# Patient Record
Sex: FEMALE | Race: WHITE | NOT HISPANIC OR LATINO | URBAN - METROPOLITAN AREA
[De-identification: names, ages, dates, MRNs, and addresses within clinical notes are randomized per-mention and may not be internally consistent; named-entity substitution may affect disease eponyms.]

---

## 2017-12-30 ENCOUNTER — INPATIENT (INPATIENT)
Facility: HOSPITAL | Age: 77
LOS: 2 days | Discharge: HOME | End: 2018-01-02
Attending: HOSPITALIST

## 2017-12-30 DIAGNOSIS — I48.0 PAROXYSMAL ATRIAL FIBRILLATION: ICD-10-CM

## 2017-12-30 DIAGNOSIS — J10.1 INFLUENZA DUE TO OTHER IDENTIFIED INFLUENZA VIRUS WITH OTHER RESPIRATORY MANIFESTATIONS: ICD-10-CM

## 2018-01-05 DIAGNOSIS — Z86.73 PERSONAL HISTORY OF TRANSIENT ISCHEMIC ATTACK (TIA), AND CEREBRAL INFARCTION WITHOUT RESIDUAL DEFICITS: ICD-10-CM

## 2018-01-05 DIAGNOSIS — I25.10 ATHEROSCLEROTIC HEART DISEASE OF NATIVE CORONARY ARTERY WITHOUT ANGINA PECTORIS: ICD-10-CM

## 2018-01-05 DIAGNOSIS — R50.9 FEVER, UNSPECIFIED: ICD-10-CM

## 2018-01-05 DIAGNOSIS — E78.00 PURE HYPERCHOLESTEROLEMIA, UNSPECIFIED: ICD-10-CM

## 2018-01-05 DIAGNOSIS — R07.9 CHEST PAIN, UNSPECIFIED: ICD-10-CM

## 2018-01-05 DIAGNOSIS — I11.0 HYPERTENSIVE HEART DISEASE WITH HEART FAILURE: ICD-10-CM

## 2018-01-05 DIAGNOSIS — G93.41 METABOLIC ENCEPHALOPATHY: ICD-10-CM

## 2018-01-05 DIAGNOSIS — I48.91 UNSPECIFIED ATRIAL FIBRILLATION: ICD-10-CM

## 2018-01-05 DIAGNOSIS — I50.9 HEART FAILURE, UNSPECIFIED: ICD-10-CM

## 2018-01-05 DIAGNOSIS — Z95.5 PRESENCE OF CORONARY ANGIOPLASTY IMPLANT AND GRAFT: ICD-10-CM

## 2018-01-05 DIAGNOSIS — J44.9 CHRONIC OBSTRUCTIVE PULMONARY DISEASE, UNSPECIFIED: ICD-10-CM

## 2018-01-05 DIAGNOSIS — J44.1 CHRONIC OBSTRUCTIVE PULMONARY DISEASE WITH (ACUTE) EXACERBATION: ICD-10-CM

## 2018-01-05 DIAGNOSIS — M19.90 UNSPECIFIED OSTEOARTHRITIS, UNSPECIFIED SITE: ICD-10-CM

## 2018-11-02 ENCOUNTER — EMERGENCY (EMERGENCY)
Facility: HOSPITAL | Age: 78
LOS: 1 days | Discharge: HOME | End: 2018-11-02
Attending: EMERGENCY MEDICINE | Admitting: EMERGENCY MEDICINE

## 2018-11-02 VITALS
RESPIRATION RATE: 16 BRPM | OXYGEN SATURATION: 95 % | SYSTOLIC BLOOD PRESSURE: 126 MMHG | TEMPERATURE: 98 F | HEART RATE: 50 BPM | DIASTOLIC BLOOD PRESSURE: 81 MMHG

## 2018-11-02 VITALS
RESPIRATION RATE: 17 BRPM | HEIGHT: 60 IN | WEIGHT: 179.9 LBS | DIASTOLIC BLOOD PRESSURE: 65 MMHG | SYSTOLIC BLOOD PRESSURE: 132 MMHG | OXYGEN SATURATION: 92 % | HEART RATE: 52 BPM

## 2018-11-02 DIAGNOSIS — Z88.1 ALLERGY STATUS TO OTHER ANTIBIOTIC AGENTS STATUS: ICD-10-CM

## 2018-11-02 DIAGNOSIS — Z79.899 OTHER LONG TERM (CURRENT) DRUG THERAPY: ICD-10-CM

## 2018-11-02 DIAGNOSIS — I10 ESSENTIAL (PRIMARY) HYPERTENSION: ICD-10-CM

## 2018-11-02 DIAGNOSIS — R10.13 EPIGASTRIC PAIN: ICD-10-CM

## 2018-11-02 DIAGNOSIS — Z88.5 ALLERGY STATUS TO NARCOTIC AGENT: ICD-10-CM

## 2018-11-02 DIAGNOSIS — J45.909 UNSPECIFIED ASTHMA, UNCOMPLICATED: ICD-10-CM

## 2018-11-02 DIAGNOSIS — E11.9 TYPE 2 DIABETES MELLITUS WITHOUT COMPLICATIONS: ICD-10-CM

## 2018-11-02 DIAGNOSIS — R10.32 LEFT LOWER QUADRANT PAIN: ICD-10-CM

## 2018-11-02 DIAGNOSIS — R42 DIZZINESS AND GIDDINESS: ICD-10-CM

## 2018-11-02 LAB
ALBUMIN SERPL ELPH-MCNC: 4.6 G/DL — SIGNIFICANT CHANGE UP (ref 3.5–5.2)
ALP SERPL-CCNC: 52 U/L — SIGNIFICANT CHANGE UP (ref 30–115)
ALT FLD-CCNC: 12 U/L — SIGNIFICANT CHANGE UP (ref 0–41)
ANION GAP SERPL CALC-SCNC: 15 MMOL/L — HIGH (ref 7–14)
APPEARANCE UR: CLEAR — SIGNIFICANT CHANGE UP
AST SERPL-CCNC: 14 U/L — SIGNIFICANT CHANGE UP (ref 0–41)
BASE EXCESS BLDV CALC-SCNC: 3.2 MMOL/L — HIGH (ref -2–2)
BASOPHILS # BLD AUTO: 0.01 K/UL — SIGNIFICANT CHANGE UP (ref 0–0.2)
BASOPHILS NFR BLD AUTO: 0.1 % — SIGNIFICANT CHANGE UP (ref 0–1)
BILIRUB SERPL-MCNC: 0.7 MG/DL — SIGNIFICANT CHANGE UP (ref 0.2–1.2)
BILIRUB UR-MCNC: NEGATIVE — SIGNIFICANT CHANGE UP
BUN SERPL-MCNC: 26 MG/DL — HIGH (ref 10–20)
CA-I SERPL-SCNC: 1.2 MMOL/L — SIGNIFICANT CHANGE UP (ref 1.12–1.3)
CALCIUM SERPL-MCNC: 9.6 MG/DL — SIGNIFICANT CHANGE UP (ref 8.5–10.1)
CHLORIDE SERPL-SCNC: 96 MMOL/L — LOW (ref 98–110)
CO2 SERPL-SCNC: 27 MMOL/L — SIGNIFICANT CHANGE UP (ref 17–32)
COLOR SPEC: YELLOW — SIGNIFICANT CHANGE UP
CREAT SERPL-MCNC: 1.1 MG/DL — SIGNIFICANT CHANGE UP (ref 0.7–1.5)
DIFF PNL FLD: NEGATIVE — SIGNIFICANT CHANGE UP
EOSINOPHIL # BLD AUTO: 0.02 K/UL — SIGNIFICANT CHANGE UP (ref 0–0.7)
EOSINOPHIL NFR BLD AUTO: 0.3 % — SIGNIFICANT CHANGE UP (ref 0–8)
EPI CELLS # UR: ABNORMAL /HPF
GAS PNL BLDV: 139 MMOL/L — SIGNIFICANT CHANGE UP (ref 136–145)
GAS PNL BLDV: SIGNIFICANT CHANGE UP
GLUCOSE SERPL-MCNC: 199 MG/DL — HIGH (ref 70–99)
GLUCOSE UR QL: 250 MG/DL
HCO3 BLDV-SCNC: 30 MMOL/L — HIGH (ref 22–29)
HCT VFR BLD CALC: 39.4 % — SIGNIFICANT CHANGE UP (ref 37–47)
HCT VFR BLDA CALC: 46.4 % — HIGH (ref 34–44)
HGB BLD CALC-MCNC: 15.1 G/DL — SIGNIFICANT CHANGE UP (ref 14–18)
HGB BLD-MCNC: 13.8 G/DL — SIGNIFICANT CHANGE UP (ref 12–16)
IMM GRANULOCYTES NFR BLD AUTO: 0.6 % — HIGH (ref 0.1–0.3)
KETONES UR-MCNC: NEGATIVE — SIGNIFICANT CHANGE UP
LACTATE BLDV-MCNC: 3.7 MMOL/L — HIGH (ref 0.5–1.6)
LEUKOCYTE ESTERASE UR-ACNC: ABNORMAL
LIDOCAIN IGE QN: 50 U/L — SIGNIFICANT CHANGE UP (ref 7–60)
LYMPHOCYTES # BLD AUTO: 2 K/UL — SIGNIFICANT CHANGE UP (ref 1.2–3.4)
LYMPHOCYTES # BLD AUTO: 28.4 % — SIGNIFICANT CHANGE UP (ref 20.5–51.1)
MCHC RBC-ENTMCNC: 32.6 PG — HIGH (ref 27–31)
MCHC RBC-ENTMCNC: 35 G/DL — SIGNIFICANT CHANGE UP (ref 32–37)
MCV RBC AUTO: 93.1 FL — SIGNIFICANT CHANGE UP (ref 81–99)
MONOCYTES # BLD AUTO: 0.42 K/UL — SIGNIFICANT CHANGE UP (ref 0.1–0.6)
MONOCYTES NFR BLD AUTO: 6 % — SIGNIFICANT CHANGE UP (ref 1.7–9.3)
NEUTROPHILS # BLD AUTO: 4.55 K/UL — SIGNIFICANT CHANGE UP (ref 1.4–6.5)
NEUTROPHILS NFR BLD AUTO: 64.6 % — SIGNIFICANT CHANGE UP (ref 42.2–75.2)
NITRITE UR-MCNC: NEGATIVE — SIGNIFICANT CHANGE UP
NRBC # BLD: 0 /100 WBCS — SIGNIFICANT CHANGE UP (ref 0–0)
PCO2 BLDV: 53 MMHG — HIGH (ref 41–51)
PH BLDV: 7.36 — SIGNIFICANT CHANGE UP (ref 7.26–7.43)
PH UR: 7 — SIGNIFICANT CHANGE UP (ref 5–8)
PLATELET # BLD AUTO: 209 K/UL — SIGNIFICANT CHANGE UP (ref 130–400)
PO2 BLDV: 34 MMHG — SIGNIFICANT CHANGE UP (ref 20–40)
POTASSIUM BLDV-SCNC: 4.3 MMOL/L — SIGNIFICANT CHANGE UP (ref 3.3–5.6)
POTASSIUM SERPL-MCNC: 4.5 MMOL/L — SIGNIFICANT CHANGE UP (ref 3.5–5)
POTASSIUM SERPL-SCNC: 4.5 MMOL/L — SIGNIFICANT CHANGE UP (ref 3.5–5)
PROT SERPL-MCNC: 7.9 G/DL — SIGNIFICANT CHANGE UP (ref 6–8)
PROT UR-MCNC: ABNORMAL MG/DL
RBC # BLD: 4.23 M/UL — SIGNIFICANT CHANGE UP (ref 4.2–5.4)
RBC # FLD: 11.8 % — SIGNIFICANT CHANGE UP (ref 11.5–14.5)
SAO2 % BLDV: 58 % — SIGNIFICANT CHANGE UP
SODIUM SERPL-SCNC: 138 MMOL/L — SIGNIFICANT CHANGE UP (ref 135–146)
SP GR SPEC: 1.02 — SIGNIFICANT CHANGE UP (ref 1.01–1.03)
TROPONIN T SERPL-MCNC: <0.01 NG/ML — SIGNIFICANT CHANGE UP
UROBILINOGEN FLD QL: 0.2 MG/DL — SIGNIFICANT CHANGE UP (ref 0.2–0.2)
WBC # BLD: 7.04 K/UL — SIGNIFICANT CHANGE UP (ref 4.8–10.8)
WBC # FLD AUTO: 7.04 K/UL — SIGNIFICANT CHANGE UP (ref 4.8–10.8)
WBC UR QL: SIGNIFICANT CHANGE UP /HPF

## 2018-11-02 RX ORDER — FAMOTIDINE 10 MG/ML
20 INJECTION INTRAVENOUS ONCE
Qty: 0 | Refills: 0 | Status: COMPLETED | OUTPATIENT
Start: 2018-11-02 | End: 2018-11-02

## 2018-11-02 RX ORDER — SODIUM CHLORIDE 9 MG/ML
125 INJECTION INTRAMUSCULAR; INTRAVENOUS; SUBCUTANEOUS
Qty: 0 | Refills: 0 | Status: DISCONTINUED | OUTPATIENT
Start: 2018-11-02 | End: 2018-11-08

## 2018-11-02 RX ADMIN — FAMOTIDINE 20 MILLIGRAM(S): 10 INJECTION INTRAVENOUS at 06:35

## 2018-11-02 RX ADMIN — SODIUM CHLORIDE 1 MILLILITER(S): 9 INJECTION INTRAMUSCULAR; INTRAVENOUS; SUBCUTANEOUS at 06:35

## 2018-11-02 NOTE — ED PROVIDER NOTE - PHYSICAL EXAMINATION
Physical Exam    Vital Signs: I have reviewed the initial vital signs.  Constitutional: well-nourished, appears stated age, no acute distress, seated on stretcher  HEENT: Conjunctiva pink, Sclera clear, PERRLA, EOMI. Mucous membranes moist, no exudates or lesions noted.  Cardiovascular: S1 and S2 present, regular rate, regular rhythm, well-perfused extremities, radial pulses equal and 2+ No peripheral edema  Respiratory: unlabored respiratory effort, clear to auscultation bilaterally no wheezing, rales and rhonchi  Gastrointestinal: soft, TTP in epigastric region and slightly tender in LLQ. Slight distention. no pulsatile mass, active bowel sounds in all 4 quadrants. - CVA tenderness. No rebound tenderness or guarding  Musculoskeletal: supple nontender neck, no midline tenderness, no joint pain  Integumentary: warm, dry, no rash  Neurologic: awake, alert, cranial nerves II-XII grossly intact, extremities’ motor and sensory functions grossly intact  Psychiatric: appropriate mood, appropriate affect

## 2018-11-02 NOTE — ED PROVIDER NOTE - CARE PROVIDER_API CALL
Sandor Covarrubias), Neurology; Neuromuscular Medicine  [address] [place] [address] [id number]  Phone: [phone]  Fax: [phone]

## 2018-11-02 NOTE — ED PROVIDER NOTE - OBJECTIVE STATEMENT
78 year old female with hx of MI, CVA, A fib, DM, HTN, HLD presenting with abdominal pain x 3 days. Patient is presenting to the ED because she was awoken from sleep this morning with nausea, dizziness, and abdominal pain. The pain is located in the epigastric region and does not radiate. She denies vomiting, diarrhea, fever, chest pain, shortness of breath, dysuria. Last bowel movement was hard and yesterday morning. Her daughter out The pain comes and goes and is not associated with eating. Patient had MI and stroke 7 years ago with no residual deficits. No hx abdominal surgeries or cardiac surgeries. Cardiologist is Dr. Morrison and PCP is Dr. Londono.

## 2018-11-02 NOTE — ED PROVIDER NOTE - NS ED ROS FT
Constitutional: (-) fever (-) weakness  Head: (-) trauma  EENT: (-) sore throat  Cardiovascular: (-) chest pain, (-) syncope  Respiratory: (-) cough, (-) shortness of breath  Gastrointestinal: (-) vomiting, (-) diarrhea (+) nausea (+) abd pain  Genitourinary: (-) dysuria  Musculoskeletal: (-) neck pain, (-) back pain, (-) joint pain  Integumentary: (-) rash, (-) edema  Neurological: (-) headache, (+) dizziness  Allergic/Immunologic: (-) pruritus

## 2018-11-02 NOTE — ED PROVIDER NOTE - ATTENDING CONTRIBUTION TO CARE
78 year old female with hx of MI, CVA, A fib, DM, HTN, HLD presenting with abdominal pain x 3 days.  felt dizzy this am. no fever. pt in nad, CNs nml, aaox3, neck sup, no bruits or thrills, ctab, rrr, ab soft, nt, nd, no focal def. no rash. will get labs, ekg, imaging, reassess.

## 2018-11-02 NOTE — ED ADULT NURSE NOTE - PMH
Afib    CVA (cerebral vascular accident)    Diabetes    MI (myocardial infarction) Afib    Asthma    CVA (cerebral vascular accident)    Diabetes    MI (myocardial infarction)

## 2018-11-02 NOTE — ED PROVIDER NOTE - PROGRESS NOTE DETAILS
pt seen and examined with Pa Fellow Shaheed; pt with epigastric and mild LLQ abdominal pain x 3 days; no rebound guarding; labs, ct abdomen/pelvis, UA; s/o to me by dr curiel - abd pain, last night. hx of copd, chf, htn, dm, - abd soft. pending ct. Sign out received from DEONDRE Purcell. Patient now comfortable, improvement in pain. Abd soft. Will f/u ct. Scans negative. Patient continues to feel well. Wants to go home. Will DC with GI/neuro Follow up. Scans negative. Patient continues to feel well. Wants to go home. Patient will f/u GI in [place]. Will DC with neuro Follow up. Gave copy of results to patient.

## 2018-11-02 NOTE — ED PROVIDER NOTE - NSFOLLOWUPINSTRUCTIONS_ED_ALL_ED_FT
Abdominal Pain    Many things can cause abdominal pain. Many times, abdominal pain is not caused by a disease and will improve without treatment. Your health care provider will do a physical exam to determine if there is a dangerous cause of your pain; blood tests and imaging may help determine the cause of your pain. However, in many cases, no cause may be found and you may need further testing as an outpatient. Monitor your abdominal pain for any changes.     SEEK IMMEDIATE MEDICAL CARE IF YOU HAVE ANY OF THE FOLLOWING SYMPTOMS: worsening abdominal pain, uncontrollable vomiting, profuse diarrhea, inability to have bowel movements or pass gas, black or bloody stools, fever accompanying chest pain or back pain, or fainting. These symptoms may represent a serious problem that is an emergency. Do not wait to see if the symptoms will go away. Get medical help right away. Call 911 and do not drive yourself to the hospital.     Dizziness    Dizziness can manifest as a feeling of unsteadiness or light-headedness. You may feel like you are about to faint. This condition can be caused by a number of things, including medicines, dehydration, or illness. Drink enough fluid to keep your urine clear or pale yellow. Do not drink alcohol and limit your caffeine intake. Avoid quick or sudden movements.  Rise slowly from chairs and steady yourself until you feel okay. In the morning, first sit up on the side of the bed.    SEEK IMMEDIATE MEDICAL CARE IF YOU HAVE ANY OF THE FOLLOWING SYMPTOMS: vomiting, changes in your vision or speech, weakness in your arms or legs, trouble speaking or swallowing, chest pain, abdominal pain, shortness of breath, sweating, bleeding, headache, neck pain, or fever.

## 2018-11-02 NOTE — ED ADULT NURSE NOTE - NSIMPLEMENTINTERV_GEN_ALL_ED
Implemented All Universal Safety Interventions:  [place] to call system. Call bell, personal items and telephone within reach. Instruct patient to call for assistance. Room bathroom lighting operational. Non-slip footwear when patient is off stretcher. Physically safe environment: no spills, clutter or unnecessary equipment. Stretcher in lowest position, wheels locked, appropriate side rails in place.

## 2018-11-03 LAB
CULTURE RESULTS: SIGNIFICANT CHANGE UP
SPECIMEN SOURCE: SIGNIFICANT CHANGE UP

## 2019-07-05 ENCOUNTER — INPATIENT (INPATIENT)
Facility: HOSPITAL | Age: 79
LOS: 1 days | Discharge: HOME | End: 2019-07-07
Attending: HOSPITALIST | Admitting: HOSPITALIST
Payer: MEDICARE

## 2019-07-05 VITALS
TEMPERATURE: 98 F | DIASTOLIC BLOOD PRESSURE: 59 MMHG | RESPIRATION RATE: 20 BRPM | SYSTOLIC BLOOD PRESSURE: 104 MMHG | OXYGEN SATURATION: 95 % | HEART RATE: 53 BPM

## 2019-07-05 LAB
ALBUMIN SERPL ELPH-MCNC: 4 G/DL — SIGNIFICANT CHANGE UP (ref 3.5–5.2)
ALP SERPL-CCNC: 53 U/L — SIGNIFICANT CHANGE UP (ref 30–115)
ALT FLD-CCNC: 26 U/L — SIGNIFICANT CHANGE UP (ref 0–41)
ANION GAP SERPL CALC-SCNC: 17 MMOL/L — HIGH (ref 7–14)
APPEARANCE UR: CLEAR — SIGNIFICANT CHANGE UP
APTT BLD: 30.2 SEC — SIGNIFICANT CHANGE UP (ref 27–39.2)
AST SERPL-CCNC: 14 U/L — SIGNIFICANT CHANGE UP (ref 0–41)
BASE EXCESS BLDV CALC-SCNC: 2.7 MMOL/L — HIGH (ref -2–2)
BASOPHILS # BLD AUTO: 0.01 K/UL — SIGNIFICANT CHANGE UP (ref 0–0.2)
BASOPHILS NFR BLD AUTO: 0.1 % — SIGNIFICANT CHANGE UP (ref 0–1)
BILIRUB SERPL-MCNC: 1.5 MG/DL — HIGH (ref 0.2–1.2)
BILIRUB UR-MCNC: NEGATIVE — SIGNIFICANT CHANGE UP
BUN SERPL-MCNC: 25 MG/DL — HIGH (ref 10–20)
CA-I SERPL-SCNC: 1.11 MMOL/L — LOW (ref 1.12–1.3)
CALCIUM SERPL-MCNC: 8.8 MG/DL — SIGNIFICANT CHANGE UP (ref 8.5–10.1)
CHLORIDE SERPL-SCNC: 96 MMOL/L — LOW (ref 98–110)
CO2 SERPL-SCNC: 23 MMOL/L — SIGNIFICANT CHANGE UP (ref 17–32)
COLOR SPEC: SIGNIFICANT CHANGE UP
CREAT SERPL-MCNC: 1 MG/DL — SIGNIFICANT CHANGE UP (ref 0.7–1.5)
DIFF PNL FLD: NEGATIVE — SIGNIFICANT CHANGE UP
EOSINOPHIL # BLD AUTO: 0 K/UL — SIGNIFICANT CHANGE UP (ref 0–0.7)
EOSINOPHIL NFR BLD AUTO: 0 % — SIGNIFICANT CHANGE UP (ref 0–8)
GAS PNL BLDV: 139 MMOL/L — SIGNIFICANT CHANGE UP (ref 136–145)
GAS PNL BLDV: SIGNIFICANT CHANGE UP
GLUCOSE SERPL-MCNC: 405 MG/DL — HIGH (ref 70–99)
GLUCOSE UR QL: 100 MG/DL
HCO3 BLDV-SCNC: 27 MMOL/L — SIGNIFICANT CHANGE UP (ref 22–29)
HCT VFR BLD CALC: 33.6 % — LOW (ref 37–47)
HCT VFR BLDA CALC: 30.1 % — LOW (ref 34–44)
HGB BLD CALC-MCNC: 9.8 G/DL — LOW (ref 14–18)
HGB BLD-MCNC: 11.7 G/DL — LOW (ref 12–16)
IMM GRANULOCYTES NFR BLD AUTO: 0.6 % — HIGH (ref 0.1–0.3)
INR BLD: 1.42 RATIO — HIGH (ref 0.65–1.3)
KETONES UR-MCNC: NEGATIVE — SIGNIFICANT CHANGE UP
LACTATE BLDV-MCNC: 2.2 MMOL/L — HIGH (ref 0.5–1.6)
LEUKOCYTE ESTERASE UR-ACNC: NEGATIVE — SIGNIFICANT CHANGE UP
LYMPHOCYTES # BLD AUTO: 0.86 K/UL — LOW (ref 1.2–3.4)
LYMPHOCYTES # BLD AUTO: 10.4 % — LOW (ref 20.5–51.1)
MAGNESIUM SERPL-MCNC: 2 MG/DL — SIGNIFICANT CHANGE UP (ref 1.8–2.4)
MCHC RBC-ENTMCNC: 32.5 PG — HIGH (ref 27–31)
MCHC RBC-ENTMCNC: 34.8 G/DL — SIGNIFICANT CHANGE UP (ref 32–37)
MCV RBC AUTO: 93.3 FL — SIGNIFICANT CHANGE UP (ref 81–99)
MONOCYTES # BLD AUTO: 0.31 K/UL — SIGNIFICANT CHANGE UP (ref 0.1–0.6)
MONOCYTES NFR BLD AUTO: 3.8 % — SIGNIFICANT CHANGE UP (ref 1.7–9.3)
NEUTROPHILS # BLD AUTO: 7.03 K/UL — HIGH (ref 1.4–6.5)
NEUTROPHILS NFR BLD AUTO: 85.1 % — HIGH (ref 42.2–75.2)
NITRITE UR-MCNC: NEGATIVE — SIGNIFICANT CHANGE UP
NRBC # BLD: 0 /100 WBCS — SIGNIFICANT CHANGE UP (ref 0–0)
NT-PROBNP SERPL-SCNC: 2341 PG/ML — HIGH (ref 0–300)
PCO2 BLDV: 40 MMHG — LOW (ref 41–51)
PH BLDV: 7.44 — HIGH (ref 7.26–7.43)
PH UR: 6 — SIGNIFICANT CHANGE UP (ref 5–8)
PLATELET # BLD AUTO: 213 K/UL — SIGNIFICANT CHANGE UP (ref 130–400)
PO2 BLDV: 64 MMHG — HIGH (ref 20–40)
POTASSIUM BLDV-SCNC: 3.8 MMOL/L — SIGNIFICANT CHANGE UP (ref 3.3–5.6)
POTASSIUM SERPL-MCNC: 3.9 MMOL/L — SIGNIFICANT CHANGE UP (ref 3.5–5)
POTASSIUM SERPL-SCNC: 3.9 MMOL/L — SIGNIFICANT CHANGE UP (ref 3.5–5)
PROT SERPL-MCNC: 6.9 G/DL — SIGNIFICANT CHANGE UP (ref 6–8)
PROT UR-MCNC: NEGATIVE — SIGNIFICANT CHANGE UP
PROTHROM AB SERPL-ACNC: 16.3 SEC — HIGH (ref 9.95–12.87)
RBC # BLD: 3.6 M/UL — LOW (ref 4.2–5.4)
RBC # FLD: 11.8 % — SIGNIFICANT CHANGE UP (ref 11.5–14.5)
SAO2 % BLDV: 94 % — SIGNIFICANT CHANGE UP
SODIUM SERPL-SCNC: 136 MMOL/L — SIGNIFICANT CHANGE UP (ref 135–146)
SP GR SPEC: 1.01 — SIGNIFICANT CHANGE UP (ref 1.01–1.03)
TROPONIN T SERPL-MCNC: <0.01 NG/ML — SIGNIFICANT CHANGE UP
UROBILINOGEN FLD QL: 0.2 — SIGNIFICANT CHANGE UP (ref 0.2–0.2)
WBC # BLD: 8.26 K/UL — SIGNIFICANT CHANGE UP (ref 4.8–10.8)
WBC # FLD AUTO: 8.26 K/UL — SIGNIFICANT CHANGE UP (ref 4.8–10.8)

## 2019-07-05 PROCEDURE — 71045 X-RAY EXAM CHEST 1 VIEW: CPT | Mod: 26

## 2019-07-05 PROCEDURE — 93010 ELECTROCARDIOGRAM REPORT: CPT

## 2019-07-05 PROCEDURE — 99285 EMERGENCY DEPT VISIT HI MDM: CPT

## 2019-07-05 RX ORDER — IPRATROPIUM/ALBUTEROL SULFATE 18-103MCG
3 AEROSOL WITH ADAPTER (GRAM) INHALATION
Refills: 0 | Status: COMPLETED | OUTPATIENT
Start: 2019-07-05 | End: 2019-07-05

## 2019-07-05 RX ORDER — IPRATROPIUM/ALBUTEROL SULFATE 18-103MCG
3 AEROSOL WITH ADAPTER (GRAM) INHALATION ONCE
Refills: 0 | Status: COMPLETED | OUTPATIENT
Start: 2019-07-05 | End: 2019-07-05

## 2019-07-05 RX ADMIN — Medication 3 MILLILITER(S): at 17:37

## 2019-07-05 RX ADMIN — Medication 3 MILLILITER(S): at 18:19

## 2019-07-05 RX ADMIN — Medication 3 MILLILITER(S): at 18:41

## 2019-07-05 RX ADMIN — Medication 125 MILLIGRAM(S): at 19:38

## 2019-07-05 NOTE — H&P ADULT - NSHPPHYSICALEXAM_GEN_ALL_CORE
PHYSICAL EXAM:  GENERAL: NAD, lying in bed comfortably  HEAD:  Atraumatic, Normocephalic  EYES: EOMI, PERRLA, conjunctiva and sclera clear  ENT: Moist mucous membranes  NECK: Supple, No JVD  CHEST/LUNG: Clear to auscultation bilaterally; No rales, rhonchi, wheezing, or rubs. Unlabored respirations  HEART: Regular rate and rhythm; No murmurs, rubs, or gallops  ABDOMEN: Bowel sounds present; Soft, Nontender, Nondistended. No hepatomegally  EXTREMITIES:  2+ Peripheral Pulses, brisk capillary refill. No clubbing, cyanosis, or edema  NERVOUS SYSTEM:  Alert & Oriented X3, speech clear. No deficits   MSK: FROM all 4 extremities, full and equal strength  SKIN: No rashes or lesions GENERAL: NAD, lying in bed comfortably  HEAD: Atraumatic, Normocephalic  EYES: EOMI, PERRLA, conjunctiva pink and cornea white  ENT: Moist mucous membranes  NECK: Supple, No JVD  CHEST/LUNG: Fine crackles on left lower lobes, no wheezing  HEART: Regular rate and rhythm; No murmurs, rubs, or gallops  ABDOMEN: Bowel sounds present; Soft, Nontender, Nondistended. No hepatomegaly  EXTREMITIES:  2+ Peripheral Pulses, brisk capillary refill. 1+ petal edema  NERVOUS SYSTEM: Alert & Oriented X3, speech clear. No deficits   MSK: FROM all 4 extremities, full and equal strength  SKIN: No rashes or lesions

## 2019-07-05 NOTE — H&P ADULT - NSICDXPASTMEDICALHX_GEN_ALL_CORE_FT
PAST MEDICAL HISTORY:  Afib     Asthma     CAD (coronary atherosclerotic disease)     Congestive heart failure Unknown EF    Diabetes

## 2019-07-05 NOTE — H&P ADULT - ASSESSMENT
79 years old female with reported PMHx of COPD, HF with unknown EF, Afib on Eliquis, CVA, Diabetes, compression fracture of L1, CAD with recent cath showing non-obstructive CAD, presented to ED for shortness of breath for the past 2 weeks.    # Acute exacerbation of heart failure with 79 years old female with reported PMHx of COPD, HF with unknown EF, Afib on Eliquis, CVA, Diabetes, compression fracture of L1, CAD with recent cath showing non-obstructive CAD, presented to ED for shortness of breath for the past 2 weeks.    # Acute exacerbation of heart failure with unknown EF  - BNP 2341, CXR shows right sided pleural effusion, pending official read  - Increase Lasix to 40mg BID  - Continue Coreg 12.5mg BID, Losartan 100mg QD  - Repeat ECHO  - Strict I/O, daily weight, fluid restriction < 1L  - Wean off O2 as tolerated    # Chronic COPD  - Stable, no wheezing on exam  - Continue Symbicort and Duoneb    # Paroxysmal atrial fibrillation  - CHADsVASc 4 (Age, Sex, CHF)  - Currently at sinus bradycardia, rate control with Coreg 25mg BID, will decrease to Coreg 12.5 BID  - Continue anticoagulation with Eliquis 2.5mg BID    # T2DM without DKA  - Glucose 405, AG 17, no ketone in urine  - Start insulin basal/bolus  - Check A1C  - Monitor FS    # CAD  - Stable  - Continue Coreg, atorvastatin, and Eliquis    # Compression fracture of L1  - Call family to bring in MRI report  - Consider Neurosx consult if needed      DVT ppx: Eliquis  GI ppx: Not indicated  Diet: DASH, carb consistent, fluid restriction  Activity: ambulate as tolerated  Code status: Full code  Dispo: acute

## 2019-07-05 NOTE — ED PROVIDER NOTE - OBJECTIVE STATEMENT
79 y.o female w/ hx of COPD, DM, CHF, a-fib, COPD presents to the ED for evaluation of dyspnea x 2 weeks.  Admits to baseline dyspnea, but has become worse over past 2 weeks.  Dyspnea worse w/ ambulation, alleviated with rest, mild severity.  No associated chest pain, hemoptysis, edema of lower extremities, calf pain, recent travel, prolonged periods of being sedentary, hx of DVT/PE.  Had recent cardiac cath earlier this year which was WNL per pt. Was told that dyspnea 2/2 COPD and was told to follow w/ pulm but has not.  Per family pt was satting mid 80s on RA after ambulating which concerned them prompting visit to the ED.  Took extra dose of lasix yesterday with some improvement of dyspnea.  + orthopnea.

## 2019-07-05 NOTE — H&P ADULT - NSHPLABSRESULTS_GEN_ALL_CORE
11.7   8.26  )-----------( 213      ( 2019 17:41 )             33.6   07-05    136  |  96<L>  |  25<H>  ----------------------------<  405<H>  3.9   |  23  |  1.0    Ca    8.8      2019 17:41  Mg     2.0     07-05    TPro  6.9  /  Alb  4.0  /  TBili  1.5<H>  /  DBili  x   /  AST  14  /  ALT  26  /  AlkPhos  53  07-05    Serum Pro-Brain Natriuretic Peptide: 2341 pg/mL (19 @ 17:41)    CARDIAC MARKERS ( 2019 17:41 )  x     / <0.01 ng/mL / x     / x     / x        Urinalysis Basic - ( 2019 21:38 )    Color: Dark Yellow / Appearance: Clear / S.015 / pH: x  Gluc: x / Ketone: Negative  / Bili: Negative / Urobili: 0.2   Blood: x / Protein: Negative / Nitrite: Negative   Leuk Esterase: Negative / RBC: x / WBC x   Sq Epi: x / Non Sq Epi: x / Bacteria: x

## 2019-07-05 NOTE — H&P ADULT - NSHPREVIEWOFSYSTEMS_GEN_ALL_CORE
REVIEW OF SYSTEMS    CONSTITUTIONAL: No weakness, fevers or chills  RESPIRATORY: No cough, wheezing, hemoptysis; No shortness of breath  CARDIOVASCULAR: No chest pain or palpitations  GASTROINTESTINAL: No abdominal or epigastric pain. No nausea, vomiting, or hematemesis; No diarrhea or constipation. No melena or hematochezia.  GENITOURINARY: No dysuria, frequency or hematuria  NEUROLOGICAL: No numbness or weakness  SKIN: No itching, rashes CONSTITUTIONAL: No weakness, fevers or chills  RESPIRATORY: Reports shortness of breath on exertion, No cough, wheezing, hemoptysis;  CARDIOVASCULAR: No chest pain or palpitations  GASTROINTESTINAL: No abdominal or epigastric pain. No nausea, vomiting, or hematemesis; No diarrhea or constipation. No melena or hematochezia.  GENITOURINARY: No dysuria, frequency or hematuria  NEUROLOGICAL: No numbness or weakness  SKIN: No itching, rashes

## 2019-07-05 NOTE — ED PROVIDER NOTE - CLINICAL SUMMARY MEDICAL DECISION MAKING FREE TEXT BOX
Labs with hyperglycemia and AG but no acidosis--pt has been off her DM meds and taking steroids for the past few days, per daughter. Pt slightly improved with nebs/steroids but still with some mild tachypnea and scattered wheezing--saturation is 96% on 3L but drops to 89-90 on RA. As pt had recent cath in [facility] and was told SOB unlikely to be cardiac, will admit for pulm eval.

## 2019-07-05 NOTE — ED ADULT NURSE NOTE - OBJECTIVE STATEMENT
pt 78 y/o female presents ED BIBA c/o SOB for along time worsening today, pt should be on Oxygen but is staying with daughter and does not have O2.

## 2019-07-05 NOTE — ED PROVIDER NOTE - ATTENDING CONTRIBUTION TO CARE
80yo woman h/o HTN, DM, COPD, afib c/o persistent SHANKS x severl months, progressively worsening over the last few weeks. Reports that she has some SOB at baseline (and she is obese), but daughter notes that she has been staying with her over the past few days and she has had a significant decline in her exercise tolerance, is SOB with just walking around apt. Saw her cardiologist within the last couple of months and had a cardiac cath, was told that the dyspnea was unlikely to be cardiac and that she needed pulmonary function testing. She denies chest pain, nausea, vomiting, fever cough. On exam she is nontoxic, but hypoxic to about 90 with minimal exertion, 94-95 on 3L O2. Lungs with decreased BS at bases, CVS1S2 abd obese, soft, NT. Trace b/l edema of the legs. Unclear if sx are pulm vs cardiac in origin, will check labs, CXR, try neb/steroids, likely admit.

## 2019-07-05 NOTE — H&P ADULT - HISTORY OF PRESENT ILLNESS
79 years old female with reported PMHx of COPD, HF with unknown EF, Afib on AC, CVA, Diabetes, CAD with recent cath showing non-obstructive CAD, presented to ED for shortness of breath for the past week.      In ED: Solumedrol 125mg x 1, Duoneb x 3 79 years old female with reported PMHx of COPD, HF with unknown EF, Afib on Eliquis, CVA, Diabetes, compression fracture of L1, CAD with recent cath showing non-obstructive CAD, presented to ED for shortness of breath for the past 2 weeks. Patient has been having exertional dyspnea for the past 2 weeks, usually when she was changing, putting on shoes and she noticed herself requiring more rest on ambulation. Patient also reports increased lower extremity swelling for the past days. Patient reports compliance to diet and medication. Due to worsening shortness of breath and LE swelling, patient's daughter told her to take 2 doses of lasix (80mg total) today but the symptoms persist. Patient denies fever/chills, cough, orthopnea, chest pain, abdominal pain, nausea/vomiting, diarrhea/constipation, or urinary symptoms.       In ED: Solumedrol 125mg x 1, Duoneb x 3 79 years old female with reported PMHx of COPD, HF with unknown EF, Afib on Eliquis, Diabetes, compression fracture of L1, CAD with recent cath showing non-obstructive CAD, presented to ED for shortness of breath for the past 2 weeks. Patient has been having exertional dyspnea for the past 2 weeks, usually when she was changing, putting on shoes and she noticed herself requiring more rest on ambulation. Patient also reports increased lower extremity swelling for the past days. Patient reports compliance to diet and medication. Due to worsening shortness of breath and LE swelling, patient's daughter told her to take 2 doses of lasix (80mg total) today but the symptoms persist. Patient denies fever/chills, cough, orthopnea, chest pain, abdominal pain, nausea/vomiting, diarrhea/constipation, or urinary symptoms.       In ED: Solumedrol 125mg x 1, Duoneb x 3

## 2019-07-05 NOTE — ED PROVIDER NOTE - PHYSICAL EXAMINATION
CONST: Well appearing in NAD  EYES: Sclera and conjunctiva clear.  CARD: Normal S1 S2; Normal rate and rhythm  RESP: + wheezing bilaterally, no accessory muscle use, speaking in full sentences without difficulty, Equal BS B/L, No distress  GI: Soft, non-tender, non-distended.  MS: Normal ROM in all extremities. No edema of lower extremities, no calf pain, radial pulses 2+ bilaterally  SKIN: Warm, dry, no acute rashes. Good turgor  NEURO: A&Ox3, No focal deficits. Strength 5/5 with no sensory deficits. Steady gait

## 2019-07-05 NOTE — ED PROVIDER NOTE - NS ED ROS FT
Constitutional: See HPI.  Eyes: No visual changes, eye pain or discharge.   ENMT: No hearing changes, pain, discharge or infections. No neck pain or stiffness. No limited ROM  Cardiac: + edema. No chest pain with exertion.  Respiratory: + SHANKS. No cough or respiratory distress. No hemoptysis.   GI: No nausea, vomiting, diarrhea or abdominal pain.  : No dysuria, frequency or burning. No Discharge  MS: No myalgia, muscle weakness, joint pain or back pain.  Neuro: No headache or weakness.   Skin: No skin rash.  Except as documented in the HPI, all other systems are negative.

## 2019-07-05 NOTE — ED ADULT NURSE NOTE - NSIMPLEMENTINTERV_GEN_ALL_ED
Implemented All Fall with Harm Risk Interventions:  [place] to call system. Call bell, personal items and telephone within reach. Instruct patient to call for assistance. Room bathroom lighting operational. Non-slip footwear when patient is off stretcher. Physically safe environment: no spills, clutter or unnecessary equipment. Stretcher in lowest position, wheels locked, appropriate side rails in place. Provide visual cue, wrist band, yellow gown, etc. Monitor gait and stability. Monitor for mental status changes and reorient to person, place, and time. Review medications for side effects contributing to fall risk. Reinforce activity limits and safety measures with patient and family. Provide visual clues: red socks.

## 2019-07-06 DIAGNOSIS — Z90.49 ACQUIRED ABSENCE OF OTHER SPECIFIED PARTS OF DIGESTIVE TRACT: Chronic | ICD-10-CM

## 2019-07-06 PROBLEM — E11.9 TYPE 2 DIABETES MELLITUS WITHOUT COMPLICATIONS: Chronic | Status: ACTIVE | Noted: 2018-11-02

## 2019-07-06 PROBLEM — J45.909 UNSPECIFIED ASTHMA, UNCOMPLICATED: Chronic | Status: ACTIVE | Noted: 2018-11-02

## 2019-07-06 LAB
ALBUMIN SERPL ELPH-MCNC: 3.8 G/DL — SIGNIFICANT CHANGE UP (ref 3.5–5.2)
ALP SERPL-CCNC: 54 U/L — SIGNIFICANT CHANGE UP (ref 30–115)
ALT FLD-CCNC: 23 U/L — SIGNIFICANT CHANGE UP (ref 0–41)
ANION GAP SERPL CALC-SCNC: 17 MMOL/L — HIGH (ref 7–14)
AST SERPL-CCNC: 10 U/L — SIGNIFICANT CHANGE UP (ref 0–41)
BASOPHILS # BLD AUTO: 0 K/UL — SIGNIFICANT CHANGE UP (ref 0–0.2)
BASOPHILS NFR BLD AUTO: 0 % — SIGNIFICANT CHANGE UP (ref 0–1)
BILIRUB SERPL-MCNC: 1.4 MG/DL — HIGH (ref 0.2–1.2)
BUN SERPL-MCNC: 34 MG/DL — HIGH (ref 10–20)
CALCIUM SERPL-MCNC: 8.8 MG/DL — SIGNIFICANT CHANGE UP (ref 8.5–10.1)
CHLORIDE SERPL-SCNC: 97 MMOL/L — LOW (ref 98–110)
CO2 SERPL-SCNC: 23 MMOL/L — SIGNIFICANT CHANGE UP (ref 17–32)
CREAT SERPL-MCNC: 1.2 MG/DL — SIGNIFICANT CHANGE UP (ref 0.7–1.5)
EOSINOPHIL # BLD AUTO: 0 K/UL — SIGNIFICANT CHANGE UP (ref 0–0.7)
EOSINOPHIL NFR BLD AUTO: 0 % — SIGNIFICANT CHANGE UP (ref 0–8)
ESTIMATED AVERAGE GLUCOSE: 174 MG/DL — HIGH (ref 68–114)
GLUCOSE BLDC GLUCOMTR-MCNC: 228 MG/DL — HIGH (ref 70–99)
GLUCOSE BLDC GLUCOMTR-MCNC: 254 MG/DL — HIGH (ref 70–99)
GLUCOSE SERPL-MCNC: 414 MG/DL — HIGH (ref 70–99)
HBA1C BLD-MCNC: 7.7 % — HIGH (ref 4–5.6)
HCT VFR BLD CALC: 35.8 % — LOW (ref 37–47)
HGB BLD-MCNC: 12.3 G/DL — SIGNIFICANT CHANGE UP (ref 12–16)
IMM GRANULOCYTES NFR BLD AUTO: 0.6 % — HIGH (ref 0.1–0.3)
LYMPHOCYTES # BLD AUTO: 0.86 K/UL — LOW (ref 1.2–3.4)
LYMPHOCYTES # BLD AUTO: 12.9 % — LOW (ref 20.5–51.1)
MAGNESIUM SERPL-MCNC: 2.3 MG/DL — SIGNIFICANT CHANGE UP (ref 1.8–2.4)
MCHC RBC-ENTMCNC: 32.1 PG — HIGH (ref 27–31)
MCHC RBC-ENTMCNC: 34.4 G/DL — SIGNIFICANT CHANGE UP (ref 32–37)
MCV RBC AUTO: 93.5 FL — SIGNIFICANT CHANGE UP (ref 81–99)
MONOCYTES # BLD AUTO: 0.09 K/UL — LOW (ref 0.1–0.6)
MONOCYTES NFR BLD AUTO: 1.3 % — LOW (ref 1.7–9.3)
NEUTROPHILS # BLD AUTO: 5.68 K/UL — SIGNIFICANT CHANGE UP (ref 1.4–6.5)
NEUTROPHILS NFR BLD AUTO: 85.2 % — HIGH (ref 42.2–75.2)
NRBC # BLD: 0 /100 WBCS — SIGNIFICANT CHANGE UP (ref 0–0)
PLATELET # BLD AUTO: 211 K/UL — SIGNIFICANT CHANGE UP (ref 130–400)
POTASSIUM SERPL-MCNC: 3.8 MMOL/L — SIGNIFICANT CHANGE UP (ref 3.5–5)
POTASSIUM SERPL-SCNC: 3.8 MMOL/L — SIGNIFICANT CHANGE UP (ref 3.5–5)
PROT SERPL-MCNC: 7.3 G/DL — SIGNIFICANT CHANGE UP (ref 6–8)
RBC # BLD: 3.83 M/UL — LOW (ref 4.2–5.4)
RBC # FLD: 11.7 % — SIGNIFICANT CHANGE UP (ref 11.5–14.5)
SODIUM SERPL-SCNC: 137 MMOL/L — SIGNIFICANT CHANGE UP (ref 135–146)
WBC # BLD: 6.67 K/UL — SIGNIFICANT CHANGE UP (ref 4.8–10.8)
WBC # FLD AUTO: 6.67 K/UL — SIGNIFICANT CHANGE UP (ref 4.8–10.8)

## 2019-07-06 PROCEDURE — 99223 1ST HOSP IP/OBS HIGH 75: CPT | Mod: AI

## 2019-07-06 PROCEDURE — 93306 TTE W/DOPPLER COMPLETE: CPT | Mod: 26

## 2019-07-06 RX ORDER — FUROSEMIDE 40 MG
40 TABLET ORAL
Refills: 0 | Status: DISCONTINUED | OUTPATIENT
Start: 2019-07-06 | End: 2019-07-07

## 2019-07-06 RX ORDER — CARVEDILOL PHOSPHATE 80 MG/1
25 CAPSULE, EXTENDED RELEASE ORAL EVERY 12 HOURS
Refills: 0 | Status: DISCONTINUED | OUTPATIENT
Start: 2019-07-06 | End: 2019-07-06

## 2019-07-06 RX ORDER — CHLORHEXIDINE GLUCONATE 213 G/1000ML
1 SOLUTION TOPICAL
Refills: 0 | Status: DISCONTINUED | OUTPATIENT
Start: 2019-07-06 | End: 2019-07-07

## 2019-07-06 RX ORDER — ROSUVASTATIN CALCIUM 5 MG/1
0 TABLET ORAL
Qty: 0 | Refills: 0 | DISCHARGE

## 2019-07-06 RX ORDER — SODIUM CHLORIDE 9 MG/ML
1000 INJECTION, SOLUTION INTRAVENOUS
Refills: 0 | Status: DISCONTINUED | OUTPATIENT
Start: 2019-07-06 | End: 2019-07-07

## 2019-07-06 RX ORDER — CARVEDILOL PHOSPHATE 80 MG/1
12.5 CAPSULE, EXTENDED RELEASE ORAL EVERY 12 HOURS
Refills: 0 | Status: DISCONTINUED | OUTPATIENT
Start: 2019-07-06 | End: 2019-07-07

## 2019-07-06 RX ORDER — DEXTROSE 50 % IN WATER 50 %
12.5 SYRINGE (ML) INTRAVENOUS ONCE
Refills: 0 | Status: DISCONTINUED | OUTPATIENT
Start: 2019-07-06 | End: 2019-07-07

## 2019-07-06 RX ORDER — DEXTROSE 50 % IN WATER 50 %
15 SYRINGE (ML) INTRAVENOUS ONCE
Refills: 0 | Status: DISCONTINUED | OUTPATIENT
Start: 2019-07-06 | End: 2019-07-07

## 2019-07-06 RX ORDER — LOSARTAN POTASSIUM 100 MG/1
1 TABLET, FILM COATED ORAL
Qty: 0 | Refills: 0 | DISCHARGE

## 2019-07-06 RX ORDER — MAGNESIUM OXIDE 400 MG ORAL TABLET 241.3 MG
400 TABLET ORAL DAILY
Refills: 0 | Status: DISCONTINUED | OUTPATIENT
Start: 2019-07-06 | End: 2019-07-07

## 2019-07-06 RX ORDER — DEXTROSE 50 % IN WATER 50 %
25 SYRINGE (ML) INTRAVENOUS ONCE
Refills: 0 | Status: DISCONTINUED | OUTPATIENT
Start: 2019-07-06 | End: 2019-07-07

## 2019-07-06 RX ORDER — RANOLAZINE 500 MG/1
0 TABLET, FILM COATED, EXTENDED RELEASE ORAL
Qty: 0 | Refills: 0 | DISCHARGE

## 2019-07-06 RX ORDER — IPRATROPIUM/ALBUTEROL SULFATE 18-103MCG
3 AEROSOL WITH ADAPTER (GRAM) INHALATION EVERY 4 HOURS
Refills: 0 | Status: DISCONTINUED | OUTPATIENT
Start: 2019-07-06 | End: 2019-07-07

## 2019-07-06 RX ORDER — FUROSEMIDE 40 MG
1 TABLET ORAL
Qty: 0 | Refills: 0 | DISCHARGE

## 2019-07-06 RX ORDER — GLUCAGON INJECTION, SOLUTION 0.5 MG/.1ML
1 INJECTION, SOLUTION SUBCUTANEOUS ONCE
Refills: 0 | Status: DISCONTINUED | OUTPATIENT
Start: 2019-07-06 | End: 2019-07-07

## 2019-07-06 RX ORDER — ATORVASTATIN CALCIUM 80 MG/1
10 TABLET, FILM COATED ORAL AT BEDTIME
Refills: 0 | Status: DISCONTINUED | OUTPATIENT
Start: 2019-07-06 | End: 2019-07-07

## 2019-07-06 RX ORDER — BUDESONIDE AND FORMOTEROL FUMARATE DIHYDRATE 160; 4.5 UG/1; UG/1
2 AEROSOL RESPIRATORY (INHALATION)
Refills: 0 | Status: DISCONTINUED | OUTPATIENT
Start: 2019-07-06 | End: 2019-07-07

## 2019-07-06 RX ORDER — INSULIN LISPRO 100/ML
4 VIAL (ML) SUBCUTANEOUS
Refills: 0 | Status: DISCONTINUED | OUTPATIENT
Start: 2019-07-06 | End: 2019-07-07

## 2019-07-06 RX ORDER — APIXABAN 2.5 MG/1
2.5 TABLET, FILM COATED ORAL
Refills: 0 | Status: DISCONTINUED | OUTPATIENT
Start: 2019-07-06 | End: 2019-07-07

## 2019-07-06 RX ORDER — INSULIN LISPRO 100/ML
VIAL (ML) SUBCUTANEOUS
Refills: 0 | Status: DISCONTINUED | OUTPATIENT
Start: 2019-07-06 | End: 2019-07-07

## 2019-07-06 RX ORDER — LOSARTAN POTASSIUM 100 MG/1
100 TABLET, FILM COATED ORAL DAILY
Refills: 0 | Status: DISCONTINUED | OUTPATIENT
Start: 2019-07-06 | End: 2019-07-06

## 2019-07-06 RX ORDER — INSULIN HUMAN 100 [IU]/ML
10 INJECTION, SOLUTION SUBCUTANEOUS ONCE
Refills: 0 | Status: COMPLETED | OUTPATIENT
Start: 2019-07-06 | End: 2019-07-06

## 2019-07-06 RX ORDER — INSULIN GLARGINE 100 [IU]/ML
13 INJECTION, SOLUTION SUBCUTANEOUS AT BEDTIME
Refills: 0 | Status: DISCONTINUED | OUTPATIENT
Start: 2019-07-06 | End: 2019-07-07

## 2019-07-06 RX ORDER — FLUTICASONE PROPIONATE 220 MCG
0 AEROSOL WITH ADAPTER (GRAM) INHALATION
Qty: 0 | Refills: 0 | DISCHARGE

## 2019-07-06 RX ORDER — IPRATROPIUM/ALBUTEROL SULFATE 18-103MCG
3 AEROSOL WITH ADAPTER (GRAM) INHALATION EVERY 6 HOURS
Refills: 0 | Status: DISCONTINUED | OUTPATIENT
Start: 2019-07-06 | End: 2019-07-07

## 2019-07-06 RX ORDER — LINAGLIPTIN 5 MG/1
0 TABLET, FILM COATED ORAL
Qty: 0 | Refills: 0 | DISCHARGE

## 2019-07-06 RX ORDER — CARVEDILOL PHOSPHATE 80 MG/1
0 CAPSULE, EXTENDED RELEASE ORAL
Qty: 0 | Refills: 0 | DISCHARGE

## 2019-07-06 RX ORDER — INSULIN LISPRO 100/ML
5 VIAL (ML) SUBCUTANEOUS ONCE
Refills: 0 | Status: COMPLETED | OUTPATIENT
Start: 2019-07-06 | End: 2019-07-06

## 2019-07-06 RX ADMIN — ATORVASTATIN CALCIUM 10 MILLIGRAM(S): 80 TABLET, FILM COATED ORAL at 21:44

## 2019-07-06 RX ADMIN — Medication 5: at 08:19

## 2019-07-06 RX ADMIN — MAGNESIUM OXIDE 400 MG ORAL TABLET 400 MILLIGRAM(S): 241.3 TABLET ORAL at 14:04

## 2019-07-06 RX ADMIN — Medication 3 MILLILITER(S): at 18:54

## 2019-07-06 RX ADMIN — APIXABAN 2.5 MILLIGRAM(S): 2.5 TABLET, FILM COATED ORAL at 07:05

## 2019-07-06 RX ADMIN — Medication 40 MILLIGRAM(S): at 15:54

## 2019-07-06 RX ADMIN — Medication 6: at 11:28

## 2019-07-06 RX ADMIN — Medication 4 UNIT(S): at 11:28

## 2019-07-06 RX ADMIN — Medication 3: at 18:29

## 2019-07-06 RX ADMIN — INSULIN GLARGINE 13 UNIT(S): 100 INJECTION, SOLUTION SUBCUTANEOUS at 21:43

## 2019-07-06 RX ADMIN — Medication 40 MILLIGRAM(S): at 21:43

## 2019-07-06 RX ADMIN — Medication 5 UNIT(S): at 13:29

## 2019-07-06 RX ADMIN — Medication 40 MILLIGRAM(S): at 18:53

## 2019-07-06 RX ADMIN — Medication 4 UNIT(S): at 08:20

## 2019-07-06 RX ADMIN — CARVEDILOL PHOSPHATE 12.5 MILLIGRAM(S): 80 CAPSULE, EXTENDED RELEASE ORAL at 18:52

## 2019-07-06 RX ADMIN — Medication 4 UNIT(S): at 18:29

## 2019-07-06 RX ADMIN — APIXABAN 2.5 MILLIGRAM(S): 2.5 TABLET, FILM COATED ORAL at 18:53

## 2019-07-07 ENCOUNTER — TRANSCRIPTION ENCOUNTER (OUTPATIENT)
Age: 79
End: 2019-07-07

## 2019-07-07 VITALS
SYSTOLIC BLOOD PRESSURE: 102 MMHG | RESPIRATION RATE: 18 BRPM | HEART RATE: 64 BPM | TEMPERATURE: 98 F | DIASTOLIC BLOOD PRESSURE: 59 MMHG

## 2019-07-07 LAB
GLUCOSE BLDC GLUCOMTR-MCNC: 251 MG/DL — HIGH (ref 70–99)
GLUCOSE BLDC GLUCOMTR-MCNC: 370 MG/DL — HIGH (ref 70–99)

## 2019-07-07 PROCEDURE — 99222 1ST HOSP IP/OBS MODERATE 55: CPT

## 2019-07-07 PROCEDURE — 99239 HOSP IP/OBS DSCHRG MGMT >30: CPT

## 2019-07-07 RX ORDER — LOSARTAN POTASSIUM 100 MG/1
1 TABLET, FILM COATED ORAL
Qty: 0 | Refills: 0 | DISCHARGE

## 2019-07-07 RX ORDER — CARVEDILOL PHOSPHATE 80 MG/1
1 CAPSULE, EXTENDED RELEASE ORAL
Qty: 0 | Refills: 0 | DISCHARGE

## 2019-07-07 RX ORDER — FUROSEMIDE 40 MG
1 TABLET ORAL
Qty: 0 | Refills: 0 | DISCHARGE

## 2019-07-07 RX ORDER — SENNA PLUS 8.6 MG/1
2 TABLET ORAL
Qty: 60 | Refills: 0
Start: 2019-07-07 | End: 2019-08-05

## 2019-07-07 RX ORDER — CARVEDILOL PHOSPHATE 80 MG/1
1 CAPSULE, EXTENDED RELEASE ORAL
Qty: 60 | Refills: 2
Start: 2019-07-07 | End: 2019-10-04

## 2019-07-07 RX ORDER — FUROSEMIDE 40 MG
1 TABLET ORAL
Qty: 60 | Refills: 0
Start: 2019-07-07 | End: 2019-08-05

## 2019-07-07 RX ORDER — ASPIRIN/CALCIUM CARB/MAGNESIUM 324 MG
1 TABLET ORAL
Qty: 30 | Refills: 0
Start: 2019-07-07 | End: 2019-08-05

## 2019-07-07 RX ORDER — FLUTICASONE PROPIONATE AND SALMETEROL 50; 250 UG/1; UG/1
1 POWDER ORAL; RESPIRATORY (INHALATION)
Qty: 0 | Refills: 0 | DISCHARGE

## 2019-07-07 RX ORDER — FLUTICASONE PROPIONATE AND SALMETEROL 50; 250 UG/1; UG/1
1 POWDER ORAL; RESPIRATORY (INHALATION)
Qty: 1 | Refills: 0
Start: 2019-07-07 | End: 2019-08-05

## 2019-07-07 RX ORDER — ATORVASTATIN CALCIUM 80 MG/1
1 TABLET, FILM COATED ORAL
Qty: 0 | Refills: 0 | DISCHARGE

## 2019-07-07 RX ORDER — PANTOPRAZOLE SODIUM 20 MG/1
1 TABLET, DELAYED RELEASE ORAL
Qty: 5 | Refills: 0
Start: 2019-07-07 | End: 2019-07-11

## 2019-07-07 RX ORDER — ATORVASTATIN CALCIUM 80 MG/1
1 TABLET, FILM COATED ORAL
Qty: 0 | Refills: 0 | DISCHARGE
Start: 2019-07-07

## 2019-07-07 RX ORDER — DOCUSATE SODIUM 100 MG
1 CAPSULE ORAL
Qty: 60 | Refills: 0
Start: 2019-07-07 | End: 2019-08-05

## 2019-07-07 RX ORDER — POLYETHYLENE GLYCOL 3350 17 G/17G
17 POWDER, FOR SOLUTION ORAL
Qty: 500 | Refills: 0
Start: 2019-07-07 | End: 2019-07-13

## 2019-07-07 RX ADMIN — Medication 40 MILLIGRAM(S): at 05:14

## 2019-07-07 RX ADMIN — Medication 4 UNIT(S): at 12:10

## 2019-07-07 RX ADMIN — CARVEDILOL PHOSPHATE 12.5 MILLIGRAM(S): 80 CAPSULE, EXTENDED RELEASE ORAL at 05:14

## 2019-07-07 RX ADMIN — Medication 3 MILLILITER(S): at 02:10

## 2019-07-07 RX ADMIN — Medication 5: at 08:01

## 2019-07-07 RX ADMIN — BUDESONIDE AND FORMOTEROL FUMARATE DIHYDRATE 2 PUFF(S): 160; 4.5 AEROSOL RESPIRATORY (INHALATION) at 12:10

## 2019-07-07 RX ADMIN — MAGNESIUM OXIDE 400 MG ORAL TABLET 400 MILLIGRAM(S): 241.3 TABLET ORAL at 12:10

## 2019-07-07 RX ADMIN — Medication 3 MILLILITER(S): at 08:48

## 2019-07-07 RX ADMIN — Medication 40 MILLIGRAM(S): at 14:13

## 2019-07-07 RX ADMIN — Medication 3: at 12:10

## 2019-07-07 RX ADMIN — APIXABAN 2.5 MILLIGRAM(S): 2.5 TABLET, FILM COATED ORAL at 05:14

## 2019-07-07 RX ADMIN — Medication 3 MILLILITER(S): at 11:35

## 2019-07-07 RX ADMIN — Medication 4 UNIT(S): at 08:01

## 2019-07-07 NOTE — PROVIDER CONTACT NOTE (OTHER) - ACTION/TREATMENT ORDERED:
As per MD, recommends Neuro consult to be placed but will address the issue during rounds in the morning

## 2019-07-07 NOTE — PROVIDER CONTACT NOTE (OTHER) - SITUATION
As per pt daughter, pt has been getting difficult and being forgetful. Daughter wants pt to be consulted

## 2019-07-07 NOTE — DISCHARGE NOTE PROVIDER - HOSPITAL COURSE
79 years old female with PMHx of COPD, HFpEF, Afib on Eliquis, Diabetes, compression fracture of L1, non-obstructive CAD presented to ED for shortness of breath for the past 2 weeks. Patient was admitted for COPD exacerbation and acute on chronic HFpEF exacerbation. Patient was treated with steroids and diuresed with Lasix. Her symptoms improved and she is saturating well on room air during ambulation. She is stable for discharge with early pulmonary follow up as outpatient. Discharged on prednisone x 5days.

## 2019-07-07 NOTE — CONSULT NOTE ADULT - SUBJECTIVE AND OBJECTIVE BOX
HPI:  79 years old female with reported PMHx of COPD, HF with unknown EF, Afib on Eliquis, Diabetes, compression fracture of L1, CAD with recent cath showing non-obstructive CAD, presented to ED for shortness of breath for the past 2 weeks. Patient has been having exertional dyspnea for the past 2 weeks, usually when she was changing, putting on shoes and she noticed herself requiring more rest on ambulation. Patient also reports increased lower extremity swelling for the past days. Patient reports compliance to diet and medication. Due to worsening shortness of breath and LE swelling, patient's daughter told her to take 2 doses of lasix (80mg total) today but the symptoms persist. Patient denies fever/chills, cough, orthopnea, chest pain, abdominal pain, nausea/vomiting, diarrhea/constipation, or urinary symptoms.     In ED: Solumedrol 125mg x 1, Duoneb x 3 (05 Jul 2019 23:13)    Pt. currently denies any worsening SOB, being treated for CHF exacerbation    PAST MEDICAL & SURGICAL HISTORY  Congestive heart failure: Unknown EF  CAD (coronary atherosclerotic disease)  Asthma  Diabetes  Afib  S/P appendectomy      FAMILY HISTORY:  FAMILY HISTORY:  No pertinent family history in first degree relatives      SOCIAL HISTORY:  []smoker  []Alcohol  []Drug    ALLERGIES:  Augmentin (Rash)  oxycodone (Pruritus)      MEDICATIONS:  MEDICATIONS  (STANDING):  ALBUTerol/ipratropium for Nebulization 3 milliLiter(s) Nebulizer every 4 hours  apixaban 2.5 milliGRAM(s) Oral two times a day  atorvastatin 10 milliGRAM(s) Oral at bedtime  buDESOnide 160 MICROgram(s)/formoterol 4.5 MICROgram(s) Inhaler 2 Puff(s) Inhalation two times a day  carvedilol 12.5 milliGRAM(s) Oral every 12 hours  chlorhexidine 4% Liquid 1 Application(s) Topical <User Schedule>  dextrose 5%. 1000 milliLiter(s) (50 mL/Hr) IV Continuous <Continuous>  dextrose 50% Injectable 12.5 Gram(s) IV Push once  dextrose 50% Injectable 25 Gram(s) IV Push once  dextrose 50% Injectable 25 Gram(s) IV Push once  furosemide    Tablet 40 milliGRAM(s) Oral two times a day  insulin glargine Injectable (LANTUS) 13 Unit(s) SubCutaneous at bedtime  insulin lispro (HumaLOG) corrective regimen sliding scale   SubCutaneous three times a day before meals  insulin lispro Injectable (HumaLOG) 4 Unit(s) SubCutaneous three times a day before meals  magnesium oxide 400 milliGRAM(s) Oral daily  methylPREDNISolone sodium succinate Injectable 40 milliGRAM(s) IV Push every 8 hours    MEDICATIONS  (PRN):  ALBUTerol/ipratropium for Nebulization. 3 milliLiter(s) Nebulizer every 6 hours PRN Shortness of Breath and/or Wheezing  dextrose 40% Gel 15 Gram(s) Oral once PRN Blood Glucose LESS THAN 70 milliGRAM(s)/deciliter  glucagon  Injectable 1 milliGRAM(s) IntraMuscular once PRN Glucose LESS THAN 70 milligrams/deciliter      HOME MEDICATIONS:  Home Medications:  Advair Diskus 250 mcg-50 mcg inhalation powder: 1 puff(s) inhaled 2 times a day (06 Jul 2019 00:22)  atorvastatin 10 mg oral tablet: 1 tab(s) orally once a day (06 Jul 2019 00:22)  carvedilol 25 mg oral tablet: 1 tab(s) orally 2 times a day (06 Jul 2019 00:22)  Eliquis 2.5 mg oral tablet: 1 tab(s) orally 2 times a day (06 Jul 2019 00:22)  furosemide 40 mg oral tablet: 1 tab(s) orally once a day (06 Jul 2019 00:22)  glipiZIDE 10 mg oral tablet, extended release: 1 tab(s) orally 2 times a day (06 Jul 2019 00:22)  ipratropium-albuterol 0.5 mg-2.5 mg/3 mLinhalation solution: 3 milliliter(s) inhaled 2 times a day (06 Jul 2019 00:22)  losartan 100 mg oral tablet: 1 tab(s) orally once a day (06 Jul 2019 00:22)  Mag-Ox 400 oral tablet: 1 tab(s) orally once a day (06 Jul 2019 00:22)  Norco 5 mg-325 mg oral tablet: 1 tab(s) orally every 8 hours, As Needed (06 Jul 2019 00:22)  Tradjenta 5 mg oral tablet: 1 tab(s) orally once a day (06 Jul 2019 00:22)      VITALS:   T(F): 95.7 (07-07 @ 00:00), Max: 97.8 (07-06 @ 19:50)  HR: 66 (07-07 @ 00:00) (51 - 69)  BP: 138/70 (07-07 @ 00:00) (90/53 - 139/60)  BP(mean): --  RR: 18 (07-07 @ 00:00) (18 - 20)  SpO2: 99% (07-06 @ 19:50) (93% - 99%)    I&O's Summary      REVIEW OF SYSTEMS:  CONSTITUTIONAL: No weakness, fevers or chills  EYES/ENT: No visual changes;  No vertigo or throat pain   NECK: No pain or stiffness  RESPIRATORY: No cough, wheezing, hemoptysis, Shortness of breath as per HPI  CARDIOVASCULAR: No chest pain or palpitations  GASTROINTESTINAL: No abdominal or epigastric pain. No nausea, vomiting, or hematemesis; No diarrhea or constipation. No melena or hematochezia.  GENITOURINARY: No dysuria, frequency or hematuria  NEUROLOGICAL: No numbness or weakness  SKIN: No itching, no rashes    PHYSICAL EXAM:  NEURO: patient is awake , alert and oriented  GEN: Not in acute distress  NECK: no thyroid enlargement, no JVD  LUNGS: Decreased breath sounds Right lower lung field  CARDIOVASCULAR: S1/S2 present, RRR , no murmurs or rubs, no carotid bruits,  + PP bilaterally  ABD: Soft, non-tender, non-distended, +BS  EXT: No VINNY  SKIN: Intact    LABS:                        12.3   6.67  )-----------( 211      ( 06 Jul 2019 06:11 )             35.8     07-06    137  |  97<L>  |  34<H>  ----------------------------<  414<H>  3.8   |  23  |  1.2    Ca    8.8      06 Jul 2019 06:11  Mg     2.3     07-06    TPro  7.3  /  Alb  3.8  /  TBili  1.4<H>  /  DBili  x   /  AST  10  /  ALT  23  /  AlkPhos  54  07-06    PT/INR - ( 05 Jul 2019 17:41 )   PT: 16.30 sec;   INR: 1.42 ratio         PTT - ( 05 Jul 2019 17:41 )  PTT:30.2 sec    CARDIAC MARKERS ( 05 Jul 2019 17:41 )  x     / <0.01 ng/mL / x     / x     / x          Serum Pro-Brain Natriuretic Peptide: 2341 pg/mL (07-05-19 @ 17:41)      RADIOLOGY:  -CXR: 7/5  Right sided pleural effusion    -TTE: 7/6   1. Normal global left ventricular systolic function.   2. LV Ejection Fraction by Bustamante's Method with a biplane EF of 66 %.   3. Normal left ventricular internal cavity size.   4. Mild thickening of the anterior and posterior mitral valve leaflets.   5. Mild to moderate mitral annular calcification.   6. Mild to moderate mitral valve regurgitation.   7. Mild to moderate aortic regurgitation.   8. Sclerotic aortic valve with normal opening.   9. Estimated pulmonary artery systolic pressure is 47.7 mmHg assuming a   right atrial pressure of 5 mmHg, which is consistent with mild pulmonary   hypertension.  10. LA volume Index is 68.5 ml/m² ml/m2.    -CATHETERIZATION:   pt. reported non-obstructive CAD (cardiac cath last month at [facility] by Dr. Morrison)    ECG:  A.Fib @ 53 bpm, Non-specific ST-T changes, anteroseptal Q waves/poor RW progression HPI:  79 years old female with reported PMHx of COPD, HF with unknown EF, Afib on Eliquis, Diabetes, compression fracture of L1, CAD with recent cath showing non-obstructive CAD, presented to ED for shortness of breath for the past 2 weeks. Patient has been having exertional dyspnea for the past 2 weeks, usually when she was changing, putting on shoes and she noticed herself requiring more rest on ambulation. Patient also reports increased lower extremity swelling for the past days. Patient reports compliance to diet and medication. Due to worsening shortness of breath and LE swelling, patient's daughter told her to take 2 doses of lasix (80mg total) today but the symptoms persist. Patient denies fever/chills, cough, orthopnea, chest pain, abdominal pain, nausea/vomiting, diarrhea/constipation, or urinary symptoms.     In ED: Solumedrol 125mg x 1, Duoneb x 3 (05 Jul 2019 23:13)    Pt. currently denies any worsening SOB, being treated for CHF exacerbation      PAST MEDICAL & SURGICAL HISTORY  Congestive heart failure: Unknown EF  CAD (coronary atherosclerotic disease)  Asthma  Diabetes  Afib  S/P appendectomy      No pertinent family history of premature CAD in first degree relatives    SOCIAL HISTORY: Denies EtOH, smoking or drug use    ALLERGIES:  Augmentin (Rash)  oxycodone (Pruritus)      MEDICATIONS:  MEDICATIONS  (STANDING):  ALBUTerol/ipratropium for Nebulization 3 milliLiter(s) Nebulizer every 4 hours  apixaban 2.5 milliGRAM(s) Oral two times a day  atorvastatin 10 milliGRAM(s) Oral at bedtime  buDESOnide 160 MICROgram(s)/formoterol 4.5 MICROgram(s) Inhaler 2 Puff(s) Inhalation two times a day  carvedilol 12.5 milliGRAM(s) Oral every 12 hours  chlorhexidine 4% Liquid 1 Application(s) Topical <User Schedule>  dextrose 5%. 1000 milliLiter(s) (50 mL/Hr) IV Continuous <Continuous>  dextrose 50% Injectable 12.5 Gram(s) IV Push once  dextrose 50% Injectable 25 Gram(s) IV Push once  dextrose 50% Injectable 25 Gram(s) IV Push once  furosemide    Tablet 40 milliGRAM(s) Oral two times a day  insulin glargine Injectable (LANTUS) 13 Unit(s) SubCutaneous at bedtime  insulin lispro (HumaLOG) corrective regimen sliding scale   SubCutaneous three times a day before meals  insulin lispro Injectable (HumaLOG) 4 Unit(s) SubCutaneous three times a day before meals  magnesium oxide 400 milliGRAM(s) Oral daily  methylPREDNISolone sodium succinate Injectable 40 milliGRAM(s) IV Push every 8 hours    MEDICATIONS  (PRN):  ALBUTerol/ipratropium for Nebulization. 3 milliLiter(s) Nebulizer every 6 hours PRN Shortness of Breath and/or Wheezing  dextrose 40% Gel 15 Gram(s) Oral once PRN Blood Glucose LESS THAN 70 milliGRAM(s)/deciliter  glucagon  Injectable 1 milliGRAM(s) IntraMuscular once PRN Glucose LESS THAN 70 milligrams/deciliter      HOME MEDICATIONS:  Home Medications:  Advair Diskus 250 mcg-50 mcg inhalation powder: 1 puff(s) inhaled 2 times a day (06 Jul 2019 00:22)  atorvastatin 10 mg oral tablet: 1 tab(s) orally once a day (06 Jul 2019 00:22)  carvedilol 25 mg oral tablet: 1 tab(s) orally 2 times a day (06 Jul 2019 00:22)  Eliquis 2.5 mg oral tablet: 1 tab(s) orally 2 times a day (06 Jul 2019 00:22)  furosemide 40 mg oral tablet: 1 tab(s) orally once a day (06 Jul 2019 00:22)  glipiZIDE 10 mg oral tablet, extended release: 1 tab(s) orally 2 times a day (06 Jul 2019 00:22)  ipratropium-albuterol 0.5 mg-2.5 mg/3 mLinhalation solution: 3 milliliter(s) inhaled 2 times a day (06 Jul 2019 00:22)  losartan 100 mg oral tablet: 1 tab(s) orally once a day (06 Jul 2019 00:22)  Mag-Ox 400 oral tablet: 1 tab(s) orally once a day (06 Jul 2019 00:22)  Norco 5 mg-325 mg oral tablet: 1 tab(s) orally every 8 hours, As Needed (06 Jul 2019 00:22)  Tradjenta 5 mg oral tablet: 1 tab(s) orally once a day (06 Jul 2019 00:22)      REVIEW OF SYSTEMS:  CONSTITUTIONAL: No fever, weight loss, fatigue  NECK: No pain or stiffness  RESPIRATORY: See HPI  CARDIOVASCULAR: See HPI  GASTROINTESTINAL: No abdominal/epigastric pain, nausea, vomiting, hematemesis, diarrhea, constipation, melena or hematochezia  GENITOURINARY: No dysuria, frequency, hematuria, incontinence  NEUROLOGICAL: No headaches, memory loss, loss of strength, numbness, tremors  SKIN: No itching, burning, rashes, lesions   ENDOCRINE: No heat/cold intolerance or hair loss  MUSCULOSKELETAL: No joint pain or swelling  HEME/LYMPH: No easy bruising or bleeding gums    VITALS:   T(F): 95.7 (07-07 @ 00:00), Max: 97.8 (07-06 @ 19:50)  HR: 66 (07-07 @ 00:00) (51 - 69)  BP: 138/70 (07-07 @ 00:00) (90/53 - 139/60)  BP(mean): --  RR: 18 (07-07 @ 00:00) (18 - 20)  SpO2: 99% (07-06 @ 19:50) (93% - 99%)      PHYSICAL EXAM:  General: NAD, AAOx3  HEENT: NCAT, EOMI, PERRLA  Neck: supple, no JVD  CV: RRR, S1S2 nl, no murmurs, no edema  Respiratory: decreased bibasilar BS, no wheeze	  Abdomen: soft, NT/ND, +BS  Extremities: ROM nl, 2+ PP bilaterally  Neuro: Nonfocal                            12.3   6.67  )-----------( 211      ( 06 Jul 2019 06:11 )             35.8     07-06    137  |  97<L>  |  34<H>  ----------------------------<  414<H>  3.8   |  23  |  1.2    Ca    8.8      06 Jul 2019 06:11  Mg     2.3     07-06    TPro  7.3  /  Alb  3.8  /  TBili  1.4<H>  /  DBili  x   /  AST  10  /  ALT  23  /  AlkPhos  54  07-06    PT/INR - ( 05 Jul 2019 17:41 )   PT: 16.30 sec;   INR: 1.42 ratio        Troponin T, Serum: <0.01 ng/mL (07.05.19 @ 17:41)  Troponin T, Serum: <0.01 ng/mL (11.02.18 @ 06:01)      Serum Pro-Brain Natriuretic Peptide: 2341 pg/mL (07-05-19 @ 17:41)      RADIOLOGY:  -CXR: 7/5  Right sided pleural effusion    -TTE: 7/6   1. Normal global left ventricular systolic function.   2. LV Ejection Fraction by Bustamante's Method with a biplane EF of 66 %.   3. Normal left ventricular internal cavity size.   4. Mild thickening of the anterior and posterior mitral valve leaflets.   5. Mild to moderate mitral annular calcification.   6. Mild to moderate mitral valve regurgitation.   7. Mild to moderate aortic regurgitation.   8. Sclerotic aortic valve with normal opening.   9. Estimated pulmonary artery systolic pressure is 47.7 mmHg assuming a   right atrial pressure of 5 mmHg, which is consistent with mild pulmonary   hypertension.  10. LA volume Index is 68.5 ml/m² ml/m2.      -CATHETERIZATION:   pt. reported non-obstructive CAD (cardiac cath last month at [facility] by Dr. Morrison)    ECG:  A.Fib @ 53 bpm, Non-specific ST-T changes, anteroseptal Q waves/poor RW progression

## 2019-07-07 NOTE — DISCHARGE NOTE NURSING/CASE MANAGEMENT/SOCIAL WORK - NSDCPEPT PROEDHF_GEN_ALL_CORE
Monitor weight daily/Call primary care provider for follow up after discharge/Low salt diet/Activities as tolerated/Report signs and symptoms to primary care provider

## 2019-07-07 NOTE — CONSULT NOTE ADULT - ASSESSMENT
79 years old female with reported PMHx of COPD, HFpEF/valvular CHF, Afib on Eliquis, Diabetes, compression fracture of L1, CAD with recent cath at [facility] showing non-obstructive CAD, presented to ED for shortness of breath for the past 2 weeks being seen by Cardiology for decompensated heart failure.    A & P:    # Acute decompensated heart failure/A.Fib on Eliquis  - HFpEF, valvular disease (mild-mod MR, and mild-mod AI)  - EKG and 2d echo reviewed  - increase lasix to 40 mg PO Q12  - needs to be on guideline directed medical therapy (ASA, statin, betablockers, ARB's)  - optimization of treatment for COPD  - [id number] of 4, c/w Eliquis for anticoagulation for A.Fib  - will discuss the case with the Attending 79 years old female with reported PMHx of COPD, HFpEF/valvular CHF, Afib on Eliquis, Diabetes, compression fracture of L1, CAD with recent cath at [facility] showing non-obstructive CAD, presented to ED for shortness of breath for the past 2 weeks being seen by Cardiology for decompensated heart failure.      # Acute HFpEF / Valvular heart disease (mild-mod MR and mild-mod AI)    - Change Lasix to 40 mg IV Q12  - Continue guideline directed medical therapy (ASA, statin, beta blockers, ARB's)  - Continue Eliquis 79 years old female with reported PMHx of COPD, HFpEF/valvular CHF, Afib on Eliquis, Diabetes, compression fracture of L1, CAD with recent cath at [facility] showing non-obstructive CAD, presented to ED for shortness of breath for the past 2 weeks being seen by Cardiology for decompensated heart failure.      # Acute on Chronic HFpEF / Valvular heart disease (mild-mod MR and mild-mod AI)    - Change Lasix to 40 mg IV Q12  - Continue guideline directed medical therapy (ASA, statin, beta blockers, ARB's)  - Continue Eliquis

## 2019-07-07 NOTE — DISCHARGE NOTE PROVIDER - NSDCCPCAREPLAN_GEN_ALL_CORE_FT
PRINCIPAL DISCHARGE DIAGNOSIS  Diagnosis: COPD exacerbation  Assessment and Plan of Treatment: prednisone 40mg daily for 5 more days  continue taking advair daily and albuterol as needed   follow up with pulmonologist      SECONDARY DISCHARGE DIAGNOSES  Diagnosis: Diabetes mellitus  Assessment and Plan of Treatment: glucose control will be difficult due to prednisone   please check fingersticks three times a day and follow up with your PMD if your fingersticks are more than 400

## 2019-07-07 NOTE — DISCHARGE NOTE NURSING/CASE MANAGEMENT/SOCIAL WORK - NSDCDPATPORTLINK_GEN_ALL_CORE
You can access the [facility] Patient Portal, offered by [facility], by registering with the following website: [email or web address]

## 2019-07-12 DIAGNOSIS — Z91.14 PATIENT'S OTHER NONCOMPLIANCE WITH MEDICATION REGIMEN: ICD-10-CM

## 2019-07-12 DIAGNOSIS — I48.0 PAROXYSMAL ATRIAL FIBRILLATION: ICD-10-CM

## 2019-07-12 DIAGNOSIS — E11.65 TYPE 2 DIABETES MELLITUS WITH HYPERGLYCEMIA: ICD-10-CM

## 2019-07-12 DIAGNOSIS — I50.33 ACUTE ON CHRONIC DIASTOLIC (CONGESTIVE) HEART FAILURE: ICD-10-CM

## 2019-07-12 DIAGNOSIS — Z86.73 PERSONAL HISTORY OF TRANSIENT ISCHEMIC ATTACK (TIA), AND CEREBRAL INFARCTION WITHOUT RESIDUAL DEFICITS: ICD-10-CM

## 2019-07-12 DIAGNOSIS — R06.02 SHORTNESS OF BREATH: ICD-10-CM

## 2019-07-12 DIAGNOSIS — I25.2 OLD MYOCARDIAL INFARCTION: ICD-10-CM

## 2019-07-12 DIAGNOSIS — R09.02 HYPOXEMIA: ICD-10-CM

## 2019-07-12 DIAGNOSIS — I25.10 ATHEROSCLEROTIC HEART DISEASE OF NATIVE CORONARY ARTERY WITHOUT ANGINA PECTORIS: ICD-10-CM

## 2019-07-12 DIAGNOSIS — Z88.8 ALLERGY STATUS TO OTHER DRUGS, MEDICAMENTS AND BIOLOGICAL SUBSTANCES: ICD-10-CM

## 2019-07-12 DIAGNOSIS — J44.1 CHRONIC OBSTRUCTIVE PULMONARY DISEASE WITH (ACUTE) EXACERBATION: ICD-10-CM

## 2019-07-12 PROBLEM — Z00.00 ENCOUNTER FOR PREVENTIVE HEALTH EXAMINATION: Status: ACTIVE | Noted: 2019-07-12

## 2019-09-09 ENCOUNTER — INPATIENT (INPATIENT)
Facility: HOSPITAL | Age: 79
LOS: 3 days | Discharge: HOME | End: 2019-09-13
Attending: INTERNAL MEDICINE | Admitting: INTERNAL MEDICINE
Payer: MEDICARE

## 2019-09-09 VITALS
OXYGEN SATURATION: 99 % | RESPIRATION RATE: 16 BRPM | DIASTOLIC BLOOD PRESSURE: 89 MMHG | SYSTOLIC BLOOD PRESSURE: 124 MMHG | HEART RATE: 152 BPM

## 2019-09-09 DIAGNOSIS — Z90.49 ACQUIRED ABSENCE OF OTHER SPECIFIED PARTS OF DIGESTIVE TRACT: Chronic | ICD-10-CM

## 2019-09-09 LAB
HCT VFR BLD CALC: 40.6 % — SIGNIFICANT CHANGE UP (ref 37–47)
HGB BLD-MCNC: 13.6 G/DL — SIGNIFICANT CHANGE UP (ref 12–16)
MCHC RBC-ENTMCNC: 32.2 PG — HIGH (ref 27–31)
MCHC RBC-ENTMCNC: 33.5 G/DL — SIGNIFICANT CHANGE UP (ref 32–37)
MCV RBC AUTO: 96.2 FL — SIGNIFICANT CHANGE UP (ref 81–99)
NRBC # BLD: 0 /100 WBCS — SIGNIFICANT CHANGE UP (ref 0–0)
PLATELET # BLD AUTO: 239 K/UL — SIGNIFICANT CHANGE UP (ref 130–400)
RBC # BLD: 4.22 M/UL — SIGNIFICANT CHANGE UP (ref 4.2–5.4)
RBC # FLD: 12.7 % — SIGNIFICANT CHANGE UP (ref 11.5–14.5)
WBC # BLD: 6.29 K/UL — SIGNIFICANT CHANGE UP (ref 4.8–10.8)
WBC # FLD AUTO: 6.29 K/UL — SIGNIFICANT CHANGE UP (ref 4.8–10.8)

## 2019-09-09 PROCEDURE — 99285 EMERGENCY DEPT VISIT HI MDM: CPT

## 2019-09-09 PROCEDURE — 93010 ELECTROCARDIOGRAM REPORT: CPT | Mod: 76

## 2019-09-09 RX ORDER — SODIUM CHLORIDE 9 MG/ML
1000 INJECTION, SOLUTION INTRAVENOUS ONCE
Refills: 0 | Status: COMPLETED | OUTPATIENT
Start: 2019-09-09 | End: 2019-09-09

## 2019-09-09 RX ORDER — DILTIAZEM HCL 120 MG
10 CAPSULE, EXT RELEASE 24 HR ORAL ONCE
Refills: 0 | Status: COMPLETED | OUTPATIENT
Start: 2019-09-09 | End: 2019-09-09

## 2019-09-09 RX ADMIN — ADENOSINE 6 MILLIGRAM(S): 3 INJECTION INTRAVENOUS at 22:50

## 2019-09-09 RX ADMIN — SODIUM CHLORIDE 1000 MILLILITER(S): 9 INJECTION, SOLUTION INTRAVENOUS at 23:00

## 2019-09-09 RX ADMIN — Medication 10 MILLIGRAM(S): at 23:05

## 2019-09-09 NOTE — ED PROVIDER NOTE - PROGRESS NOTE DETAILS
Pt found to be in SVT, 6 adenosine given with response, pt now in a-fib. 10 mg cardizem given. Patient signd outto have follow of imaging and reassess

## 2019-09-09 NOTE — ED PROVIDER NOTE - OBJECTIVE STATEMENT
The pt is a 79y F with PMH afib on eliquis, DM, COPD, CHF, asthma is presenting to ED with palpations x 1 day. Pt endorses palpations x 1 day. no aggravating or relieving factors. Daughter called PMD ([facility]) and was instructed to give 50 metoprolol and an additional 50 if pt still tachycardic. pt did not respond, so EMS was called. Pt denies cp, sob, f/c/n/v/d, abd pain, urinary symptoms, lightheadedness/dizziness, lower extremity swelling, melena, bloody stools.

## 2019-09-09 NOTE — ED PROVIDER NOTE - CLINICAL SUMMARY MEDICAL DECISION MAKING FREE TEXT BOX
Case endorsed to me by Dr. Rosas -- patient with known afib, here for assessment of palpitations, found to be in SVT. SVT resolved with adenosine and patient converted to baseline afib but rapid, which further stabilized with cardizem.    On exam had abdominal tenderness, so CT scan was done as concern for acute intrabdominal pathology as cause of arrhythmia, none noted on CT.     Labs suggestive of UTI, also notable for mild transaminitis. Will treat UTI, place on tele for rapid afib, new SVT, for cards assessment, serial trop.

## 2019-09-09 NOTE — ED PROVIDER NOTE - ATTENDING CONTRIBUTION TO CARE
79y F with PMH afib on eliquis, DM, COPD, CHF, asthma is presenting to ED with palpations x 1 day. Pt endorses palpations x 1 day. Patient came in with svt, patient has underlying afib, patient took BB prior to arrival at home, patient denies chest pain, no n/v/d, no loc, no fever, + endorses mild abdominal discomfort. Patient svt broke with adenosine.     CONSTITUTIONAL: Well-developed; well-nourished; in no acute distress. Sitting up and providing appropriate history and physical examination  SKIN: skin exam is warm and dry, no acute rash.  HEAD: Normocephalic; atraumatic.  EYES: PERRL, 3 mm bilateral, no nystagmus, EOM intact; conjunctiva and sclera clear.  ENT: No nasal discharge; airway clear.  NECK: Supple; non tender. + full passive ROM in all directions. No JVD  CARD: S1, S2 normal; no murmurs, gallops, or rubs. Regular rate and rhythm. + Symmetric Strong Pulses  RESP: No wheezes, rales or rhonchi. Good air movement bilaterally  ABD: soft; non-distended; + mild epigastric tender. No Rebound, No Guarding, No signs of peritonitis, No CVA tenderness. No pulsatile abdominal mass. + Strong and Symmetric Pulses  EXT: Normal ROM. No clubbing, cyanosis or edema. Dp and Pt Pulses intact. Cap refill less than 3 seconds  NEURO: Alert, oriented, grossly unremarkable. No Focal deficits. GCS 15. NIH 0  PSYCH: Cooperative, appropriate.

## 2019-09-09 NOTE — ED PROVIDER NOTE - PMH
Afib    Asthma    CAD (coronary atherosclerotic disease)    Congestive heart failure  Unknown EF  Diabetes

## 2019-09-09 NOTE — ED PROVIDER NOTE - NS ED ROS FT
GEN: (-) fever, (-) chills, (-) malaise  HEENT: (-) vision changes, (-) HA  CV: (-) chest pain, (+) palpitations, (-) edema  PULM: (-) cough, (-) wheezing, (-) dyspnea  GI: (-) abdominal pain,(-) Nausea, (-) Vomiting, (-) Diarrhea, (-) Melena  NEURO: (-) weakness, (-) paresthesias, (-) syncope, (-) LOC  : (-) dysuria, (-) frequency, (-) urgency, (-) incontinence   MS: (-) back pain, (-) joint pain, (-)myalgias, (-) swelling  SKIN: (-) rashes, (-) new lesions  HEME: (-) bleeding, (-) ecchymosis

## 2019-09-09 NOTE — ED PROVIDER NOTE - PHYSICAL EXAMINATION
GEN: Alert & Oriented x 3, No acute distress. Calm, appropriate.  Head and Neck: Normocephalic, atraumatic.   ENT: Oral mucosa pink, moist without lesions.   Eyes: PERRL. No pale conjunctiva No conjunctival injection. No scleral icterus.  RESP: Lungs clear to auscult bilat. no wheezes, rhonchi or rales. No retractions. Equal air entry.  CARDIO: irregular rate and rhythm, no murmurs, rubs or gallops.  Radial pulses 2+ bilaterally. No lower extremity edema.  ABD: Soft, Nondistended. No rebound tenderness/guarding. No pulsatile mass. slight tenderness with palpation to RUQ. no tenderness with palpation RLQ/LLQ/LUQ.   MS: Full ROM of extremities.   SKIN: no rashes/lesions, no petechiae, no ecchymosis.  NEURO: CN II-XII grossly intact. Strength + sensation intact x 4 extremities. Speech and cognition normal.

## 2019-09-10 PROBLEM — I25.10 ATHEROSCLEROTIC HEART DISEASE OF NATIVE CORONARY ARTERY WITHOUT ANGINA PECTORIS: Chronic | Status: ACTIVE | Noted: 2019-07-06

## 2019-09-10 LAB
ALBUMIN SERPL ELPH-MCNC: 4.2 G/DL — SIGNIFICANT CHANGE UP (ref 3.5–5.2)
ALP SERPL-CCNC: 58 U/L — SIGNIFICANT CHANGE UP (ref 30–115)
ALT FLD-CCNC: 49 U/L — HIGH (ref 0–41)
ANION GAP SERPL CALC-SCNC: 14 MMOL/L — SIGNIFICANT CHANGE UP (ref 7–14)
APPEARANCE UR: ABNORMAL
AST SERPL-CCNC: 92 U/L — HIGH (ref 0–41)
BACTERIA # UR AUTO: ABNORMAL
BILIRUB SERPL-MCNC: 0.5 MG/DL — SIGNIFICANT CHANGE UP (ref 0.2–1.2)
BILIRUB UR-MCNC: NEGATIVE — SIGNIFICANT CHANGE UP
BUN SERPL-MCNC: 20 MG/DL — SIGNIFICANT CHANGE UP (ref 10–20)
CALCIUM SERPL-MCNC: 9 MG/DL — SIGNIFICANT CHANGE UP (ref 8.5–10.1)
CHLORIDE SERPL-SCNC: 101 MMOL/L — SIGNIFICANT CHANGE UP (ref 98–110)
CO2 SERPL-SCNC: 27 MMOL/L — SIGNIFICANT CHANGE UP (ref 17–32)
COLOR SPEC: YELLOW — SIGNIFICANT CHANGE UP
CREAT SERPL-MCNC: 0.9 MG/DL — SIGNIFICANT CHANGE UP (ref 0.7–1.5)
DIFF PNL FLD: NEGATIVE — SIGNIFICANT CHANGE UP
EPI CELLS # UR: 13 /HPF — HIGH (ref 0–5)
GLUCOSE BLDC GLUCOMTR-MCNC: 147 MG/DL — HIGH (ref 70–99)
GLUCOSE BLDC GLUCOMTR-MCNC: 181 MG/DL — HIGH (ref 70–99)
GLUCOSE BLDC GLUCOMTR-MCNC: 200 MG/DL — HIGH (ref 70–99)
GLUCOSE BLDC GLUCOMTR-MCNC: 209 MG/DL — HIGH (ref 70–99)
GLUCOSE SERPL-MCNC: 281 MG/DL — HIGH (ref 70–99)
GLUCOSE UR QL: ABNORMAL
HYALINE CASTS # UR AUTO: 2 /LPF — SIGNIFICANT CHANGE UP (ref 0–7)
KETONES UR-MCNC: NEGATIVE — SIGNIFICANT CHANGE UP
LEUKOCYTE ESTERASE UR-ACNC: ABNORMAL
LIDOCAIN IGE QN: 83 U/L — HIGH (ref 7–60)
MAGNESIUM SERPL-MCNC: 1.9 MG/DL — SIGNIFICANT CHANGE UP (ref 1.8–2.4)
NITRITE UR-MCNC: NEGATIVE — SIGNIFICANT CHANGE UP
PH UR: 6.5 — SIGNIFICANT CHANGE UP (ref 5–8)
POTASSIUM SERPL-MCNC: 4.4 MMOL/L — SIGNIFICANT CHANGE UP (ref 3.5–5)
POTASSIUM SERPL-SCNC: 4.4 MMOL/L — SIGNIFICANT CHANGE UP (ref 3.5–5)
PROT SERPL-MCNC: 6.6 G/DL — SIGNIFICANT CHANGE UP (ref 6–8)
PROT UR-MCNC: SIGNIFICANT CHANGE UP
RBC CASTS # UR COMP ASSIST: 1 /HPF — SIGNIFICANT CHANGE UP (ref 0–4)
SODIUM SERPL-SCNC: 142 MMOL/L — SIGNIFICANT CHANGE UP (ref 135–146)
SP GR SPEC: 1.02 — SIGNIFICANT CHANGE UP (ref 1.01–1.02)
TROPONIN T SERPL-MCNC: <0.01 NG/ML — SIGNIFICANT CHANGE UP
TROPONIN T SERPL-MCNC: <0.01 NG/ML — SIGNIFICANT CHANGE UP
UROBILINOGEN FLD QL: SIGNIFICANT CHANGE UP
WBC UR QL: 40 /HPF — HIGH (ref 0–5)

## 2019-09-10 PROCEDURE — 99223 1ST HOSP IP/OBS HIGH 75: CPT | Mod: AI

## 2019-09-10 PROCEDURE — 71275 CT ANGIOGRAPHY CHEST: CPT | Mod: 26

## 2019-09-10 PROCEDURE — 74177 CT ABD & PELVIS W/CONTRAST: CPT | Mod: 26

## 2019-09-10 PROCEDURE — 99223 1ST HOSP IP/OBS HIGH 75: CPT

## 2019-09-10 PROCEDURE — 93010 ELECTROCARDIOGRAM REPORT: CPT

## 2019-09-10 PROCEDURE — 71045 X-RAY EXAM CHEST 1 VIEW: CPT | Mod: 26

## 2019-09-10 PROCEDURE — 93306 TTE W/DOPPLER COMPLETE: CPT | Mod: 26

## 2019-09-10 RX ORDER — LISINOPRIL 2.5 MG/1
20 TABLET ORAL DAILY
Refills: 0 | Status: DISCONTINUED | OUTPATIENT
Start: 2019-09-10 | End: 2019-09-13

## 2019-09-10 RX ORDER — INSULIN LISPRO 100/ML
6 VIAL (ML) SUBCUTANEOUS
Refills: 0 | Status: DISCONTINUED | OUTPATIENT
Start: 2019-09-10 | End: 2019-09-12

## 2019-09-10 RX ORDER — DILTIAZEM HCL 120 MG
10 CAPSULE, EXT RELEASE 24 HR ORAL ONCE
Refills: 0 | Status: COMPLETED | OUTPATIENT
Start: 2019-09-10 | End: 2019-09-10

## 2019-09-10 RX ORDER — DEXTROSE 50 % IN WATER 50 %
12.5 SYRINGE (ML) INTRAVENOUS ONCE
Refills: 0 | Status: DISCONTINUED | OUTPATIENT
Start: 2019-09-10 | End: 2019-09-13

## 2019-09-10 RX ORDER — GLUCAGON INJECTION, SOLUTION 0.5 MG/.1ML
1 INJECTION, SOLUTION SUBCUTANEOUS ONCE
Refills: 0 | Status: DISCONTINUED | OUTPATIENT
Start: 2019-09-10 | End: 2019-09-13

## 2019-09-10 RX ORDER — DILTIAZEM HCL 120 MG
60 CAPSULE, EXT RELEASE 24 HR ORAL EVERY 6 HOURS
Refills: 0 | Status: DISCONTINUED | OUTPATIENT
Start: 2019-09-10 | End: 2019-09-10

## 2019-09-10 RX ORDER — DILTIAZEM HCL 120 MG
30 CAPSULE, EXT RELEASE 24 HR ORAL EVERY 6 HOURS
Refills: 0 | Status: DISCONTINUED | OUTPATIENT
Start: 2019-09-10 | End: 2019-09-10

## 2019-09-10 RX ORDER — INSULIN LISPRO 100/ML
VIAL (ML) SUBCUTANEOUS
Refills: 0 | Status: DISCONTINUED | OUTPATIENT
Start: 2019-09-10 | End: 2019-09-13

## 2019-09-10 RX ORDER — ASPIRIN/CALCIUM CARB/MAGNESIUM 324 MG
81 TABLET ORAL DAILY
Refills: 0 | Status: DISCONTINUED | OUTPATIENT
Start: 2019-09-10 | End: 2019-09-13

## 2019-09-10 RX ORDER — SODIUM CHLORIDE 9 MG/ML
1000 INJECTION, SOLUTION INTRAVENOUS
Refills: 0 | Status: DISCONTINUED | OUTPATIENT
Start: 2019-09-10 | End: 2019-09-13

## 2019-09-10 RX ORDER — DOCUSATE SODIUM 100 MG
100 CAPSULE ORAL DAILY
Refills: 0 | Status: DISCONTINUED | OUTPATIENT
Start: 2019-09-10 | End: 2019-09-11

## 2019-09-10 RX ORDER — SENNA PLUS 8.6 MG/1
2 TABLET ORAL AT BEDTIME
Refills: 0 | Status: DISCONTINUED | OUTPATIENT
Start: 2019-09-10 | End: 2019-09-11

## 2019-09-10 RX ORDER — CARVEDILOL PHOSPHATE 80 MG/1
12.5 CAPSULE, EXTENDED RELEASE ORAL EVERY 12 HOURS
Refills: 0 | Status: DISCONTINUED | OUTPATIENT
Start: 2019-09-10 | End: 2019-09-11

## 2019-09-10 RX ORDER — IPRATROPIUM/ALBUTEROL SULFATE 18-103MCG
3 AEROSOL WITH ADAPTER (GRAM) INHALATION EVERY 6 HOURS
Refills: 0 | Status: DISCONTINUED | OUTPATIENT
Start: 2019-09-10 | End: 2019-09-13

## 2019-09-10 RX ORDER — POLYETHYLENE GLYCOL 3350 17 G/17G
17 POWDER, FOR SOLUTION ORAL
Refills: 0 | Status: DISCONTINUED | OUTPATIENT
Start: 2019-09-10 | End: 2019-09-11

## 2019-09-10 RX ORDER — ADENOSINE 3 MG/ML
6 INJECTION INTRAVENOUS ONCE
Refills: 0 | Status: COMPLETED | OUTPATIENT
Start: 2019-09-10 | End: 2019-09-09

## 2019-09-10 RX ORDER — DEXTROSE 50 % IN WATER 50 %
25 SYRINGE (ML) INTRAVENOUS ONCE
Refills: 0 | Status: DISCONTINUED | OUTPATIENT
Start: 2019-09-10 | End: 2019-09-13

## 2019-09-10 RX ORDER — ALBUTEROL 90 UG/1
2 AEROSOL, METERED ORAL EVERY 6 HOURS
Refills: 0 | Status: DISCONTINUED | OUTPATIENT
Start: 2019-09-10 | End: 2019-09-13

## 2019-09-10 RX ORDER — CEFTRIAXONE 500 MG/1
1000 INJECTION, POWDER, FOR SOLUTION INTRAMUSCULAR; INTRAVENOUS EVERY 24 HOURS
Refills: 0 | Status: COMPLETED | OUTPATIENT
Start: 2019-09-10 | End: 2019-09-10

## 2019-09-10 RX ORDER — PANTOPRAZOLE SODIUM 20 MG/1
40 TABLET, DELAYED RELEASE ORAL
Refills: 0 | Status: DISCONTINUED | OUTPATIENT
Start: 2019-09-10 | End: 2019-09-13

## 2019-09-10 RX ORDER — DILTIAZEM HCL 120 MG
60 CAPSULE, EXT RELEASE 24 HR ORAL EVERY 8 HOURS
Refills: 0 | Status: DISCONTINUED | OUTPATIENT
Start: 2019-09-10 | End: 2019-09-11

## 2019-09-10 RX ORDER — CHLORHEXIDINE GLUCONATE 213 G/1000ML
1 SOLUTION TOPICAL
Refills: 0 | Status: DISCONTINUED | OUTPATIENT
Start: 2019-09-10 | End: 2019-09-13

## 2019-09-10 RX ORDER — INSULIN GLARGINE 100 [IU]/ML
15 INJECTION, SOLUTION SUBCUTANEOUS AT BEDTIME
Refills: 0 | Status: DISCONTINUED | OUTPATIENT
Start: 2019-09-10 | End: 2019-09-12

## 2019-09-10 RX ORDER — APIXABAN 2.5 MG/1
2.5 TABLET, FILM COATED ORAL
Refills: 0 | Status: DISCONTINUED | OUTPATIENT
Start: 2019-09-10 | End: 2019-09-10

## 2019-09-10 RX ORDER — ATORVASTATIN CALCIUM 80 MG/1
10 TABLET, FILM COATED ORAL AT BEDTIME
Refills: 0 | Status: DISCONTINUED | OUTPATIENT
Start: 2019-09-10 | End: 2019-09-13

## 2019-09-10 RX ORDER — TIOTROPIUM BROMIDE 18 UG/1
1 CAPSULE ORAL; RESPIRATORY (INHALATION) DAILY
Refills: 0 | Status: DISCONTINUED | OUTPATIENT
Start: 2019-09-10 | End: 2019-09-13

## 2019-09-10 RX ORDER — BUDESONIDE AND FORMOTEROL FUMARATE DIHYDRATE 160; 4.5 UG/1; UG/1
2 AEROSOL RESPIRATORY (INHALATION)
Refills: 0 | Status: DISCONTINUED | OUTPATIENT
Start: 2019-09-10 | End: 2019-09-13

## 2019-09-10 RX ORDER — APIXABAN 2.5 MG/1
5 TABLET, FILM COATED ORAL
Refills: 0 | Status: DISCONTINUED | OUTPATIENT
Start: 2019-09-10 | End: 2019-09-13

## 2019-09-10 RX ORDER — FUROSEMIDE 40 MG
40 TABLET ORAL DAILY
Refills: 0 | Status: DISCONTINUED | OUTPATIENT
Start: 2019-09-10 | End: 2019-09-13

## 2019-09-10 RX ORDER — CEFTRIAXONE 500 MG/1
1000 INJECTION, POWDER, FOR SOLUTION INTRAMUSCULAR; INTRAVENOUS EVERY 24 HOURS
Refills: 0 | Status: DISCONTINUED | OUTPATIENT
Start: 2019-09-10 | End: 2019-09-13

## 2019-09-10 RX ORDER — DEXTROSE 50 % IN WATER 50 %
15 SYRINGE (ML) INTRAVENOUS ONCE
Refills: 0 | Status: DISCONTINUED | OUTPATIENT
Start: 2019-09-10 | End: 2019-09-13

## 2019-09-10 RX ADMIN — Medication 30 MILLIGRAM(S): at 11:48

## 2019-09-10 RX ADMIN — Medication 10 MILLIGRAM(S): at 13:05

## 2019-09-10 RX ADMIN — Medication 10 MILLIGRAM(S): at 09:46

## 2019-09-10 RX ADMIN — CARVEDILOL PHOSPHATE 12.5 MILLIGRAM(S): 80 CAPSULE, EXTENDED RELEASE ORAL at 17:43

## 2019-09-10 RX ADMIN — POLYETHYLENE GLYCOL 3350 17 GRAM(S): 17 POWDER, FOR SOLUTION ORAL at 17:43

## 2019-09-10 RX ADMIN — Medication 100 MILLIGRAM(S): at 11:48

## 2019-09-10 RX ADMIN — TIOTROPIUM BROMIDE 1 CAPSULE(S): 18 CAPSULE ORAL; RESPIRATORY (INHALATION) at 08:58

## 2019-09-10 RX ADMIN — Medication 81 MILLIGRAM(S): at 11:48

## 2019-09-10 RX ADMIN — ALBUTEROL 2 PUFF(S): 90 AEROSOL, METERED ORAL at 15:51

## 2019-09-10 RX ADMIN — INSULIN GLARGINE 15 UNIT(S): 100 INJECTION, SOLUTION SUBCUTANEOUS at 22:45

## 2019-09-10 RX ADMIN — SENNA PLUS 2 TABLET(S): 8.6 TABLET ORAL at 21:58

## 2019-09-10 RX ADMIN — CEFTRIAXONE 100 MILLIGRAM(S): 500 INJECTION, POWDER, FOR SOLUTION INTRAMUSCULAR; INTRAVENOUS at 17:45

## 2019-09-10 RX ADMIN — Medication 4: at 17:42

## 2019-09-10 RX ADMIN — CEFTRIAXONE 100 MILLIGRAM(S): 500 INJECTION, POWDER, FOR SOLUTION INTRAMUSCULAR; INTRAVENOUS at 04:30

## 2019-09-10 RX ADMIN — Medication 60 MILLIGRAM(S): at 21:58

## 2019-09-10 RX ADMIN — ALBUTEROL 2 PUFF(S): 90 AEROSOL, METERED ORAL at 08:57

## 2019-09-10 RX ADMIN — APIXABAN 5 MILLIGRAM(S): 2.5 TABLET, FILM COATED ORAL at 17:42

## 2019-09-10 RX ADMIN — ATORVASTATIN CALCIUM 10 MILLIGRAM(S): 80 TABLET, FILM COATED ORAL at 21:58

## 2019-09-10 RX ADMIN — Medication 6 UNIT(S): at 08:47

## 2019-09-10 NOTE — H&P ADULT - NSHPREVIEWOFSYSTEMS_GEN_ALL_CORE
CONSTITUTIONAL: No weakness, fevers or chills   RESPIRATORY: Reports shortness of breath, No cough, wheezing,   hemoptysis;  CARDIOVASCULAR: palpitations  GASTROINTESTINAL: No abdominal or epigastric pain. No nausea, vomiting, or  hematemesis; No diarrhea or constipation. No melena or hematochezia.  GENITOURINARY: No dysuria, frequency or hematuria  NEUROLOGICAL: No numbness or weakness  SKIN: No itching, rashes  Other Review of Systems: All other review of systems negative, except as noted  in HPI

## 2019-09-10 NOTE — ED ADULT NURSE REASSESSMENT NOTE - NS ED NURSE REASSESS COMMENT FT1
Patient received alert,oriented . Cardiac moniter show normal sinus rhythm . Will continue to moniter

## 2019-09-10 NOTE — ED ADULT NURSE NOTE - NS TRANSFER PATIENT BELONGINGS
Wrist Watch/Electronic Device (specify)/Cell Phone/PDA (specify)/yellow metal ring, earrings, necklace, shoe Ipad,  and other belongings like food/Clothing/Jewelry/Money (specify)

## 2019-09-10 NOTE — ED ADULT NURSE REASSESSMENT NOTE - NS ED NURSE REASSESS COMMENT FT1
Pt. received, alert and oriented, able to make needs known, Uzbek speaking. Pt. denied any chest pain, dizziness, headache or palpitations. No complaints of pain or discomfort. Patient explained POC with Uzbek speaking nurse at bedside, aware and in agreement. Pt. connected to cardiac monitor. Will continue to monitor.

## 2019-09-10 NOTE — H&P ADULT - NSICDXPASTMEDICALHX_GEN_ALL_CORE_FT
PAST MEDICAL HISTORY:  Afib     Asthma     CAD (coronary atherosclerotic disease)     Diabetes     Diastolic heart failure

## 2019-09-10 NOTE — H&P ADULT - ATTENDING COMMENTS
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 Symptoms started in the morning she knew she had AFib, she called her MD and instructed to take Metoprolol 50mg, but her symptoms persist so she came to ED, she has multiple episodes of palpitations in the past, she denies chest pain, SOB, cough or fever, no abdominal pain or urinary symptoms. In the ED her HR: 152,. EKG showed SVT, she was given Adenosine IV and her rhythm slow down to Afib with RVR, then she received Cardizem IV.     PHYSICAL EXAM:  GENERAL: NAD, well-developed  HEAD:  Atraumatic, Normocephalic  EYES: EOMI, PERRLA, conjunctiva and sclera clear  NECK: Supple, No JVD  CHEST/LUNG: Clear to auscultation bilaterally; No wheeze  HEART: Irregular rate and rhythm; No murmurs, rubs, or gallops  ABDOMEN: Soft, Nontender, Nondistended; Bowel sounds present  EXTREMITIES:  2+ Peripheral Pulses, No clubbing, cyanosis, or edema  PSYCH: AAOx3  NEUROLOGY: non-focal  SKIN: No rashes or lesions    A/P:   Atrial Fibrillation with RVR ;  As per patient, she has multiple episodes in the past, her symptoms are not controlled  She follow up with DR. Morrison at [facility]   Continue Coreg 12.5mg BID, add Cardizem 30mg q 6 hrs  EP consult.   Echo. Telemetry monitor.     Chronic HFpEF: stable  Continue Lasix and Coreg.     COPD/Asthma:   Stable  Lungs are clear.   Continue Albuterol prn. Symbicort and Spiriva.     HTN:   Continue Coreg, Lisinopril and Lasix.     DM type 2:

## 2019-09-10 NOTE — CONSULT NOTE ADULT - ATTENDING COMMENTS
Cardiologist: None    80 yo Citizen of the [place] speaking F with history of AFib admitted with dyspnea found to have AFib with RVR.  HR are still very rapid (currently 140 bpm).    Recommend  - Discussed ARNOLD/DCCV with patient as an option to restore sinus rhythm but pt is not interested at this time. Will try to discuss with pt's daughter. Went over risks/benefits/alternatives.  - Recommend rate control. Consider Cardizem drip.   - Check TSH and free T4  - Monitor lytes and keep K>4.0 and Mg >2.0  - Please let us know if pt changes her mind for ARNOLD/DCCV; otherwise rate control and anticoagulate with ELIQUIS 5MG BID.
I saw the patient on 10th with the residents and above care was discussed.  I was not aware that consult was not signed completely, I just did today, but consult and visit was done on 10th

## 2019-09-10 NOTE — H&P ADULT - ASSESSMENT
79 years old female with reported PMHx of asthma, HFpEF, Afib on Eliquis, TIA, Diabetes, non obstructive CAD  presented to ED for shortness of breath 2/2 afib with RVR    # Acute exacerbation of diastolic heart failure 2/2 afib with RVR  - start diltiazem 30 mg po every 6 hrs + c/w coreg 12.5 mg po bid to keep HR<110  -  cw eliquis 2.5 mg po bid  - Accurate in/ out /  daily body weight, fluid restriction < 1 l  - decrease lasix dose and monitor  - off oxygen with no signs of fluid overload on PE    # Asthma:  -wean off steroids  - c/w inhaled steroids and duonebs    # T2DM  - Start insulin basal/bolus  - Check A1C  - Monitor FS    # CAD  - Stable,   - continue with aspirin and atorvastatin    DVT ppx: Eliquis  GI ppx: Pantoprazole  diet: DASH/TLC, fluid restriction  Activity: ambulate as tolerated  Code status: Full code  Dispo: acute 79 years old female with reported PMHx of asthma, HFpEF, Afib on Eliquis, TIA, Diabetes, non obstructive CAD  presented to ED for shortness of breath 2/2 afib with RVR    # Acute exacerbation of diastolic heart failure 2/2 afib with RVR  - start diltiazem 30 mg po every 6 hrs + c/w coreg 12.5 mg po bid to keep HR<110  -  cw eliquis 2.5 mg po bid  - Accurate in/ out /  daily body weight, fluid restriction < 1 l  - decrease lasix to 40 mg po od and monitor urine output  - off oxygen with no signs of fluid overload on PE    # Asthma:  -wean off steroids  - c/w inhaled steroids and duonebs    # T2DM  - Start insulin basal/bolus  - Check A1C  - Monitor FS    # CAD  - Stable,   - continue with aspirin and atorvastatin    DVT ppx: Eliquis  GI ppx: Pantoprazole  diet: DASH/TLC, fluid restriction  Activity: ambulate as tolerated  Code status: Full code  Dispo: acute 79 years old female with reported PMHx of asthma / copd on home O2, HFpEF, Afib on Eliquis, TIA, Diabetes, non obstructive CAD  presented to ED for shortness of breath 2/2 afib with RVR    # Acute exacerbation of diastolic heart failure 2/2 afib with RVR  - start diltiazem 30 mg po every 6 hrs + c/w coreg 12.5 mg po bid to keep HR<110  -  cw eliquis 2.5 mg po bid  - Accurate in/ out /  daily body weight, fluid restriction < 1 l  - decrease lasix to 40 mg po od and monitor urine output  - off oxygen with no signs of fluid overload on PE    # Asthma:  -wean off steroids  - c/w inhaled steroids and duonebs    # T2DM  - Start insulin basal/bolus  - Check A1C  - Monitor FS    # CAD  - Stable,   - continue with aspirin and atorvastatin    DVT ppx: Eliquis  GI ppx: Pantoprazole  diet: DASH/TLC, fluid restriction  Activity: ambulate as tolerated  Code status: Full code  Dispo: acute 79 years old female with reported PMHx of asthma / copd on home O2, HFpEF, Afib on Eliquis, TIA, Diabetes, non obstructive CAD  presented to ED for shortness of breath 2/2 afib with RVR    # Acute exacerbation of diastolic heart failure 2/2 afib with RVR  - start diltiazem 30 mg po every 6 hrs + c/w coreg 12.5 mg po bid to keep HR<110  -  cw eliquis 2.5 mg po bid  - Accurate in/ out /  daily body weight, fluid restriction < 1 l  - decrease lasix to 40 mg po od and monitor urine output  - off oxygen with no signs of fluid overload on PE    # Asthma / COPD  - c/w inhaled steroids and duonebs    # T2DM  - Start insulin basal/bolus  - Check A1C  - Monitor FS    # HTN:  c/w with lisinopril 20 mg po od    # CAD  - Stable,   - continue with aspirin and atorvastatin    DVT ppx: Eliquis  GI ppx: Pantoprazole  diet: DASH/TLC, fluid restriction  Activity: ambulate as tolerated  Code status: Full code  Dispo: acute

## 2019-09-10 NOTE — H&P ADULT - HISTORY OF PRESENT ILLNESS
79 years old female with reported PMHx of Asthma??, HFpEF, Afib on Eliquis, Diabetes, non obstructive CAD, Hypertension presented to ED for shortness of breath. Patient has been having exertional dyspnea for the past 2 weeks, requiring more rest. Patient reports compliance to diet and medication. she was c/o palpitations.  she  denies fever/chills, cough, orthopnea, chest pain, abdominal pain, nausea/vomiting, diarrhea/constipation, or urinary symptoms.  in the ED patient was found to be Afib with RVR. Adenosine was given but w/o any response.  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 Patient has been having exertional dyspnea for the past 2 weeks, requiring more rest. Patient reports compliance to diet and medication. she was c/o palpitations.  she  denies fever/chills, cough, orthopnea, chest pain, abdominal pain, nausea/vomiting, diarrhea/constipation, or urinary symptoms.  in the ED patient was found to be Afib with RVR. Adenosine was given but w/o any response.  cardizem 10 mg was given after that with adequate rate control  70-80  hemodynamically stable, EKG low rate Afib, no ST-T wave changes, 2 sets trop negative

## 2019-09-10 NOTE — ED ADULT NURSE REASSESSMENT NOTE - NS ED NURSE REASSESS COMMENT FT1
Patient report given to ora STAFFORD . Patient is alert,oriented . Daughter at bedside .Patient made aware of need to be on moniter with the help of daughter .Patient agrees to the plan of care

## 2019-09-10 NOTE — CONSULT NOTE ADULT - ASSESSMENT
78yo  with PAF, admitted with AF RVR, now rate controlled on Cardizem    con't tele  check TSH free T4  Echo  Maintain electrolytes K>4.0 Mg >2.0  kristian control with cardizem, uptitrate as tolerates  Conver to long acting  f/u as out pt for possible ablation  d/w attending

## 2019-09-10 NOTE — CONSULT NOTE ADULT - SUBJECTIVE AND OBJECTIVE BOX
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 Patient has been having exertional dyspnea for the past 2 weeks, requiring more rest. Patient reports compliance to diet and medication. she was c/o palpitations.  she  denies fever/chills, cough, orthopnea, chest pain, abdominal pain, nausea/vomiting, diarrhea/constipation, or urinary symptoms.  in the ED patient was found to be Afib with RVR. Adenosine was given but w/o any response.  cardizem 10 mg was given after that with adequate rate control  70-80  hemodynamically stable, EKG low rate Afib, no ST-T wave changes, 2 sets trop negative (10 Sep 2019 08:30)      Electrophysiology:  79y Female    REVIEW OF SYSTEMS    [ ] A ten-point review of systems was otherwise negative except as noted.  [ ] Due to altered mental status/intubation, subjective information were not able to be obtained from the patient. History was obtained, to the extent possible, from review of the chart and collateral sources of information.      PAST MEDICAL & SURGICAL HISTORY:  Diastolic heart failure  CAD (coronary atherosclerotic disease)  Asthma  Diabetes  Afib  S/P appendectomy      Home Medications:  atorvastatin 10 mg oral tablet: 1 tab(s) orally once a day (at bedtime) (10 Sep 2019 01:39)  Eliquis 2.5 mg oral tablet: 1 tab(s) orally 2 times a day (10 Sep 2019 01:39)  glipiZIDE 10 mg oral tablet, extended release: 1 tab(s) orally 2 times a day (10 Sep 2019 01:39)  ipratropium-albuterol 0.5 mg-2.5 mg/3 mLinhalation solution: 3 milliliter(s) inhaled 2 times a day (10 Sep 2019 01:39)  Mag-Ox 400 oral tablet: 1 tab(s) orally once a day (10 Sep 2019 01:39)  Norco 5 mg-325 mg oral tablet: 1 tab(s) orally every 8 hours, As Needed (10 Sep 2019 01:39)  Tradjenta 5 mg oral tablet: 1 tab(s) orally once a day (10 Sep 2019 01:39)      Allergies:    Augmentin (Rash)  oxycodone (Pruritus)      PREVIOUS DIAGNOSTIC TESTING:      ECHO  FINDINGS:    STRESS  FINDINGS:    CATHETERIZATION  FINDINGS:    ELECTROPHYSIOLOGY STUDY  FINDINGS:    CAROTID ULTRASOUND:  FINDINGS    VENOUS DUPLEX SCAN:  FINDINGS:    CHEST CT PULMONARY ANGIO with IV Contrast:  FINDINGS:    FAMILY HISTORY:  No pertinent family history in first degree relatives      SOCIAL HISTORY:    CIGARETTES:    ALCOHOL:      MEDICATIONS  (STANDING):  ALBUTerol    90 MICROgram(s) HFA Inhaler 2 Puff(s) Inhalation every 6 hours  apixaban 2.5 milliGRAM(s) Oral two times a day  aspirin enteric coated 81 milliGRAM(s) Oral daily  atorvastatin 10 milliGRAM(s) Oral at bedtime  buDESOnide 160 MICROgram(s)/formoterol 4.5 MICROgram(s) Inhaler - Peds 2 Puff(s) Inhalation two times a day  carvedilol 12.5 milliGRAM(s) Oral every 12 hours  cefTRIAXone   IVPB 1000 milliGRAM(s) IV Intermittent every 24 hours  chlorhexidine 4% Liquid 1 Application(s) Topical <User Schedule>  dextrose 5%. 1000 milliLiter(s) (50 mL/Hr) IV Continuous <Continuous>  dextrose 50% Injectable 12.5 Gram(s) IV Push once  dextrose 50% Injectable 25 Gram(s) IV Push once  dextrose 50% Injectable 25 Gram(s) IV Push once  diltiazem    Tablet 30 milliGRAM(s) Oral every 6 hours  docusate sodium Oral Tab/Cap - Peds 100 milliGRAM(s) Oral daily  furosemide    Tablet 40 milliGRAM(s) Oral daily  insulin glargine Injectable (LANTUS) 15 Unit(s) SubCutaneous at bedtime  insulin lispro (HumaLOG) corrective regimen sliding scale   SubCutaneous three times a day before meals  lisinopril 20 milliGRAM(s) Oral daily  pantoprazole    Tablet 40 milliGRAM(s) Oral before breakfast  polyethylene glycol 3350 17 Gram(s) Oral two times a day  senna 8.6 milliGRAM(s) Oral Tablet - Peds 2 Tablet(s) Oral at bedtime  tiotropium 18 MICROgram(s) Capsule 1 Capsule(s) Inhalation daily    MEDICATIONS  (PRN):  dextrose 40% Gel 15 Gram(s) Oral once PRN Blood Glucose LESS THAN 70 milliGRAM(s)/deciliter  glucagon  Injectable 1 milliGRAM(s) IntraMuscular once PRN Glucose LESS THAN 70 milligrams/deciliter  insulin lispro Injectable (HumaLOG) 6 Unit(s) SubCutaneous three times a day before meals PRN if finger stick > 200      Vital Signs Last 24 Hrs  T(C): 35.9 (10 Sep 2019 10:31), Max: 36.6 (10 Sep 2019 03:11)  T(F): 96.6 (10 Sep 2019 10:31), Max: 97.9 (10 Sep 2019 03:11)  HR: 80 (10 Sep 2019 10:31) (76 - 152)  BP: 130/68 (10 Sep 2019 10:) (116/77 - 151/90)  BP(mean): 88 (10 Sep 2019 09:44) (88 - 88)  RR: 20 (10 Sep 2019 10:) (16 - 20)  SpO2: 96% (10 Sep 2019 10:) (96% - 100%)    PHYSICAL EXAM:    GENERAL: In no apparent distress, well nourished, and hydrated.  HEAD:  Atraumatic, Normocephalic  EYES: EOMI, PERRLA, conjunctiva and sclera clear  NECK: Supple and normal thyroid.  No JVD or carotid bruit.  Carotid pulse is 2+ bilaterally.  HEART: Regular rate and rhythm; No murmurs, rubs, or gallops.  PULMONARY: Clear to auscultation and perfusion.  No rales, wheezing, or rhonchi bilaterally.  ABDOMEN: Soft, Nontender, Nondistended; Bowel sounds present  EXTREMITIES:  2+ Peripheral Pulses, No clubbing, cyanosis, or edema  NEUROLOGICAL: Grossly nonfocal      INTERPRETATION OF TELEMETRY:    ECG:    I&O's Detail      LABS:                        13.6   6.29  )-----------( 239      ( 09 Sep 2019 23:26 )             40.6     -    142  |  101  |  20  ----------------------------<  281<H>  4.4   |  27  |  0.9    Ca    9.0      09 Sep 2019 23:26  Mg     1.9         TPro  6.6  /  Alb  4.2  /  TBili  0.5  /  DBili  x   /  AST  92<H>  /  ALT  49<H>  /  AlkPhos  58      CARDIAC MARKERS ( 09 Sep 2019 23:26 )  x     / <0.01 ng/mL / x     / x     / x            Urinalysis Basic - ( 10 Sep 2019 00:35 )    Color: Yellow / Appearance: Slightly Turbid / S.016 / pH: x  Gluc: x / Ketone: Negative  / Bili: Negative / Urobili: <2 mg/dL   Blood: x / Protein: Trace / Nitrite: Negative   Leuk Esterase: Large / RBC: 1 /HPF / WBC 40 /HPF   Sq Epi: x / Non Sq Epi: 13 /HPF / Bacteria: Few      BNP      I&O's Detail    Daily     Daily     RADIOLOGY & ADDITIONAL STUDIES: Patient is a 79y old  Female who presents with a chief complaint of shortness of breath (10 Sep 2019 08:30)        HPI:  79 years old female with reported PMHx of 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 Patient has been having exertional dyspnea for the past 2 weeks, requiring more rest. Patient reports compliance to diet and medication. she was c/o palpitations.  she  denies fever/chills, cough, orthopnea, chest pain, abdominal pain, nausea/vomiting, diarrhea/constipation, or urinary symptoms.  in the ED patient was found to be Afib with RVR. Adenosine was given but w/o any response.  cardizem 10 mg was given after that with adequate rate control  70-80  hemodynamically stable, EKG low rate Afib, no ST-T wave changes, 2 sets trop negative (10 Sep 2019 08:30)      Electrophysiology (spoke with pt and dtr):  79y Female with h/o PAF, on Eliquis and Coreg, COPD on Albuterol and Atrovent, h/o CVA (no residuals) and MI 6yrs ago in [facility], DM, HTN, developed episode of SOB, palpitations and chest tightness, took extra Coreg as instructed by PMD (Dr Garcia) and some relieve, was doing well on  then on Monday had similar episode not relieved by extra Coreg, and presented to ED. In ED was found to be in ?SVT (no strip available) treated with Adenosine 6mg, slowed down to be recognized AF RVR, received Cardizem 10mg IVP with great rate response and started on PO.  Patient has long standing PAF, usually rate controlled Symptomatic with episodes.      Dtr [facility] [phone]      REVIEW OF SYSTEMS    [x] A ten-point review of systems was otherwise negative except as noted.      PAST MEDICAL & SURGICAL HISTORY:  Diastolic heart failure  CAD (coronary atherosclerotic disease)  [facility]  patent coronaries  Asthma  Diabetes  Afib  S/P appendectomy      Home Medications:  atorvastatin 10 mg oral tablet: 1 tab(s) orally once a day (at bedtime) (10 Sep 2019 01:39)  Eliquis 2.5 mg oral tablet: 1 tab(s) orally 2 times a day (10 Sep 2019 01:39)  glipiZIDE 10 mg oral tablet, extended release: 1 tab(s) orally 2 times a day (10 Sep 2019 01:39)  ipratropium-albuterol 0.5 mg-2.5 mg/3 mLinhalation solution: 3 milliliter(s) inhaled 2 times a day (10 Sep 2019 01:39)  Mag-Ox 400 oral tablet: 1 tab(s) orally once a day (10 Sep 2019 01:39)  Norco 5 mg-325 mg oral tablet: 1 tab(s) orally every 8 hours, As Needed (10 Sep 2019 01:39)  Tradjenta 5 mg oral tablet: 1 tab(s) orally once a day (10 Sep 2019 01:39)      Allergies:    Augmentin (Rash)  oxycodone (Pruritus)      PREVIOUS DIAGNOSTIC TESTING:      ECHO  FINDINGS:  < from: Transthoracic Echocardiogram (19 @ 09:13) >   1. Normal global left ventricular systolic function.   2. LV Ejection Fraction by Bustamante's Method with a biplane EF of 66 %.   3. Normal left ventricular internal cavity size.   4. Mild thickening of the anterior and posterior mitral valve leaflets.   5. Mild to moderate mitral annular calcification.   6. Mild to moderate mitral valve regurgitation.   7. Mild to moderate aortic regurgitation.   8. Sclerotic aortic valve with normal opening.   9. Estimated pulmonary artery systolic pressure is 47.7 mmHg assuming a   right atrial pressure of 5 mmHg, which is consistent with mild pulmonary   hypertension.  10. LA volume Index is 68.5 ml/m² ml/m2.    < end of copied text >    STRESS  FINDINGS:    CATHETERIZATION  FINDINGS:  [facility]  [email or web address] : patent coronaries    ELECTROPHYSIOLOGY STUDY  FINDINGS:    CAROTID ULTRASOUND:  FINDINGS    VENOUS DUPLEX SCAN:  FINDINGS:    CHEST CT PULMONARY ANGIO with IV Contrast:  FINDINGS:    FAMILY HISTORY:  No pertinent family history in first degree relatives      SOCIAL HISTORY: denies tobacco / ETOH / illicit drug use    CIGARETTES:    ALCOHOL:      MEDICATIONS  (STANDING):  ALBUTerol    90 MICROgram(s) HFA Inhaler 2 Puff(s) Inhalation every 6 hours  apixaban 2.5 milliGRAM(s) Oral two times a day  aspirin enteric coated 81 milliGRAM(s) Oral daily  atorvastatin 10 milliGRAM(s) Oral at bedtime  buDESOnide 160 MICROgram(s)/formoterol 4.5 MICROgram(s) Inhaler - Peds 2 Puff(s) Inhalation two times a day  carvedilol 12.5 milliGRAM(s) Oral every 12 hours  cefTRIAXone   IVPB 1000 milliGRAM(s) IV Intermittent every 24 hours  chlorhexidine 4% Liquid 1 Application(s) Topical <User Schedule>  dextrose 5%. 1000 milliLiter(s) (50 mL/Hr) IV Continuous <Continuous>  dextrose 50% Injectable 12.5 Gram(s) IV Push once  dextrose 50% Injectable 25 Gram(s) IV Push once  dextrose 50% Injectable 25 Gram(s) IV Push once  diltiazem    Tablet 30 milliGRAM(s) Oral every 6 hours  docusate sodium Oral Tab/Cap - Peds 100 milliGRAM(s) Oral daily  furosemide    Tablet 40 milliGRAM(s) Oral daily  insulin glargine Injectable (LANTUS) 15 Unit(s) SubCutaneous at bedtime  insulin lispro (HumaLOG) corrective regimen sliding scale   SubCutaneous three times a day before meals  lisinopril 20 milliGRAM(s) Oral daily  pantoprazole    Tablet 40 milliGRAM(s) Oral before breakfast  polyethylene glycol 3350 17 Gram(s) Oral two times a day  senna 8.6 milliGRAM(s) Oral Tablet - Peds 2 Tablet(s) Oral at bedtime  tiotropium 18 MICROgram(s) Capsule 1 Capsule(s) Inhalation daily    MEDICATIONS  (PRN):  dextrose 40% Gel 15 Gram(s) Oral once PRN Blood Glucose LESS THAN 70 milliGRAM(s)/deciliter  glucagon  Injectable 1 milliGRAM(s) IntraMuscular once PRN Glucose LESS THAN 70 milligrams/deciliter  insulin lispro Injectable (HumaLOG) 6 Unit(s) SubCutaneous three times a day before meals PRN if finger stick > 200      Vital Signs Last 24 Hrs  T(C): 35.9 (10 Sep 2019 10:31), Max: 36.6 (10 Sep 2019 03:11)  T(F): 96.6 (10 Sep 2019 10:31), Max: 97.9 (10 Sep 2019 03:11)  HR: 80 (10 Sep 2019 10:31) (76 - 152)  BP: 130/68 (10 Sep 2019 10:31) (116/77 - 151/90)  BP(mean): 88 (10 Sep 2019 09:44) (88 - 88)  RR: 20 (10 Sep 2019 10:31) (16 - 20)  SpO2: 96% (10 Sep 2019 10:31) (96% - 100%)    PHYSICAL EXAM:    GENERAL: In no apparent distress, well nourished, and hydrated.  HEAD:  Atraumatic, Normocephalic  EYES: EOMI, PERRLA, conjunctiva and sclera clear  NECK: Supple and normal thyroid.  No JVD or carotid bruit.  Carotid pulse is 2+ bilaterally.  HEART: IRRegular rate and rhythm; No murmurs, rubs, or gallops.  PULMONARY: Clear to auscultation and perfusion.  No rales, wheezing, or rhonchi bilaterally.  ABDOMEN: Soft, Nontender, Nondistended; Bowel sounds present  EXTREMITIES:  2+ Peripheral Pulses, No clubbing, cyanosis, or edema  NEUROLOGICAL: Grossly nonfocal      INTERPRETATION OF TELEMETRY:    ECG:  < from: 12 Lead ECG (19 @ 23:26) >  Ventricular Rate 146 BPM    Atrial Rate 147 BPM    QRS Duration 86 ms    Q-T Interval 320 ms    QTC Calculation(Bezet) 498 ms    R Axis -33 degrees    T Axis 198 degrees    Diagnosis Line Atrial fibrillation with rapid ventricular response  Left axis deviation  Septal infarct , age undetermined  ST & T wave abnormality, consider inferolateral ischemia  Abnormal ECG    < end of copied text >    < from: 12 Lead ECG (19 @ 23:09) >  Ventricular Rate 152 BPM    Atrial Rate 234 BPM    QRS Duration 90 ms    Q-T Interval 292 ms    QTC Calculation(Bezet) 464 ms    R Axis -42 degrees    T Axis 174 degrees    Diagnosis Line Supraventricular tachycardia  Left axis deviation  Left anterior fascicular block  Septal infarct , age undetermined  ST & T wave abnormality, consider inferolateral ischemia  Abnormal ECG    < end of copied text >      I&O's Detail      LABS:                        13.6   6.29  )-----------( 239      ( 09 Sep 2019 23:26 )             40.6     -    142  |  101  |  20  ----------------------------<  281<H>  4.4   |  27  |  0.9    Ca    9.0      09 Sep 2019 23:26  Mg     1.9         TPro  6.6  /  Alb  4.2  /  TBili  0.5  /  DBili  x   /  AST  92<H>  /  ALT  49<H>  /  AlkPhos  58      CARDIAC MARKERS ( 09 Sep 2019 23:26 )  x     / <0.01 ng/mL / x     / x     / x            Urinalysis Basic - ( 10 Sep 2019 00:35 )    Color: Yellow / Appearance: Slightly Turbid / S.016 / pH: x  Gluc: x / Ketone: Negative  / Bili: Negative / Urobili: <2 mg/dL   Blood: x / Protein: Trace / Nitrite: Negative   Leuk Esterase: Large / RBC: 1 /HPF / WBC 40 /HPF   Sq Epi: x / Non Sq Epi: 13 /HPF / Bacteria: Few      BNP      I&O's Detail    Daily     Daily     RADIOLOGY & ADDITIONAL STUDIES: Patient is a 79y old  Female who presents with a chief complaint of shortness of breath (10 Sep 2019 08:30)        HPI:  79 years old female with reported PMHx of 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 Patient has been having exertional dyspnea for the past 2 weeks, requiring more rest. Patient reports compliance to diet and medication. she was c/o palpitations.  she  denies fever/chills, cough, orthopnea, chest pain, abdominal pain, nausea/vomiting, diarrhea/constipation, or urinary symptoms.  in the ED patient was found to be Afib with RVR. Adenosine was given but w/o any response.  cardizem 10 mg was given after that with adequate rate control  70-80  hemodynamically stable, EKG low rate Afib, no ST-T wave changes, 2 sets trop negative (10 Sep 2019 08:30)      Electrophysiology (spoke with pt and dtr):  79y Female with h/o PAF, on Eliquis and Coreg, COPD on Albuterol and Atrovent, h/o CVA (no residuals) and MI 6yrs ago in [facility], DM, HTN, developed episode of SOB, palpitations and chest tightness, took extra Coreg as instructed by PMD (Dr Garcia) and some relieve, was doing well on  then on Monday had similar episode not relieved by extra Coreg, and presented to ED. In ED was found to be in ?SVT (no strip available) treated with Adenosine 6mg, slowed down to be recognized AF RVR, received Cardizem 10mg IVP with great rate response and started on PO.  Patient has long standing PAF, usually rate controlled Symptomatic with episodes.      Dtr [facility] [phone]      REVIEW OF SYSTEMS    [x] A ten-point review of systems was otherwise negative except as noted.      PAST MEDICAL & SURGICAL HISTORY:  Diastolic heart failure  CAD (coronary atherosclerotic disease)  [facility]  patent coronaries  Asthma  Diabetes  Afib  S/P appendectomy      Home Medications:  atorvastatin 10 mg oral tablet: 1 tab(s) orally once a day (at bedtime) (10 Sep 2019 01:39)  Eliquis 2.5 mg oral tablet: 1 tab(s) orally 2 times a day (10 Sep 2019 01:39)  glipiZIDE 10 mg oral tablet, extended release: 1 tab(s) orally 2 times a day (10 Sep 2019 01:39)  ipratropium-albuterol 0.5 mg-2.5 mg/3 mLinhalation solution: 3 milliliter(s) inhaled 2 times a day (10 Sep 2019 01:39)  Mag-Ox 400 oral tablet: 1 tab(s) orally once a day (10 Sep 2019 01:39)  Norco 5 mg-325 mg oral tablet: 1 tab(s) orally every 8 hours, As Needed (10 Sep 2019 01:39)  Tradjenta 5 mg oral tablet: 1 tab(s) orally once a day (10 Sep 2019 01:39)      Allergies:    Augmentin (Rash)  oxycodone (Pruritus)      PREVIOUS DIAGNOSTIC TESTING:      ECHO  FINDINGS:  < from: Transthoracic Echocardiogram (19 @ 09:13) >   1. Normal global left ventricular systolic function.   2. LV Ejection Fraction by Bustamante's Method with a biplane EF of 66 %.   3. Normal left ventricular internal cavity size.   4. Mild thickening of the anterior and posterior mitral valve leaflets.   5. Mild to moderate mitral annular calcification.   6. Mild to moderate mitral valve regurgitation.   7. Mild to moderate aortic regurgitation.   8. Sclerotic aortic valve with normal opening.   9. Estimated pulmonary artery systolic pressure is 47.7 mmHg assuming a   right atrial pressure of 5 mmHg, which is consistent with mild pulmonary   hypertension.  10. LA volume Index is 68.5 ml/m² ml/m2.    < end of copied text >    CATHETERIZATION  FINDINGS:  [facility]  [email or web address] : patent coronaries    FAMILY HISTORY:  No pertinent family history in first degree relatives      SOCIAL HISTORY: denies tobacco / ETOH / illicit drug use    CIGARETTES:    ALCOHOL:      MEDICATIONS  (STANDING):  ALBUTerol    90 MICROgram(s) HFA Inhaler 2 Puff(s) Inhalation every 6 hours  apixaban 2.5 milliGRAM(s) Oral two times a day  aspirin enteric coated 81 milliGRAM(s) Oral daily  atorvastatin 10 milliGRAM(s) Oral at bedtime  buDESOnide 160 MICROgram(s)/formoterol 4.5 MICROgram(s) Inhaler - Peds 2 Puff(s) Inhalation two times a day  carvedilol 12.5 milliGRAM(s) Oral every 12 hours  cefTRIAXone   IVPB 1000 milliGRAM(s) IV Intermittent every 24 hours  chlorhexidine 4% Liquid 1 Application(s) Topical <User Schedule>  dextrose 5%. 1000 milliLiter(s) (50 mL/Hr) IV Continuous <Continuous>  dextrose 50% Injectable 12.5 Gram(s) IV Push once  dextrose 50% Injectable 25 Gram(s) IV Push once  dextrose 50% Injectable 25 Gram(s) IV Push once  diltiazem    Tablet 30 milliGRAM(s) Oral every 6 hours  docusate sodium Oral Tab/Cap - Peds 100 milliGRAM(s) Oral daily  furosemide    Tablet 40 milliGRAM(s) Oral daily  insulin glargine Injectable (LANTUS) 15 Unit(s) SubCutaneous at bedtime  insulin lispro (HumaLOG) corrective regimen sliding scale   SubCutaneous three times a day before meals  lisinopril 20 milliGRAM(s) Oral daily  pantoprazole    Tablet 40 milliGRAM(s) Oral before breakfast  polyethylene glycol 3350 17 Gram(s) Oral two times a day  senna 8.6 milliGRAM(s) Oral Tablet - Peds 2 Tablet(s) Oral at bedtime  tiotropium 18 MICROgram(s) Capsule 1 Capsule(s) Inhalation daily    MEDICATIONS  (PRN):  dextrose 40% Gel 15 Gram(s) Oral once PRN Blood Glucose LESS THAN 70 milliGRAM(s)/deciliter  glucagon  Injectable 1 milliGRAM(s) IntraMuscular once PRN Glucose LESS THAN 70 milligrams/deciliter  insulin lispro Injectable (HumaLOG) 6 Unit(s) SubCutaneous three times a day before meals PRN if finger stick > 200      Vital Signs Last 24 Hrs  T(C): 35.9 (10 Sep 2019 10:31), Max: 36.6 (10 Sep 2019 03:11)  T(F): 96.6 (10 Sep 2019 10:31), Max: 97.9 (10 Sep 2019 03:11)  HR: 80 (10 Sep 2019 10:31) (76 - 152)  BP: 130/68 (10 Sep 2019 10:31) (116/77 - 151/90)  BP(mean): 88 (10 Sep 2019 09:44) (88 - 88)  RR: 20 (10 Sep 2019 10:31) (16 - 20)  SpO2: 96% (10 Sep 2019 10:31) (96% - 100%)    PHYSICAL EXAM:    GENERAL: In no apparent distress, well nourished, and hydrated.  HEAD:  Atraumatic, Normocephalic  EYES: EOMI, PERRLA, conjunctiva and sclera clear  NECK:  No JVD   HEART: IRRegular rate and rhythm; tachycardic  PULMONARY: Clear to auscultation and perfusion.  No rales, wheezing, or rhonchi bilaterally.  ABDOMEN: Soft, Nontender, Nondistended; Bowel sounds present  EXTREMITIES:  2+ Peripheral Pulses, No clubbing, cyanosis, or edema  NEUROLOGICAL: Grossly nonfocal      INTERPRETATION OF TELEMETRY:    ECG:  < from: 12 Lead ECG (19 @ 23:26) >  Ventricular Rate 146 BPM    Atrial Rate 147 BPM    QRS Duration 86 ms    Q-T Interval 320 ms    QTC Calculation(Bezet) 498 ms    R Axis -33 degrees    T Axis 198 degrees    Diagnosis Line Atrial fibrillation with rapid ventricular response  Left axis deviation  Septal infarct , age undetermined  ST & T wave abnormality, consider inferolateral ischemia  Abnormal ECG    < end of copied text >    < from: 12 Lead ECG (19 @ 23:09) >  Ventricular Rate 152 BPM    Atrial Rate 234 BPM    QRS Duration 90 ms    Q-T Interval 292 ms    QTC Calculation(Bezet) 464 ms    R Axis -42 degrees    T Axis 174 degrees    Diagnosis Line Supraventricular tachycardia  Left axis deviation  Left anterior fascicular block  Septal infarct , age undetermined  ST & T wave abnormality, consider inferolateral ischemia  Abnormal ECG    < end of copied text >      I&O's Detail      LABS:                        13.6   6.29  )-----------( 239      ( 09 Sep 2019 23:26 )             40.6         142  |  101  |  20  ----------------------------<  281<H>  4.4   |  27  |  0.9    Ca    9.0      09 Sep 2019 23:26  Mg     1.9         TPro  6.6  /  Alb  4.2  /  TBili  0.5  /  DBili  x   /  AST  92<H>  /  ALT  49<H>  /  AlkPhos  58      CARDIAC MARKERS ( 09 Sep 2019 23:26 )  x     / <0.01 ng/mL / x     / x     / x            Urinalysis Basic - ( 10 Sep 2019 00:35 )    Color: Yellow / Appearance: Slightly Turbid / S.016 / pH: x  Gluc: x / Ketone: Negative  / Bili: Negative / Urobili: <2 mg/dL   Blood: x / Protein: Trace / Nitrite: Negative   Leuk Esterase: Large / RBC: 1 /HPF / WBC 40 /HPF   Sq Epi: x / Non Sq Epi: 13 /HPF / Bacteria: Few      BNP      I&O's Detail    Daily     Daily     RADIOLOGY & ADDITIONAL STUDIES:

## 2019-09-10 NOTE — H&P ADULT - NSHPPHYSICALEXAM_GEN_ALL_CORE
VITAL SIGNS:  Vital Signs Last 24 Hrs  T(C): 36.6 (10 Sep 2019 03:11), Max: 36.6 (10 Sep 2019 03:11)  T(F): 97.9 (10 Sep 2019 03:11), Max: 97.9 (10 Sep 2019 03:11)  HR: 92 (10 Sep 2019 05:51) (76 - 152)  BP: 151/90 (10 Sep 2019 03:11) (123/93 - 151/90)  BP(mean): --  RR: 16 (10 Sep 2019 03:11) (16 - 16)  SpO2: 99% (10 Sep 2019 03:11) (99% - 100%)    conscious, oriented, Hungarian speaker lady  no JVD, no carotid bruits  heart: Irregular S1S2, no murmurs  lungs : clear breath sounds bilaterally  abdomen: soft, non tender, non distended, +BS  no LLE, + peripheral pulses

## 2019-09-10 NOTE — ED ADULT NURSE NOTE - CHPI ED NUR SYMPTOMS NEG
no chest pain/no congestion/no back pain/no syncope/no dizziness/no shortness of breath/no chills/no diaphoresis/no fever/no vomiting/no nausea

## 2019-09-10 NOTE — ED ADULT NURSE NOTE - OBJECTIVE STATEMENT
pt is an 78 yo F pmHx afib on eliquis, DM, COPD, CHF, asthma p/w palpitations x 1 day. pt daughter called pmd and was informed to give 50 mg of metoprolol and 50 more if no response. pt didn't respond and ems was called. pt denies cp, sob, dizziness, n/v/d, fever, chills.

## 2019-09-10 NOTE — CONSULT NOTE ADULT - SUBJECTIVE AND OBJECTIVE BOX
Date of Admission: 09-10-19    CHIEF COMPLAINT: RVR/SVT     HISTORY OF PRESENT ILLNESS:     79y Female with h/o PAF, on Eliquis and Coreg, COPD on Albuterol and Atrovent and home oxygen, h/o CVA (no residuals) and MI 6yrs ago in [facility], DM, HTN, developed episode of SOB, palpitations and chest tightness, took extra Coreg as instructed by PMD (Dr Garcia) and some relieve, was doing well on Sunday then on Monday had similar episode not relieved by extra Coreg, and presented to ED. In ED was found to be in ?SVT (no strip available) treated with Adenosine 6mg, slowed down to be recognized AF RVR, received Cardizem 10mg IVP with great rate response and started on PO.    Spoke with the patient with , she says she presented due to RUQ pain radiating to her back and shoulder and worsened by food. She denies new shortness, palpitations or chest pain. She says she felt these symptoms when her grandson passed away a few months ago and whenever she remembers that. She says that she is chronically short of breath on ambulation and has chronic orthopnea and sleeps almost at 90 degrees however this is all at baseline. She denies a history of stroke and is not aware of a history of CHF or atrial fibrillation and says that if it is in the chart it must be true.     PAST MEDICAL & SURGICAL HISTORY  Diastolic heart failure  CAD (coronary atherosclerotic disease)  Asthma  Diabetes  Afib  S/P appendectomy      FAMILY HISTORY:  FAMILY HISTORY:  No pertinent family history in first degree relatives      SOCIAL HISTORY:  [-] ex smoker  [-]Alcohol  [-]Drug    ROS:  Negative except as mentioned in HPI    ALLERGIES:  Augmentin (Rash)  oxycodone (Pruritus)      MEDICATIONS:  MEDICATIONS  (STANDING):  ALBUTerol    90 MICROgram(s) HFA Inhaler 2 Puff(s) Inhalation every 6 hours  apixaban 5 milliGRAM(s) Oral two times a day  aspirin enteric coated 81 milliGRAM(s) Oral daily  atorvastatin 10 milliGRAM(s) Oral at bedtime  buDESOnide 160 MICROgram(s)/formoterol 4.5 MICROgram(s) Inhaler - Peds 2 Puff(s) Inhalation two times a day  carvedilol 12.5 milliGRAM(s) Oral every 12 hours  cefTRIAXone   IVPB 1000 milliGRAM(s) IV Intermittent every 24 hours  chlorhexidine 4% Liquid 1 Application(s) Topical <User Schedule>  dextrose 5%. 1000 milliLiter(s) (50 mL/Hr) IV Continuous <Continuous>  dextrose 50% Injectable 12.5 Gram(s) IV Push once  dextrose 50% Injectable 25 Gram(s) IV Push once  dextrose 50% Injectable 25 Gram(s) IV Push once  diltiazem    Tablet 60 milliGRAM(s) Oral every 8 hours  docusate sodium Oral Tab/Cap - Peds 100 milliGRAM(s) Oral daily  furosemide    Tablet 40 milliGRAM(s) Oral daily  insulin glargine Injectable (LANTUS) 15 Unit(s) SubCutaneous at bedtime  insulin lispro (HumaLOG) corrective regimen sliding scale   SubCutaneous three times a day before meals  lisinopril 20 milliGRAM(s) Oral daily  pantoprazole    Tablet 40 milliGRAM(s) Oral before breakfast  polyethylene glycol 3350 17 Gram(s) Oral two times a day  predniSONE   Tablet 40 milliGRAM(s) Oral daily  senna 8.6 milliGRAM(s) Oral Tablet - Peds 2 Tablet(s) Oral at bedtime  tiotropium 18 MICROgram(s) Capsule 1 Capsule(s) Inhalation daily    MEDICATIONS  (PRN):  ALBUTerol/ipratropium for Nebulization. 3 milliLiter(s) Nebulizer every 6 hours PRN Shortness of Breath and/or Wheezing  dextrose 40% Gel 15 Gram(s) Oral once PRN Blood Glucose LESS THAN 70 milliGRAM(s)/deciliter  glucagon  Injectable 1 milliGRAM(s) IntraMuscular once PRN Glucose LESS THAN 70 milligrams/deciliter  insulin lispro Injectable (HumaLOG) 6 Unit(s) SubCutaneous three times a day before meals PRN if finger stick > 200      HOME MEDICATIONS:  Home Medications:  atorvastatin 10 mg oral tablet: 1 tab(s) orally once a day (at bedtime) (10 Sep 2019 01:39)  Eliquis 2.5 mg oral tablet: 1 tab(s) orally 2 times a day (10 Sep 2019 01:39)  glipiZIDE 10 mg oral tablet, extended release: 1 tab(s) orally 2 times a day (10 Sep 2019 01:39)  ipratropium-albuterol 0.5 mg-2.5 mg/3 mLinhalation solution: 3 milliliter(s) inhaled 2 times a day (10 Sep 2019 01:39)  Mag-Ox 400 oral tablet: 1 tab(s) orally once a day (10 Sep 2019 01:39)  Norco 5 mg-325 mg oral tablet: 1 tab(s) orally every 8 hours, As Needed (10 Sep 2019 01:39)  Tradjenta 5 mg oral tablet: 1 tab(s) orally once a day (10 Sep 2019 01:39)      VITALS:   T(F): 97.5 (09-10 @ 16:18), Max: 97.9 (09-10 @ 03:11)  HR: 87 (09-10 @ 16:18) (76 - 152)  BP: 164/86 (09-10 @ 16:18) (116/77 - 170/102)  BP(mean): 88 (09-10 @ 09:44) (88 - 88)  RR: 18 (09-10 @ 16:18) (16 - 20)  SpO2: 98% (09-10 @ 16:18) (96% - 100%)    I&O's Summary      PHYSICAL EXAM:  GEN: Not in acute distress, lying in bed   LUNGS: Bilateral exp and insp wheezing   CARDIOVASCULAR: Irregularly irregular rhythm, S1/S2 present, no murmurs, rubs or gallops, no JVD, + PP bilaterally  ABD: Soft, non-tender, non-distended, bowel sounds present   EXT: No VINNY  SKIN: Intact  NEURO: AAOx3, non focal     LABS:                        13.6   6.29  )-----------( 239      ( 09 Sep 2019 23:26 )             40.6     09-09    142  |  101  |  20  ----------------------------<  281<H>  4.4   |  27  |  0.9    Ca    9.0      09 Sep 2019 23:26  Mg     1.9     09-09    TPro  6.6  /  Alb  4.2  /  TBili  0.5  /  DBili  x   /  AST  92<H>  /  ALT  49<H>  /  AlkPhos  58  09-09      Troponin T, Serum: <0.01 ng/mL (09-10-19 @ 11:41)  Troponin T, Serum: <0.01 ng/mL (09-09-19 @ 23:26)    Troponin <0.01, CKMB --, CK --/ 09-10-19 @ 11:41  Troponin <0.01, CKMB --, CK --/ 09-09-19 @ 23:26    Hemoglobin A1C   Thyroid      RADIOLOGY:  -CXR:  < from: Xray Chest 1 View AP/PA (09.10.19 @ 01:15) >  Impression:      No radiographic evidence of acute cardiopulmonary disease.    < end of copied text >	    -TTE:  < from: Transthoracic Echocardiogram (09.10.19 @ 14:29) >  Summary:   1. Normal global left ventricular systolic function.   2. LV EjectionFraction by Bustamante's Method with a biplane EF of 65 %.   3. Elevated mean left atrial pressure.   4. Moderate concentric left ventricular hypertrophy.   5. Moderately increased LV wall thickness.   6. Normal left ventricular internal cavity size.   7. The left ventricular diastolic function could not be assessed in this   study.   8. Moderate mitral annular calcification.   9. Mild aortic regurgitation.  10. Estimated pulmonary artery systolic pressure is 62.8 mmHg assuming a   right atrial pressure of 3 mmHg, which is consistent with severe   pulmonary hypertension.  11. Pulmonary hypertension is present.  12. LA volume Index is 29.2 ml/m² ml/m2.    -CCTA:  -STRESS TEST:  -CATHETERIZATION:    ECG:  < from: 12 Lead ECG (09.09.19 @ 23:26) >  Diagnosis Line Atrial fibrillation with rapid ventricular response  Left axis deviation  Septal infarct , age undetermined  ST & T wave abnormality, consider inferolateral ischemia  Abnormal ECG    < end of copied text >    < from: 12 Lead ECG (09.09.19 @ 23:09) >  Diagnosis Line Supraventricular tachycardia  Left axis deviation  Left anterior fascicular block  Septal infarct , age undetermined  ST & T wave abnormality, consider inferolateral ischemia  Abnormal ECG    < end of copied text >    TELEMETRY EVENTS:  A fib with RVR Date of Admission: 09-10-19    CHIEF COMPLAINT: RVR/SVT     HISTORY OF PRESENT ILLNESS:     As per the patient's daughter: 79y Female with h/o PAF, on Eliquis and Coreg, COPD on Albuterol and Atrovent and home oxygen, h/o CVA (no residuals) and MI 6yrs ago in [place], DM, HTN, developed episode of SOB, palpitations and chest tightness, took extra Coreg as instructed by PMD (Dr Garcia) and some relieve, was doing well on Sunday then on Monday had similar episode not relieved by extra Coreg, and presented to ED. In ED was found to be in ?SVT (no strip available) treated with Adenosine 6mg, slowed down to be recognized AF RVR, received Cardizem 10mg IVP with great rate response and started on PO.    Spoke with the patient with , she says she presented due to RUQ pain radiating to her back and shoulder and worsened by food. She denies new shortness, palpitations or chest pain. She says she felt these symptoms when her grandson passed away a few months ago and whenever she remembers that. She says that she is chronically short of breath on ambulation and has chronic orthopnea and sleeps almost at 90 degrees however this is all at baseline. She denies a history of stroke and is not aware of a history of CHF or atrial fibrillation and says that if it is in the chart it must be true. She denies syncope or presyncope episodes.     PAST MEDICAL & SURGICAL HISTORY  Diastolic heart failure  CAD (coronary atherosclerotic disease)  Asthma  Diabetes  Afib  S/P appendectomy      FAMILY HISTORY:  FAMILY HISTORY:  No pertinent family history in first degree relatives      SOCIAL HISTORY:  [-] ex smoker  [-]Alcohol  [-]Drug    ROS:  Negative except as mentioned in HPI    ALLERGIES:  Augmentin (Rash)  oxycodone (Pruritus)      MEDICATIONS:  MEDICATIONS  (STANDING):  ALBUTerol    90 MICROgram(s) HFA Inhaler 2 Puff(s) Inhalation every 6 hours  apixaban 5 milliGRAM(s) Oral two times a day  aspirin enteric coated 81 milliGRAM(s) Oral daily  atorvastatin 10 milliGRAM(s) Oral at bedtime  buDESOnide 160 MICROgram(s)/formoterol 4.5 MICROgram(s) Inhaler - Peds 2 Puff(s) Inhalation two times a day  carvedilol 12.5 milliGRAM(s) Oral every 12 hours  cefTRIAXone   IVPB 1000 milliGRAM(s) IV Intermittent every 24 hours  chlorhexidine 4% Liquid 1 Application(s) Topical <User Schedule>  dextrose 5%. 1000 milliLiter(s) (50 mL/Hr) IV Continuous <Continuous>  dextrose 50% Injectable 12.5 Gram(s) IV Push once  dextrose 50% Injectable 25 Gram(s) IV Push once  dextrose 50% Injectable 25 Gram(s) IV Push once  diltiazem    Tablet 60 milliGRAM(s) Oral every 8 hours  docusate sodium Oral Tab/Cap - Peds 100 milliGRAM(s) Oral daily  furosemide    Tablet 40 milliGRAM(s) Oral daily  insulin glargine Injectable (LANTUS) 15 Unit(s) SubCutaneous at bedtime  insulin lispro (HumaLOG) corrective regimen sliding scale   SubCutaneous three times a day before meals  lisinopril 20 milliGRAM(s) Oral daily  pantoprazole    Tablet 40 milliGRAM(s) Oral before breakfast  polyethylene glycol 3350 17 Gram(s) Oral two times a day  predniSONE   Tablet 40 milliGRAM(s) Oral daily  senna 8.6 milliGRAM(s) Oral Tablet - Peds 2 Tablet(s) Oral at bedtime  tiotropium 18 MICROgram(s) Capsule 1 Capsule(s) Inhalation daily    MEDICATIONS  (PRN):  ALBUTerol/ipratropium for Nebulization. 3 milliLiter(s) Nebulizer every 6 hours PRN Shortness of Breath and/or Wheezing  dextrose 40% Gel 15 Gram(s) Oral once PRN Blood Glucose LESS THAN 70 milliGRAM(s)/deciliter  glucagon  Injectable 1 milliGRAM(s) IntraMuscular once PRN Glucose LESS THAN 70 milligrams/deciliter  insulin lispro Injectable (HumaLOG) 6 Unit(s) SubCutaneous three times a day before meals PRN if finger stick > 200      HOME MEDICATIONS:  Home Medications:  atorvastatin 10 mg oral tablet: 1 tab(s) orally once a day (at bedtime) (10 Sep 2019 01:39)  Eliquis 2.5 mg oral tablet: 1 tab(s) orally 2 times a day (10 Sep 2019 01:39)  glipiZIDE 10 mg oral tablet, extended release: 1 tab(s) orally 2 times a day (10 Sep 2019 01:39)  ipratropium-albuterol 0.5 mg-2.5 mg/3 mLinhalation solution: 3 milliliter(s) inhaled 2 times a day (10 Sep 2019 01:39)  Mag-Ox 400 oral tablet: 1 tab(s) orally once a day (10 Sep 2019 01:39)  Norco 5 mg-325 mg oral tablet: 1 tab(s) orally every 8 hours, As Needed (10 Sep 2019 01:39)  Tradjenta 5 mg oral tablet: 1 tab(s) orally once a day (10 Sep 2019 01:39)      VITALS:   T(F): 97.5 (09-10 @ 16:18), Max: 97.9 (09-10 @ 03:11)  HR: 87 (09-10 @ 16:18) (76 - 152)  BP: 164/86 (09-10 @ 16:18) (116/77 - 170/102)  BP(mean): 88 (09-10 @ 09:44) (88 - 88)  RR: 18 (09-10 @ 16:18) (16 - 20)  SpO2: 98% (09-10 @ 16:18) (96% - 100%)    I&O's Summary      PHYSICAL EXAM:  GEN: Not in acute distress, lying in bed   LUNGS: Bilateral exp and insp wheezing   CARDIOVASCULAR: Irregularly irregular rhythm, S1/S2 present, no murmurs, rubs or gallops, no JVD, + PP bilaterally  ABD: Soft, non-tender, non-distended, bowel sounds present   EXT: No VINNY  SKIN: Intact  NEURO: AAOx3, non focal     LABS:                        13.6   6.29  )-----------( 239      ( 09 Sep 2019 23:26 )             40.6     09-09    142  |  101  |  20  ----------------------------<  281<H>  4.4   |  27  |  0.9    Ca    9.0      09 Sep 2019 23:26  Mg     1.9     09-09    TPro  6.6  /  Alb  4.2  /  TBili  0.5  /  DBili  x   /  AST  92<H>  /  ALT  49<H>  /  AlkPhos  58  09-09      Troponin T, Serum: <0.01 ng/mL (09-10-19 @ 11:41)  Troponin T, Serum: <0.01 ng/mL (09-09-19 @ 23:26)    Troponin <0.01, CKMB --, CK --/ 09-10-19 @ 11:41  Troponin <0.01, CKMB --, CK --/ 09-09-19 @ 23:26    Hemoglobin A1C   Thyroid      RADIOLOGY:  -CXR:  < from: Xray Chest 1 View AP/PA (09.10.19 @ 01:15) >  Impression:      No radiographic evidence of acute cardiopulmonary disease.    < end of copied text >	    -TTE:  < from: Transthoracic Echocardiogram (09.10.19 @ 14:29) >  Summary:   1. Normal global left ventricular systolic function.   2. LV EjectionFraction by Bustamante's Method with a biplane EF of 65 %.   3. Elevated mean left atrial pressure.   4. Moderate concentric left ventricular hypertrophy.   5. Moderately increased LV wall thickness.   6. Normal left ventricular internal cavity size.   7. The left ventricular diastolic function could not be assessed in this   study.   8. Moderate mitral annular calcification.   9. Mild aortic regurgitation.  10. Estimated pulmonary artery systolic pressure is 62.8 mmHg assuming a   right atrial pressure of 3 mmHg, which is consistent with severe   pulmonary hypertension.  11. Pulmonary hypertension is present.  12. LA volume Index is 29.2 ml/m² ml/m2.    -CCTA:  -STRESS TEST:  -CATHETERIZATION:    ECG:  < from: 12 Lead ECG (09.09.19 @ 23:26) >  Diagnosis Line Atrial fibrillation with rapid ventricular response  Left axis deviation  Septal infarct , age undetermined  ST & T wave abnormality, consider inferolateral ischemia  Abnormal ECG    < end of copied text >    < from: 12 Lead ECG (09.09.19 @ 23:09) >  Diagnosis Line Supraventricular tachycardia  Left axis deviation  Left anterior fascicular block  Septal infarct , age undetermined  ST & T wave abnormality, consider inferolateral ischemia  Abnormal ECG    < end of copied text >    TELEMETRY EVENTS:  A fib with RVR Date of Admission: 09-10-19    CHIEF COMPLAINT: RVR/SVT     HISTORY OF PRESENT ILLNESS:     As per the patient's daughter: 79y Female with h/o PAF, on Eliquis and Coreg, COPD on Albuterol and Atrovent and home oxygen, h/o CVA (no residuals) and MI 6yrs ago in [facility], DM, HTN, developed episode of SOB, palpitations and chest tightness, took extra Coreg as instructed by PMD (Dr Garcia) and some relief, was doing well on Sunday then on Monday had similar episode not relieved by extra Coreg that time which lead her to present to the ED. In ED was found to be in AVNRT s/p Adenosine 6mg with good response then EKG showed a fib with RVR s/p cardizem 10 X1 with good response then conversion to PO     Spoke with the patient with , she says she presented due to RUQ pain radiating to her back and shoulder and worsened by food. She denies new shortness, palpitations or chest pain. She says she felt these symptoms when her grandson passed away a few months ago and whenever she remembers that. She says that she is chronically short of breath on ambulation and has chronic orthopnea and sleeps almost at 90 degrees however this is all at baseline. Is not aware of a history of CHF or atrial fibrillation and says that if it is in the chart it must be true. She denies syncope or presyncope episodes.     PAST MEDICAL & SURGICAL HISTORY  Diastolic heart failure  CAD (coronary atherosclerotic disease)  Asthma  Diabetes  Afib  S/P appendectomy      FAMILY HISTORY:  FAMILY HISTORY:  No pertinent family history in first degree relatives      SOCIAL HISTORY:  [-] ex smoker  [-]Alcohol  [-]Drug    ROS:  Negative except as mentioned in HPI    ALLERGIES:  Augmentin (Rash)  oxycodone (Pruritus)      MEDICATIONS:  MEDICATIONS  (STANDING):  ALBUTerol    90 MICROgram(s) HFA Inhaler 2 Puff(s) Inhalation every 6 hours  apixaban 5 milliGRAM(s) Oral two times a day  aspirin enteric coated 81 milliGRAM(s) Oral daily  atorvastatin 10 milliGRAM(s) Oral at bedtime  buDESOnide 160 MICROgram(s)/formoterol 4.5 MICROgram(s) Inhaler - Peds 2 Puff(s) Inhalation two times a day  carvedilol 12.5 milliGRAM(s) Oral every 12 hours  cefTRIAXone   IVPB 1000 milliGRAM(s) IV Intermittent every 24 hours  chlorhexidine 4% Liquid 1 Application(s) Topical <User Schedule>  dextrose 5%. 1000 milliLiter(s) (50 mL/Hr) IV Continuous <Continuous>  dextrose 50% Injectable 12.5 Gram(s) IV Push once  dextrose 50% Injectable 25 Gram(s) IV Push once  dextrose 50% Injectable 25 Gram(s) IV Push once  diltiazem    Tablet 60 milliGRAM(s) Oral every 8 hours  docusate sodium Oral Tab/Cap - Peds 100 milliGRAM(s) Oral daily  furosemide    Tablet 40 milliGRAM(s) Oral daily  insulin glargine Injectable (LANTUS) 15 Unit(s) SubCutaneous at bedtime  insulin lispro (HumaLOG) corrective regimen sliding scale   SubCutaneous three times a day before meals  lisinopril 20 milliGRAM(s) Oral daily  pantoprazole    Tablet 40 milliGRAM(s) Oral before breakfast  polyethylene glycol 3350 17 Gram(s) Oral two times a day  predniSONE   Tablet 40 milliGRAM(s) Oral daily  senna 8.6 milliGRAM(s) Oral Tablet - Peds 2 Tablet(s) Oral at bedtime  tiotropium 18 MICROgram(s) Capsule 1 Capsule(s) Inhalation daily    MEDICATIONS  (PRN):  ALBUTerol/ipratropium for Nebulization. 3 milliLiter(s) Nebulizer every 6 hours PRN Shortness of Breath and/or Wheezing  dextrose 40% Gel 15 Gram(s) Oral once PRN Blood Glucose LESS THAN 70 milliGRAM(s)/deciliter  glucagon  Injectable 1 milliGRAM(s) IntraMuscular once PRN Glucose LESS THAN 70 milligrams/deciliter  insulin lispro Injectable (HumaLOG) 6 Unit(s) SubCutaneous three times a day before meals PRN if finger stick > 200      HOME MEDICATIONS:  Home Medications:  atorvastatin 10 mg oral tablet: 1 tab(s) orally once a day (at bedtime) (10 Sep 2019 01:39)  Eliquis 2.5 mg oral tablet: 1 tab(s) orally 2 times a day (10 Sep 2019 01:39)  glipiZIDE 10 mg oral tablet, extended release: 1 tab(s) orally 2 times a day (10 Sep 2019 01:39)  ipratropium-albuterol 0.5 mg-2.5 mg/3 mLinhalation solution: 3 milliliter(s) inhaled 2 times a day (10 Sep 2019 01:39)  Mag-Ox 400 oral tablet: 1 tab(s) orally once a day (10 Sep 2019 01:39)  Norco 5 mg-325 mg oral tablet: 1 tab(s) orally every 8 hours, As Needed (10 Sep 2019 01:39)  Tradjenta 5 mg oral tablet: 1 tab(s) orally once a day (10 Sep 2019 01:39)      VITALS:   T(F): 97.5 (09-10 @ 16:18), Max: 97.9 (09-10 @ 03:11)  HR: 87 (09-10 @ 16:18) (76 - 152)  BP: 164/86 (09-10 @ 16:18) (116/77 - 170/102)  BP(mean): 88 (09-10 @ 09:44) (88 - 88)  RR: 18 (09-10 @ 16:18) (16 - 20)  SpO2: 98% (09-10 @ 16:18) (96% - 100%)    I&O's Summary      PHYSICAL EXAM:  GEN: Not in acute distress, lying in bed   LUNGS: Bilateral exp and insp wheezing   CARDIOVASCULAR: Irregularly irregular rhythm, S1/S2 present, no murmurs, rubs or gallops, no JVD, + PP bilaterally  ABD: Soft, non-tender, non-distended, bowel sounds present   EXT: No VINNY  SKIN: Intact  NEURO: AAOx3, non focal     LABS:                        13.6   6.29  )-----------( 239      ( 09 Sep 2019 23:26 )             40.6     09-09    142  |  101  |  20  ----------------------------<  281<H>  4.4   |  27  |  0.9    Ca    9.0      09 Sep 2019 23:26  Mg     1.9     09-09    TPro  6.6  /  Alb  4.2  /  TBili  0.5  /  DBili  x   /  AST  92<H>  /  ALT  49<H>  /  AlkPhos  58  09-09      Troponin T, Serum: <0.01 ng/mL (09-10-19 @ 11:41)  Troponin T, Serum: <0.01 ng/mL (09-09-19 @ 23:26)    Troponin <0.01, CKMB --, CK --/ 09-10-19 @ 11:41  Troponin <0.01, CKMB --, CK --/ 09-09-19 @ 23:26    Hemoglobin A1C   Thyroid      RADIOLOGY:  -CXR:  < from: Xray Chest 1 View AP/PA (09.10.19 @ 01:15) >  Impression:      No radiographic evidence of acute cardiopulmonary disease.    < end of copied text >	    -TTE:  < from: Transthoracic Echocardiogram (09.10.19 @ 14:29) >  Summary:   1. Normal global left ventricular systolic function.   2. LV EjectionFraction by Bustamante's Method with a biplane EF of 65 %.   3. Elevated mean left atrial pressure.   4. Moderate concentric left ventricular hypertrophy.   5. Moderately increased LV wall thickness.   6. Normal left ventricular internal cavity size.   7. The left ventricular diastolic function could not be assessed in this   study.   8. Moderate mitral annular calcification.   9. Mild aortic regurgitation.  10. Estimated pulmonary artery systolic pressure is 62.8 mmHg assuming a   right atrial pressure of 3 mmHg, which is consistent with severe   pulmonary hypertension.  11. Pulmonary hypertension is present.  12. LA volume Index is 29.2 ml/m² ml/m2.    -CCTA:  -STRESS TEST:  -CATHETERIZATION:    ECG:  < from: 12 Lead ECG (09.09.19 @ 23:26) >  Diagnosis Line Atrial fibrillation with rapid ventricular response  Left axis deviation  Septal infarct , age undetermined  ST & T wave abnormality, consider inferolateral ischemia  Abnormal ECG    < end of copied text >    < from: 12 Lead ECG (09.09.19 @ 23:09) >  Diagnosis Line Supraventricular tachycardia  Left axis deviation  Left anterior fascicular block  Septal infarct , age undetermined  ST & T wave abnormality, consider inferolateral ischemia  Abnormal ECG    < end of copied text >    TELEMETRY EVENTS:  A fib with RVR

## 2019-09-10 NOTE — H&P ADULT - NSHPSOCIALHISTORY_GEN_ALL_CORE
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

## 2019-09-10 NOTE — ED ADULT NURSE NOTE - NSIMPLEMENTINTERV_GEN_ALL_ED
Implemented All Universal Safety Interventions:  [place] to call system. Call bell, personal items and telephone within reach. Instruct patient to call for assistance. Room bathroom lighting operational. Non-slip footwear when patient is off stretcher. Physically safe environment: no spills, clutter or unnecessary equipment. Stretcher in lowest position, wheels locked, appropriate side rails in place.

## 2019-09-10 NOTE — H&P ADULT - NSHPLABSRESULTS_GEN_ALL_CORE
LABS:                        13.6   6.29  )-----------( 239      ( 09 Sep 2019 23:26 )             40.6         142  |  101  |  20  ----------------------------<  281<H>  4.4   |  27  |  0.9    Ca    9.0      09 Sep 2019 23:26  Mg     1.9         TPro  6.6  /  Alb  4.2  /  TBili  0.5  /  DBili  x   /  AST  92<H>  /  ALT  49<H>  /  AlkPhos  58        Urinalysis Basic - ( 10 Sep 2019 00:35 )    Color: Yellow / Appearance: Slightly Turbid / S.016 / pH: x  Gluc: x / Ketone: Negative  / Bili: Negative / Urobili: <2 mg/dL   Blood: x / Protein: Trace / Nitrite: Negative   Leuk Esterase: Large / RBC: 1 /HPF / WBC 40 /HPF   Sq Epi: x / Non Sq Epi: 13 /HPF / Bacteria: Few      CAPILLARY BLOOD GLUCOSE          RADIOLOGY & ADDITIONAL TESTS: Reviewed.    CT angio chest: No CT evidence for acute intrathoracic, abdominal or pelvic pathology,   specifically no pulmonary embolism was identified.    CT Abdomen: negative for any signs of cystitis or pyelonephritis

## 2019-09-10 NOTE — CONSULT NOTE ADULT - ASSESSMENT
ATTENDING ATTESTATION TO FOLLOW     IMPRESSION   AVNRT/A fib with RVR/A fib - Currently rate controlled at 80-90 BPM   CHADsVASC: 8-9 ; HASBLED 3  COPD exacerbation   ECHO as above with EF 62%, LV hypertrophy, increased LA pressure, severe pulm hypertension   ?senile depression / dementia     RECOMMENDATIONS  	  - Rate control for now: continue current cardizem dose for, uptitrate as needed and change to long acting when rate is controlled, and change Coreg to metoprolol 25 BID   - Anticoagulation with Eliquis 5 BID   - Check TSH and serial cardiac enzymes   - Might require ablation if recurrent symptoms - follow up with EP in the office   - Continue current lasix dose as patient euvolemic   - Might require ischemic workup once stabilized - Follow up with cardiology in the office   - Monitor lytes and keep K>4.0 and Mg >2.0  - Optimize COPD status, consider IV steroids, pulmonary evaluation and maintain O2 88-92%   - Continue telemetry monitoring for now ATTENDING ATTESTATION TO FOLLOW     IMPRESSION   AVNRT/A fib with RVR/A fib - Currently rate controlled at 80-90 BPM   CHADsVASC: 8-9 ; HASBLED 3  HFpEF - Patient euvolemic   Bilateral wheezing   ECHO as above with EF 62%, LV hypertrophy, increased LA pressure, severe pulm hypertension   ?senile depression / dementia     RECOMMENDATIONS  	  - Rate control for now: continue current cardizem dose, uptitrate as needed and change to long acting when rate is controlled, and change Coreg to metoprolol 25 BID   - Anticoagulation with Eliquis 5 BID   - Check TSH and serial cardiac enzymes   - Might require ablation if recurrent symptoms - follow up with EP in the office   - Continue current lasix dose as patient euvolemic   - Might require ischemic workup once stabilized - Follow up with cardiology in the office   - Monitor lytes and keep K>4.0 and Mg >2.0  - Optimize COPD status, consider IV steroids, pulmonary evaluation and maintain O2 88-92%   - Continue telemetry monitoring for now ATTENDING ATTESTATION TO FOLLOW     IMPRESSION   AVNRT/A fib with RVR/A fib - Currently rate controlled at 80-90 BPM   CHADsVASC II: 8-9 ; HASBLED 3  HFpEF - Patient euvolemic   Bilateral wheezing   ECHO as above with EF 62%, LV hypertrophy, increased LA pressure, severe pulm hypertension   ?senile depression / dementia     RECOMMENDATIONS  	  - Rate control for now: continue current Cardizem dose,  titrate up as needed and change to long acting when rate is controlled, and change Coreg to metoprolol 25 BID   - Anticoagulation with Eliquis 5 BID   - Check TSH and serial cardiac enzymes   - Might require ablation if recurrent symptoms - follow up with EP in the office   - Continue current Lasix dose as patient euvolemic   - Might require ischemic workup once stabilized - Follow up with cardiology in the office   - Monitor lytes and keep K>4.0 and Mg >2.0  - Optimize COPD status, consider IV steroids, pulmonary evaluation and maintain O2 88-92%   - Continue telemetry monitoring for now

## 2019-09-11 LAB
ANION GAP SERPL CALC-SCNC: 14 MMOL/L — SIGNIFICANT CHANGE UP (ref 7–14)
BASOPHILS # BLD AUTO: 0.02 K/UL — SIGNIFICANT CHANGE UP (ref 0–0.2)
BASOPHILS NFR BLD AUTO: 0.3 % — SIGNIFICANT CHANGE UP (ref 0–1)
BUN SERPL-MCNC: 11 MG/DL — SIGNIFICANT CHANGE UP (ref 10–20)
CALCIUM SERPL-MCNC: 9.5 MG/DL — SIGNIFICANT CHANGE UP (ref 8.5–10.1)
CHLORIDE SERPL-SCNC: 101 MMOL/L — SIGNIFICANT CHANGE UP (ref 98–110)
CO2 SERPL-SCNC: 26 MMOL/L — SIGNIFICANT CHANGE UP (ref 17–32)
CREAT SERPL-MCNC: 0.7 MG/DL — SIGNIFICANT CHANGE UP (ref 0.7–1.5)
EOSINOPHIL # BLD AUTO: 0.05 K/UL — SIGNIFICANT CHANGE UP (ref 0–0.7)
EOSINOPHIL NFR BLD AUTO: 0.9 % — SIGNIFICANT CHANGE UP (ref 0–8)
ESTIMATED AVERAGE GLUCOSE: 194 MG/DL — HIGH (ref 68–114)
GLUCOSE BLDC GLUCOMTR-MCNC: 189 MG/DL — HIGH (ref 70–99)
GLUCOSE BLDC GLUCOMTR-MCNC: 212 MG/DL — HIGH (ref 70–99)
GLUCOSE BLDC GLUCOMTR-MCNC: 287 MG/DL — HIGH (ref 70–99)
GLUCOSE BLDC GLUCOMTR-MCNC: 354 MG/DL — HIGH (ref 70–99)
GLUCOSE SERPL-MCNC: 257 MG/DL — HIGH (ref 70–99)
HBA1C BLD-MCNC: 8.4 % — HIGH (ref 4–5.6)
HCT VFR BLD CALC: 40.8 % — SIGNIFICANT CHANGE UP (ref 37–47)
HGB BLD-MCNC: 14.1 G/DL — SIGNIFICANT CHANGE UP (ref 12–16)
IMM GRANULOCYTES NFR BLD AUTO: 0.3 % — SIGNIFICANT CHANGE UP (ref 0.1–0.3)
LYMPHOCYTES # BLD AUTO: 1.53 K/UL — SIGNIFICANT CHANGE UP (ref 1.2–3.4)
LYMPHOCYTES # BLD AUTO: 26.3 % — SIGNIFICANT CHANGE UP (ref 20.5–51.1)
MAGNESIUM SERPL-MCNC: 1.6 MG/DL — LOW (ref 1.8–2.4)
MCHC RBC-ENTMCNC: 32.3 PG — HIGH (ref 27–31)
MCHC RBC-ENTMCNC: 34.6 G/DL — SIGNIFICANT CHANGE UP (ref 32–37)
MCV RBC AUTO: 93.6 FL — SIGNIFICANT CHANGE UP (ref 81–99)
MONOCYTES # BLD AUTO: 0.2 K/UL — SIGNIFICANT CHANGE UP (ref 0.1–0.6)
MONOCYTES NFR BLD AUTO: 3.4 % — SIGNIFICANT CHANGE UP (ref 1.7–9.3)
NEUTROPHILS # BLD AUTO: 4 K/UL — SIGNIFICANT CHANGE UP (ref 1.4–6.5)
NEUTROPHILS NFR BLD AUTO: 68.8 % — SIGNIFICANT CHANGE UP (ref 42.2–75.2)
NRBC # BLD: 0 /100 WBCS — SIGNIFICANT CHANGE UP (ref 0–0)
NT-PROBNP SERPL-SCNC: 3600 PG/ML — HIGH (ref 0–300)
PLATELET # BLD AUTO: 231 K/UL — SIGNIFICANT CHANGE UP (ref 130–400)
POTASSIUM SERPL-MCNC: 3.7 MMOL/L — SIGNIFICANT CHANGE UP (ref 3.5–5)
POTASSIUM SERPL-SCNC: 3.7 MMOL/L — SIGNIFICANT CHANGE UP (ref 3.5–5)
RBC # BLD: 4.36 M/UL — SIGNIFICANT CHANGE UP (ref 4.2–5.4)
RBC # FLD: 12.1 % — SIGNIFICANT CHANGE UP (ref 11.5–14.5)
SODIUM SERPL-SCNC: 141 MMOL/L — SIGNIFICANT CHANGE UP (ref 135–146)
T3FREE SERPL-MCNC: 3.74 PG/ML — SIGNIFICANT CHANGE UP (ref 1.8–4.6)
T4 FREE SERPL-MCNC: 1.5 NG/DL — SIGNIFICANT CHANGE UP (ref 0.9–1.8)
TSH SERPL-MCNC: 1.18 UIU/ML — SIGNIFICANT CHANGE UP (ref 0.27–4.2)
WBC # BLD: 5.82 K/UL — SIGNIFICANT CHANGE UP (ref 4.8–10.8)
WBC # FLD AUTO: 5.82 K/UL — SIGNIFICANT CHANGE UP (ref 4.8–10.8)

## 2019-09-11 PROCEDURE — 99233 SBSQ HOSP IP/OBS HIGH 50: CPT

## 2019-09-11 PROCEDURE — 99232 SBSQ HOSP IP/OBS MODERATE 35: CPT

## 2019-09-11 RX ORDER — METOPROLOL TARTRATE 50 MG
25 TABLET ORAL
Refills: 0 | Status: DISCONTINUED | OUTPATIENT
Start: 2019-09-11 | End: 2019-09-13

## 2019-09-11 RX ORDER — DILTIAZEM HCL 120 MG
180 CAPSULE, EXT RELEASE 24 HR ORAL DAILY
Refills: 0 | Status: DISCONTINUED | OUTPATIENT
Start: 2019-09-11 | End: 2019-09-13

## 2019-09-11 RX ADMIN — APIXABAN 5 MILLIGRAM(S): 2.5 TABLET, FILM COATED ORAL at 06:39

## 2019-09-11 RX ADMIN — ATORVASTATIN CALCIUM 10 MILLIGRAM(S): 80 TABLET, FILM COATED ORAL at 21:36

## 2019-09-11 RX ADMIN — INSULIN GLARGINE 15 UNIT(S): 100 INJECTION, SOLUTION SUBCUTANEOUS at 21:36

## 2019-09-11 RX ADMIN — CEFTRIAXONE 100 MILLIGRAM(S): 500 INJECTION, POWDER, FOR SOLUTION INTRAMUSCULAR; INTRAVENOUS at 17:09

## 2019-09-11 RX ADMIN — Medication 6 UNIT(S): at 07:41

## 2019-09-11 RX ADMIN — LISINOPRIL 20 MILLIGRAM(S): 2.5 TABLET ORAL at 06:39

## 2019-09-11 RX ADMIN — ALBUTEROL 2 PUFF(S): 90 AEROSOL, METERED ORAL at 13:19

## 2019-09-11 RX ADMIN — POLYETHYLENE GLYCOL 3350 17 GRAM(S): 17 POWDER, FOR SOLUTION ORAL at 06:46

## 2019-09-11 RX ADMIN — Medication 60 MILLIGRAM(S): at 06:39

## 2019-09-11 RX ADMIN — CARVEDILOL PHOSPHATE 12.5 MILLIGRAM(S): 80 CAPSULE, EXTENDED RELEASE ORAL at 06:39

## 2019-09-11 RX ADMIN — Medication 60 MILLIGRAM(S): at 13:27

## 2019-09-11 RX ADMIN — BUDESONIDE AND FORMOTEROL FUMARATE DIHYDRATE 2 PUFF(S): 160; 4.5 AEROSOL RESPIRATORY (INHALATION) at 21:36

## 2019-09-11 RX ADMIN — Medication 40 MILLIGRAM(S): at 06:39

## 2019-09-11 RX ADMIN — PANTOPRAZOLE SODIUM 40 MILLIGRAM(S): 20 TABLET, DELAYED RELEASE ORAL at 06:39

## 2019-09-11 RX ADMIN — Medication 10: at 16:27

## 2019-09-11 RX ADMIN — APIXABAN 5 MILLIGRAM(S): 2.5 TABLET, FILM COATED ORAL at 17:13

## 2019-09-11 RX ADMIN — Medication 2: at 07:41

## 2019-09-11 RX ADMIN — ALBUTEROL 2 PUFF(S): 90 AEROSOL, METERED ORAL at 20:45

## 2019-09-11 RX ADMIN — BUDESONIDE AND FORMOTEROL FUMARATE DIHYDRATE 2 PUFF(S): 160; 4.5 AEROSOL RESPIRATORY (INHALATION) at 07:40

## 2019-09-11 RX ADMIN — Medication 6: at 11:42

## 2019-09-11 NOTE — PROGRESS NOTE ADULT - ASSESSMENT
78yo  with PAF, admitted with AF RVR, now rate controlled on Cardizem    con't tele  follow up TSH free T4  Echo as above  Maintain electrolytes K>4.0 Mg >2.0  Rate control with cardizem, uptitrate as tolerates, convert to long acting  Asymptomatic at this time, no need for ARNOLD DCCV  f/u as out pt for possible ablation  Con't Dennis, ChadVasc 8  d/w attending
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 Today is her day 1 in hospital and has no acute complaints. Patient was having diarrhea since morning so bowel regimen was stopped.     Atrial Fibrillation with RVR ;  As per patient, she has multiple episodes in the past, her symptoms are not controlled  She follow up with DR. Morrison at [facility]   continue current cardizem dose, uptitrate as needed and change to long acting 180 mg, change Coreg to metoprolol 25 BID   Cardio rec to continue   - Anticoagulation with Eliquis 5 BID   - Check TSH pending and serial cardiac enzymes negative   - Might require ablation if recurrent symptoms - follow up with EP in the office   - Continue current lasix dose as patient euvolemic 40 mg IV   - Might require ischemic workup once stabilized   - Follow up with cardiology in the office   - Monitor lytes and keep K>4.0 and Mg >2.0  - Optimize COPD status, consider IV steroids, pulmonary evaluation and maintain O2 88-92%   - Continue telemetry monitoring for now       EP consult.   Echo 65% EF with age related changes   Telemetry monitor.   no need for ARNOLD DCCV  f/u as out pt for possible ablation    Chronic HFpEF: stable  Continue Lasix and metoprolol.     COPD/Asthma:   Stable  Lungs are clear.   Continue Albuterol prn. Symbicort and Spiriva.     HTN:   Continue METOPROLOL, Lisinopril and Lasix.       CODE: FULL  DIET; DASH  DVT PROPHYLAXIS  GI PROPHYLAXIS

## 2019-09-11 NOTE — PROGRESS NOTE ADULT - ATTENDING COMMENTS
#Progress Note Handoff  Pending (specify):  Consults_________, Tests________, Test Results_TSH_____, Other__Tele monitoring_______  Family discussion: [place] pt   Disposition: Home_x__/SNF___/Other________/Unknown at this time________

## 2019-09-11 NOTE — PROGRESS NOTE ADULT - SUBJECTIVE AND OBJECTIVE BOX
INTERVAL HPI/OVERNIGHT EVENTS:  resting comfortably  rate controlled 60-80s  asymptomatic    MEDICATIONS  (STANDING):  ALBUTerol    90 MICROgram(s) HFA Inhaler 2 Puff(s) Inhalation every 6 hours  apixaban 5 milliGRAM(s) Oral two times a day  aspirin enteric coated 81 milliGRAM(s) Oral daily  atorvastatin 10 milliGRAM(s) Oral at bedtime  buDESOnide 160 MICROgram(s)/formoterol 4.5 MICROgram(s) Inhaler - Peds 2 Puff(s) Inhalation two times a day  carvedilol 12.5 milliGRAM(s) Oral every 12 hours  cefTRIAXone   IVPB 1000 milliGRAM(s) IV Intermittent every 24 hours  chlorhexidine 4% Liquid 1 Application(s) Topical <User Schedule>  dextrose 5%. 1000 milliLiter(s) (50 mL/Hr) IV Continuous <Continuous>  dextrose 50% Injectable 12.5 Gram(s) IV Push once  dextrose 50% Injectable 25 Gram(s) IV Push once  dextrose 50% Injectable 25 Gram(s) IV Push once  diltiazem    Tablet 60 milliGRAM(s) Oral every 8 hours  docusate sodium Oral Tab/Cap - Peds 100 milliGRAM(s) Oral daily  furosemide    Tablet 40 milliGRAM(s) Oral daily  insulin glargine Injectable (LANTUS) 15 Unit(s) SubCutaneous at bedtime  insulin lispro (HumaLOG) corrective regimen sliding scale   SubCutaneous three times a day before meals  lisinopril 20 milliGRAM(s) Oral daily  pantoprazole    Tablet 40 milliGRAM(s) Oral before breakfast  polyethylene glycol 3350 17 Gram(s) Oral two times a day  predniSONE   Tablet 40 milliGRAM(s) Oral daily  senna 8.6 milliGRAM(s) Oral Tablet - Peds 2 Tablet(s) Oral at bedtime  tiotropium 18 MICROgram(s) Capsule 1 Capsule(s) Inhalation daily    MEDICATIONS  (PRN):  ALBUTerol/ipratropium for Nebulization. 3 milliLiter(s) Nebulizer every 6 hours PRN Shortness of Breath and/or Wheezing  dextrose 40% Gel 15 Gram(s) Oral once PRN Blood Glucose LESS THAN 70 milliGRAM(s)/deciliter  glucagon  Injectable 1 milliGRAM(s) IntraMuscular once PRN Glucose LESS THAN 70 milligrams/deciliter  insulin lispro Injectable (HumaLOG) 6 Unit(s) SubCutaneous three times a day before meals PRN if finger stick > 200      Allergies    Augmentin (Rash)  oxycodone (Pruritus)    Intolerances        REVIEW OF SYSTEMS    [x ] A ten-point review of systems was otherwise negative except as noted.        Vital Signs Last 24 Hrs  T(C): 35.8 (11 Sep 2019 13:39), Max: 36.7 (10 Sep 2019 19:30)  T(F): 96.5 (11 Sep 2019 13:39), Max: 98.1 (10 Sep 2019 19:30)  HR: 86 (11 Sep 2019 13:39) (80 - 87)  BP: 126/66 (11 Sep 2019 13:39) (126/66 - 164/86)  BP(mean): --  RR: 17 (11 Sep 2019 13:39) (17 - 18)  SpO2: 97% (11 Sep 2019 07:55) (96% - 99%)    19 @ 07:01  -  19 @ 14:11  --------------------------------------------------------  IN: 250 mL / OUT: 0 mL / NET: 250 mL        PHYSICAL EXAM:    GENERAL: In no apparent distress, well nourished, and hydrated.  HEART: IRRegular rate and rhythm; No murmur; NO rubs, or gallops.  PULMONARY: Clear to auscultation and percussion.  Normal expansion/effort. No rales, wheezing, or rhonchi bilaterally.  ABDOMEN: Soft, Nontender, Nondistended; Bowel sounds present  EXTREMITIES:  Extremities warm, pink, well-perfused, 2+ Peripheral Pulses, No clubbing, cyanosis, or edema  NEUROLOGICAL: alert & oriented x 3, no focal deficits, PERCLARA WAGGONER    LABS:                        14.1   5.82  )-----------( 231      ( 11 Sep 2019 08:18 )             40.8         141  |  101  |  11  ----------------------------<  257<H>  3.7   |  26  |  0.7    Ca    9.5      11 Sep 2019 08:18  Mg     1.6         TPro  6.6  /  Alb  4.2  /  TBili  0.5  /  DBili  x   /  AST  92<H>  /  ALT  49<H>  /  AlkPhos  58        Urinalysis Basic - ( 10 Sep 2019 00:35 )    Color: Yellow / Appearance: Slightly Turbid / S.016 / pH: x  Gluc: x / Ketone: Negative  / Bili: Negative / Urobili: <2 mg/dL   Blood: x / Protein: Trace / Nitrite: Negative   Leuk Esterase: Large / RBC: 1 /HPF / WBC 40 /HPF   Sq Epi: x / Non Sq Epi: 13 /HPF / Bacteria: Few        RADIOLOGY & ADDITIONAL TESTS:  < from: Transthoracic Echocardiogram (09.10.19 @ 14:29) >  Summary:   1. Normal global left ventricular systolic function.   2. LV EjectionFraction by Bustamante's Method with a biplane EF of 65 %.   3. Elevated mean left atrial pressure.   4. Moderate concentric left ventricular hypertrophy.   5. Moderately increased LV wall thickness.   6. Normal left ventricular internal cavity size.   7. The left ventricular diastolic function could not be assessed in this   study.   8. Moderate mitral annular calcification.   9. Mild aortic regurgitation.  10. Estimated pulmonary artery systolic pressure is 62.8 mmHg assuming a   right atrial pressure of 3 mmHg, which is consistent with severe   pulmonary hypertension.  11. Pulmonary hypertension is present.  12. LA volume Index is 29.2 ml/m² ml/m2.    < end of copied text >
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 Patient has been having exertional dyspnea for the past 2 weeks, requiring more rest. Patient reports compliance to diet and medication. she was c/o palpitations. She  denies fever/chills, cough, orthopnea, chest pain, abdominal pain, nausea/vomiting, diarrhea/constipation, or urinary symptoms. In the ED patient was found to be Afib with RVR. Adenosine was given but w/o any response. Cardizem 10 mg was given after that with adequate rate control  70-80. Hemodynamically stable, EKG low rate Afib, no ST-T wave changes, 2 sets trop negative.      Spoke with the patient with , she says she presented due to RUQ pain radiating to her back and shoulder and worsened by food. She denies new shortness, palpitations or chest pain. She says she felt these symptoms when her grandson passed away a few months ago and whenever she remembers that. She says that she is chronically short of breath on ambulation and has chronic orthopnea and sleeps almost at 90 degrees however this is all at baseline. Is not aware of a history of CHF or atrial fibrillation and says that if it is in the chart it must be true. She denies syncope or presyncope episodes.         PAST MEDICAL & SURGICAL HISTORY:  Diastolic heart failure  CAD (coronary atherosclerotic disease)  Asthma  Diabetes  Afib  S/P appendectomy    OVERNIGHT EVENTS: ns    SUBJECTIVE / INTERVAL HPI: Patient seen and examined at bedside.     VITAL SIGNS:  Vital Signs Last 24 Hrs  T(C): 35.8 (11 Sep 2019 13:39), Max: 36.7 (10 Sep 2019 19:30)  T(F): 96.5 (11 Sep 2019 13:39), Max: 98.1 (10 Sep 2019 19:30)  HR: 86 (11 Sep 2019 13:39) (80 - 87)  BP: 126/66 (11 Sep 2019 13:39) (126/66 - 161/93)  BP(mean): --  RR: 17 (11 Sep 2019 13:39) (17 - 18)  SpO2: 97% (11 Sep 2019 07:55) (96% - 99%)    PHYSICAL EXAM:    General: WDWN  HEENT: NC/AT; PERRL, anicteric sclera; MMM  Neck: supple  Cardiovascular: +S1/S2, RRR  Respiratory: CTA B/L; no W/R/R  Gastrointestinal: soft, NT/ND; +BSx4  Extremities: WWP; no edema, clubbing or cyanosis  Vascular: 2+ radial, DP/PT pulses B/L  Neurological: AAOx3; no focal deficits    MEDICATIONS:  MEDICATIONS  (STANDING):  ALBUTerol    90 MICROgram(s) HFA Inhaler 2 Puff(s) Inhalation every 6 hours  apixaban 5 milliGRAM(s) Oral two times a day  aspirin enteric coated 81 milliGRAM(s) Oral daily  atorvastatin 10 milliGRAM(s) Oral at bedtime  buDESOnide 160 MICROgram(s)/formoterol 4.5 MICROgram(s) Inhaler - Peds 2 Puff(s) Inhalation two times a day  carvedilol 12.5 milliGRAM(s) Oral every 12 hours  cefTRIAXone   IVPB 1000 milliGRAM(s) IV Intermittent every 24 hours  chlorhexidine 4% Liquid 1 Application(s) Topical <User Schedule>  dextrose 5%. 1000 milliLiter(s) (50 mL/Hr) IV Continuous <Continuous>  dextrose 50% Injectable 12.5 Gram(s) IV Push once  dextrose 50% Injectable 25 Gram(s) IV Push once  dextrose 50% Injectable 25 Gram(s) IV Push once  diltiazem    Tablet 60 milliGRAM(s) Oral every 8 hours  furosemide    Tablet 40 milliGRAM(s) Oral daily  insulin glargine Injectable (LANTUS) 15 Unit(s) SubCutaneous at bedtime  insulin lispro (HumaLOG) corrective regimen sliding scale   SubCutaneous three times a day before meals  lisinopril 20 milliGRAM(s) Oral daily  pantoprazole    Tablet 40 milliGRAM(s) Oral before breakfast  predniSONE   Tablet 40 milliGRAM(s) Oral daily  tiotropium 18 MICROgram(s) Capsule 1 Capsule(s) Inhalation daily    MEDICATIONS  (PRN):  ALBUTerol/ipratropium for Nebulization. 3 milliLiter(s) Nebulizer every 6 hours PRN Shortness of Breath and/or Wheezing  dextrose 40% Gel 15 Gram(s) Oral once PRN Blood Glucose LESS THAN 70 milliGRAM(s)/deciliter  glucagon  Injectable 1 milliGRAM(s) IntraMuscular once PRN Glucose LESS THAN 70 milligrams/deciliter  insulin lispro Injectable (HumaLOG) 6 Unit(s) SubCutaneous three times a day before meals PRN if finger stick > 200      ALLERGIES:  Allergies    Augmentin (Rash)  oxycodone (Pruritus)    Intolerances        LABS:                        14.1   5.82  )-----------( 231      ( 11 Sep 2019 08:18 )             40.8         141  |  101  |  11  ----------------------------<  257<H>  3.7   |  26  |  0.7    Ca    9.5      11 Sep 2019 08:18  Mg     1.6         TPro  6.6  /  Alb  4.2  /  TBili  0.5  /  DBili  x   /  AST  92<H>  /  ALT  49<H>  /  AlkPhos  58        Urinalysis Basic - ( 10 Sep 2019 00:35 )    Color: Yellow / Appearance: Slightly Turbid / S.016 / pH: x  Gluc: x / Ketone: Negative  / Bili: Negative / Urobili: <2 mg/dL   Blood: x / Protein: Trace / Nitrite: Negative   Leuk Esterase: Large / RBC: 1 /HPF / WBC 40 /HPF   Sq Epi: x / Non Sq Epi: 13 /HPF / Bacteria: Few      CAPILLARY BLOOD GLUCOSE      POCT Blood Glucose.: 354 mg/dL (11 Sep 2019 16:23)      RADIOLOGY & ADDITIONAL TESTS: Reviewed.

## 2019-09-12 ENCOUNTER — TRANSCRIPTION ENCOUNTER (OUTPATIENT)
Age: 79
End: 2019-09-12

## 2019-09-12 PROBLEM — I50.30 UNSPECIFIED DIASTOLIC (CONGESTIVE) HEART FAILURE: Chronic | Status: ACTIVE | Noted: 2019-09-10

## 2019-09-12 LAB
ANION GAP SERPL CALC-SCNC: 13 MMOL/L — SIGNIFICANT CHANGE UP (ref 7–14)
BASOPHILS # BLD AUTO: 0.01 K/UL — SIGNIFICANT CHANGE UP (ref 0–0.2)
BASOPHILS NFR BLD AUTO: 0.1 % — SIGNIFICANT CHANGE UP (ref 0–1)
BUN SERPL-MCNC: 23 MG/DL — HIGH (ref 10–20)
CALCIUM SERPL-MCNC: 9.7 MG/DL — SIGNIFICANT CHANGE UP (ref 8.5–10.1)
CHLORIDE SERPL-SCNC: 103 MMOL/L — SIGNIFICANT CHANGE UP (ref 98–110)
CO2 SERPL-SCNC: 25 MMOL/L — SIGNIFICANT CHANGE UP (ref 17–32)
CREAT SERPL-MCNC: 0.8 MG/DL — SIGNIFICANT CHANGE UP (ref 0.7–1.5)
EOSINOPHIL # BLD AUTO: 0.02 K/UL — SIGNIFICANT CHANGE UP (ref 0–0.7)
EOSINOPHIL NFR BLD AUTO: 0.3 % — SIGNIFICANT CHANGE UP (ref 0–8)
GLUCOSE BLDC GLUCOMTR-MCNC: 184 MG/DL — HIGH (ref 70–99)
GLUCOSE BLDC GLUCOMTR-MCNC: 344 MG/DL — HIGH (ref 70–99)
GLUCOSE BLDC GLUCOMTR-MCNC: 345 MG/DL — HIGH (ref 70–99)
GLUCOSE BLDC GLUCOMTR-MCNC: 393 MG/DL — HIGH (ref 70–99)
GLUCOSE BLDC GLUCOMTR-MCNC: 430 MG/DL — HIGH (ref 70–99)
GLUCOSE SERPL-MCNC: 225 MG/DL — HIGH (ref 70–99)
HCT VFR BLD CALC: 36.8 % — LOW (ref 37–47)
HGB BLD-MCNC: 12.5 G/DL — SIGNIFICANT CHANGE UP (ref 12–16)
IMM GRANULOCYTES NFR BLD AUTO: 0.5 % — HIGH (ref 0.1–0.3)
LYMPHOCYTES # BLD AUTO: 2.38 K/UL — SIGNIFICANT CHANGE UP (ref 1.2–3.4)
LYMPHOCYTES # BLD AUTO: 30.1 % — SIGNIFICANT CHANGE UP (ref 20.5–51.1)
MCHC RBC-ENTMCNC: 31.9 PG — HIGH (ref 27–31)
MCHC RBC-ENTMCNC: 34 G/DL — SIGNIFICANT CHANGE UP (ref 32–37)
MCV RBC AUTO: 93.9 FL — SIGNIFICANT CHANGE UP (ref 81–99)
MONOCYTES # BLD AUTO: 0.63 K/UL — HIGH (ref 0.1–0.6)
MONOCYTES NFR BLD AUTO: 8 % — SIGNIFICANT CHANGE UP (ref 1.7–9.3)
NEUTROPHILS # BLD AUTO: 4.82 K/UL — SIGNIFICANT CHANGE UP (ref 1.4–6.5)
NEUTROPHILS NFR BLD AUTO: 61 % — SIGNIFICANT CHANGE UP (ref 42.2–75.2)
NRBC # BLD: 0 /100 WBCS — SIGNIFICANT CHANGE UP (ref 0–0)
PLATELET # BLD AUTO: 231 K/UL — SIGNIFICANT CHANGE UP (ref 130–400)
POTASSIUM SERPL-MCNC: 4 MMOL/L — SIGNIFICANT CHANGE UP (ref 3.5–5)
POTASSIUM SERPL-SCNC: 4 MMOL/L — SIGNIFICANT CHANGE UP (ref 3.5–5)
RBC # BLD: 3.92 M/UL — LOW (ref 4.2–5.4)
RBC # FLD: 12.2 % — SIGNIFICANT CHANGE UP (ref 11.5–14.5)
SODIUM SERPL-SCNC: 141 MMOL/L — SIGNIFICANT CHANGE UP (ref 135–146)
WBC # BLD: 7.9 K/UL — SIGNIFICANT CHANGE UP (ref 4.8–10.8)
WBC # FLD AUTO: 7.9 K/UL — SIGNIFICANT CHANGE UP (ref 4.8–10.8)

## 2019-09-12 PROCEDURE — 99239 HOSP IP/OBS DSCHRG MGMT >30: CPT

## 2019-09-12 RX ORDER — FUROSEMIDE 40 MG
1 TABLET ORAL
Qty: 0 | Refills: 0 | DISCHARGE
Start: 2019-09-12

## 2019-09-12 RX ORDER — METOPROLOL TARTRATE 50 MG
1 TABLET ORAL
Qty: 60 | Refills: 2
Start: 2019-09-12 | End: 2019-12-10

## 2019-09-12 RX ORDER — INSULIN LISPRO 100/ML
8 VIAL (ML) SUBCUTANEOUS
Refills: 0 | Status: DISCONTINUED | OUTPATIENT
Start: 2019-09-12 | End: 2019-09-13

## 2019-09-12 RX ORDER — DILTIAZEM HCL 120 MG
15 CAPSULE, EXT RELEASE 24 HR ORAL ONCE
Refills: 0 | Status: COMPLETED | OUTPATIENT
Start: 2019-09-12 | End: 2019-09-12

## 2019-09-12 RX ORDER — LINAGLIPTIN 5 MG/1
1 TABLET, FILM COATED ORAL
Qty: 30 | Refills: 0
Start: 2019-09-12 | End: 2019-10-11

## 2019-09-12 RX ORDER — LISINOPRIL 2.5 MG/1
1 TABLET ORAL
Qty: 30 | Refills: 0
Start: 2019-09-12 | End: 2019-10-11

## 2019-09-12 RX ORDER — LINAGLIPTIN 5 MG/1
1 TABLET, FILM COATED ORAL
Qty: 0 | Refills: 0 | DISCHARGE

## 2019-09-12 RX ORDER — INSULIN GLARGINE 100 [IU]/ML
18 INJECTION, SOLUTION SUBCUTANEOUS AT BEDTIME
Refills: 0 | Status: DISCONTINUED | OUTPATIENT
Start: 2019-09-12 | End: 2019-09-13

## 2019-09-12 RX ORDER — DILTIAZEM HCL 120 MG
1 CAPSULE, EXT RELEASE 24 HR ORAL
Qty: 30 | Refills: 0
Start: 2019-09-12 | End: 2019-10-11

## 2019-09-12 RX ADMIN — Medication 40 MILLIGRAM(S): at 06:24

## 2019-09-12 RX ADMIN — Medication 12: at 11:28

## 2019-09-12 RX ADMIN — APIXABAN 5 MILLIGRAM(S): 2.5 TABLET, FILM COATED ORAL at 06:23

## 2019-09-12 RX ADMIN — ALBUTEROL 2 PUFF(S): 90 AEROSOL, METERED ORAL at 16:10

## 2019-09-12 RX ADMIN — CEFTRIAXONE 100 MILLIGRAM(S): 500 INJECTION, POWDER, FOR SOLUTION INTRAMUSCULAR; INTRAVENOUS at 16:46

## 2019-09-12 RX ADMIN — Medication 40 MILLIGRAM(S): at 06:23

## 2019-09-12 RX ADMIN — Medication 2: at 08:01

## 2019-09-12 RX ADMIN — BUDESONIDE AND FORMOTEROL FUMARATE DIHYDRATE 2 PUFF(S): 160; 4.5 AEROSOL RESPIRATORY (INHALATION) at 20:52

## 2019-09-12 RX ADMIN — INSULIN GLARGINE 15 UNIT(S): 100 INJECTION, SOLUTION SUBCUTANEOUS at 22:32

## 2019-09-12 RX ADMIN — APIXABAN 5 MILLIGRAM(S): 2.5 TABLET, FILM COATED ORAL at 17:33

## 2019-09-12 RX ADMIN — Medication 6 UNIT(S): at 16:46

## 2019-09-12 RX ADMIN — Medication 25 MILLIGRAM(S): at 06:23

## 2019-09-12 RX ADMIN — ATORVASTATIN CALCIUM 10 MILLIGRAM(S): 80 TABLET, FILM COATED ORAL at 22:32

## 2019-09-12 RX ADMIN — Medication 180 MILLIGRAM(S): at 06:23

## 2019-09-12 RX ADMIN — Medication 8: at 16:45

## 2019-09-12 RX ADMIN — PANTOPRAZOLE SODIUM 40 MILLIGRAM(S): 20 TABLET, DELAYED RELEASE ORAL at 06:23

## 2019-09-12 RX ADMIN — Medication 25 MILLIGRAM(S): at 17:33

## 2019-09-12 RX ADMIN — Medication 81 MILLIGRAM(S): at 11:28

## 2019-09-12 RX ADMIN — Medication 6 UNIT(S): at 11:28

## 2019-09-12 RX ADMIN — Medication 15 MILLIGRAM(S): at 18:44

## 2019-09-12 RX ADMIN — BUDESONIDE AND FORMOTEROL FUMARATE DIHYDRATE 2 PUFF(S): 160; 4.5 AEROSOL RESPIRATORY (INHALATION) at 08:01

## 2019-09-12 RX ADMIN — LISINOPRIL 20 MILLIGRAM(S): 2.5 TABLET ORAL at 06:23

## 2019-09-12 RX ADMIN — ALBUTEROL 2 PUFF(S): 90 AEROSOL, METERED ORAL at 08:05

## 2019-09-12 NOTE — DISCHARGE NOTE PROVIDER - NSDCFUSCHEDAPPT_GEN_ALL_CORE_FT
PRATEEK LAWRENCE ; 09/19/2019 ; [facility] [address] PRTAEEK LAWRENCE ; 09/19/2019 ; [facility] [address]

## 2019-09-12 NOTE — DISCHARGE NOTE PROVIDER - HOSPITAL COURSE
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 Patient had been having exertional dyspnea for the past 2 weeks, requiring more rest. Patient reported compliance to diet and medication. In the ED, patient was found to be Afib with RVR. Adenosine was given but w/o any response. Cardizem 10 mg was given after that with adequate rate control  70-80. She was hemodynamically stable, EKG was low rate Afib, no ST-T wave changes, trop sets negative. AC with Eliquis 5 mg bid, Cardiazem 180 mg, metoprolol 25 mg bid started in replacement of coreg. Patient became asymptomatic and heart rate was controlled.  EP was consulted who recommended that she might require ablation if recurrent symptoms - follow up with EP in the office.

## 2019-09-12 NOTE — DISCHARGE NOTE PROVIDER - NSDCFUADDAPPT_GEN_ALL_CORE_FT
Please call and make an appointment with your PMD, cardiologist and Electrophysiologist within a week after discharge and follow up with them for further management and routine health care assessments.     YOU DON'T HAVE A PRIMARY CARE SO YOU HAVE BEEN MADE AN APPOINTMENT AT [facility] MEDICINE ON SEP 19, THURSDAY 8-30 AM, [address] [place] [address]. [phone] Please call and make an appointment with your PMD, cardiologist and Electrophysiologist within a week after discharge and follow up with them for further management and routine health care assessments.   Follow up with the diabetes medicine ordered again from your pharmacy,   YOU DON'T HAVE A PRIMARY CARE SO YOU HAVE BEEN MADE AN APPOINTMENT AT [facility] MEDICINE ON SEP 19, THURSDAY 8-30 AM, [address] [place] [address]. [phone]

## 2019-09-12 NOTE — DISCHARGE NOTE PROVIDER - NSDCCPCAREPLAN_GEN_ALL_CORE_FT
PRINCIPAL DISCHARGE DIAGNOSIS  Diagnosis: Atrial fibrillation with RVR  Assessment and Plan of Treatment: You came to ED with shortness of breath for the past 2 weeks, requiring more rest despite compliance to diet and medication. In the ED, you were found to be Afib with RVR. You became hemodynamically stable, EKG was low rate Afib, no ST-T wave changes, trop sets negative. Anticoagulation with Eliquis 5 mg bid continued, Cardiazem was changed to 180 mg daily and metoprolol 25 mg bid started in replacement of coreg. ElectroPhysiology was consulted who recommended that youmight require ablation if recurrent symptoms - follow up with EP in the office and comply with medication regimen. . PRINCIPAL DISCHARGE DIAGNOSIS  Diagnosis: Atrial fibrillation with RVR  Assessment and Plan of Treatment: You came to ED with shortness of breath for the past 2 weeks, requiring more rest despite compliance to diet and medication. In the ED, you were found to be Afib with RVR. You became hemodynamically stable, EKG was low rate Afib, no ST-T wave changes, trop sets negative. Anticoagulation with Eliquis 5 mg bid continued, Cardiazem was changed to 240 mg daily and metoprolol 25 mg bid started in replacement of coreg. ElectroPhysiology was consulted who recommended that youmight require ablation if recurrent symptoms - follow up with EP in the office and comply with medication regimen. .

## 2019-09-12 NOTE — DISCHARGE NOTE PROVIDER - PROVIDER TOKENS
PROVIDER:[TOKEN:[[id number]],FOLLOWUP:[2 weeks]],PROVIDER:[TOKEN:[[id number]],FOLLOWUP:[2 weeks]]

## 2019-09-12 NOTE — DISCHARGE NOTE PROVIDER - CARE PROVIDER_API CALL
Joao Junior; TANJA)  Cardiac Electrophysiology  [address] [facility], [address] [address]  Phone: [phone]  Fax: [phone]  Follow Up Time: 2 weeks    Aretha Dukes)  Cardiology; Internal Medicine; Nuclear Cardiology  [address] [place] [address], [address]  [place] [address]  Phone: [phone]  Fax: [phone]  Follow Up Time: 2 weeks

## 2019-09-12 NOTE — DISCHARGE NOTE PROVIDER - NSDCFUADDINST_GEN_ALL_CORE_FT
Please call and make an appointment with your PMD, cardiologist and Electrophysiologist within a week after discharge and follow up with them for further management and routine health care assessments. Diet low in sodium and cholesterol and high in fibers.    YOU DON'T HAVE A PRIMARY CARE SO YOU HAVE BEEN MADE AN APPOINTMENT AT [facility] MEDICINE ON SEP 19, THURSDAY 8-30 AM, [address] [place] [address]. [phone]

## 2019-09-13 ENCOUNTER — TRANSCRIPTION ENCOUNTER (OUTPATIENT)
Age: 79
End: 2019-09-13

## 2019-09-13 VITALS
HEART RATE: 74 BPM | SYSTOLIC BLOOD PRESSURE: 136 MMHG | TEMPERATURE: 97 F | DIASTOLIC BLOOD PRESSURE: 78 MMHG | RESPIRATION RATE: 18 BRPM

## 2019-09-13 LAB
ANION GAP SERPL CALC-SCNC: 17 MMOL/L — HIGH (ref 7–14)
BASOPHILS # BLD AUTO: 0.02 K/UL — SIGNIFICANT CHANGE UP (ref 0–0.2)
BASOPHILS NFR BLD AUTO: 0.2 % — SIGNIFICANT CHANGE UP (ref 0–1)
BUN SERPL-MCNC: 29 MG/DL — HIGH (ref 10–20)
CALCIUM SERPL-MCNC: 10.7 MG/DL — HIGH (ref 8.5–10.1)
CHLORIDE SERPL-SCNC: 95 MMOL/L — LOW (ref 98–110)
CO2 SERPL-SCNC: 30 MMOL/L — SIGNIFICANT CHANGE UP (ref 17–32)
CREAT SERPL-MCNC: 0.8 MG/DL — SIGNIFICANT CHANGE UP (ref 0.7–1.5)
EOSINOPHIL # BLD AUTO: 0 K/UL — SIGNIFICANT CHANGE UP (ref 0–0.7)
EOSINOPHIL NFR BLD AUTO: 0 % — SIGNIFICANT CHANGE UP (ref 0–8)
GLUCOSE BLDC GLUCOMTR-MCNC: 236 MG/DL — HIGH (ref 70–99)
GLUCOSE BLDC GLUCOMTR-MCNC: 250 MG/DL — HIGH (ref 70–99)
GLUCOSE BLDC GLUCOMTR-MCNC: 253 MG/DL — HIGH (ref 70–99)
GLUCOSE SERPL-MCNC: 280 MG/DL — HIGH (ref 70–99)
HCT VFR BLD CALC: 42.6 % — SIGNIFICANT CHANGE UP (ref 37–47)
HGB BLD-MCNC: 14.8 G/DL — SIGNIFICANT CHANGE UP (ref 12–16)
IMM GRANULOCYTES NFR BLD AUTO: 0.9 % — HIGH (ref 0.1–0.3)
LYMPHOCYTES # BLD AUTO: 1.41 K/UL — SIGNIFICANT CHANGE UP (ref 1.2–3.4)
LYMPHOCYTES # BLD AUTO: 15.9 % — LOW (ref 20.5–51.1)
MCHC RBC-ENTMCNC: 32.7 PG — HIGH (ref 27–31)
MCHC RBC-ENTMCNC: 34.7 G/DL — SIGNIFICANT CHANGE UP (ref 32–37)
MCV RBC AUTO: 94 FL — SIGNIFICANT CHANGE UP (ref 81–99)
MONOCYTES # BLD AUTO: 0.23 K/UL — SIGNIFICANT CHANGE UP (ref 0.1–0.6)
MONOCYTES NFR BLD AUTO: 2.6 % — SIGNIFICANT CHANGE UP (ref 1.7–9.3)
NEUTROPHILS # BLD AUTO: 7.15 K/UL — HIGH (ref 1.4–6.5)
NEUTROPHILS NFR BLD AUTO: 80.4 % — HIGH (ref 42.2–75.2)
NRBC # BLD: 0 /100 WBCS — SIGNIFICANT CHANGE UP (ref 0–0)
PLATELET # BLD AUTO: 273 K/UL — SIGNIFICANT CHANGE UP (ref 130–400)
POTASSIUM SERPL-MCNC: 4.2 MMOL/L — SIGNIFICANT CHANGE UP (ref 3.5–5)
POTASSIUM SERPL-SCNC: 4.2 MMOL/L — SIGNIFICANT CHANGE UP (ref 3.5–5)
RBC # BLD: 4.53 M/UL — SIGNIFICANT CHANGE UP (ref 4.2–5.4)
RBC # FLD: 12.5 % — SIGNIFICANT CHANGE UP (ref 11.5–14.5)
SODIUM SERPL-SCNC: 142 MMOL/L — SIGNIFICANT CHANGE UP (ref 135–146)
WBC # BLD: 8.89 K/UL — SIGNIFICANT CHANGE UP (ref 4.8–10.8)
WBC # FLD AUTO: 8.89 K/UL — SIGNIFICANT CHANGE UP (ref 4.8–10.8)

## 2019-09-13 PROCEDURE — 99233 SBSQ HOSP IP/OBS HIGH 50: CPT

## 2019-09-13 RX ORDER — APIXABAN 2.5 MG/1
1 TABLET, FILM COATED ORAL
Qty: 60 | Refills: 0
Start: 2019-09-13 | End: 2019-10-12

## 2019-09-13 RX ORDER — HYDRALAZINE HCL 50 MG
25 TABLET ORAL ONCE
Refills: 0 | Status: COMPLETED | OUTPATIENT
Start: 2019-09-13 | End: 2019-09-13

## 2019-09-13 RX ORDER — METOPROLOL TARTRATE 50 MG
1 TABLET ORAL
Qty: 30 | Refills: 3
Start: 2019-09-13 | End: 2020-01-10

## 2019-09-13 RX ORDER — APIXABAN 2.5 MG/1
1 TABLET, FILM COATED ORAL
Qty: 0 | Refills: 0 | DISCHARGE

## 2019-09-13 RX ORDER — DILTIAZEM HCL 120 MG
30 CAPSULE, EXT RELEASE 24 HR ORAL ONCE
Refills: 0 | Status: COMPLETED | OUTPATIENT
Start: 2019-09-13 | End: 2019-09-13

## 2019-09-13 RX ORDER — DILTIAZEM HCL 120 MG
60 CAPSULE, EXT RELEASE 24 HR ORAL ONCE
Refills: 0 | Status: COMPLETED | OUTPATIENT
Start: 2019-09-13 | End: 2019-09-13

## 2019-09-13 RX ORDER — DILTIAZEM HCL 120 MG
1 CAPSULE, EXT RELEASE 24 HR ORAL
Qty: 30 | Refills: 0
Start: 2019-09-13 | End: 2019-10-12

## 2019-09-13 RX ORDER — HYDRALAZINE HCL 50 MG
10 TABLET ORAL ONCE
Refills: 0 | Status: DISCONTINUED | OUTPATIENT
Start: 2019-09-13 | End: 2019-09-13

## 2019-09-13 RX ADMIN — PANTOPRAZOLE SODIUM 40 MILLIGRAM(S): 20 TABLET, DELAYED RELEASE ORAL at 05:49

## 2019-09-13 RX ADMIN — Medication 180 MILLIGRAM(S): at 05:07

## 2019-09-13 RX ADMIN — Medication 4: at 11:41

## 2019-09-13 RX ADMIN — Medication 8 UNIT(S): at 08:09

## 2019-09-13 RX ADMIN — BUDESONIDE AND FORMOTEROL FUMARATE DIHYDRATE 2 PUFF(S): 160; 4.5 AEROSOL RESPIRATORY (INHALATION) at 08:33

## 2019-09-13 RX ADMIN — TIOTROPIUM BROMIDE 1 CAPSULE(S): 18 CAPSULE ORAL; RESPIRATORY (INHALATION) at 08:54

## 2019-09-13 RX ADMIN — Medication 60 MILLIGRAM(S): at 14:13

## 2019-09-13 RX ADMIN — Medication 8 UNIT(S): at 16:55

## 2019-09-13 RX ADMIN — Medication 30 MILLIGRAM(S): at 17:23

## 2019-09-13 RX ADMIN — APIXABAN 5 MILLIGRAM(S): 2.5 TABLET, FILM COATED ORAL at 05:07

## 2019-09-13 RX ADMIN — Medication 25 MILLIGRAM(S): at 08:32

## 2019-09-13 RX ADMIN — Medication 40 MILLIGRAM(S): at 05:07

## 2019-09-13 RX ADMIN — Medication 8 UNIT(S): at 11:41

## 2019-09-13 RX ADMIN — CHLORHEXIDINE GLUCONATE 1 APPLICATION(S): 213 SOLUTION TOPICAL at 05:07

## 2019-09-13 RX ADMIN — ALBUTEROL 2 PUFF(S): 90 AEROSOL, METERED ORAL at 08:33

## 2019-09-13 RX ADMIN — Medication 81 MILLIGRAM(S): at 11:42

## 2019-09-13 RX ADMIN — Medication 4: at 16:55

## 2019-09-13 RX ADMIN — Medication 25 MILLIGRAM(S): at 17:02

## 2019-09-13 RX ADMIN — Medication 25 MILLIGRAM(S): at 05:07

## 2019-09-13 RX ADMIN — Medication 6: at 08:09

## 2019-09-13 RX ADMIN — LISINOPRIL 20 MILLIGRAM(S): 2.5 TABLET ORAL at 05:07

## 2019-09-13 RX ADMIN — APIXABAN 5 MILLIGRAM(S): 2.5 TABLET, FILM COATED ORAL at 17:02

## 2019-09-13 NOTE — CHART NOTE - NSCHARTNOTEFT_GEN_A_CORE
<<<RESIDENT DISCHARGE NOTE>>>     PRATEEK LAWRENCE  MRN-[id number]    VITAL SIGNS:  T(F): 97.1 (09-13-19 @ 13:15), Max: 97.1 (09-13-19 @ 13:15)  HR: 125 (09-13-19 @ 13:15)  BP: 134/96 (09-13-19 @ 13:15)  SpO2: 98% (09-13-19 @ 07:03)      PHYSICAL EXAMINATION:  General: NAD  Head & Neck: NCAT  Pulmonary: CTA b/l  Cardiovascular: +s1s2 RRR  Gastrointestinal/Abdomen & Pelvis: soft, NT/ND (+) bs  Neurologic/Motor: FROM x 4 5/5    TEST RESULTS:                        14.8   8.89  )-----------( 273      ( 13 Sep 2019 11:35 )             42.6       09-13    142  |  95<L>  |  29<H>  ----------------------------<  280<H>  4.2   |  30  |  0.8    Ca    10.7<H>      13 Sep 2019 11:35        FINAL DISCHARGE INTERVIEW:  Resident(s) Present: (Name: Hoa), RN Present: (Name: Nely)  Change in heart rate control medicine and following up with PMD for monitoring of heart rate, rhythm and blood pressure explained to daughter.  DISCHARGE MEDICATION RECONCILIATION  reviewed with Attending (Name:Jewel)    DISPOSITION:   [ x ] Home,    [  ] Home with Visiting Nursing Services,   [    ]  SNF/ NH,    [   ] Acute Rehab (4A),   [   ] Other (Specify:)
<<<RESIDENT DISCHARGE NOTE>>>     PRATEEK LAWRENCE  MRN-[id number]    VITAL SIGNS:  T(F): 97.4 (09-12-19 @ 13:30), Max: 97.5 (09-11-19 @ 20:30)  HR: 99 (09-12-19 @ 13:30)  BP: 144/95 (09-12-19 @ 13:30)  SpO2: 98% (09-11-19 @ 19:45)      PHYSICAL EXAMINATION:  General: NAD  Head & Neck: NCAT  Pulmonary: CTA b/l  Cardiovascular: +s1s2 RRR  Gastrointestinal/Abdomen & Pelvis: soft, NT/ND (+) bs  Neurologic/Motor: FROM x 4 5/5    TEST RESULTS:                        12.5   7.90  )-----------( 231      ( 12 Sep 2019 05:34 )             36.8       09-12    141  |  103  |  23<H>  ----------------------------<  225<H>  4.0   |  25  |  0.8    Ca    9.7      12 Sep 2019 05:34  Mg     1.6     09-11        FINAL DISCHARGE INTERVIEW:  Resident(s) Present: (Name: Kanu, RN Present: (Name:Nely)    DISCHARGE MEDICATION RECONCILIATION  reviewed with Attending (Name:HAZEL)    DISPOSITION:   [  ] Home,    [  ] Home with Visiting Nursing Services,   [    ]  SNF/ NH,    [   ] Acute Rehab (4A),   [   ] Other (Specify:)
Called by RN because patients HR was in the 140's. On the monitor patient was in Afib w/ RVR. Blood pressure was 120's systolic. Patient is ready for discharge by 6PM today and no longer has IV access. After reviewing chart, 30mg cardizem was provided orally.     Will continue to monitor until discharge.
Patient was having afib w/ RVR to 145 to low 150s. Patient is asymptomatic, no chest pain. /100. Saturating well on RA, no difficulty breathing. Will administer IV 15mg cardizem push and monitor.

## 2019-09-13 NOTE — DISCHARGE NOTE NURSING/CASE MANAGEMENT/SOCIAL WORK - NSDCFUADDAPPT_GEN_ALL_CORE_FT
Please call and make an appointment with your PMD, cardiologist and Electrophysiologist within a week after discharge and follow up with them for further management and routine health care assessments.   Follow up with the diabetes medicine ordered again from your pharmacy,   YOU DON'T HAVE A PRIMARY CARE SO YOU HAVE BEEN MADE AN APPOINTMENT AT [facility] MEDICINE ON SEP 19, THURSDAY 8-30 AM, [address] [place] [address]. [phone]

## 2019-09-15 LAB
CULTURE RESULTS: SIGNIFICANT CHANGE UP
SPECIMEN SOURCE: SIGNIFICANT CHANGE UP

## 2019-09-18 DIAGNOSIS — E11.9 TYPE 2 DIABETES MELLITUS WITHOUT COMPLICATIONS: ICD-10-CM

## 2019-09-18 DIAGNOSIS — Z79.01 LONG TERM (CURRENT) USE OF ANTICOAGULANTS: ICD-10-CM

## 2019-09-18 DIAGNOSIS — I50.32 CHRONIC DIASTOLIC (CONGESTIVE) HEART FAILURE: ICD-10-CM

## 2019-09-18 DIAGNOSIS — I11.0 HYPERTENSIVE HEART DISEASE WITH HEART FAILURE: ICD-10-CM

## 2019-09-18 DIAGNOSIS — J44.9 CHRONIC OBSTRUCTIVE PULMONARY DISEASE, UNSPECIFIED: ICD-10-CM

## 2019-09-18 DIAGNOSIS — I48.0 PAROXYSMAL ATRIAL FIBRILLATION: ICD-10-CM

## 2019-09-18 DIAGNOSIS — Z99.81 DEPENDENCE ON SUPPLEMENTAL OXYGEN: ICD-10-CM

## 2019-09-18 DIAGNOSIS — I25.10 ATHEROSCLEROTIC HEART DISEASE OF NATIVE CORONARY ARTERY WITHOUT ANGINA PECTORIS: ICD-10-CM

## 2019-09-18 DIAGNOSIS — I47.1 SUPRAVENTRICULAR TACHYCARDIA: ICD-10-CM

## 2019-09-18 DIAGNOSIS — Z86.73 PERSONAL HISTORY OF TRANSIENT ISCHEMIC ATTACK (TIA), AND CEREBRAL INFARCTION WITHOUT RESIDUAL DEFICITS: ICD-10-CM

## 2019-09-19 ENCOUNTER — APPOINTMENT (OUTPATIENT)
Dept: INTERNAL MEDICINE | Facility: CLINIC | Age: 79
End: 2019-09-19

## 2020-07-26 NOTE — ED ADULT NURSE NOTE - NS ED PATIENT SAFETY CONCERN
RDW 13.4 13.5   * 110*   MPV 13.4* 13.1*       Radiology:   CT ABDOMEN PELVIS WO CONTRAST   Final Result      1. Diffuse small and large bowel enteritis with multiple fluid-filled   bowel loops showing minimal distention and no focal point of   obstruction. Consistent with infectious etiology. 2. Minimal free fluid in the pelvis. 3. Cholelithiasis with a contracted gallbladder, no obvious   cholecystitis. Assessment:    Active Problems:    Gastroenteritis    Enterocolitis  Resolved Problems:    * No resolved hospital problems. *      Plan:  1. Acute enterocolitis with nausea, vomiting and diarrhea  -C. Difficile negative. Rotavirus negative, still having significant diarrhea. Started on Imodium  -Started IV fluids. -GI consulted, patient started on IV steroids. Now ruling out inflammatory bowel disease.     2. Hypokalemia  -  K 3.3 on admission, replaced now 3.8     3. Cholelithiasis  -No acute cholecystitis noted. Recommend follow-up as outpatient.     4. Hypotension  -Improving slightly, most likely secondary to volume contraction, IV fluids continued. We will continue to monitor. 5.  Thrombocytopenia  -Platelets improved today to 121 from 111 yesterday,  -I will discuss with attending regarding work-up, continue to monitor for now.       Electronically signed by Mark Juan PA-C on 7/26/2020 at 9:11 AM No

## 2021-07-21 ENCOUNTER — EMERGENCY (EMERGENCY)
Facility: HOSPITAL | Age: 81
LOS: 0 days | Discharge: LEFT BEFORE DISCHARGE | End: 2021-07-22
Attending: EMERGENCY MEDICINE | Admitting: EMERGENCY MEDICINE
Payer: MEDICARE

## 2021-07-21 VITALS
OXYGEN SATURATION: 98 % | SYSTOLIC BLOOD PRESSURE: 113 MMHG | HEART RATE: 113 BPM | HEIGHT: 60 IN | RESPIRATION RATE: 17 BRPM | DIASTOLIC BLOOD PRESSURE: 76 MMHG | TEMPERATURE: 96 F

## 2021-07-21 DIAGNOSIS — Z88.8 ALLERGY STATUS TO OTHER DRUGS, MEDICAMENTS AND BIOLOGICAL SUBSTANCES: ICD-10-CM

## 2021-07-21 DIAGNOSIS — Z88.1 ALLERGY STATUS TO OTHER ANTIBIOTIC AGENTS STATUS: ICD-10-CM

## 2021-07-21 DIAGNOSIS — Z99.81 DEPENDENCE ON SUPPLEMENTAL OXYGEN: ICD-10-CM

## 2021-07-21 DIAGNOSIS — R60.0 LOCALIZED EDEMA: ICD-10-CM

## 2021-07-21 DIAGNOSIS — E11.9 TYPE 2 DIABETES MELLITUS WITHOUT COMPLICATIONS: ICD-10-CM

## 2021-07-21 DIAGNOSIS — R11.0 NAUSEA: ICD-10-CM

## 2021-07-21 DIAGNOSIS — R74.02 ELEVATION OF LEVELS OF LACTIC ACID DEHYDROGENASE [LDH]: ICD-10-CM
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
## 2021-07-21 DIAGNOSIS — I50.30 UNSPECIFIED DIASTOLIC (CONGESTIVE) HEART FAILURE: ICD-10-CM

## 2021-07-21 DIAGNOSIS — I48.91 UNSPECIFIED ATRIAL FIBRILLATION: ICD-10-CM

## 2021-07-21 DIAGNOSIS — Z79.84 LONG TERM (CURRENT) USE OF ORAL HYPOGLYCEMIC DRUGS: ICD-10-CM

## 2021-07-21 DIAGNOSIS — Z90.49 ACQUIRED ABSENCE OF OTHER SPECIFIED PARTS OF DIGESTIVE TRACT: Chronic | ICD-10-CM

## 2021-07-21 DIAGNOSIS — R11.2 NAUSEA WITH VOMITING, UNSPECIFIED: ICD-10-CM

## 2021-07-21 DIAGNOSIS — T82.897A OTHER SPECIFIED COMPLICATION OF CARDIAC PROSTHETIC DEVICES, IMPLANTS AND GRAFTS, INITIAL ENCOUNTER: ICD-10-CM
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
## 2021-07-21 DIAGNOSIS — I25.10 ATHEROSCLEROTIC HEART DISEASE OF NATIVE CORONARY ARTERY WITHOUT ANGINA PECTORIS: ICD-10-CM

## 2021-07-21 DIAGNOSIS — Z79.899 OTHER LONG TERM (CURRENT) DRUG THERAPY: ICD-10-CM

## 2021-07-21 DIAGNOSIS — Z79.51 LONG TERM (CURRENT) USE OF INHALED STEROIDS: ICD-10-CM

## 2021-07-21 DIAGNOSIS — Z79.01 LONG TERM (CURRENT) USE OF ANTICOAGULANTS: ICD-10-CM

## 2021-07-21 DIAGNOSIS — R00.0 TACHYCARDIA, UNSPECIFIED: ICD-10-CM

## 2021-07-21 DIAGNOSIS — I11.0 HYPERTENSIVE HEART DISEASE WITH HEART FAILURE: ICD-10-CM

## 2021-07-21 DIAGNOSIS — Z79.02 LONG TERM (CURRENT) USE OF ANTITHROMBOTICS/ANTIPLATELETS: ICD-10-CM

## 2021-07-21 DIAGNOSIS — E83.42 HYPOMAGNESEMIA: ICD-10-CM
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
## 2021-07-21 DIAGNOSIS — E78.5 HYPERLIPIDEMIA, UNSPECIFIED: ICD-10-CM

## 2021-07-21 DIAGNOSIS — R19.7 DIARRHEA, UNSPECIFIED: ICD-10-CM

## 2021-07-21 DIAGNOSIS — R53.1 WEAKNESS: ICD-10-CM

## 2021-07-21 LAB
B-OH-BUTYR SERPL-SCNC: <0.2 MMOL/L — SIGNIFICANT CHANGE UP
BASOPHILS # BLD AUTO: 0.02 K/UL — SIGNIFICANT CHANGE UP (ref 0–0.2)
BASOPHILS NFR BLD AUTO: 0.2 % — SIGNIFICANT CHANGE UP (ref 0–1)
EOSINOPHIL # BLD AUTO: 0.07 K/UL — SIGNIFICANT CHANGE UP (ref 0–0.7)
EOSINOPHIL NFR BLD AUTO: 0.8 % — SIGNIFICANT CHANGE UP (ref 0–8)
GAS PNL BLDV: SIGNIFICANT CHANGE UP
HCT VFR BLD CALC: 36.7 % — LOW (ref 37–47)
HGB BLD-MCNC: 12.6 G/DL — SIGNIFICANT CHANGE UP (ref 12–16)
IMM GRANULOCYTES NFR BLD AUTO: 0.2 % — SIGNIFICANT CHANGE UP (ref 0.1–0.3)
LACTATE SERPL-SCNC: 2.5 MMOL/L — HIGH (ref 0.7–2)
LYMPHOCYTES # BLD AUTO: 1.79 K/UL — SIGNIFICANT CHANGE UP (ref 1.2–3.4)
LYMPHOCYTES # BLD AUTO: 21.4 % — SIGNIFICANT CHANGE UP (ref 20.5–51.1)
MCHC RBC-ENTMCNC: 32.7 PG — HIGH (ref 27–31)
MCHC RBC-ENTMCNC: 34.3 G/DL — SIGNIFICANT CHANGE UP (ref 32–37)
MCV RBC AUTO: 95.3 FL — SIGNIFICANT CHANGE UP (ref 81–99)
MONOCYTES # BLD AUTO: 0.6 K/UL — SIGNIFICANT CHANGE UP (ref 0.1–0.6)
MONOCYTES NFR BLD AUTO: 7.2 % — SIGNIFICANT CHANGE UP (ref 1.7–9.3)
NEUTROPHILS # BLD AUTO: 5.87 K/UL — SIGNIFICANT CHANGE UP (ref 1.4–6.5)
NEUTROPHILS NFR BLD AUTO: 70.2 % — SIGNIFICANT CHANGE UP (ref 42.2–75.2)
NRBC # BLD: 0 /100 WBCS — SIGNIFICANT CHANGE UP (ref 0–0)
NT-PROBNP SERPL-SCNC: 3878 PG/ML — HIGH (ref 0–300)
PLATELET # BLD AUTO: 213 K/UL — SIGNIFICANT CHANGE UP (ref 130–400)
RBC # BLD: 3.85 M/UL — LOW (ref 4.2–5.4)
RBC # FLD: 12.6 % — SIGNIFICANT CHANGE UP (ref 11.5–14.5)
TROPONIN T SERPL-MCNC: <0.01 NG/ML — SIGNIFICANT CHANGE UP
WBC # BLD: 8.37 K/UL — SIGNIFICANT CHANGE UP (ref 4.8–10.8)
WBC # FLD AUTO: 8.37 K/UL — SIGNIFICANT CHANGE UP (ref 4.8–10.8)

## 2021-07-21 PROCEDURE — 93010 ELECTROCARDIOGRAM REPORT: CPT | Mod: 76

## 2021-07-21 PROCEDURE — 71045 X-RAY EXAM CHEST 1 VIEW: CPT | Mod: 26

## 2021-07-21 PROCEDURE — 99284 EMERGENCY DEPT VISIT MOD MDM: CPT

## 2021-07-21 PROCEDURE — 93010 ELECTROCARDIOGRAM REPORT: CPT

## 2021-07-21 RX ORDER — ONDANSETRON 8 MG/1
4 TABLET, FILM COATED ORAL ONCE
Refills: 0 | Status: COMPLETED | OUTPATIENT
Start: 2021-07-21 | End: 2021-07-21

## 2021-07-21 NOTE — ED ADULT NURSE NOTE - NSIMPLEMENTINTERV_GEN_ALL_ED
Implemented All Fall with Harm Risk Interventions:  [place] to call system. Call bell, personal items and telephone within reach. Instruct patient to call for assistance. Room bathroom lighting operational. Non-slip footwear when patient is off stretcher. Physically safe environment: no spills, clutter or unnecessary equipment. Stretcher in lowest position, wheels locked, appropriate side rails in place. Provide visual cue, wrist band, yellow gown, etc. Monitor gait and stability. Monitor for mental status changes and reorient to person, place, and time. Review medications for side effects contributing to fall risk. Reinforce activity limits and safety measures with patient and family. Provide visual clues: red socks.

## 2021-07-21 NOTE — ED ADULT NURSE NOTE - OBJECTIVE STATEMENT
Pt presented to ED c/o med eval. Pt BIBA c/o weakness x2 days. Pt baseline on 3L NC at home. Family at bedside. Pt h/o CHF/COPD. Denies n/v/d/fevers/chills. Airway intact.

## 2021-07-21 NOTE — ED ADULT TRIAGE NOTE - CHIEF COMPLAINT QUOTE
Pt BIBA with tachycardia since morning as per pulse ox. also complaining of weakness and NVD started today. FS as per . hx of COPD/ CHF. on home o2 3 L

## 2021-07-22 VITALS — HEART RATE: 114 BPM

## 2021-07-22 LAB
ALBUMIN SERPL ELPH-MCNC: 3.8 G/DL — SIGNIFICANT CHANGE UP (ref 3.5–5.2)
ALP SERPL-CCNC: 125 U/L — HIGH (ref 30–115)
ALT FLD-CCNC: 88 U/L — HIGH (ref 0–41)
ANION GAP SERPL CALC-SCNC: 16 MMOL/L — HIGH (ref 7–14)
APPEARANCE UR: ABNORMAL
AST SERPL-CCNC: 119 U/L — HIGH (ref 0–41)
BACTERIA # UR AUTO: NEGATIVE — SIGNIFICANT CHANGE UP
BASE EXCESS BLDV CALC-SCNC: 6.2 MMOL/L — HIGH (ref -2–2)
BILIRUB SERPL-MCNC: 1 MG/DL — SIGNIFICANT CHANGE UP (ref 0.2–1.2)
BILIRUB UR-MCNC: NEGATIVE — SIGNIFICANT CHANGE UP
BUN SERPL-MCNC: 17 MG/DL — SIGNIFICANT CHANGE UP (ref 10–20)
CA-I SERPL-SCNC: 1.08 MMOL/L — LOW (ref 1.12–1.3)
CALCIUM SERPL-MCNC: 8.6 MG/DL — SIGNIFICANT CHANGE UP (ref 8.5–10.1)
CHLORIDE SERPL-SCNC: 95 MMOL/L — LOW (ref 98–110)
CO2 SERPL-SCNC: 24 MMOL/L — SIGNIFICANT CHANGE UP (ref 17–32)
COLOR SPEC: YELLOW — SIGNIFICANT CHANGE UP
CREAT SERPL-MCNC: 1 MG/DL — SIGNIFICANT CHANGE UP (ref 0.7–1.5)
DIFF PNL FLD: NEGATIVE — SIGNIFICANT CHANGE UP
EPI CELLS # UR: 7 /HPF — HIGH (ref 0–5)
GAS PNL BLDV: 137 MMOL/L — SIGNIFICANT CHANGE UP (ref 136–145)
GLUCOSE SERPL-MCNC: 230 MG/DL — HIGH (ref 70–99)
GLUCOSE UR QL: ABNORMAL
HCO3 BLDV-SCNC: 31 MMOL/L — HIGH (ref 22–29)
HCT VFR BLDA CALC: 43.3 % — SIGNIFICANT CHANGE UP (ref 34–44)
HGB BLD CALC-MCNC: 14.1 G/DL — SIGNIFICANT CHANGE UP (ref 14–18)
HYALINE CASTS # UR AUTO: 4 /LPF — SIGNIFICANT CHANGE UP (ref 0–7)
KETONES UR-MCNC: NEGATIVE — SIGNIFICANT CHANGE UP
LACTATE BLDV-MCNC: 1.9 MMOL/L — HIGH (ref 0.5–1.6)
LEUKOCYTE ESTERASE UR-ACNC: ABNORMAL
LIDOCAIN IGE QN: 79 U/L — HIGH (ref 7–60)
MAGNESIUM SERPL-MCNC: 1.7 MG/DL — LOW (ref 1.8–2.4)
NITRITE UR-MCNC: NEGATIVE — SIGNIFICANT CHANGE UP
PCO2 BLDV: 45 MMHG — SIGNIFICANT CHANGE UP (ref 41–51)
PH BLDV: 7.45 — HIGH (ref 7.26–7.43)
PH UR: 6.5 — SIGNIFICANT CHANGE UP (ref 5–8)
PO2 BLDV: 65 MMHG — HIGH (ref 20–40)
POTASSIUM BLDV-SCNC: 3.2 MMOL/L — LOW (ref 3.3–5.6)
POTASSIUM SERPL-MCNC: 3.5 MMOL/L — SIGNIFICANT CHANGE UP (ref 3.5–5)
POTASSIUM SERPL-SCNC: 3.5 MMOL/L — SIGNIFICANT CHANGE UP (ref 3.5–5)
PROT SERPL-MCNC: 6.9 G/DL — SIGNIFICANT CHANGE UP (ref 6–8)
PROT UR-MCNC: ABNORMAL
RBC CASTS # UR COMP ASSIST: 3 /HPF — SIGNIFICANT CHANGE UP (ref 0–4)
SAO2 % BLDV: 92 % — SIGNIFICANT CHANGE UP
SODIUM SERPL-SCNC: 135 MMOL/L — SIGNIFICANT CHANGE UP (ref 135–146)
SP GR SPEC: 1.02 — SIGNIFICANT CHANGE UP (ref 1.01–1.03)
UROBILINOGEN FLD QL: ABNORMAL
WBC UR QL: 10 /HPF — HIGH (ref 0–5)

## 2021-07-22 RX ORDER — MAGNESIUM SULFATE 500 MG/ML
2 VIAL (ML) INJECTION ONCE
Refills: 0 | Status: COMPLETED | OUTPATIENT
Start: 2021-07-22 | End: 2021-07-22

## 2021-07-22 RX ADMIN — Medication 50 GRAM(S): at 01:04

## 2021-07-22 RX ADMIN — ONDANSETRON 4 MILLIGRAM(S): 8 TABLET, FILM COATED ORAL at 01:04

## 2021-07-22 NOTE — ED PROVIDER NOTE - PATIENT PORTAL LINK FT
You can access the FollowMyHealth Patient Portal offered by [facility] by registering at the following website: [email or web address]. By joining [employer]’s FollowMyHealth portal, you will also be able to view your health information using other applications (apps) compatible with our system.

## 2021-07-22 NOTE — ED PROVIDER NOTE - NS ED ROS FT
Constitutional: no fever, chills, no recent weight loss  Eyes: no redness/discharge/pain/vision changes  ENT: no rhinorrhea/ear pain/sore throat  Cardiac: No chest pain, SOB or edema.  Respiratory: No cough or respiratory distress  GI: No diarrhea or abdominal pain.  : No dysuria, frequency, urgency or hematuria  MS: no pain to back or extremities, no loss of ROM  Neuro: No headache. No LOC.  Skin: No skin rash.  Except as documented in the HPI, all other systems are negative.

## 2021-07-22 NOTE — ED PROVIDER NOTE - OBJECTIVE STATEMENT
pt with pmhx Afib, Asthma, CAD/CHF, DM, COPD on home O2 presents to ED c/o generalized weakness, n/v. Denies fever/chill/HA/dizziness/chest pain/palpitation/sob/abd pain/d/black stool/bloody stool/urinary sxs

## 2021-07-22 NOTE — ED PROVIDER NOTE - CARE PLAN
Principal Discharge DX:	Pacemaker complications  Secondary Diagnosis:	Tachycardia  Secondary Diagnosis:	Hypomagnesemia  Secondary Diagnosis:	Transaminitis

## 2021-07-22 NOTE — ED PROVIDER NOTE - ATTENDING CONTRIBUTION TO CARE
81F pmh copd on home O2, cad, chf/svt s/p ppm on lasix 40 mg BID, AF on elliquis, dm, htn, hl p/w tachycardia. Per daughter @ bedside who is RN, she chkd pts VS today and found her tachycardic to 110s - rpts she is usu never above 70 w/her PPM. Later this afternoon pt rptd gen weakness & nvd. FS in triage 270s. No other complaints. No falls or head trauma. No ha, f/c, uri sx, cp/sob/suggs, cough, hemoptysis, abd pain, flank pain, urinary sx, rash, edema. PCP/Cardio in [place].    PE:  elderly f nad  skin warm, dry  ncat  neck supple  tachy 110s reg rhythm nl s1s2 no mrg  ctab no wrr  abd soft ntnd no palpable masses no rgr  back non-tender no cvat  ext mild pitting edema distal LEs bl, L=R, no erythema, dpi  neuro aaox3 grossly nf exam

## 2021-07-22 NOTE — ED PROVIDER NOTE - CLINICAL SUMMARY MEDICAL DECISION MAKING FREE TEXT BOX
tachycardia, nvd - ekg & bedside cardiac monitor showing steady ventric pacing @ 113/114, cxr clear, hypomag repleted - pt & daughter advised of rec for admission for ppm interrog / EP consultation, requesting to leave AMA - Risks of same explained to pt incl. possible disability/death. Pt verbalized understanding of same - rec urgent op Cardio f/u, encouraged to return to ED @ any time for further mgmt/admission

## 2021-07-22 NOTE — ED PROVIDER NOTE - PHYSICAL EXAMINATION
CONSTITUTIONAL: Well-appearing; well-nourished; in no apparent distress.   CARDIOVASCULAR: Normal S1, S2; no murmurs, rubs, or gallops.   RESPIRATORY: Normal chest excursion with respiration; breath sounds clear and equal bilaterally; no wheezes, rhonchi, or rales.  GI/: non-distended; non-tender; no palpable organomegaly.   MS: minimal b/l LE edema, Normal ROM in all four extremities; non-tender to palpation; distal pulses are normal.   SKIN: Normal for age and race; warm; dry; good turgor; no apparent lesions or exudate.   NEURO/PSYCH: A & O x 4; grossly unremarkable. mood and manner are appropriate.

## 2021-07-22 NOTE — ED PROVIDER NOTE - NSFOLLOWUPINSTRUCTIONS_ED_ALL_ED_FT

## 2021-07-22 NOTE — ED PROVIDER NOTE - NSFOLLOWUPCLINICS_GEN_ALL_ED_FT
A Cardiologist  Cardiology  .  [place]   Phone:   Fax:     A Family Medicine Doctor  Family Medicine  .  [place]   Phone:   Fax:     A Gastroenterologist  Gastroenterology  .  [place]   Phone:   Fax:

## 2021-07-23 LAB
CULTURE RESULTS: SIGNIFICANT CHANGE UP
SPECIMEN SOURCE: SIGNIFICANT CHANGE UP

## 2021-08-05 NOTE — ED ADULT NURSE NOTE - NSFALLRSKINDICATORS_ED_ALL_ED
yes
Consult received and appreciated for unintentional wt loss PTA.  Source: Medical record and pt (Sammarinese speaking,  ID#[id number], [place])

## 2021-08-16 ENCOUNTER — EMERGENCY (EMERGENCY)
Facility: HOSPITAL | Age: 81
LOS: 0 days | Discharge: HOME | End: 2021-08-16
Attending: EMERGENCY MEDICINE | Admitting: EMERGENCY MEDICINE
Payer: MEDICARE

## 2021-08-16 VITALS
OXYGEN SATURATION: 94 % | DIASTOLIC BLOOD PRESSURE: 78 MMHG | WEIGHT: 188.94 LBS | HEART RATE: 60 BPM | RESPIRATION RATE: 20 BRPM | TEMPERATURE: 97 F | HEIGHT: 60 IN | SYSTOLIC BLOOD PRESSURE: 151 MMHG

## 2021-08-16 DIAGNOSIS — M25.532 PAIN IN LEFT WRIST: ICD-10-CM

## 2021-08-16 DIAGNOSIS — Z79.84 LONG TERM (CURRENT) USE OF ORAL HYPOGLYCEMIC DRUGS: ICD-10-CM
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
## 2021-08-16 DIAGNOSIS — Z99.81 DEPENDENCE ON SUPPLEMENTAL OXYGEN: ICD-10-CM

## 2021-08-16 DIAGNOSIS — Z79.01 LONG TERM (CURRENT) USE OF ANTICOAGULANTS: ICD-10-CM

## 2021-08-16 DIAGNOSIS — M54.5 LOW BACK PAIN: ICD-10-CM

## 2021-08-16 DIAGNOSIS — Z79.82 LONG TERM (CURRENT) USE OF ASPIRIN: ICD-10-CM

## 2021-08-16 DIAGNOSIS — Z90.49 ACQUIRED ABSENCE OF OTHER SPECIFIED PARTS OF DIGESTIVE TRACT: Chronic | ICD-10-CM
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
## 2021-08-16 DIAGNOSIS — I25.10 ATHEROSCLEROTIC HEART DISEASE OF NATIVE CORONARY ARTERY WITHOUT ANGINA PECTORIS: ICD-10-CM

## 2021-08-16 DIAGNOSIS — I48.91 UNSPECIFIED ATRIAL FIBRILLATION: ICD-10-CM

## 2021-08-16 DIAGNOSIS — Z79.899 OTHER LONG TERM (CURRENT) DRUG THERAPY: ICD-10-CM

## 2021-08-16 DIAGNOSIS — S52.612A DISPLACED FRACTURE OF LEFT ULNA STYLOID PROCESS, INITIAL ENCOUNTER FOR CLOSED FRACTURE: ICD-10-CM

## 2021-08-16 DIAGNOSIS — J44.9 CHRONIC OBSTRUCTIVE PULMONARY DISEASE, UNSPECIFIED: ICD-10-CM

## 2021-08-16 DIAGNOSIS — I50.30 UNSPECIFIED DIASTOLIC (CONGESTIVE) HEART FAILURE: ICD-10-CM

## 2021-08-16 PROCEDURE — 73090 X-RAY EXAM OF FOREARM: CPT | Mod: 26,LT

## 2021-08-16 PROCEDURE — 99284 EMERGENCY DEPT VISIT MOD MDM: CPT | Mod: 25,GC

## 2021-08-16 PROCEDURE — 29125 APPL SHORT ARM SPLINT STATIC: CPT | Mod: LT,GC

## 2021-08-16 PROCEDURE — 73130 X-RAY EXAM OF HAND: CPT | Mod: 26,LT

## 2021-08-16 PROCEDURE — 73110 X-RAY EXAM OF WRIST: CPT | Mod: 26,LT

## 2021-08-16 RX ORDER — ACETAMINOPHEN 500 MG
650 TABLET ORAL ONCE
Refills: 0 | Status: COMPLETED | OUTPATIENT
Start: 2021-08-16 | End: 2021-08-16

## 2021-08-16 RX ADMIN — Medication 650 MILLIGRAM(S): at 20:53

## 2021-08-16 NOTE — ED ADULT NURSE NOTE - CAS EDN DISCHARGE ASSESSMENT
----- Message from Angel Ellington MD sent at 7/15/2018  6:17 PM CDT -----  Please inform patient that their testing results were normal other than mild chronic gastritis without H. pylori infection.. Thanks.   Alert and oriented to person, place and time

## 2021-08-16 NOTE — ED ADULT NURSE NOTE - NSIMPLEMENTINTERV_GEN_ALL_ED
Implemented All Fall with Harm Risk Interventions:  [place] to call system. Call bell, personal items and telephone within reach. Instruct patient to call for assistance. Room bathroom lighting operational. Non-slip footwear when patient is off stretcher. Physically safe environment: no spills, clutter or unnecessary equipment. Stretcher in lowest position, wheels locked, appropriate side rails in place. Provide visual cue, wrist band, yellow gown, etc. Monitor gait and stability. Monitor for mental status changes and reorient to person, place, and time. Review medications for side effects contributing to fall risk. Reinforce activity limits and safety measures with patient and family. Provide visual clues: red socks.

## 2021-08-16 NOTE — ED ADULT TRIAGE NOTE - CHIEF COMPLAINT QUOTE
She tripped and fell forward and her left wrist/hand and lower back hurts as per daughter. Did not hit head, on Eliquis

## 2021-08-16 NOTE — ED PROVIDER NOTE - PHYSICAL EXAMINATION
CONSTITUTIONAL: well-appearing, in NAD  SKIN: Warm dry, normal skin turgor  HEAD: NCAT  EYES: EOMI, PERRLA, no scleral icterus, conjunctiva pink  ENT: normal pharynx with no erythema or exudates  NECK: Supple; non tender. Full ROM.  CARD: RRR, no murmurs.  RESP: clear to ausculation b/l. No crackles or wheezing.  ABD: soft, non-tender, non-distended, no rebound or guarding.  EXT: tenderness to L ulnar aspect of wrist, cap refill <2 seconds, radial pulses 2+ b/l, full ROM of wrist, fingers, Full ROM, no pedal edema, no calf tenderness  NEURO: AAOX3 normal motor. normal sensory. Normal gait.  PSYCH: Cooperative, appropriate.

## 2021-08-16 NOTE — ED PROVIDER NOTE - PATIENT PORTAL LINK FT
You can access the FollowMyHealth Patient Portal offered by [facility] by registering at the following website: [email or web address]. By joining [employer]’s FollowMyHealth portal, you will also be able to view your health information using other applications (apps) compatible with our system.

## 2021-08-16 NOTE — ED PROVIDER NOTE - NS ED ROS FT
Constitutional:  (-) fever, (-) chills, (-) lethargy  Eyes:  (-) eye pain (-) visual changes  ENMT: (-) nasal discharge, (-) sore throat. (-) neck pain or stiffness  Cardiac: (-) chest pain (-) palpitations  Respiratory:  (-) cough (-) respiratory distress.   GI:  (-) nausea (-) vomiting (-) diarrhea (-) abdominal pain.  :  (-) dysuria (-) frequency (-) burning.  MS:  (-) back pain (+) joint pain, L wrist  Neuro:  (-) headache (-) numbness (-) tingling (-) focal weakness  Skin:  (-) rash  Except as documented in the HPI,  all other systems are negative

## 2021-08-16 NOTE — ED ADULT NURSE NOTE - OBJECTIVE STATEMENT
Pt s/p trip and fall forward two days ago and her left wrist/hand and lower back hurts as per daughter. Pt did not hit head. Pt on eloquis

## 2021-08-16 NOTE — ED PROVIDER NOTE - NSFOLLOWUPINSTRUCTIONS_ED_ALL_ED_FT
Ulnar Fracture      An ulnar fracture is a break in the ulna bone. The ulna is a bone in the forearm, on the same side as the little finger. The forearm is the part of the arm that is between the elbow and the wrist. It is made up of two bones: the radius and the ulna. You can feel the ulna on the outside of the wrist and at the point of the elbow.    An ulnar fracture can happen near the wrist, near the elbow, or in the middle of the forearm. In many cases of ulnar fracture, the radius is also fractured.    What are the causes?  This condition is usually caused by a direct hit or stress to the forearm. This may result from:  •An accident, such as a car or bike accident.  •Falling with the arm outstretched.    What increases the risk?  You may be more likely to fracture your ulna if you:  •Play contact sports.  •Have a condition that causes your bones to become thin and brittle (osteoporosis).    What are the signs or symptoms?  Signs and symptoms may include:  •Pain immediately after the injury.  •An abnormal bend or bump in the arm (deformity).  •Swelling.  •Bruising.  •Numbness or weakness in your hand.  •Inability to turn your hand from side to side.    How is this diagnosed?  This condition may be diagnosed based on:  •Your symptoms and medical history.  •A physical exam.  •An xray.    How is this treated?  Treatment depends on how severe your fracture is, where it is, and how the pieces of the broken bone line up with each other (alignment).  •The first step in treatment may be for you to wear a temporary splint for a few days. After the swelling goes down, you may get a cast, get a different type of splint, or have surgery.  •If your broken bone is in good alignment, you will need to wear a splint or cast for several weeks.  •If your broken bone is not aligned (is displaced), your health care provider will need to align the bone pieces. After alignment, you will need to wear a splint or cast for up to 6 weeks. To align your broken bone, your health care provider may:  •Move the bones back into position without surgery (closed reduction).  •Perform surgery to align the fracture and fix the bone pieces into place with metal screws, plates, or wires (open reduction and internal fixation, ORIF).  •Perform surgery to align the fracture and fix the bone pieces into place with pins that are attached to a stabilizing bar outside your skin (external fixation).    Treatment may also include:  •Having your cast changed after 2–3 weeks.  •Physical therapy.  •Follow-up visits and X-rays to make sure you are healing.    Follow these instructions at home:    If you have a splint:     •Wear it as told by your health care provider. Remove it only as told by your health care provider.  •Loosen the splint if your fingers tingle, become numb, or turn cold and blue.  •Keep the splint clean and dry.    If you have a cast:     • Do not stick anything inside the cast to scratch your skin. Doing that increases your risk for infection.  •Check the skin around the cast every day. Tell your health care provider about any concerns.  •You may put lotion on dry skin around the edges of the cast. Do not put lotion on the skin underneath the cast.  •Keep the cast clean and dry.    Bathing     • Do not take baths, swim, or use a hot tub until your health care provider approves. Ask your health care provider if you may take showers. You may only be allowed to take sponge baths.    •If your splint or cast is not waterproof:  •Do not let it get wet.  •Cover it with a watertight covering when you take a bath or a shower.    Activity     • Do not lift anything with your injured arm.  • Do not use the injured arm to support your body weight until your health care provider says that you can.  •Ask your health care provider what activities are safe for you during recovery, and ask what activities you need to avoid.    Managing pain, stiffness, and swelling   •If directed, put ice on painful areas:  •If you have a removable splint, remove it as told by your health care provider.  •Put ice in a plastic bag.  •Place a towel between your skin and the bag, or between your cast and the bag.  •Leave the ice on for 20 minutes, 2–3 times a day.  •Move your fingers often to avoid stiffness and to lessen swelling.  •Raise (elevate) the injured area above the level of your heart while you are sitting or lying down.    General instructions     • Do not put pressure on any part of the cast or splint until it is fully hardened. This may take several hours.    •Take over-the-counter and prescription medicines only as told by your health care provider.    • Do not drive until your health care provider approves. You should not drive or use heavy machinery while taking prescription pain medicine.    • Do not use any products that contain nicotine or tobacco, such as cigarettes and e-cigarettes. These can delay bone healing. If you need help quitting, ask your health care provider.     •Keep all follow-up visits as told by your health care provider. This is important.    Contact a health care provider if you have:    •Pain that does not get better with medicine.  •Swelling that gets worse.  •A bad smell coming from your cast.    Get help right away if:    •You cannot move your fingers.  •You have severe pain.  •Your fingers or your hand:  •Become numb, cold, or pale.  •Turn a bluish color.    Summary    •An ulnar fracture is a break in the ulnar bone, which is the bone in your forearm that is on the same side as your little finger.    •You may need to wear a splint or a cast for at least several weeks. Sometimes surgery is needed.    •Keep all follow-up visits as told by your health care provider.

## 2021-08-16 NOTE — ED PROVIDER NOTE - CARE PROVIDER_API CALL
Eugene Causey)  Orthopaedic Surgery  [address] [place] [address]  Phone: [phone]  Fax: [phone]  Follow Up Time: 1-3 Days

## 2021-08-16 NOTE — ED PROVIDER NOTE - OBJECTIVE STATEMENT
80 yo F with PMH of Afib not on AC, Asthma, CAD, CHF, DM, COPD on 3 L home O2 presenting for L wrist pain and swelling. Patient had mechanical fall 2 days prior, witnessed by daughter, in which she landed on her L hand. Started having pain at ulnar aspect of wrist today with some swelling. Denies numbness, weakness, tingling, head trauma, LOC, vision changes, dizziness, headache, CP, SOB, NVD.

## 2021-08-28 ENCOUNTER — EMERGENCY (EMERGENCY)
Facility: HOSPITAL | Age: 81
LOS: 0 days | Discharge: HOME | End: 2021-08-28
Attending: EMERGENCY MEDICINE | Admitting: EMERGENCY MEDICINE
Payer: MEDICARE

## 2021-08-28 VITALS
TEMPERATURE: 98 F | RESPIRATION RATE: 19 BRPM | SYSTOLIC BLOOD PRESSURE: 154 MMHG | OXYGEN SATURATION: 94 % | DIASTOLIC BLOOD PRESSURE: 73 MMHG | HEART RATE: 60 BPM | HEIGHT: 60 IN | WEIGHT: 160.06 LBS

## 2021-08-28 DIAGNOSIS — Z79.84 LONG TERM (CURRENT) USE OF ORAL HYPOGLYCEMIC DRUGS: ICD-10-CM

## 2021-08-28 DIAGNOSIS — J44.9 CHRONIC OBSTRUCTIVE PULMONARY DISEASE, UNSPECIFIED: ICD-10-CM

## 2021-08-28 DIAGNOSIS — Z79.899 OTHER LONG TERM (CURRENT) DRUG THERAPY: ICD-10-CM

## 2021-08-28 DIAGNOSIS — Z90.49 ACQUIRED ABSENCE OF OTHER SPECIFIED PARTS OF DIGESTIVE TRACT: Chronic | ICD-10-CM

## 2021-08-28 DIAGNOSIS — I50.9 HEART FAILURE, UNSPECIFIED: ICD-10-CM

## 2021-08-28 DIAGNOSIS — Z20.822 CONTACT WITH AND (SUSPECTED) EXPOSURE TO COVID-19: ICD-10-CM

## 2021-08-28 DIAGNOSIS — E11.9 TYPE 2 DIABETES MELLITUS WITHOUT COMPLICATIONS: ICD-10-CM

## 2021-08-28 DIAGNOSIS — Z79.82 LONG TERM (CURRENT) USE OF ASPIRIN: ICD-10-CM

## 2021-08-28 DIAGNOSIS — Z88.0 ALLERGY STATUS TO PENICILLIN: ICD-10-CM
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
## 2021-08-28 DIAGNOSIS — Z88.5 ALLERGY STATUS TO NARCOTIC AGENT: ICD-10-CM

## 2021-08-28 DIAGNOSIS — I48.91 UNSPECIFIED ATRIAL FIBRILLATION: ICD-10-CM
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
## 2021-08-28 DIAGNOSIS — F03.90 UNSPECIFIED DEMENTIA WITHOUT BEHAVIORAL DISTURBANCE: ICD-10-CM

## 2021-08-28 DIAGNOSIS — M54.5 LOW BACK PAIN: ICD-10-CM

## 2021-08-28 DIAGNOSIS — I25.10 ATHEROSCLEROTIC HEART DISEASE OF NATIVE CORONARY ARTERY WITHOUT ANGINA PECTORIS: ICD-10-CM

## 2021-08-28 DIAGNOSIS — Z99.81 DEPENDENCE ON SUPPLEMENTAL OXYGEN: ICD-10-CM

## 2021-08-28 DIAGNOSIS — S00.03XA CONTUSION OF SCALP, INITIAL ENCOUNTER: ICD-10-CM

## 2021-08-28 DIAGNOSIS — R51.9 HEADACHE, UNSPECIFIED: ICD-10-CM

## 2021-08-28 DIAGNOSIS — S00.83XA CONTUSION OF OTHER PART OF HEAD, INITIAL ENCOUNTER: ICD-10-CM

## 2021-08-28 DIAGNOSIS — Z79.01 LONG TERM (CURRENT) USE OF ANTICOAGULANTS: ICD-10-CM

## 2021-08-28 LAB
ALBUMIN SERPL ELPH-MCNC: 3.9 G/DL — SIGNIFICANT CHANGE UP (ref 3.5–5.2)
ALP SERPL-CCNC: 92 U/L — SIGNIFICANT CHANGE UP (ref 30–115)
ALT FLD-CCNC: 59 U/L — HIGH (ref 0–41)
ANION GAP SERPL CALC-SCNC: 13 MMOL/L — SIGNIFICANT CHANGE UP (ref 7–14)
APPEARANCE UR: CLEAR — SIGNIFICANT CHANGE UP
APTT BLD: 34.6 SEC — SIGNIFICANT CHANGE UP (ref 27–39.2)
AST SERPL-CCNC: 133 U/L — HIGH (ref 0–41)
BASOPHILS # BLD AUTO: 0.03 K/UL — SIGNIFICANT CHANGE UP (ref 0–0.2)
BASOPHILS NFR BLD AUTO: 0.4 % — SIGNIFICANT CHANGE UP (ref 0–1)
BILIRUB SERPL-MCNC: 0.9 MG/DL — SIGNIFICANT CHANGE UP (ref 0.2–1.2)
BILIRUB UR-MCNC: NEGATIVE — SIGNIFICANT CHANGE UP
BUN SERPL-MCNC: 17 MG/DL — SIGNIFICANT CHANGE UP (ref 10–20)
CALCIUM SERPL-MCNC: 9.1 MG/DL — SIGNIFICANT CHANGE UP (ref 8.5–10.1)
CHLORIDE SERPL-SCNC: 99 MMOL/L — SIGNIFICANT CHANGE UP (ref 98–110)
CO2 SERPL-SCNC: 24 MMOL/L — SIGNIFICANT CHANGE UP (ref 17–32)
COLOR SPEC: YELLOW — SIGNIFICANT CHANGE UP
CREAT SERPL-MCNC: 1 MG/DL — SIGNIFICANT CHANGE UP (ref 0.7–1.5)
DIFF PNL FLD: SIGNIFICANT CHANGE UP
EOSINOPHIL # BLD AUTO: 0.07 K/UL — SIGNIFICANT CHANGE UP (ref 0–0.7)
EOSINOPHIL NFR BLD AUTO: 1 % — SIGNIFICANT CHANGE UP (ref 0–8)
ETHANOL SERPL-MCNC: <10 MG/DL — SIGNIFICANT CHANGE UP
GLUCOSE SERPL-MCNC: 247 MG/DL — HIGH (ref 70–99)
GLUCOSE UR QL: ABNORMAL
HCT VFR BLD CALC: 37.8 % — SIGNIFICANT CHANGE UP (ref 37–47)
HGB BLD-MCNC: 12.7 G/DL — SIGNIFICANT CHANGE UP (ref 12–16)
IMM GRANULOCYTES NFR BLD AUTO: 0.7 % — HIGH (ref 0.1–0.3)
INR BLD: 1.21 RATIO — SIGNIFICANT CHANGE UP (ref 0.65–1.3)
KETONES UR-MCNC: NEGATIVE — SIGNIFICANT CHANGE UP
LACTATE SERPL-SCNC: 1.9 MMOL/L — SIGNIFICANT CHANGE UP (ref 0.7–2)
LEUKOCYTE ESTERASE UR-ACNC: NEGATIVE — SIGNIFICANT CHANGE UP
LIDOCAIN IGE QN: 171 U/L — HIGH (ref 7–60)
LYMPHOCYTES # BLD AUTO: 1.28 K/UL — SIGNIFICANT CHANGE UP (ref 1.2–3.4)
LYMPHOCYTES # BLD AUTO: 18.4 % — LOW (ref 20.5–51.1)
MCHC RBC-ENTMCNC: 32.8 PG — HIGH (ref 27–31)
MCHC RBC-ENTMCNC: 33.6 G/DL — SIGNIFICANT CHANGE UP (ref 32–37)
MCV RBC AUTO: 97.7 FL — SIGNIFICANT CHANGE UP (ref 81–99)
MONOCYTES # BLD AUTO: 0.53 K/UL — SIGNIFICANT CHANGE UP (ref 0.1–0.6)
MONOCYTES NFR BLD AUTO: 7.6 % — SIGNIFICANT CHANGE UP (ref 1.7–9.3)
NEUTROPHILS # BLD AUTO: 5.01 K/UL — SIGNIFICANT CHANGE UP (ref 1.4–6.5)
NEUTROPHILS NFR BLD AUTO: 71.9 % — SIGNIFICANT CHANGE UP (ref 42.2–75.2)
NITRITE UR-MCNC: NEGATIVE — SIGNIFICANT CHANGE UP
NRBC # BLD: 0 /100 WBCS — SIGNIFICANT CHANGE UP (ref 0–0)
PH UR: 6.5 — SIGNIFICANT CHANGE UP (ref 5–8)
PLATELET # BLD AUTO: 222 K/UL — SIGNIFICANT CHANGE UP (ref 130–400)
POTASSIUM SERPL-MCNC: 4.4 MMOL/L — SIGNIFICANT CHANGE UP (ref 3.5–5)
POTASSIUM SERPL-SCNC: 4.4 MMOL/L — SIGNIFICANT CHANGE UP (ref 3.5–5)
PROT SERPL-MCNC: 7.5 G/DL — SIGNIFICANT CHANGE UP (ref 6–8)
PROT UR-MCNC: SIGNIFICANT CHANGE UP
PROTHROM AB SERPL-ACNC: 13.9 SEC — HIGH (ref 9.95–12.87)
RBC # BLD: 3.87 M/UL — LOW (ref 4.2–5.4)
RBC # FLD: 12.5 % — SIGNIFICANT CHANGE UP (ref 11.5–14.5)
SARS-COV-2 RNA SPEC QL NAA+PROBE: SIGNIFICANT CHANGE UP
SODIUM SERPL-SCNC: 136 MMOL/L — SIGNIFICANT CHANGE UP (ref 135–146)
SP GR SPEC: >1.05 (ref 1.01–1.03)
UROBILINOGEN FLD QL: SIGNIFICANT CHANGE UP
WBC # BLD: 6.97 K/UL — SIGNIFICANT CHANGE UP (ref 4.8–10.8)
WBC # FLD AUTO: 6.97 K/UL — SIGNIFICANT CHANGE UP (ref 4.8–10.8)

## 2021-08-28 PROCEDURE — 72170 X-RAY EXAM OF PELVIS: CPT | Mod: 26

## 2021-08-28 PROCEDURE — 99283 EMERGENCY DEPT VISIT LOW MDM: CPT

## 2021-08-28 PROCEDURE — 74177 CT ABD & PELVIS W/CONTRAST: CPT | Mod: 26,MA

## 2021-08-28 PROCEDURE — 70450 CT HEAD/BRAIN W/O DYE: CPT | Mod: 26,MA

## 2021-08-28 PROCEDURE — 73630 X-RAY EXAM OF FOOT: CPT | Mod: 26,RT

## 2021-08-28 PROCEDURE — 71045 X-RAY EXAM CHEST 1 VIEW: CPT | Mod: 26

## 2021-08-28 PROCEDURE — 72125 CT NECK SPINE W/O DYE: CPT | Mod: 26,MA

## 2021-08-28 PROCEDURE — 99291 CRITICAL CARE FIRST HOUR: CPT

## 2021-08-28 PROCEDURE — 71260 CT THORAX DX C+: CPT | Mod: 26,MA

## 2021-08-28 RX ORDER — SODIUM CHLORIDE 9 MG/ML
1000 INJECTION INTRAMUSCULAR; INTRAVENOUS; SUBCUTANEOUS ONCE
Refills: 0 | Status: COMPLETED | OUTPATIENT
Start: 2021-08-28 | End: 2021-08-28

## 2021-08-28 RX ADMIN — SODIUM CHLORIDE 1000 MILLILITER(S): 9 INJECTION INTRAMUSCULAR; INTRAVENOUS; SUBCUTANEOUS at 03:46

## 2021-08-28 NOTE — ED PROVIDER NOTE - PATIENT PORTAL LINK FT
You can access the FollowMyHealth Patient Portal offered by [facility] by registering at the following website: [email or web address]. By joining [employer]’s FollowMyHealth portal, you will also be able to view your health information using other applications (apps) compatible with our system.

## 2021-08-28 NOTE — ED PROVIDER NOTE - NS ED ROS FT
Review of Systems:  CONSTITUTIONAL: No fever   SKIN: No rash  HEMATOLOGIC: No abnormal bleeding   EYES: No eye pain, No blurred vision  ENT:  No neck pain, No rhinorrhea   RESPIRATORY: No shortness of breath, No cough  CARDIAC: No chest pain, No palpitations  GI: No abdominal pain, No nausea, No vomiting   : No dysuria, frequency, hematuria.   MUSCULOSKELETAL: No joint paint, No swelling, +back pain  NEUROLOGIC: No numbness, No focal weakness, +headache   All other systems negative, unless specified in HPI

## 2021-08-28 NOTE — CONSULT NOTE ADULT - ASSESSMENT
81F w/PMHx of Afib on Eliquis, DM, asthma, CAD, heart failure, dementia with multiple mechanical falls in the past (most recent presentation two weeks ago) presents s/p mechanical fall down 6-7 steps, +HT, ?LOC, +AC (Eliquis).     Plan:   -CT pan scan   -CXR, pelvis XR, right foot XR   -Trauma labs   -C-collar in place   -Patient and plan discussed with Dr. Patricio 81F w/PMHx of Afib on Eliquis, DM, asthma, CAD, heart failure, dementia with multiple mechanical falls in the past (most recent presentation two weeks ago) presents s/p mechanical fall down 6-7 steps, +HT, ?LOC, +AC (Eliquis).     Plan:   -No traumatic injuries identified on imaging   -Patient cleared from a Trauma Surgery standpoint    -Patient and plan discussed with Dr. Patricio

## 2021-08-28 NOTE — ED ADULT TRIAGE NOTE - CHIEF COMPLAINT QUOTE
Pt came s/p fall down stairs, unwitnessed, unknown amount of stairs, daughter heard her falling, came right away, pt was conscious, has dementia, confused at the baseline, as per pt, she did lost consciousness, hit her head, has left forehead hematoma, on Eliquis. Pt c/o back pain, headache, right foot pain and headache.

## 2021-08-28 NOTE — CONSULT NOTE ADULT - ATTENDING COMMENTS
Trauma Attending H&P Attestation    Patient seen and evaluated with the trauma team in the trauma bay upon arrival. All pertinent labs and radiographic imaging reviewed, pending final reports. Outpatient medications reviewed, including the presence of anticoagulants, if applicable. I agree with the resident's note above, including the physical exam findings, assessment and plan as documented with the following adjustments.     Trauma Level: [ ] Code  [x ] Alert  [ ] Consult [ ] Transfer in  Activation by:  [x ] ED physician [ x] EMS  Intubated in Field? [ ] Yes [ x] No  Intubated in ED? [ ] Yes [ x] No  Intubated in Trauma [place]? [ ] Yes [x ] No    PRATEEK LAWRENCE Patient is a 81y old  Female who presents with a chief complaint of mechanical fall on AC     Patient presented with GCS [15 ]  upon arrival to the trauma bay.    Allergies  Augmentin (Rash)  oxycodone (Pruritus)  Intolerances    PAST MEDICAL & SURGICAL HISTORY:  Afib  Diabetes  Asthma  CAD (coronary atherosclerotic disease)  Diastolic heart failure  S/P appendectomy    On AC/Antiplatelets [x ] Yes [ ] No              [x ] NOVACs, [ ] Coumadin, [ ] ASA, [ ] Antiplatelets     Vital Signs Last 24 Hrs  T(C): 36.8 (28 Aug 2021 02:39), Max: 36.8 (28 Aug 2021 02:39)  T(F): 98.2 (28 Aug 2021 02:39), Max: 98.2 (28 Aug 2021 02:39)  HR: 60 (28 Aug 2021 02:39) (60 - 60)  BP: 154/73 (28 Aug 2021 02:39) (154/73 - 154/73)  BP(mean): --  RR: 19 (28 Aug 2021 02:39) (19 - 19)  SpO2: 94% (28 Aug 2021 02:39) (94% - 94%)    PE: scalp hematomas x 2    Assessment:  Fall on AC    PLAN  - supportive care  - GI/DVT prophylaxis  - pain management  - follow up consults  - repeat studies as needed  - complete trauma work up included but not limites to                          [x ] CXR [x ] PXR [ ] Extremities X-RAYs                          [x ] NCHCT [x ] C-Spine CT [x ] CT Chest [ x] CT Abdomen/Pelvis                          [x ] FAST [ ] Other                          [x ] Trauma Labs                          [ ] Toxicology   - Consults  [ ] Neurosurgery [ ] Orthopaedics [ ] Plastics [ ] Fascial/OMFS [x ] Medicine [ ] Cardiology      Tiffanie Patricio MD, FACS  Trauma/ACS/Surgical Critical Care Attending

## 2021-08-28 NOTE — ED PROVIDER NOTE - PROGRESS NOTE DETAILS
Authored by Johanna Gardner DO: Patient came in as a trauma I intend to get an E-FAST Authored by Johanna Gardner, DO: Spoke with patients daughter who is a physician reports that patient's labs are baseline (LFT and lipase) known to be elevated due to her medications. Daughter and granddaughter agree with plan for patient to go home. Pt cleared by trauma tram.

## 2021-08-28 NOTE — ED PROVIDER NOTE - CLINICAL SUMMARY MEDICAL DECISION MAKING FREE TEXT BOX
pt evaluated s/p fall with frontal hematoma, trauma called, pan s/p panscan without acute traumatic injury. pt reported feeling well to go home with family. advised close follow up with PMD; strict return precautions advised and pt verbalized understanding.

## 2021-08-28 NOTE — ED PROVIDER NOTE - ATTENDING CONTRIBUTION TO CARE
80 yo female with PMH Afib on Eliquis, CAD, CHF, CAD, asthma, DM, COPD on 3L O2, dementia BIBEMS to ED s/p fall. Pt fell down about 6 stairs at home prior to arrival, with + head trauma without reported LOC. + left sided headache. Denied any vomiting, abd pain, CP, SOB.      VITAL SIGNS: noted  CONSTITUTIONAL: Well-developed; well-nourished; in no acute distress  HEAD: Normocephalic; + left parietal contusion/small left frontal hematoma  EYES: PERRL, EOM intact; no hemotympanum, neg Battles, conjunctiva and sclera clear  ENT: No nasal discharge; airway clear. MMM  NECK: Supple; non tender. No anterior cervical lymphadenopathy noted  CARD: S1, S2 normal; no murmurs, gallops, or rubs. Regular rate and rhythm  RESP: CTAB/L, no wheezes, rales or rhonchi  ABD: Normal bowel sounds; soft; non-distended; non-tender; no hepatosplenomegaly. No CVA tenderness  EXT: Normal ROM. No calf tenderness or edema. Distal pulses intact  NEURO: Alert, oriented. Grossly unremarkable. No focal deficits  SKIN: Skin exam is warm and dry, no acute rash

## 2021-08-28 NOTE — ED ADULT NURSE NOTE - OBJECTIVE STATEMENT
patient presents s/p fall down unknown number of steps at home, on eliquis with hx of multiple recent falls. patient denies LOC, + HT sustained hematoma to left parietal & frontal areas. endorses headache, lumbar spine pain and right foot pain. has dementia and is confused at baseline. denies cp/sob

## 2021-08-28 NOTE — ED ADULT NURSE NOTE - NSIMPLEMENTINTERV_GEN_ALL_ED
Implemented All Fall Risk Interventions:  [place] to call system. Call bell, personal items and telephone within reach. Instruct patient to call for assistance. Room bathroom lighting operational. Non-slip footwear when patient is off stretcher. Physically safe environment: no spills, clutter or unnecessary equipment. Stretcher in lowest position, wheels locked, appropriate side rails in place. Provide visual cue, wrist band, yellow gown, etc. Monitor gait and stability. Monitor for mental status changes and reorient to person, place, and time. Review medications for side effects contributing to fall risk. Reinforce activity limits and safety measures with patient and family.

## 2021-08-28 NOTE — CONSULT NOTE ADULT - SUBJECTIVE AND OBJECTIVE BOX
TRAUMA ACTIVATION LEVEL:  Trauma Alert     MECHANISM OF INJURY: S/p mechanical fall down 6-7 steps, +HT, ?LOC, +AC (Eliquis)    GCS: 15 	E: 4	V: 5	M: 6    HPI:  81F w/PMHx of Afib on Eliquis, DM, asthma, CAD, heart failure, dementia with multiple mechanical falls in the past (most recent presentation two weeks ago) presents s/p mechanical fall down 6-7 steps, +HT, ?LOC, +AC (Eliquis). Per daughter the fall was unwitnessed, exact number of steps unknown. She was found at the bottom of the stairs, conscious. In the ED she is GCS 15, demented at baseline. Primary survey negative. On exposure she has a ~3cm left frontal scalp hematoma and ~5cm left parietal scalp hematoma. No other acute external signs of injury. Patient complains of right subcostal pain, lumbar spine pain, and right great toe pain. ROM of all extremities in tact. Cervical collar in place.     PAST MEDICAL & SURGICAL HISTORY:  Afib  Diabetes  Asthma  CAD (coronary atherosclerotic disease)  Diastolic heart failure  S/P appendectomy    Allergies  Augmentin (Rash)  oxycodone (Pruritus)    Home Medications:  atorvastatin 10 mg oral tablet: 1 tab(s) orally once a day (at bedtime) (10 Sep 2019 01:39)  glipiZIDE 10 mg oral tablet, extended release: 1 tab(s) orally 2 times a day (12 Sep 2019 15:28)  ipratropium-albuterol 0.5 mg-2.5 mg/3 mLinhalation solution: 3 milliliter(s) inhaled 2 times a day (10 Sep 2019 01:39)  Lasix 40 mg oral tablet: 1 tab(s) orally once a day (12 Sep 2019 15:14)  Mag-Ox 400 oral tablet: 1 tab(s) orally once a day (10 Sep 2019 01:39)  Norco 5 mg-325 mg oral tablet: 1 tab(s) orally every 8 hours, As Needed (10 Sep 2019 01:39)  Tradjenta 5 mg oral tablet: 1 tab(s) orally once a day (12 Sep 2019 15:28)    ROS: 10-system review is otherwise negative except HPI above.      Primary Survey:    A - airway intact  B - bilateral breath sounds and good chest rise  C - palpable pulses in all extremities  D - GCS 15 on arrival, PHAM  Exposure obtained    Vital Signs Last 24 Hrs  T(C): 36.8 (28 Aug 2021 02:39), Max: 36.8 (28 Aug 2021 02:39)  T(F): 98.2 (28 Aug 2021 02:39), Max: 98.2 (28 Aug 2021 02:39)  HR: 60 (28 Aug 2021 02:39) (60 - 60)  BP: 154/73 (28 Aug 2021 02:39) (154/73 - 154/73)  RR: 19 (28 Aug 2021 02:39) (19 - 19)  SpO2: 94% (28 Aug 2021 02:39) (94% - 94%)    Secondary Survey:   General: NAD, confused (baseline dementia)   HEENT: Normocephalic, EOMI, PEERLA. no scalp lacerations, ~3cm left frontal scalp hematoma, ~5cm left parietal scalp hematoma   Neck: Soft, midline trachea. no cspine tenderness  Chest: No subq  emphysema, tender at the right chest   Cardiac: S1, S2, RRR  Respiratory: Bilateral breath sounds, clear and equal bilaterally  Abdomen: Soft, non-distended, no rebound, tender under the right costal margin  Groin: Normal appearing, pelvis stable   Ext: palp radial b/l UE, b/l DP palp in Lower Extrem. +Right great toe pain  Back: no palpable runoff/stepoff/deformity, +TTP at the lumbar spine    LABS:  *Pending trauma labs     RADIOLOGY & ADDITIONAL STUDIES:  *Pending CT pan scan, CXR, pelvis XR  TRAUMA ACTIVATION LEVEL:  Trauma Alert     MECHANISM OF INJURY: S/p mechanical fall down 6-7 steps, +HT, ?LOC, +AC (Eliquis)    GCS: 15 	E: 4	V: 5	M: 6    HPI:  81F w/PMHx of Afib on Eliquis, DM, asthma, CAD, COPD w/3L home O2, heart failure, dementia with multiple mechanical falls in the past (most recent presentation two weeks ago) presents s/p mechanical fall down 6-7 steps, +HT, ?LOC, +AC (Eliquis). Per daughter the fall was unwitnessed, exact number of steps unknown. She was found at the bottom of the stairs, conscious. In the ED she is GCS 15, demented at baseline. Primary survey negative. On exposure she has a ~3cm left frontal scalp hematoma and ~5cm left parietal scalp hematoma. No other acute external signs of injury. Patient complains of right subcostal pain, lumbar spine pain, and right great toe pain. ROM of all extremities in tact. Cervical collar in place.     PAST MEDICAL & SURGICAL HISTORY:  Afib  Diabetes  Asthma  CAD (coronary atherosclerotic disease)  Diastolic heart failure  S/P appendectomy    Allergies  Augmentin (Rash)  oxycodone (Pruritus)    Home Medications:  atorvastatin 10 mg oral tablet: 1 tab(s) orally once a day (at bedtime) (10 Sep 2019 01:39)  glipiZIDE 10 mg oral tablet, extended release: 1 tab(s) orally 2 times a day (12 Sep 2019 15:28)  ipratropium-albuterol 0.5 mg-2.5 mg/3 mLinhalation solution: 3 milliliter(s) inhaled 2 times a day (10 Sep 2019 01:39)  Lasix 40 mg oral tablet: 1 tab(s) orally once a day (12 Sep 2019 15:14)  Mag-Ox 400 oral tablet: 1 tab(s) orally once a day (10 Sep 2019 01:39)  Norco 5 mg-325 mg oral tablet: 1 tab(s) orally every 8 hours, As Needed (10 Sep 2019 01:39)  Tradjenta 5 mg oral tablet: 1 tab(s) orally once a day (12 Sep 2019 15:28)    ROS: 10-system review is otherwise negative except HPI above.      Primary Survey:    A - airway intact  B - bilateral breath sounds and good chest rise  C - palpable pulses in all extremities  D - GCS 15 on arrival, PHAM  Exposure obtained    Vital Signs Last 24 Hrs  T(C): 36.8 (28 Aug 2021 02:39), Max: 36.8 (28 Aug 2021 02:39)  T(F): 98.2 (28 Aug 2021 02:39), Max: 98.2 (28 Aug 2021 02:39)  HR: 60 (28 Aug 2021 02:39) (60 - 60)  BP: 154/73 (28 Aug 2021 02:39) (154/73 - 154/73)  RR: 19 (28 Aug 2021 02:39) (19 - 19)  SpO2: 94% (28 Aug 2021 02:39) (94% - 94%)    Secondary Survey:   General: NAD, confused (baseline dementia)   HEENT: Normocephalic, EOMI, PEERLA. no scalp lacerations, ~3cm left frontal scalp hematoma, ~5cm left parietal scalp hematoma   Neck: Soft, midline trachea. no cspine tenderness  Chest: No subq  emphysema, tender at the right chest   Cardiac: S1, S2, RRR  Respiratory: Bilateral breath sounds, clear and equal bilaterally  Abdomen: Soft, non-distended, no rebound, tender under the right costal margin  Groin: Normal appearing, pelvis stable   Ext: palp radial b/l UE, b/l DP palp in Lower Extrem. +Right great toe pain  Back: no palpable runoff/stepoff/deformity, +TTP at the lumbar spine    LABS:  *Pending trauma labs     RADIOLOGY & ADDITIONAL STUDIES:  *Pending CT pan scan, CXR, pelvis XR  TRAUMA ACTIVATION LEVEL:  Trauma Alert     MECHANISM OF INJURY: S/p mechanical fall down 6-7 steps, +HT, ?LOC, +AC (Eliquis)    GCS: 15 	E: 4	V: 5	M: 6    HPI:  81F w/PMHx of Afib on Eliquis, DM, asthma, CAD, COPD w/3L home O2, heart failure, dementia with multiple mechanical falls in the past (most recent presentation two weeks ago) presents s/p mechanical fall down 6-7 steps, +HT, ?LOC, +AC (Eliquis). Per daughter the fall was unwitnessed, exact number of steps unknown. She was found at the bottom of the stairs, conscious. In the ED she is GCS 15, demented at baseline. Primary survey negative. On exposure she has a ~3cm left frontal scalp hematoma and ~5cm left parietal scalp hematoma. No other acute external signs of injury. Patient complains of right subcostal pain, lumbar spine pain, and right great toe pain. ROM of all extremities in tact. Cervical collar in place.     PAST MEDICAL & SURGICAL HISTORY:  Afib  Diabetes  Asthma  CAD (coronary atherosclerotic disease)  Diastolic heart failure  S/P appendectomy    Allergies  Augmentin (Rash)  oxycodone (Pruritus)    Home Medications:  atorvastatin 10 mg oral tablet: 1 tab(s) orally once a day (at bedtime) (10 Sep 2019 01:39)  glipiZIDE 10 mg oral tablet, extended release: 1 tab(s) orally 2 times a day (12 Sep 2019 15:28)  ipratropium-albuterol 0.5 mg-2.5 mg/3 mLinhalation solution: 3 milliliter(s) inhaled 2 times a day (10 Sep 2019 01:39)  Lasix 40 mg oral tablet: 1 tab(s) orally once a day (12 Sep 2019 15:14)  Mag-Ox 400 oral tablet: 1 tab(s) orally once a day (10 Sep 2019 01:39)  Norco 5 mg-325 mg oral tablet: 1 tab(s) orally every 8 hours, As Needed (10 Sep 2019 01:39)  Tradjenta 5 mg oral tablet: 1 tab(s) orally once a day (12 Sep 2019 15:28)    ROS: 10-system review is otherwise negative except HPI above.      Primary Survey:    A - airway intact  B - bilateral breath sounds and good chest rise  C - palpable pulses in all extremities  D - GCS 15 on arrival, PHAM  Exposure obtained    Vital Signs Last 24 Hrs  T(C): 36.8 (28 Aug 2021 02:39), Max: 36.8 (28 Aug 2021 02:39)  T(F): 98.2 (28 Aug 2021 02:39), Max: 98.2 (28 Aug 2021 02:39)  HR: 60 (28 Aug 2021 02:39) (60 - 60)  BP: 154/73 (28 Aug 2021 02:39) (154/73 - 154/73)  RR: 19 (28 Aug 2021 02:39) (19 - 19)  SpO2: 94% (28 Aug 2021 02:39) (94% - 94%)    Secondary Survey:   General: NAD, confused (baseline dementia)   HEENT: Normocephalic, EOMI, PEERLA. no scalp lacerations, ~3cm left frontal scalp hematoma, ~5cm left parietal scalp hematoma   Neck: Soft, midline trachea. no cspine tenderness  Chest: No subq  emphysema, tender at the right chest   Cardiac: S1, S2, RRR  Respiratory: Bilateral breath sounds, clear and equal bilaterally  Abdomen: Soft, non-distended, no rebound, tender under the right costal margin  Groin: Normal appearing, pelvis stable   Ext: palp radial b/l UE, b/l DP palp in Lower Extrem. +Right great toe pain  Back: no palpable runoff/stepoff/deformity, +TTP at the lumbar spine    LABS:    POCT Blood Glucose.: 232 mg/dL (28 Aug 2021 03:00)                        12.7   6.97  )-----------( 222      ( 28 Aug 2021 02:55 )             37.8       Auto Neutrophil %: 71.9 % (08-28-21 @ 02:55)  Auto Immature Granulocyte %: 0.7 % (08-28-21 @ 02:55)    08-28    136  |  99  |  17  ----------------------------<  247<H>  4.4   |  24  |  1.0    Calcium, Total Serum: 9.1 mg/dL (08-28-21 @ 02:55)    LFTs:             7.5  | 0.9  | 133      ------------------[92      ( 28 Aug 2021 02:55 )  3.9  | x    | 59          Lipase:171      Lactate, Blood: 1.9 mmol/L (08-28-21 @ 02:55)    Coags:     13.90  ----< 1.21    ( 28 Aug 2021 02:55 )     34.6     Alcohol, Blood: <10 mg/dL (08-28-21 @ 02:55)    Urinalysis Basic - ( 28 Aug 2021 02:56 )    Color: Yellow / Appearance: Clear / SG: >1.050 / pH: x  Gluc: x / Ketone: Negative  / Bili: Negative / Urobili: <2 mg/dL   Blood: x / Protein: Trace / Nitrite: Negative   Leuk Esterase: Negative / RBC: x / WBC x   Sq Epi: x / Non Sq Epi: x / Bacteria: x    RADIOLOGY & ADDITIONAL STUDIES:  < from: CT Head No Cont (08.28.21 @ 03:35) >  Impression:  CT head:  No evidence of acute intracranial abnormality.  Left scalp, extracalvarial soft tissue hematoma measuring approximately 4 x 0.8 cm.  CT cervical spine:  No evidence of acute fracture of the cervical spine.    < from: Xray Pelvis AP only (08.28.21 @ 03:38) >  Impression:  No evidence of acute fracture.    < from: CT Chest w/ IV Cont (08.28.21 @ 03:47) >  IMPRESSION:  No evidence of acute traumatic intrathoracic or intra-abdominal pathology.

## 2021-08-28 NOTE — ED PROVIDER NOTE - CARE PROVIDER_API CALL
GIULIANO PALOMINO  [id number] [facility] [address]  [place] [address]  Phone: [phone]  Fax: [phone]  Follow Up Time: Routine

## 2021-08-28 NOTE — ED PROVIDER NOTE - PHYSICAL EXAMINATION
CONSTITUTIONAL: well developed, well nourished, no acute distress  TRAUMA: ABC intact, GCS 15  HEAD: normocephalic, +large left parietal hematoma and +small left frontal hematoma   EYES: PERRL at 3mm, EOMI, no raccoon eyes  ENT: no nasal discharge, no call sign, moist mucous membranes moist  NECK: cervical collar in place, no midline tenderness, no stepoffs, no deformity  CV: regular rate and rhythm, equal distal pulses  RESP: lungs clear to auscultation bilaterally, normal work of breathing, symmetric rise and fall of chest   CHEST: no chest wall tenderness, no crepitus, no clavicular deformity/tenting  ABD: soft, nondistended, nontender, no rebound, no guarding, no rigidity, no pelvic instability  BACK: +lumbar midline TTP, no stepoffs, no deformity, no saddle paresthesia  EXT: no obvious deformity, no tenderness of extremities, full ROM, cap refill < 2 seconds  SKIN: no rashes, no lacerations   NEURO: Awake and alert, motor strength 5/5 in all extremities, sensation grossly intact throughout, no focal neurological deficits   PSYCH: normal mood, appropriate affect

## 2021-08-29 LAB
CULTURE RESULTS: SIGNIFICANT CHANGE UP
SPECIMEN SOURCE: SIGNIFICANT CHANGE UP

## 2022-05-10 ENCOUNTER — INPATIENT (INPATIENT)
Facility: HOSPITAL | Age: 82
LOS: 3 days | Discharge: HOME HEALTH | End: 2022-05-14
Attending: HOSPITALIST | Admitting: HOSPITALIST
Payer: MEDICARE

## 2022-05-10 VITALS
RESPIRATION RATE: 18 BRPM | SYSTOLIC BLOOD PRESSURE: 176 MMHG | HEIGHT: 60 IN | DIASTOLIC BLOOD PRESSURE: 81 MMHG | TEMPERATURE: 97 F | OXYGEN SATURATION: 96 % | WEIGHT: 149.91 LBS | HEART RATE: 57 BPM

## 2022-05-10 DIAGNOSIS — Z90.49 ACQUIRED ABSENCE OF OTHER SPECIFIED PARTS OF DIGESTIVE TRACT: Chronic | ICD-10-CM

## 2022-05-10 LAB
ALBUMIN SERPL ELPH-MCNC: 3.8 G/DL — SIGNIFICANT CHANGE UP (ref 3.5–5.2)
ALP SERPL-CCNC: 71 U/L — SIGNIFICANT CHANGE UP (ref 30–115)
ALT FLD-CCNC: 39 U/L — SIGNIFICANT CHANGE UP (ref 0–41)
ANION GAP SERPL CALC-SCNC: 13 MMOL/L — SIGNIFICANT CHANGE UP (ref 7–14)
AST SERPL-CCNC: 74 U/L — HIGH (ref 0–41)
BASE EXCESS BLDV CALC-SCNC: 10.6 MMOL/L — HIGH (ref -2–3)
BASOPHILS # BLD AUTO: 0.02 K/UL — SIGNIFICANT CHANGE UP (ref 0–0.2)
BASOPHILS NFR BLD AUTO: 0.2 % — SIGNIFICANT CHANGE UP (ref 0–1)
BILIRUB SERPL-MCNC: 1.2 MG/DL — SIGNIFICANT CHANGE UP (ref 0.2–1.2)
BUN SERPL-MCNC: 57 MG/DL — HIGH (ref 10–20)
CA-I SERPL-SCNC: 1.06 MMOL/L — LOW (ref 1.15–1.33)
CALCIUM SERPL-MCNC: 8.7 MG/DL — SIGNIFICANT CHANGE UP (ref 8.5–10.1)
CHLORIDE SERPL-SCNC: 96 MMOL/L — LOW (ref 98–110)
CK MB CFR SERPL CALC: 2.2 NG/ML — SIGNIFICANT CHANGE UP (ref 0.6–6.3)
CK SERPL-CCNC: 42 U/L — SIGNIFICANT CHANGE UP (ref 0–225)
CO2 SERPL-SCNC: 30 MMOL/L — SIGNIFICANT CHANGE UP (ref 17–32)
CREAT SERPL-MCNC: 1.6 MG/DL — HIGH (ref 0.7–1.5)
D DIMER BLD IA.RAPID-MCNC: 964 NG/ML DDU — HIGH (ref 0–230)
EGFR: 32 ML/MIN/1.73M2 — LOW
EOSINOPHIL # BLD AUTO: 0.14 K/UL — SIGNIFICANT CHANGE UP (ref 0–0.7)
EOSINOPHIL NFR BLD AUTO: 1.4 % — SIGNIFICANT CHANGE UP (ref 0–8)
GAS PNL BLDV: 137 MMOL/L — SIGNIFICANT CHANGE UP (ref 136–145)
GAS PNL BLDV: SIGNIFICANT CHANGE UP
GLUCOSE BLDC GLUCOMTR-MCNC: 168 MG/DL — HIGH (ref 70–99)
GLUCOSE BLDC GLUCOMTR-MCNC: 239 MG/DL — HIGH (ref 70–99)
GLUCOSE SERPL-MCNC: 110 MG/DL — HIGH (ref 70–99)
HCO3 BLDV-SCNC: 38 MMOL/L — HIGH (ref 22–29)
HCOV PNL SPEC NAA+PROBE: DETECTED
HCT VFR BLD CALC: 32.5 % — LOW (ref 37–47)
HCT VFR BLDA CALC: 34 % — LOW (ref 39–51)
HGB BLD CALC-MCNC: 11.3 G/DL — LOW (ref 12.6–17.4)
HGB BLD-MCNC: 10.9 G/DL — LOW (ref 12–16)
IMM GRANULOCYTES NFR BLD AUTO: 0.3 % — SIGNIFICANT CHANGE UP (ref 0.1–0.3)
LACTATE BLDV-MCNC: 2.3 MMOL/L — HIGH (ref 0.5–2)
LACTATE SERPL-SCNC: 1.8 MMOL/L — SIGNIFICANT CHANGE UP (ref 0.7–2)
LACTATE SERPL-SCNC: 2.2 MMOL/L — HIGH (ref 0.7–2)
LYMPHOCYTES # BLD AUTO: 1.53 K/UL — SIGNIFICANT CHANGE UP (ref 1.2–3.4)
LYMPHOCYTES # BLD AUTO: 15.5 % — LOW (ref 20.5–51.1)
MCHC RBC-ENTMCNC: 33.5 G/DL — SIGNIFICANT CHANGE UP (ref 32–37)
MCHC RBC-ENTMCNC: 34.3 PG — HIGH (ref 27–31)
MCV RBC AUTO: 102.2 FL — HIGH (ref 81–99)
MONOCYTES # BLD AUTO: 0.67 K/UL — HIGH (ref 0.1–0.6)
MONOCYTES NFR BLD AUTO: 6.8 % — SIGNIFICANT CHANGE UP (ref 1.7–9.3)
NEUTROPHILS # BLD AUTO: 7.46 K/UL — HIGH (ref 1.4–6.5)
NEUTROPHILS NFR BLD AUTO: 75.8 % — HIGH (ref 42.2–75.2)
NRBC # BLD: 0 /100 WBCS — SIGNIFICANT CHANGE UP (ref 0–0)
NT-PROBNP SERPL-SCNC: 4262 PG/ML — HIGH (ref 0–300)
PCO2 BLDV: 64 MMHG — HIGH (ref 39–42)
PH BLDV: 7.38 — SIGNIFICANT CHANGE UP (ref 7.32–7.43)
PLATELET # BLD AUTO: 226 K/UL — SIGNIFICANT CHANGE UP (ref 130–400)
PO2 BLDV: 19 MMHG — SIGNIFICANT CHANGE UP
POTASSIUM BLDV-SCNC: 3.3 MMOL/L — LOW (ref 3.5–5.1)
POTASSIUM SERPL-MCNC: 3.5 MMOL/L — SIGNIFICANT CHANGE UP (ref 3.5–5)
POTASSIUM SERPL-SCNC: 3.5 MMOL/L — SIGNIFICANT CHANGE UP (ref 3.5–5)
PROT SERPL-MCNC: 8.6 G/DL — HIGH (ref 6–8)
RAPID RVP RESULT: DETECTED
RBC # BLD: 3.18 M/UL — LOW (ref 4.2–5.4)
RBC # FLD: 14 % — SIGNIFICANT CHANGE UP (ref 11.5–14.5)
SAO2 % BLDV: 26.1 % — SIGNIFICANT CHANGE UP
SARS-COV-2 RNA SPEC QL NAA+PROBE: SIGNIFICANT CHANGE UP
SODIUM SERPL-SCNC: 139 MMOL/L — SIGNIFICANT CHANGE UP (ref 135–146)
TROPONIN T SERPL-MCNC: 0.02 NG/ML — HIGH
TROPONIN T SERPL-MCNC: 0.03 NG/ML — CRITICAL HIGH
WBC # BLD: 9.85 K/UL — SIGNIFICANT CHANGE UP (ref 4.8–10.8)
WBC # FLD AUTO: 9.85 K/UL — SIGNIFICANT CHANGE UP (ref 4.8–10.8)

## 2022-05-10 PROCEDURE — 71045 X-RAY EXAM CHEST 1 VIEW: CPT | Mod: 26

## 2022-05-10 PROCEDURE — 99285 EMERGENCY DEPT VISIT HI MDM: CPT | Mod: FS,CS

## 2022-05-10 PROCEDURE — 93010 ELECTROCARDIOGRAM REPORT: CPT

## 2022-05-10 RX ORDER — SENNA PLUS 8.6 MG/1
2 TABLET ORAL AT BEDTIME
Refills: 0 | Status: DISCONTINUED | OUTPATIENT
Start: 2022-05-10 | End: 2022-05-14

## 2022-05-10 RX ORDER — IPRATROPIUM/ALBUTEROL SULFATE 18-103MCG
3 AEROSOL WITH ADAPTER (GRAM) INHALATION
Refills: 0 | Status: COMPLETED | OUTPATIENT
Start: 2022-05-10 | End: 2022-05-10

## 2022-05-10 RX ORDER — FUROSEMIDE 40 MG
40 TABLET ORAL DAILY
Refills: 0 | Status: DISCONTINUED | OUTPATIENT
Start: 2022-05-10 | End: 2022-05-10

## 2022-05-10 RX ORDER — IPRATROPIUM/ALBUTEROL SULFATE 18-103MCG
3 AEROSOL WITH ADAPTER (GRAM) INHALATION EVERY 6 HOURS
Refills: 0 | Status: DISCONTINUED | OUTPATIENT
Start: 2022-05-10 | End: 2022-05-14

## 2022-05-10 RX ORDER — QUETIAPINE FUMARATE 200 MG/1
25 TABLET, FILM COATED ORAL DAILY
Refills: 0 | Status: ACTIVE | OUTPATIENT
Start: 2022-05-10 | End: 2023-04-08

## 2022-05-10 RX ORDER — DEXTROSE 50 % IN WATER 50 %
15 SYRINGE (ML) INTRAVENOUS ONCE
Refills: 0 | Status: DISCONTINUED | OUTPATIENT
Start: 2022-05-10 | End: 2022-05-14

## 2022-05-10 RX ORDER — SODIUM CHLORIDE 9 MG/ML
1000 INJECTION, SOLUTION INTRAVENOUS
Refills: 0 | Status: DISCONTINUED | OUTPATIENT
Start: 2022-05-10 | End: 2022-05-14

## 2022-05-10 RX ORDER — PANTOPRAZOLE SODIUM 20 MG/1
40 TABLET, DELAYED RELEASE ORAL
Refills: 0 | Status: DISCONTINUED | OUTPATIENT
Start: 2022-05-10 | End: 2022-05-14

## 2022-05-10 RX ORDER — APIXABAN 2.5 MG/1
5 TABLET, FILM COATED ORAL
Refills: 0 | Status: DISCONTINUED | OUTPATIENT
Start: 2022-05-10 | End: 2022-05-14

## 2022-05-10 RX ORDER — METOPROLOL TARTRATE 50 MG
100 TABLET ORAL
Refills: 0 | Status: DISCONTINUED | OUTPATIENT
Start: 2022-05-10 | End: 2022-05-14

## 2022-05-10 RX ORDER — SACUBITRIL AND VALSARTAN 24; 26 MG/1; MG/1
1 TABLET, FILM COATED ORAL
Refills: 0 | Status: DISCONTINUED | OUTPATIENT
Start: 2022-05-10 | End: 2022-05-10

## 2022-05-10 RX ORDER — MAGNESIUM OXIDE 400 MG ORAL TABLET 241.3 MG
1 TABLET ORAL
Qty: 0 | Refills: 0 | DISCHARGE

## 2022-05-10 RX ORDER — LINAGLIPTIN 5 MG/1
1 TABLET, FILM COATED ORAL
Qty: 0 | Refills: 0 | DISCHARGE

## 2022-05-10 RX ORDER — IPRATROPIUM/ALBUTEROL SULFATE 18-103MCG
3 AEROSOL WITH ADAPTER (GRAM) INHALATION ONCE
Refills: 0 | Status: COMPLETED | OUTPATIENT
Start: 2022-05-10 | End: 2022-05-10

## 2022-05-10 RX ORDER — DEXTROSE 50 % IN WATER 50 %
25 SYRINGE (ML) INTRAVENOUS ONCE
Refills: 0 | Status: DISCONTINUED | OUTPATIENT
Start: 2022-05-10 | End: 2022-05-14

## 2022-05-10 RX ORDER — ONDANSETRON 8 MG/1
4 TABLET, FILM COATED ORAL ONCE
Refills: 0 | Status: COMPLETED | OUTPATIENT
Start: 2022-05-10 | End: 2022-05-10

## 2022-05-10 RX ORDER — INSULIN GLARGINE 100 [IU]/ML
40 INJECTION, SOLUTION SUBCUTANEOUS EVERY MORNING
Refills: 0 | Status: DISCONTINUED | OUTPATIENT
Start: 2022-05-10 | End: 2022-05-11

## 2022-05-10 RX ORDER — DEXTROSE 50 % IN WATER 50 %
12.5 SYRINGE (ML) INTRAVENOUS ONCE
Refills: 0 | Status: DISCONTINUED | OUTPATIENT
Start: 2022-05-10 | End: 2022-05-14

## 2022-05-10 RX ORDER — POLYETHYLENE GLYCOL 3350 17 G/17G
17 POWDER, FOR SOLUTION ORAL DAILY
Refills: 0 | Status: DISCONTINUED | OUTPATIENT
Start: 2022-05-10 | End: 2022-05-14

## 2022-05-10 RX ORDER — INSULIN LISPRO 100/ML
VIAL (ML) SUBCUTANEOUS
Refills: 0 | Status: DISCONTINUED | OUTPATIENT
Start: 2022-05-10 | End: 2022-05-14

## 2022-05-10 RX ORDER — GLUCAGON INJECTION, SOLUTION 0.5 MG/.1ML
1 INJECTION, SOLUTION SUBCUTANEOUS ONCE
Refills: 0 | Status: DISCONTINUED | OUTPATIENT
Start: 2022-05-10 | End: 2022-05-14

## 2022-05-10 RX ORDER — APIXABAN 2.5 MG/1
2.5 TABLET, FILM COATED ORAL EVERY 12 HOURS
Refills: 0 | Status: DISCONTINUED | OUTPATIENT
Start: 2022-05-10 | End: 2022-05-10

## 2022-05-10 RX ADMIN — Medication 40 MILLIGRAM(S): at 17:34

## 2022-05-10 RX ADMIN — Medication 3 MILLILITER(S): at 06:03

## 2022-05-10 RX ADMIN — Medication 3 MILLILITER(S): at 20:44

## 2022-05-10 RX ADMIN — Medication 3 MILLILITER(S): at 07:02

## 2022-05-10 RX ADMIN — Medication 100 MILLIGRAM(S): at 17:36

## 2022-05-10 RX ADMIN — ONDANSETRON 4 MILLIGRAM(S): 8 TABLET, FILM COATED ORAL at 06:16

## 2022-05-10 RX ADMIN — Medication 60 MILLIGRAM(S): at 17:32

## 2022-05-10 RX ADMIN — SENNA PLUS 2 TABLET(S): 8.6 TABLET ORAL at 22:12

## 2022-05-10 RX ADMIN — APIXABAN 5 MILLIGRAM(S): 2.5 TABLET, FILM COATED ORAL at 17:38

## 2022-05-10 RX ADMIN — Medication 3 MILLILITER(S): at 05:44

## 2022-05-10 RX ADMIN — QUETIAPINE FUMARATE 25 MILLIGRAM(S): 200 TABLET, FILM COATED ORAL at 11:57

## 2022-05-10 RX ADMIN — Medication 3 MILLILITER(S): at 14:12

## 2022-05-10 RX ADMIN — Medication 125 MILLIGRAM(S): at 05:44

## 2022-05-10 RX ADMIN — Medication 2: at 17:37

## 2022-05-10 NOTE — ED PROVIDER NOTE - PHYSICAL EXAMINATION
CONSTITUTIONAL: Well-developed; well-nourished; in no acute distress.   SKIN: warm, dry  HEAD: Normocephalic; atraumatic.  EYES: PERRL, EOMI, normal sclera and conjunctiva   ENT: No nasal discharge; airway clear.  NECK: Supple; non tender.  CARD:  Regular rate   RESP: mild tachypnea, able to speak to her daughters in short sentences, b/l expiratory wheezing.   ABD: diffuse TTP

## 2022-05-10 NOTE — PHYSICAL THERAPY INITIAL EVALUATION ADULT - GENERAL OBSERVATIONS, REHAB EVAL
[id number] Patient encountered semi greenfield in bed + IV lock, NAD , + tele, + NCO2@3L . grand daughter at bedside . social work provided translation

## 2022-05-10 NOTE — ED ADULT NURSE NOTE - OBJECTIVE STATEMENT
Pt BIBA as per daughter Jennifer (with whom she lives) because she was "dry heaving, chills, sugar was 50, pressure 189/100, SOB and chest pain. having trouble breathing." Pt has inspiratory and expiratory wheezing. Daughter translates from Finnish. Denies chest pain at this time. Pt is on eliquis for CVA 2017.

## 2022-05-10 NOTE — H&P ADULT - NSHPLABSRESULTS_GEN_ALL_CORE
05-10    139  |  96<L>  |  57<H>  ----------------------------<  110<H>  3.5   |  30  |  1.6<H>    Ca    8.7      10 May 2022 05:45    TPro  8.6<H>  /  Alb  3.8  /  TBili  1.2  /  DBili  x   /  AST  74<H>  /  ALT  39  /  AlkPhos  71  05-10                        10.9   9.85  )-----------( 226      ( 10 May 2022 05:45 )             32.5

## 2022-05-10 NOTE — PHYSICAL THERAPY INITIAL EVALUATION ADULT - PERTINENT HX OF CURRENT PROBLEM, REHAB EVAL
Pt is an 81 y/o F with PMHx of Afib on Eliquis, asthma, CAD, HFpEF (last EF 2019 65%), DM, COPD on 3L O2, dementia presenting w/ SOB

## 2022-05-10 NOTE — ED PROVIDER NOTE - CARE PLAN
Principal Discharge DX:	SOB (shortness of breath)   1 Principal Discharge DX:	SOB (shortness of breath)  Secondary Diagnosis:	TJ (acute kidney injury)  Secondary Diagnosis:	Elevated troponin  Secondary Diagnosis:	Viral illness

## 2022-05-10 NOTE — H&P ADULT - ASSESSMENT
Pt is an 83 y/o F with PMHx of Afib on Eliquis, asthma, CAD, HFpEF (last EF 2019 65%), DM, COPD on 3L O2, dementia presenting w/ SOB    SOB secondary to likely HFpEF exacerbation (last EF 2019 65%)  Afib (on eliquis)  CAD  TJ on CKD3, Cr 1.6 (baseline 0.9-1.0)  -as per pts daughter pt was receiving decadron 6mg IVP and lasix 40 IVP for past 3 days by PCP  -expiratory wheezing on exam, BNP 4262, b/l 2+ LE edema on examination  -solumedrol 125mg IVP in ED  -last echo in 2019 showed LVEF 65; will reorder echo  -will start lasix 40mg IVP; monitor renal function  -continue eliquis  -monitor I's, O's  -daily weights        COPD (on 3L O2)  COVID-19 infection  -COVID positive  -solumedrol 125 mg IVP in ED; will start decadron 6mg IVP for 10 days  -will order procal, ferritin, D-dimer  -will hold remdesevir as pt in TJ; ID consult  -duonebs q6 PRN  -cont 3L NC    T2DM  -on lantus 40 at home  -FS q6 hrs  -will start lantus 40 and SS for now; consider bolus if glucose worsens    Dementia  -continue home seroquel        DVT ppx: eliquis  GI ppx: protonix  Diet: DASH/TLC  Dispo: acute         Pt is an 83 y/o F with PMHx of Afib on Eliquis, asthma, CAD, HFpEF (last EF 2019 65%), DM, COPD on 3L O2, dementia presenting w/ SOB    SOB secondary to likely HFpEF exacerbation (last EF 2019 65%)  Afib (on eliquis)  CAD  TJ on CKD3, Cr 1.6 (baseline 0.9-1.0)  -as per pts daughter pt was receiving decadron 6mg IVP and lasix 40 IVP for past 3 days by PCP  -expiratory wheezing on exam, BNP 4262, b/l 2+ LE edema on examination  -solumedrol 125mg IVP in ED  -last echo in 2019 showed LVEF 65; will reorder echo  -will start lasix 40mg IVP; monitor renal function  -continue eliquis  -monitor I's, O's  -daily weights        COPD (on 3L O2)  COVID-19 infection  -COVID positive  -solumedrol 125 mg IVP in ED; will start decadron 6mg IVP for 10 days  -will order procal, ferritin, D-dimer  -will hold remdesevir as pt in TJ; ID consult  -duonebs q6 PRN  -cont 3L NC    T2DM  -on lantus 40 at home  -FS q6 hrs  -will start lantus 40 and SS for now; consider bolus if glucose worsens    HLD  -on repatha and vascepa at home; not on statins due to hx of liver injury      Dementia  -continue home seroquel        DVT ppx: eliquis  GI ppx: protonix  Diet: DASH/TLC  Dispo: acute         Pt is an 81 y/o F with PMHx of Afib on Eliquis, asthma, CAD, HFpEF (last EF 2019 65%), DM, COPD on 3L O2, dementia presenting w/ SOB    SOB secondary to likely HFpEF exacerbation (last EF 2019 65%)  Afib (on eliquis)  CAD  TJ on CKD3, Cr 1.6 (baseline 0.9-1.0)  -as per pts daughter pt was receiving decadron 6mg IVP and lasix 40 IVP for past 3 days by PCP  -expiratory wheezing on exam, BNP 4262, b/l 2+ LE edema on examination  -solumedrol 125mg IVP in ED  -last echo in 2019 showed LVEF 65; will reorder echo  -will start lasix 40mg IVP; monitor renal function  -continue eliquis  -monitor I's, O's  -daily weights        COPD (on 3L O2)  COVID-19 infection  -COVID positive  -solumedrol 125 mg IVP in ED; will start decadron 6mg IVP for 10 days  -lactate 2.2 on admission, will trend  -will order procal, ferritin, D-dimer  -will hold remdesevir as pt in TJ; ID consult  -duonebs q6 PRN  -cont 3L NC    T2DM  -on lantus 40 at home  -FS q6 hrs  -will start lantus 40 and SS for now; consider bolus if glucose worsens    HLD  -on repatha and vascepa at home; not on statins due to hx of liver injury      Dementia  -continue home seroquel        DVT ppx: eliquis  GI ppx: protonix  Diet: DASH/TLC  Dispo: acute

## 2022-05-10 NOTE — H&P ADULT - ATTENDING COMMENTS
Patient seen and examined independently of resident     Agree with plan above and additionally as below    Pt is an 83 y/o F with PMHx of Afib on Eliquis, asthma, CAD, HFpEF (last EF 2019 65%), DM, COPD on 3L O2, dementia presenting w/ SOB secondary to HFpEF exacerbation     GENERAL: NAD, resting in bed comfortably   PULMONARY: b/l expiratory wheezing   CARDIOVASCULAR: Regular rate and rhythm, S1-S2, no murmurs   GASTROINTESTINAL: Soft, non-tender, non-distended, no guarding.   SKIN/EXTREMITIES: 2+ LE edema b/l    # SOB secondary to likely HFpEF exacerbation (last EF 2019 65%)  # Possibly reduced EF in the past (is on entresto)   # CAD  - Continue IV lasix   - echo  - monitor I's, O's  - daily weights  - trend troponins   - Continue home medications   - Hold entresto in the setting of tj continue beta blocker     # Afib (on eliquis)  - EKG Ap,    - Continue Eliquis   - Patient with heart failure seems to be constantly RV paced. Consult EP for device interrogation    # TJ on CKD3, Cr 1.6 (baseline 0.9-1.0)  Likely hypervolemic   - Continue diuresis   - Hold entresto   - Monitor BMP     # COPD (on 3L O2)  # RVP + but not for sars cov 2  - Hold decadron for now   - Continue solumderol for COPD   - Continue inhatlers   - Trend lactate  - will order procal, ferritin, D-dimer  - ID consult  - duonebs q6 PRN  - cont 3L NC    # T2DM  -on lantus 40 at home  -FS q6 hrs  -will start lantus 40 and SS for now  - May need increase with steroids     # HLD  -on repatha and vascepa at home; not on statins due to hx of liver injury    # Dementia  -continue home seroquel    DVT ppx: eliquis  GI ppx: protonix  Diet: DASH/TLC  Dispo: acute Patient seen and examined independently of resident     Agree with plan above and additionally as below    Pt is an 83 y/o F with PMHx of Afib on Eliquis, asthma, CAD, HFpEF (last EF 2019 65%), DM, COPD on 3L O2, dementia presenting w/ SOB secondary to HFpEF exacerbation     GENERAL: NAD, resting in bed comfortably   PULMONARY: b/l expiratory wheezing. No crackles   CARDIOVASCULAR: Regular rate and rhythm, S1-S2, no murmurs   GASTROINTESTINAL: Soft, non-tender, non-distended, no guarding.   SKIN/EXTREMITIES: 1+ LE edema b/l    # SOB secondary to likely COPD exacerbation and acute bronchitis secondary to coronaviurs infection  # RVP + but not for sars cov 2  # COPD on 3L at home   # HFpeF but doubt exacerbation (last EF 2019 65%)  # Possibly reduced EF in the past (is on entresto)   # CAD  - Hold decadron for now . Continue solumderol for COPD   - Continue inhatlers   - Trend lactate  - will order procal, ferritin, D-dimer  - ID consult  - duonebs q6 PRN  - cont 3L NC  - Patient seems dry on exam. Will hold diuretics for a couple of doses and monitor BMP.   - echo  - monitor I's, O's  - daily weights  - trend troponins   - Continue home medications   - Hold entresto in the setting of tj continue beta blocker     # Afib (on eliquis)  - EKG Ap,    - Continue Eliquis   - Patient with heart failure seems to be constantly RV paced. Consult EP for device interrogation    # TJ on CKD3, Cr 1.6 (baseline 0.9-1.0)  Likely hypovolemic   - Holdiuresis   - Hold entresto   - Monitor BMP     # T2DM  -on lantus 40 at home  -FS q6 hrs  -will start lantus 40 and SS for now  - May need increase with steroids     # HLD  -on repatha and vascepa at home; not on statins due to hx of liver injury    # Dementia  -continue home seroquel    DVT ppx: eliquis  GI ppx: protonix  Diet: DASH/TLC  Dispo: acute Patient seen and examined independently of resident     Agree with plan above and additionally as below    Pt is an 83 y/o F with PMHx of Afib on Eliquis, asthma, CAD, HFpEF (last EF 2019 65%), DM, COPD on 3L O2, dementia presenting w/ SOB secondary to HFpEF exacerbation     GENERAL: NAD, resting in bed comfortably lying flat on room air   PULMONARY: b/l expiratory wheezing. No crackles   CARDIOVASCULAR: Regular rate and rhythm, S1-S2, no murmurs   GASTROINTESTINAL: Soft, non-tender, non-distended, no guarding.   SKIN/EXTREMITIES: 1+ LE edema b/l    # SOB secondary to likely COPD exacerbation and acute bronchitis secondary to coronaviurs infection  # RVP + but not for sars cov 2  # COPD on 3L at home   # HFpeF but doubt exacerbation (last EF 2019 65%)  # Possibly reduced EF in the past (is on entresto)   # CAD  - Hold decadron for now . Continue solumderol for COPD   - Continue inhatlers   - Trend lactate  - will order procal, ferritin, D-dimer  - ID consult  - duonebs q6 PRN  - cont 3L NC  - Patient seems dry on exam. Will hold diuretics for a couple of doses and monitor BMP.   - echo  - monitor I's, O's  - daily weights  - trend troponins   - Continue home medications   - Hold entresto in the setting of tj continue beta blocker     # Afib (on eliquis)  - EKG Ap,    - Continue Eliquis   - Patient with heart failure seems to be constantly RV paced. Consult EP for device interrogation    # TJ on CKD3, Cr 1.6 (baseline 0.9-1.0)  Likely hypovolemic   - Holdiuresis   - Hold entresto   - Monitor BMP     # T2DM  -on lantus 40 at home  -FS q6 hrs  -will start lantus 40 and SS for now  - May need increase with steroids     # HLD  -on repatha and vascepa at home; not on statins due to hx of liver injury    # Dementia  -continue home seroquel    DVT ppx: eliquis  GI ppx: protonix  Diet: DASH/TLC  Dispo: acute

## 2022-05-10 NOTE — PATIENT PROFILE ADULT - FALL HARM RISK - HARM RISK INTERVENTIONS

## 2022-05-10 NOTE — ED ADULT NURSE NOTE - NSIMPLEMENTINTERV_GEN_ALL_ED
Implemented All Fall with Harm Risk Interventions:  [place] to call system. Call bell, personal items and telephone within reach. Instruct patient to call for assistance. Room bathroom lighting operational. Non-slip footwear when patient is off stretcher. Physically safe environment: no spills, clutter or unnecessary equipment. Stretcher in lowest position, wheels locked, appropriate side rails in place. Provide visual cue, wrist band, yellow gown, etc. Monitor gait and stability. Monitor for mental status changes and reorient to person, place, and time. Review medications for side effects contributing to fall risk. Reinforce activity limits and safety measures with patient and family. Provide visual clues: red socks.

## 2022-05-10 NOTE — H&P ADULT - NSHPPHYSICALEXAM_GEN_ALL_CORE
GENERAL: NAD, resting in bed comfortably   PULMONARY: b/l expiratory wheezing   CARDIOVASCULAR: Regular rate and rhythm, S1-S2, no murmurs   GASTROINTESTINAL: Soft, non-tender, non-distended, no guarding.   SKIN/EXTREMITIES: 2+ LE edema b/l

## 2022-05-10 NOTE — ED PROVIDER NOTE - NS ED ATTENDING STATEMENT MOD
This was a shared visit with the BERNARDINO. I reviewed and verified the documentation and independently performed the documented: Attending with

## 2022-05-10 NOTE — ED PROVIDER NOTE - PROGRESS NOTE DETAILS
FF: discussed lab results with pt and pt family at bedside. pending xray. FF: discussed lab results with pt and pt family at bedside. pending xray. pt family agrees with plan to admit.

## 2022-05-10 NOTE — ED ADULT NURSE NOTE - NSICDXPASTMEDICALHX_GEN_ALL_CORE_FT
PAST MEDICAL HISTORY:  Afib     Asthma     CAD (coronary atherosclerotic disease)     COPD exacerbation     CVA (cerebrovascular accident)     Diabetes     Diastolic heart failure     Hyperlipidemia     Type 2 diabetes mellitus

## 2022-05-10 NOTE — ED PROVIDER NOTE - ATTENDING APP SHARED VISIT CONTRIBUTION OF CARE
79-year-old female with A. fib on Eliquis CAD CHF diabetes coming complaining of shortness of breath.  Also some nausea and dry heaving.  Denies abdominal pain.    Patient no acute distress comfortable bilateral expiratory wheezing S1-S2 abdomen soft nontender nonfocal mild lower extremity edema    Labs EKG imaging supportive care reassess

## 2022-05-10 NOTE — ED PROCEDURE NOTE - CONTRACTILITY
Preceptor Attestation:    I discussed the patient with the resident. I have verified the content of the note, which accurately reflects my assessment of the patient and the plan of care.   Supervising Physician:  Leigha Telles MD.          Decreased

## 2022-05-10 NOTE — ED PROVIDER NOTE - OBJECTIVE STATEMENT
Pt is an 83 y/o F with PMHx of Afib on Eliquis, asthma, CAD, CHF, DM, COPD on 3L O2, dementia presenting w/ SOB . Per family, pt also has SOB at baseline but her symptoms have gotten worse in the last few days. Pt has also been complaining of nausea and has been dry heaving all day with no vomiting.  Pt has a cardiologist. Dr. Morrison but she has not seen him in 2 years. No recent cardiac workup. Daughter at bedside reports mom has had similar symptoms chronically and she has been prescribed steroids and lasix in the past which only temporarily relieve her symptoms.

## 2022-05-10 NOTE — ED PROVIDER NOTE - CLINICAL SUMMARY MEDICAL DECISION MAKING FREE TEXT BOX
I personally evaluated the patient. I reviewed the Resident´s or Physician Assistant´s note (as assigned above), and agree with the findings and plan except as documented in my note.  Patient signed out to Dr. Bosch.  Patient evaluated for shortness of breath.  Labs, EKG, chest x-ray performed in the ED.  Patient noted to have slightly elevated troponin, per daughter troponin is not elevated in the past.  Patient given medication for COPD exacerbation with improvement in symptoms.  Chest x-ray noted to have pneumonia on right side, given antibiotics.  Patient admitted to medicine for further evaluation and treatment.

## 2022-05-10 NOTE — H&P ADULT - HISTORY OF PRESENT ILLNESS
Pt is an 81 y/o F with PMHx of Afib on Eliquis, asthma, CAD, HFpEF (last EF 2019 65%), DM, COPD on 3L O2, dementia presenting w/ SOB . Pt only speaks Burkinan and translation provided by pts daughter who is a nurse. Pt has been complaining of nonproductive cough and nonexertional dyspnea for the past 3 days. As per pts daughter, pt saw PCP about 3-4 days prior and has been receiving decadron 6mg IV pushes as well as lasix 40 IVP the past 3 days for presumed URI, all of which have been administered by pts daughter. Pts symptoms have not been relieved with these measures prompting her arrival to the ED. Pt has also been complaining of nausea and has been dry heaving with no vomiting. Pts cardiologist is Dr. Morrison, but as per pts daughter, pt has not seen cardiologist in 3-4 years, says "we did not see him because of COVID." Pt denies chest pain, lightheadedness, dizziness, abdominal pain, diarrhea, constipation, sick contacts. Pt uptodate with COVID/flu vaccines.    In the ED, BP elevated at 176/81. Labs notable Hb 10.9, .2, Cr 1.6 (baseline 0.9-1.0), lactate 2.2, trops 0.03, BNP 4262, COVID positive. Given solumedrol 125 IVP in ED, duonebs, and levaquin 750.  Pt is an 81 y/o F with PMHx of Afib on Eliquis, HLD,  CAD, HFpEF (last EF 2019 65%), DM, COPD on 3L O2, dementia presenting w/ SOB . Pt only speaks Trinidadian and translation provided by pts daughter who is a nurse. Pt has been complaining of nonproductive cough and nonexertional dyspnea for the past 3 days. As per pts daughter, pt saw PCP about 3-4 days prior and has been receiving decadron 6mg IV pushes as well as lasix 40 IVP the past 3 days for presumed URI, all of which have been administered by pts daughter. Pts symptoms have not been relieved with these measures prompting her arrival to the ED. Pt has also been complaining of nausea and has been dry heaving with no vomiting. Pts cardiologist is Dr. Morrison, but as per pts daughter, pt has not seen cardiologist in 3-4 years, says "we did not see him because of COVID." Pt denies chest pain, lightheadedness, dizziness, abdominal pain, diarrhea, constipation, sick contacts. Pt uptodate with COVID/flu vaccines.    In the ED, BP elevated at 176/81. Labs notable Hb 10.9, .2, Cr 1.6 (baseline 0.9-1.0), lactate 2.2, trops 0.03, BNP 4262, COVID positive. Given solumedrol 125 IVP in ED, duonebs, and levaquin 750.

## 2022-05-11 LAB
A1C WITH ESTIMATED AVERAGE GLUCOSE RESULT: 7.5 % — HIGH (ref 4–5.6)
ALBUMIN SERPL ELPH-MCNC: 3.2 G/DL — LOW (ref 3.5–5.2)
ALP SERPL-CCNC: 49 U/L — SIGNIFICANT CHANGE UP (ref 30–115)
ALT FLD-CCNC: 55 U/L — HIGH (ref 0–41)
ANION GAP SERPL CALC-SCNC: 13 MMOL/L — SIGNIFICANT CHANGE UP (ref 7–14)
AST SERPL-CCNC: 102 U/L — HIGH (ref 0–41)
BILIRUB SERPL-MCNC: 1.5 MG/DL — HIGH (ref 0.2–1.2)
BUN SERPL-MCNC: 69 MG/DL — CRITICAL HIGH (ref 10–20)
CALCIUM SERPL-MCNC: 7.8 MG/DL — LOW (ref 8.5–10.1)
CHLORIDE SERPL-SCNC: 97 MMOL/L — LOW (ref 98–110)
CO2 SERPL-SCNC: 27 MMOL/L — SIGNIFICANT CHANGE UP (ref 17–32)
CREAT SERPL-MCNC: 2 MG/DL — HIGH (ref 0.7–1.5)
EGFR: 24 ML/MIN/1.73M2 — LOW
ESTIMATED AVERAGE GLUCOSE: 169 MG/DL — HIGH (ref 68–114)
FERRITIN SERPL-MCNC: 87 NG/ML — SIGNIFICANT CHANGE UP (ref 15–150)
FOLATE SERPL-MCNC: 4.5 NG/ML — LOW
GLUCOSE BLDC GLUCOMTR-MCNC: 328 MG/DL — HIGH (ref 70–99)
GLUCOSE BLDC GLUCOMTR-MCNC: 347 MG/DL — HIGH (ref 70–99)
GLUCOSE BLDC GLUCOMTR-MCNC: 352 MG/DL — HIGH (ref 70–99)
GLUCOSE BLDC GLUCOMTR-MCNC: 354 MG/DL — HIGH (ref 70–99)
GLUCOSE BLDC GLUCOMTR-MCNC: 356 MG/DL — HIGH (ref 70–99)
GLUCOSE SERPL-MCNC: 321 MG/DL — HIGH (ref 70–99)
HCT VFR BLD CALC: 28.2 % — LOW (ref 37–47)
HGB BLD-MCNC: 9.5 G/DL — LOW (ref 12–16)
MAGNESIUM SERPL-MCNC: 2.3 MG/DL — SIGNIFICANT CHANGE UP (ref 1.8–2.4)
MCHC RBC-ENTMCNC: 33.7 G/DL — SIGNIFICANT CHANGE UP (ref 32–37)
MCHC RBC-ENTMCNC: 34.1 PG — HIGH (ref 27–31)
MCV RBC AUTO: 101.1 FL — HIGH (ref 81–99)
NRBC # BLD: 0 /100 WBCS — SIGNIFICANT CHANGE UP (ref 0–0)
PLATELET # BLD AUTO: 199 K/UL — SIGNIFICANT CHANGE UP (ref 130–400)
POTASSIUM SERPL-MCNC: 4.3 MMOL/L — SIGNIFICANT CHANGE UP (ref 3.5–5)
POTASSIUM SERPL-SCNC: 4.3 MMOL/L — SIGNIFICANT CHANGE UP (ref 3.5–5)
PROCALCITONIN SERPL-MCNC: 0.46 NG/ML — HIGH (ref 0.02–0.1)
PROT SERPL-MCNC: 7.6 G/DL — SIGNIFICANT CHANGE UP (ref 6–8)
RBC # BLD: 2.79 M/UL — LOW (ref 4.2–5.4)
RBC # FLD: 13.9 % — SIGNIFICANT CHANGE UP (ref 11.5–14.5)
SODIUM SERPL-SCNC: 137 MMOL/L — SIGNIFICANT CHANGE UP (ref 135–146)
VIT B12 SERPL-MCNC: 1247 PG/ML — HIGH (ref 232–1245)
WBC # BLD: 7.15 K/UL — SIGNIFICANT CHANGE UP (ref 4.8–10.8)
WBC # FLD AUTO: 7.15 K/UL — SIGNIFICANT CHANGE UP (ref 4.8–10.8)

## 2022-05-11 PROCEDURE — 76770 US EXAM ABDO BACK WALL COMP: CPT | Mod: 26

## 2022-05-11 PROCEDURE — 76705 ECHO EXAM OF ABDOMEN: CPT | Mod: 26

## 2022-05-11 PROCEDURE — 99233 SBSQ HOSP IP/OBS HIGH 50: CPT

## 2022-05-11 PROCEDURE — 93970 EXTREMITY STUDY: CPT | Mod: 26

## 2022-05-11 PROCEDURE — 93280 PM DEVICE PROGR EVAL DUAL: CPT | Mod: 26

## 2022-05-11 PROCEDURE — 93306 TTE W/DOPPLER COMPLETE: CPT | Mod: 26

## 2022-05-11 RX ORDER — INSULIN LISPRO 100/ML
7 VIAL (ML) SUBCUTANEOUS
Refills: 0 | Status: DISCONTINUED | OUTPATIENT
Start: 2022-05-11 | End: 2022-05-12

## 2022-05-11 RX ORDER — INSULIN GLARGINE 100 [IU]/ML
21 INJECTION, SOLUTION SUBCUTANEOUS EVERY MORNING
Refills: 0 | Status: DISCONTINUED | OUTPATIENT
Start: 2022-05-11 | End: 2022-05-12

## 2022-05-11 RX ADMIN — Medication 100 MILLIGRAM(S): at 18:07

## 2022-05-11 RX ADMIN — APIXABAN 5 MILLIGRAM(S): 2.5 TABLET, FILM COATED ORAL at 18:08

## 2022-05-11 RX ADMIN — Medication 3 MILLILITER(S): at 14:03

## 2022-05-11 RX ADMIN — Medication 4: at 17:51

## 2022-05-11 RX ADMIN — APIXABAN 5 MILLIGRAM(S): 2.5 TABLET, FILM COATED ORAL at 05:27

## 2022-05-11 RX ADMIN — QUETIAPINE FUMARATE 25 MILLIGRAM(S): 200 TABLET, FILM COATED ORAL at 12:39

## 2022-05-11 RX ADMIN — Medication 60 MILLIGRAM(S): at 18:05

## 2022-05-11 RX ADMIN — SENNA PLUS 2 TABLET(S): 8.6 TABLET ORAL at 22:29

## 2022-05-11 RX ADMIN — POLYETHYLENE GLYCOL 3350 17 GRAM(S): 17 POWDER, FOR SOLUTION ORAL at 12:39

## 2022-05-11 RX ADMIN — Medication 5: at 12:40

## 2022-05-11 RX ADMIN — Medication 60 MILLIGRAM(S): at 05:27

## 2022-05-11 RX ADMIN — Medication 100 MILLIGRAM(S): at 05:27

## 2022-05-11 RX ADMIN — Medication 3 MILLILITER(S): at 21:04

## 2022-05-11 RX ADMIN — INSULIN GLARGINE 21 UNIT(S): 100 INJECTION, SOLUTION SUBCUTANEOUS at 22:46

## 2022-05-11 RX ADMIN — INSULIN GLARGINE 40 UNIT(S): 100 INJECTION, SOLUTION SUBCUTANEOUS at 12:38

## 2022-05-11 RX ADMIN — Medication 7 UNIT(S): at 17:50

## 2022-05-11 RX ADMIN — PANTOPRAZOLE SODIUM 40 MILLIGRAM(S): 20 TABLET, DELAYED RELEASE ORAL at 05:27

## 2022-05-11 RX ADMIN — Medication 4: at 09:43

## 2022-05-11 NOTE — PROGRESS NOTE ADULT - SUBJECTIVE AND OBJECTIVE BOX
CHIEF COMPLAINT:  Patient is a 82y old  Female who presents with a chief complaint of COVID-19, SOB (11 May 2022 07:27)      INTERVAL HISTORY/OVERNIGHT EVENTS:  pt Maldivian speaking  daughter/granddaughter at bedside  still with significant wheeze bilateral lungs  on ra  no significant LE pitting edema    going for echo today  pending further gdmt    ======================  MEDICATIONS:  albuterol/ipratropium for Nebulization 3 milliLiter(s) Nebulizer every 6 hours  apixaban 5 milliGRAM(s) Oral two times a day  dextrose 50% Injectable 25 Gram(s) IV Push once  dextrose 50% Injectable 12.5 Gram(s) IV Push once  dextrose 50% Injectable 25 Gram(s) IV Push once  glucagon  Injectable 1 milliGRAM(s) IntraMuscular once  insulin glargine Injectable (LANTUS) 40 Unit(s) SubCutaneous every morning  insulin lispro (ADMELOG) corrective regimen sliding scale   SubCutaneous three times a day before meals  methylPREDNISolone sodium succinate Injectable 60 milliGRAM(s) IV Push every 12 hours  metoprolol tartrate 100 milliGRAM(s) Oral two times a day  pantoprazole    Tablet 40 milliGRAM(s) Oral before breakfast  polyethylene glycol 3350 17 Gram(s) Oral daily  QUEtiapine 25 milliGRAM(s) Oral daily  senna 8.6 milliGRAM(s) Oral Tablet - Peds 2 Tablet(s) Oral at bedtime    DRIPS:  dextrose 5%. 1000 milliLiter(s) (50 mL/Hr) IV Continuous <Continuous>  dextrose 5%. 1000 milliLiter(s) (100 mL/Hr) IV Continuous <Continuous>    PRN:       ======================  PHYSICAL EXAMINATION:  GEN:  nad.   HEENT:  eomi. ncat  PULM: wheeze /rhonchi b/l  CARD: regular. s1, s2.  no murmurs.   ABD: +bs. ntnd  EXT:  no new rashes.  no pitting edema b/l .   NEURO:  alert. follows commands  ======================  OBJECTIVE:        VS:  T(F): 97.2 (05-11 @ 04:32), Max: 98.3 (05-10 @ 17:30)  HR: 60 (05-11 @ 04:32) (57 - 65)  BP: 107/58 (05-11 @ 04:32) (91/52 - 119/64)  RR: 18 (05-11 @ 04:32) (18 - 20)  SpO2: 98% (05-10 @ 19:35) (98% - 100%)  CVP(mm Hg): --  CO: --  CI: --  PA: --  PCWP: --    I/O:      05-10 @ 07:01  -  05-11 @ 07:00  --------------------------------------------------------  IN: 240 mL / OUT: 0 mL / NET: 240 mL        Weight trend:  Weight (kg): 68 (05-10)    ======================    LABS:                          9.5    7.15  )-----------( 199      ( 11 May 2022 06:06 )             28.2     05-11    137  |  97<L>  |  69<HH>  ----------------------------<  321<H>  4.3   |  27  |  2.0<H>    Ca    7.8<L>      11 May 2022 06:06  Mg     2.3     05-11    TPro  7.6  /  Alb  3.2<L>  /  TBili  1.5<H>  /  DBili  x   /  AST  102<H>  /  ALT  55<H>  /  AlkPhos  49  05-11    LIVER FUNCTIONS - ( 11 May 2022 06:06 )  Alb: 3.2 g/dL / Pro: 7.6 g/dL / ALK PHOS: 49 U/L / ALT: 55 U/L / AST: 102 U/L / GGT: x             Creatine Kinase, Serum: 42 U/L (05-10 @ 19:45)  CKMB Units: 2.2 ng/mL (05-10 @ 19:45)  Troponin T, Serum: 0.02 ng/mL (05-10 @ 19:45)    CARDIAC MARKERS ( 10 May 2022 19:45 )  x     / 0.02 ng/mL / 42 U/L / x     / 2.2 ng/mL  CARDIAC MARKERS ( 10 May 2022 05:45 )  x     / 0.03 ng/mL / x     / x     / x          Serum Pro-Brain Natriuretic Peptide: 4262 pg/mL (05-10)        Cultures:         CHIEF COMPLAINT:  Patient is a 82y old  Female who presents with a chief complaint of COVID-19, SOB (11 May 2022 07:27)      INTERVAL HISTORY/OVERNIGHT EVENTS:  pt Barbadian speaking  daughter/granddaughter at bedside  still with significant wheeze bilateral lungs  on ra  no significant LE pitting edema    going for echo today  pending further medical management for copd exacerbation  found with lv wall abnormalities on echo; f/u cardio Dr. Dukes    ======================  MEDICATIONS:  albuterol/ipratropium for Nebulization 3 milliLiter(s) Nebulizer every 6 hours  apixaban 5 milliGRAM(s) Oral two times a day  dextrose 50% Injectable 25 Gram(s) IV Push once  dextrose 50% Injectable 12.5 Gram(s) IV Push once  dextrose 50% Injectable 25 Gram(s) IV Push once  glucagon  Injectable 1 milliGRAM(s) IntraMuscular once  insulin glargine Injectable (LANTUS) 40 Unit(s) SubCutaneous every morning  insulin lispro (ADMELOG) corrective regimen sliding scale   SubCutaneous three times a day before meals  methylPREDNISolone sodium succinate Injectable 60 milliGRAM(s) IV Push every 12 hours  metoprolol tartrate 100 milliGRAM(s) Oral two times a day  pantoprazole    Tablet 40 milliGRAM(s) Oral before breakfast  polyethylene glycol 3350 17 Gram(s) Oral daily  QUEtiapine 25 milliGRAM(s) Oral daily  senna 8.6 milliGRAM(s) Oral Tablet - Peds 2 Tablet(s) Oral at bedtime    DRIPS:  dextrose 5%. 1000 milliLiter(s) (50 mL/Hr) IV Continuous <Continuous>  dextrose 5%. 1000 milliLiter(s) (100 mL/Hr) IV Continuous <Continuous>    PRN:       ======================  PHYSICAL EXAMINATION:  GEN:  nad.   HEENT:  eomi. ncat  PULM: wheeze /rhonchi b/l  CARD: regular. s1, s2.  no murmurs.   ABD: +bs. ntnd  EXT:  no new rashes.  no pitting edema b/l .   NEURO:  alert. follows commands  ======================  OBJECTIVE:        VS:  T(F): 97.2 (05-11 @ 04:32), Max: 98.3 (05-10 @ 17:30)  HR: 60 (05-11 @ 04:32) (57 - 65)  BP: 107/58 (05-11 @ 04:32) (91/52 - 119/64)  RR: 18 (05-11 @ 04:32) (18 - 20)  SpO2: 98% (05-10 @ 19:35) (98% - 100%)  CVP(mm Hg): --  CO: --  CI: --  PA: --  PCWP: --    I/O:      05-10 @ 07:01  -  05-11 @ 07:00  --------------------------------------------------------  IN: 240 mL / OUT: 0 mL / NET: 240 mL        Weight trend:  Weight (kg): 68 (05-10)    ======================    LABS:                          9.5    7.15  )-----------( 199      ( 11 May 2022 06:06 )             28.2     05-11    137  |  97<L>  |  69<HH>  ----------------------------<  321<H>  4.3   |  27  |  2.0<H>    Ca    7.8<L>      11 May 2022 06:06  Mg     2.3     05-11    TPro  7.6  /  Alb  3.2<L>  /  TBili  1.5<H>  /  DBili  x   /  AST  102<H>  /  ALT  55<H>  /  AlkPhos  49  05-11    LIVER FUNCTIONS - ( 11 May 2022 06:06 )  Alb: 3.2 g/dL / Pro: 7.6 g/dL / ALK PHOS: 49 U/L / ALT: 55 U/L / AST: 102 U/L / GGT: x             Creatine Kinase, Serum: 42 U/L (05-10 @ 19:45)  CKMB Units: 2.2 ng/mL (05-10 @ 19:45)  Troponin T, Serum: 0.02 ng/mL (05-10 @ 19:45)    CARDIAC MARKERS ( 10 May 2022 19:45 )  x     / 0.02 ng/mL / 42 U/L / x     / 2.2 ng/mL  CARDIAC MARKERS ( 10 May 2022 05:45 )  x     / 0.03 ng/mL / x     / x     / x          Serum Pro-Brain Natriuretic Peptide: 4262 pg/mL (05-10)        Cultures:

## 2022-05-11 NOTE — PROGRESS NOTE ADULT - ASSESSMENT
Pt is an 81 y/o F with PMHx of Afib on Eliquis, HLD,  CAD, HFpEF (last EF 2019 65%), DM, COPD on 3L O2, dementia presenting w/ SOB. Pt has been complaining of nonproductive cough and nonexertional dyspnea for the past 3 days. Pt is an 83 y/o F with PMHx of Afib on Eliquis, HLD,  CAD, HFpEF (last EF 2019 65%), DM, COPD on 3L O2, dementia presenting w/ SOB. Pt has been complaining of nonproductive cough and nonexertional dyspnea for the past 3 days.    Viral ( non COVID-19 coronavirus ) bronchitis. No Covid-19  TJ on CKD-3  Chronic HFpEF  Chronic A-fib on Eliquis  DM-2 / DL  COPD on 3L NC at home  Dementia           PLAN:    ·	Can d/c tele  ·	Cont Alb/Atrovent neb treatment  ·	D/C Solumedrol. Switch her to Prednisone 40 mg po daily  ·	No diuretics.   ·	Renal/bladder US. Check PVR  ·	Check urine lytes  ·	Renal eval  ·	Cont Eliquis  ·	Monitor FS  ·	D/C sliding scale. Start her on Lantus 21 units sq in AM and 7 units sq before meals  ·	Cont her other home meds    Progress Note Handoff    Pending (specify):  Consults__Renal_______, Tests__Renal/Bladder US______, Test Results_______, Other_________  Family discussion:  Disposition: Home___/SNF___/Other________/Unknown at this time________    Laz Montoya MD  Spectra: [id number]

## 2022-05-11 NOTE — PROGRESS NOTE ADULT - ASSESSMENT
Pt is an 83 y/o F with PMHx of Afib on Eliquis, asthma, CAD, HFpEF (last EF 2019 65%), DM, COPD on 3L O2, dementia presenting w/ SOB    COPD exacerbation secondary to coronavirus  HFpEF exacerbation?  -as per pts daughter pt was receiving decadron 6mg IVP and lasix 40 IVP for past 3 days by PCP  -expiratory wheezing on exam, BNP 4262, b/l 2+ LE edema on examination  -solumedrol 125mg IVP in ED  -last echo in 2019 showed LVEF 65; will reorder echo  -will start lasix 40mg IVP; monitor renal function  -continue eliquis  -monitor I's, O's  -daily weights    Afib (on eliquis)  CAD  TJ on CKD3, Cr 1.6 (baseline 0.9-1.0)      COPD (on 3L O2)  COVID-19 infection  -corona virus +  -solumedrol 125 mg IVP in ED; will start decadron 6mg IVP for 10 days  -lactate 2.2 on admission, will trend  -will order procal, ferritin, D-dimer  -will hold remdesevir as pt in TJ; ID consult  -duonebs q6 PRN  -cont 3L NC    T2DM  -on lantus 40 at home  -FS q6 hrs  -c/w lantus 40 add SS  -monitor and adjust PRN    HLD  -on repatha and vascepa at home; not on statins due to hx of liver injury  -daughter wants pt on crestor 20; will hold off for now    Dementia  -c/w Seroquel 25mg qhs        DVT ppx: eliquis  GI ppx: protonix  Diet: DASH/TLC  Dispo: acute Pt is an 83 y/o F with PMHx of Afib on Eliquis, asthma, CAD, HFpEF (last EF 2019 65%), DM, COPD on 3L O2, dementia presenting w/ SOB    COPD exacerbation secondary to coronavirus  HFpEF exacerbation?  -as per pts daughter pt was receiving decadron 6mg IVP and lasix 40 IVP for past 3 days by PCP  -expiratory wheezing on exam, BNP 4262, b/l 2+ LE edema on examination  -solumedrol 125mg IVP in ED  -last echo in 2019 showed LVEF 65; will reorder echo  -continue eliquis  -monitor I's, O's  -daily weights    TJ on CKD3, Cr 1.6 (baseline 0.9-1.0)  -Cr worsening 2.0 today  -kidney bladder US ordered  -urine sodium, urine urea, urine   -f/u nephro eval    Afib (on eliquis)  CAD  -c/w eliquis  -on repatha; hold statin d/t mild transaminitis    COPD (on 3L O2)  COVID-19 infection  -corona virus +  -solumedrol 125 mg IVP in ED; will start decadron 6mg IVP for 10 days  -lactate 2.2 on admission, will trend  -will order procal, ferritin, D-dimer  -will hold remdesevir as pt in TJ; ID consult  -duonebs q6 PRN  -cont 3L NC    T2DM  -on lantus 40 at home  -FS q6 hrs  -per attending; d/c sliding scale  >>start lantus 21 AM, and 7 lispro qachs  -monitor and adjust PRN    HLD  -on repatha and vascepa at home; not on statins due to hx of liver injury  -daughter wants pt on crestor 20; will hold off for now d/t mild transaminitis    Dementia  -c/w Seroquel 25mg qhs        DVT ppx: eliquis  GI ppx: protonix  Diet: DASH/TLC  Dispo: acute Pt is an 81 y/o F with PMHx of Afib on Eliquis, asthma, CAD, HFpEF (last EF 2019 65%), DM, COPD on 3L O2, dementia presenting w/ SOB    COPD exacerbation secondary to coronavirus  HFpEF exacerbation? not fluid overloaded on exam (xray with some congestion)  -as per pts daughter pt was receiving decadron 6mg IVP and lasix 40 IVP for past 3 days by PCP  -expiratory wheezing on exam, BNP 4262, b/l 2+ LE edema on examination  -last echo in 2019 showed LVEF 65  -repeat echo: EF 51%, multiple wall motion abnormalities; pHTN, grade 2 diastolic dysfxn  >>Cardiology Dr. Dukes c/s for possible ischemic w/u  -switch to prednisone 40 daily (sp solumedrol bid)  -    TJ on CKD3, Cr 1.6 on admission (baseline 0.9-1.0)  -Cr worsening 2.0 today  -pt voiding, not in fluid overload, serum sodium nl  -kidney bladder US ordered  -urine sodium, urine urea, urine creatine ordered  -f/u nephro eval    Afib (on eliquis)  CAD  -c/w eliquis  -on repatha; hold statin d/t mild transaminitis    COPD (on 3L O2)  COVID-19 infection  -corona virus +  -solumedrol 125 mg IVP in ED; will start decadron 6mg IVP for 10 days  -lactate 2.2 on admission, will trend  -will order procal, ferritin, D-dimer  -will hold remdesevir as pt in TJ; ID consult  -duonebs q6 PRN  -cont 3L NC    T2DM  -on lantus 40 at home  -FS q6 hrs  -per attending; d/c sliding scale  >>start lantus 21 AM, and 7 lispro qachs  -monitor and adjust PRN    HLD  -on repatha and vascepa at home; not on statins due to hx of liver injury  -daughter wants pt on crestor 20; will hold off for now d/t mild transaminitis    Dementia  -c/w Seroquel 25mg qhs        DVT ppx: eliquis  GI ppx: protonix  Diet: DASH/TLC  Dispo: acute Pt is an 81 y/o F with PMHx of Afib on Eliquis, asthma, CAD, HFpEF (last EF 2019 65%), DM, COPD on 3L O2, dementia presenting w/ SOB    COPD exacerbation secondary to coronavirus  HFpEF exacerbation? not fluid overloaded on exam (xray with some congestion)  -as per pts daughter pt was receiving decadron 6mg IVP and lasix 40 IVP for past 3 days by PCP  -expiratory wheezing on exam, BNP 4262, b/l 2+ LE edema on examination  -last echo in 2019 showed LVEF 65  -repeat echo: EF 51%, multiple wall motion abnormalities; pHTN, grade 2 diastolic dysfxn  >>Cardiology Dr. Dukes c/s for possible ischemic w/u  -switch to prednisone 40 daily (sp solumedrol bid)  -c/w duonebs q6 standing  -will dc tele today    TJ on CKD3, Cr 1.6 on admission (baseline 0.9-1.0)  -Cr worsening 2.0 today  -pt voiding, not in fluid overload, serum sodium nl  -kidney bladder US ordered  -urine sodium, urine urea, urine creatine ordered  -f/u nephro eval    Afib (on eliquis)  CAD  -c/w eliquis  -on repatha; hold statin d/t mild transaminitis    COPD (on 3L O2) acute exacerbation (see above)  coronavirus infection  -corona virus +  -lactate 2.2 on admission, improving  -f/u ID c/s; monitor off abx for now  -cont 3L NC    T2DM  -on lantus 40 at home  -FS q6 hrs  -per attending; d/c sliding scale  >>start lantus 21 AM, and 7 lispro qachs  -monitor and adjust PRN    HLD  -on repatha and vascepa at home; not on statins due to hx of liver injury  -daughter wants pt on crestor 20; will hold off for now d/t mild transaminitis    Dementia  -c/w Seroquel 25mg qhs        DVT ppx: eliquis  GI ppx: protonix  Diet: DASH/TLC  Dispo: acute Pt is an 83 y/o F with PMHx of Afib on Eliquis, asthma, CAD, HFpEF (last EF 2019 65%), DM, COPD on 3L O2, dementia presenting w/ SOB    COPD exacerbation secondary to coronavirus  HFpEF exacerbation? not fluid overloaded on exam (xray with some congestion)  -as per pts daughter pt was receiving decadron 6mg IVP and lasix 40 IVP for past 3 days by PCP  -expiratory wheezing on exam, BNP 4262, b/l 2+ LE edema on examination  -last echo in 2019 showed LVEF 65  -repeat echo: EF 51%, multiple wall motion abnormalities; pHTN, grade 2 diastolic dysfxn  >>Cardiology Dr. Dukes c/s for possible ischemic w/u  -no diuretics for now  -switch to prednisone 40 daily (sp solumedrol bid)  -c/w duonebs q6 standing  -will dc tele today    TJ on CKD3, Cr 1.6 on admission (baseline 0.9-1.0)  -Cr worsening 2.0 today  -pt voiding, not in fluid overload, serum sodium nl  -kidney bladder US ordered  -urine sodium, urine urea, urine creatine ordered  -f/u nephro eval    Afib (on eliquis)  CAD  -c/w eliquis  -on repatha; hold statin d/t mild transaminitis    COPD (on 3L O2) acute exacerbation (see above)  coronavirus infection  -corona virus +  -lactate 2.2 on admission, improving  -f/u ID c/s; monitor off abx for now  -cont 3L NC    T2DM  -on lantus 40 at home  -FS q6 hrs  -per attending; d/c sliding scale  >>start lantus 21 AM, and 7 lispro qachs  -monitor and adjust PRN    HLD  -on repatha and vascepa at home; not on statins due to hx of liver injury  -daughter wants pt on crestor 20; will hold off for now d/t mild transaminitis    Dementia  -c/w Seroquel 25mg qhs    DVT ppx: eliquis  GI ppx: protonix  Diet: DASH/TLC  Dispo: acute Pt is an 81 y/o F with PMHx of Afib on Eliquis, asthma, CAD, HFpEF (last EF 2019 65%), DM, COPD on 3L O2, dementia presenting w/ SOB    COPD exacerbation secondary to coronavirus  HFpEF exacerbation? not fluid overloaded on exam (xray with some congestion)  -as per pts daughter pt was receiving decadron 6mg IVP and lasix 40 IVP for past 3 days by PCP  -expiratory wheezing on exam, BNP 4262, b/l 2+ LE edema on examination  -last echo in 2019 showed LVEF 65  -repeat echo: EF 51%, multiple wall motion abnormalities; pHTN, grade 2 diastolic dysfxn  >>Cardiology Dr. Dukes c/s for possible ischemic w/u  -no diuretics for now  -switch to prednisone 40 daily (sp solumedrol bid)  -c/w duonebs q6 standing  -will dc tele today    TJ on CKD3, Cr 1.6 on admission (baseline 0.9-1.0)  -Cr worsening 2.0 today  -pt voiding, not in fluid overload, serum sodium nl  -kidney bladder US ordered  -urine sodium, urine urea, urine creatine ordered  -f/u nephro eval    Afib (on eliquis)  CAD  -EP interogated device; no events; f/u o/p  -c/w eliquis  -on repatha; hold statin d/t mild transaminitis    COPD (on 3L O2) acute exacerbation (see above)  coronavirus infection  -corona virus +  -lactate 2.2 on admission, improving  -f/u ID c/s; monitor off abx for now  -cont 3L NC    T2DM  -on lantus 40 at home  -FS q6 hrs  -per attending; d/c sliding scale  >>start lantus 21 AM, and 7 lispro qachs  -monitor and adjust PRN    HLD  -on repatha and vascepa at home; not on statins due to hx of liver injury  -daughter wants pt on crestor 20; will hold off for now d/t mild transaminitis    Dementia  -c/w Seroquel 25mg qhs    DVT ppx: eliquis  GI ppx: protonix  Diet: DASH/TLC  Dispo: acute Pt is an 83 y/o F with PMHx of Afib on Eliquis, asthma, CAD, HFpEF (last EF 2019 65%), DM, COPD on 3L O2, dementia presenting w/ SOB    COPD exacerbation secondary to coronavirus  HFpEF exacerbation? not fluid overloaded on exam (xray with some congestion)  -expiratory wheezing on exam, BNP 4262 increased but similar to baseline ~3600  -last echo in 2019 showed LVEF 65  -repeat echo: EF 51%, multiple wall motion abnormalities; pHTN, grade 2 diastolic dysfxn  >>Cardiology Dr. Dukes c/s for possible ischemic w/u  -no diuretics for now  -switch to prednisone 40 daily (sp solumedrol bid)  -c/w duonebs q6 standing  -will dc tele today    TJ on CKD3, Cr 1.6 on admission (baseline 0.9-1.0)  -Cr worsening 2.0 today  -BUN/cr ratio ~over 2; prerenal?  -pt voiding, not in fluid overload, serum sodium nl  -kidney bladder US nl  -urine sodium, urine urea, urine creatine ordered  -f/u nephro eval    Afib (on eliquis)  CAD  -EP interogated device; no events; f/u o/p  -c/w eliquis  -on repatha; hold statin d/t mild transaminitis    COPD (on 3L O2) acute exacerbation (see above)  coronavirus infection  -corona virus +  -lactate 2.2 on admission, improving  -f/u ID c/s; monitor off abx for now  -cont 3L NC    T2DM  -on lantus 40 at home  -FS q6 hrs  -per attending; d/c sliding scale  -FS are elevated ~300s  >>start lantus 21 AM, and 7 lispro qachs today  -monitor and adjust PRN    HLD  -on repatha and vascepa at home; not on statins due to hx of liver injury  -daughter wants pt on crestor 20; will hold off for now d/t mild transaminitis    Dementia  -c/w Seroquel 25mg qhs    DVT ppx: eliquis  GI ppx: protonix  Diet: DASH/TLC  Dispo: acute; pending cardio c/s; improvement in wheeze; pt eval  Pt is an 81 y/o F with PMHx of Afib on Eliquis, asthma, CAD, HFpEF (last EF 2019 65%), DM, COPD on 3L O2, dementia presenting w/ SOB    COPD exacerbation secondary to coronavirus  HFpEF exacerbation? not fluid overloaded on exam (xray with some congestion)  -expiratory wheezing on exam, BNP 4262 increased but similar to baseline ~3600  -last echo in 2019 showed LVEF 65  -repeat echo: EF 51%, multiple wall motion abnormalities; pHTN, grade 2 diastolic dysfxn  >>Cardiology Dr. Dukes c/s for possible ischemic w/u  -no diuretics for now  -switch to prednisone 40 daily (sp solumedrol bid)  -c/w duonebs q6 standing  -elevated ddimer>>f/u LE duplex  -no events on tele, will dc tele today    TJ on CKD3, Cr 1.6 on admission (baseline 0.9-1.0)  -Cr worsening 2.0 today  -BUN/cr ratio ~over 2; prerenal?  -pt voiding, not in fluid overload, serum sodium nl  -kidney bladder US nl  -urine sodium, urine urea, urine creatine ordered  -f/u nephro eval    Afib (on eliquis)  CAD  -EP interogated device; no events; f/u o/p  -c/w eliquis  -on repatha; hold statin d/t mild transaminitis    COPD (on 3L O2) acute exacerbation (see above)  coronavirus infection  -corona virus +  -lactate 2.2 on admission, improving  -f/u ID c/s; monitor off abx for now  -cont 3L NC    T2DM  -on lantus 40 at home  -FS q6 hrs  -per attending; d/c sliding scale  -FS are elevated ~300s  >>start lantus 21 AM, and 7 lispro qachs today  -monitor and adjust PRN    HLD  -on repatha and vascepa at home; not on statins due to hx of liver injury  -daughter wants pt on crestor 20; will hold off for now d/t mild transaminitis    Dementia  -c/w Seroquel 25mg qhs    DVT ppx: eliquis  GI ppx: protonix  Diet: DASH/TLC  Dispo: acute; pending cardio c/s; improvement in wheeze; pt eval

## 2022-05-11 NOTE — CONSULT NOTE ADULT - TIME BILLING
Counseled patient's daughter at the bedside about diagnostic testing and treatment plan. All questions answered.

## 2022-05-11 NOTE — CONSULT NOTE ADULT - SUBJECTIVE AND OBJECTIVE BOX
PRATEEK LAWRENCE  82y, Female  Allergy: Augmentin (Rash)  oxycodone (Pruritus)      All historical available data reviewed.    HPI:  Pt is an 83 y/o F with PMHx of Afib on Eliquis, HLD,  CAD, HFpEF (last EF 2019 65%), DM, COPD on 3L O2, dementia presenting w/ SOB . Pt only speaks Bulgarian and translation provided by pts daughter who is a nurse. Pt has been complaining of nonproductive cough and nonexertional dyspnea for the past 3 days. As per pts daughter, pt saw PCP about 3-4 days prior and has been receiving decadron 6mg IV pushes as well as lasix 40 IVP the past 3 days for presumed URI, all of which have been administered by pts daughter. Pts symptoms have not been relieved with these measures prompting her arrival to the ED. Pt has also been complaining of nausea and has been dry heaving with no vomiting. Pts cardiologist is Dr. Morrison, but as per pts daughter, pt has not seen cardiologist in 3-4 years, says "we did not see him because of COVID." Pt denies chest pain, lightheadedness, dizziness, abdominal pain, diarrhea, constipation, sick contacts. Pt uptodate with COVID/flu vaccines.    In the ED, BP elevated at 176/81. Labs notable Hb 10.9, .2, Cr 1.6 (baseline 0.9-1.0), lactate 2.2, trops 0.03, BNP 4262, COVID positive. Given solumedrol 125 IVP in ED, duonebs, and levaquin 750.  (10 May 2022 11:03)    FAMILY HISTORY:    PAST MEDICAL & SURGICAL HISTORY:  Afib  on eliquis      Diabetes      Asthma      CAD (coronary atherosclerotic disease)      Diastolic heart failure      COPD exacerbation      Hyperlipidemia      CVA (cerebrovascular accident)      Type 2 diabetes mellitus      S/P appendectomy            VITALS:  T(F): 97.2, Max: 98.3 (05-10-22 @ 17:30)  HR: 60  BP: 107/58  RR: 18Vital Signs Last 24 Hrs  T(C): 36.2 (11 May 2022 04:32), Max: 36.8 (10 May 2022 17:30)  T(F): 97.2 (11 May 2022 04:32), Max: 98.3 (10 May 2022 17:30)  HR: 60 (11 May 2022 04:32) (56 - 65)  BP: 107/58 (11 May 2022 04:32) (91/52 - 156/74)  BP(mean): --  RR: 18 (11 May 2022 04:32) (18 - 20)  SpO2: 98% (10 May 2022 19:35) (98% - 100%)    TESTS & MEASUREMENTS:                        10.9   9.85  )-----------( 226      ( 10 May 2022 05:45 )             32.5     05-10    139  |  96<L>  |  57<H>  ----------------------------<  110<H>  3.5   |  30  |  1.6<H>    Ca    8.7      10 May 2022 05:45    TPro  8.6<H>  /  Alb  3.8  /  TBili  1.2  /  DBili  x   /  AST  74<H>  /  ALT  39  /  AlkPhos  71  05-10    LIVER FUNCTIONS - ( 10 May 2022 05:45 )  Alb: 3.8 g/dL / Pro: 8.6 g/dL / ALK PHOS: 71 U/L / ALT: 39 U/L / AST: 74 U/L / GGT: x                   RADIOLOGY & ADDITIONAL TESTS:  Personal review of radiological diagnostics performed  Echo and EKG results noted when applicable.     MEDICATIONS:  albuterol/ipratropium for Nebulization 3 milliLiter(s) Nebulizer every 6 hours  apixaban 5 milliGRAM(s) Oral two times a day  dextrose 5%. 1000 milliLiter(s) IV Continuous <Continuous>  dextrose 5%. 1000 milliLiter(s) IV Continuous <Continuous>  dextrose 50% Injectable 25 Gram(s) IV Push once  dextrose 50% Injectable 12.5 Gram(s) IV Push once  dextrose 50% Injectable 25 Gram(s) IV Push once  dextrose Oral Gel 15 Gram(s) Oral once PRN  glucagon  Injectable 1 milliGRAM(s) IntraMuscular once  insulin glargine Injectable (LANTUS) 40 Unit(s) SubCutaneous every morning  insulin lispro (ADMELOG) corrective regimen sliding scale   SubCutaneous three times a day before meals  methylPREDNISolone sodium succinate Injectable 60 milliGRAM(s) IV Push every 12 hours  metoprolol tartrate 100 milliGRAM(s) Oral two times a day  pantoprazole    Tablet 40 milliGRAM(s) Oral before breakfast  polyethylene glycol 3350 17 Gram(s) Oral daily  QUEtiapine 25 milliGRAM(s) Oral daily  senna 8.6 milliGRAM(s) Oral Tablet - Peds 2 Tablet(s) Oral at bedtime      ANTIBIOTICS:

## 2022-05-11 NOTE — PROGRESS NOTE ADULT - SUBJECTIVE AND OBJECTIVE BOX
PRATEEK LAWRENCE  82y Female    CHIEF COMPLAINT:    Patient is a 82y old  Female who presents with a chief complaint of COVID-19, SOB (11 May 2022 05:56)      INTERVAL HPI/OVERNIGHT EVENTS:    Patient seen and examined.    ROS: All other systems are negative.    Vital Signs:    T(F): 97.2 (22 @ 04:32), Max: 98.3 (05-10-22 @ 17:30)  HR: 60 (22 @ 04:32) (57 - 65)  BP: 107/58 (22 @ 04:32) (91/52 - 119/64)  RR: 18 (22 @ 04:32) (18 - 20)  SpO2: 98% (05-10-22 @ 19:35) (98% - 100%)  I&O's Summary    10 May 2022 07:01  -  11 May 2022 07:00  --------------------------------------------------------  IN: 240 mL / OUT: 0 mL / NET: 240 mL      Daily     Daily Weight in k (11 May 2022 04:32)  CAPILLARY BLOOD GLUCOSE      POCT Blood Glucose.: 239 mg/dL (10 May 2022 17:07)  POCT Blood Glucose.: 168 mg/dL (10 May 2022 12:00)      PHYSICAL EXAM:    GENERAL:  NAD  SKIN: No rashes or lesions  HENT: Atraumatic. Normocephalic. PERRL. Moist membranes.  NECK: Supple, No JVD. No lymphadenopathy.  PULMONARY: CTA B/L. No wheezing. No rales  CVS: Normal S1, S2. Rate and Rhythm are regular. No murmurs.  ABDOMEN/GI: Soft, Nontender, Nondistended; BS present  EXTREMITIES: Peripheral pulses intact. No edema B/L LE.  NEUROLOGIC:  No motor or sensory deficit.  PSYCH: Alert & oriented x 3    Consultant(s) Notes Reviewed:  [x ] YES  [ ] NO  Care Discussed with Consultants/Other Providers [ x] YES  [ ] NO    EKG reviewed  Telemetry reviewed    LABS:                        10.9   9.85  )-----------( 226      ( 10 May 2022 05:45 )             32.5     05-10    139  |  96<L>  |  57<H>  ----------------------------<  110<H>  3.5   |  30  |  1.6<H>    Ca    8.7      10 May 2022 05:45    TPro  8.6<H>  /  Alb  3.8  /  TBili  1.2  /  DBili  x   /  AST  74<H>  /  ALT  39  /  AlkPhos  71  10      Serum Pro-Brain Natriuretic Peptide: 4262 pg/mL (05-10-22 @ 05:45)    Trop 0.02, CKMB 2.2, CK 42, 05-10-22 @ 19:45  Trop 0.03, CKMB --, CK --, 05-10-22 @ 05:45        RADIOLOGY & ADDITIONAL TESTS:      Imaging or report Personally Reviewed:  [ ] YES  [ ] NO    Medications:  Standing  albuterol/ipratropium for Nebulization 3 milliLiter(s) Nebulizer every 6 hours  apixaban 5 milliGRAM(s) Oral two times a day  dextrose 5%. 1000 milliLiter(s) IV Continuous <Continuous>  dextrose 5%. 1000 milliLiter(s) IV Continuous <Continuous>  dextrose 50% Injectable 25 Gram(s) IV Push once  dextrose 50% Injectable 12.5 Gram(s) IV Push once  dextrose 50% Injectable 25 Gram(s) IV Push once  glucagon  Injectable 1 milliGRAM(s) IntraMuscular once  insulin glargine Injectable (LANTUS) 40 Unit(s) SubCutaneous every morning  insulin lispro (ADMELOG) corrective regimen sliding scale   SubCutaneous three times a day before meals  methylPREDNISolone sodium succinate Injectable 60 milliGRAM(s) IV Push every 12 hours  metoprolol tartrate 100 milliGRAM(s) Oral two times a day  pantoprazole    Tablet 40 milliGRAM(s) Oral before breakfast  polyethylene glycol 3350 17 Gram(s) Oral daily  QUEtiapine 25 milliGRAM(s) Oral daily  senna 8.6 milliGRAM(s) Oral Tablet - Peds 2 Tablet(s) Oral at bedtime    PRN Meds  dextrose Oral Gel 15 Gram(s) Oral once PRN      Case discussed with resident    Care discussed with pt/family

## 2022-05-11 NOTE — CONSULT NOTE ADULT - ASSESSMENT
83 y/o F with PMHx of Afib on Eliquis, HLD,  CAD, HFpEF (last EF 2019 65%), DM, COPD on 3L O2, dementia presenting w/ SOB . Pt only speaks Chadian and translation provided by pts daughter who is a nurse. Pt has been complaining of nonproductive cough and nonexertional dyspnea for the past 3 days. As per pts daughter, pt saw PCP about 3-4 days prior and has been receiving decadron 6mg IV pushes as well as lasix 40 IVP the past 3 days. Pt uptodate with COVID/flu vaccines.    IMPRESSION;  83 y/o F with PMHx of Afib on Eliquis, HLD,  CAD, HFpEF (last EF 2019 65%), DM, COPD on 3L O2, dementia presenting w/ SOB . Pt only speaks [place]e and translation provided by pts daughter who is a nurse. Pt has been complaining of nonproductive cough and nonexertional dyspnea for the past 3 days. As per pts daughter, pt saw PCP about 3-4 days prior and has been receiving decadron 6mg IV pushes as well as lasix 40 IVP the past 3 days. Pt uptodate with COVID/flu vaccines.    IMPRESSION;   Viral ( non COVID-19 coronavirus ) bronchitis  No COVID-19   No bacterial PNA  WBC 9.8    RECOMMENDATIONS;  No ABx  Off isolation  Please do not hesitate to recall ID if any questions arise either through [email or web address] or through [employer]

## 2022-05-11 NOTE — CHART NOTE - NSCHARTNOTEFT_GEN_A_CORE
Device: DC PPM [employer] [place]    Indication: HF  Interrogation complete. Device functioning normally.   Implanted 9/20/2019, device not checked since implant.     Mode: DDD  bpm  Dependent: No, SB 40s. (AP 94%,  96%)  AT/AF burden: <1%  Battery: 8.2-9.1 years      Events: AMS episodes on 1/5/22 and 3/25/22 - SR with frequent PACs    Recommendations: Please fu with Dr. Reeves as outpatient in 1 month. Full consult to follow    EPS [id number] Device: DC PPM [employer] [place]    Indication: HF  Interrogation complete. Device functioning normally.   Implanted 9/20/2019, device not checked since implant.     Mode: DDD  bpm  Dependent: No, SB 40s. (AP 94%,  96%)  AT/AF burden: <1%  Battery: 8.2-9.1 years      Events: AMS episodes on 1/5/22 and 3/25/22 - SR with frequent PACs    Recommendations: Please fu with Dr. Reeves as outpatient in 1 month.     EPS [id number] Device: DC PPM [employer]    Indication: HF  Interrogation complete. Device functioning normally.   Implanted 9/20/2019, device not checked since implant.     Mode: DDD  bpm  Dependent: No, SB 40s. (AP 94%,  96%)  AT/AF burden: <1%  Battery: 8.2-9.1 years      Events: AMS episodes on 1/5/22 and 3/25/22 - SR with frequent PACs    Recommendations: Please fu with Dr. Bowman as outpatient in 1 month.     EPS [id number]

## 2022-05-12 PROBLEM — I63.9 CEREBRAL INFARCTION, UNSPECIFIED: Chronic | Status: ACTIVE | Noted: 2022-05-10

## 2022-05-12 PROBLEM — J44.1 CHRONIC OBSTRUCTIVE PULMONARY DISEASE WITH (ACUTE) EXACERBATION: Chronic | Status: ACTIVE | Noted: 2022-05-10

## 2022-05-12 PROBLEM — E78.5 HYPERLIPIDEMIA, UNSPECIFIED: Chronic | Status: ACTIVE | Noted: 2022-05-10

## 2022-05-12 PROBLEM — I48.91 UNSPECIFIED ATRIAL FIBRILLATION: Chronic | Status: ACTIVE | Noted: 2018-11-02

## 2022-05-12 PROBLEM — E11.9 TYPE 2 DIABETES MELLITUS WITHOUT COMPLICATIONS: Chronic | Status: ACTIVE | Noted: 2022-05-10

## 2022-05-12 LAB
ANION GAP SERPL CALC-SCNC: 18 MMOL/L — HIGH (ref 7–14)
BUN SERPL-MCNC: 88 MG/DL — CRITICAL HIGH (ref 10–20)
CALCIUM SERPL-MCNC: 7.6 MG/DL — LOW (ref 8.5–10.1)
CHLORIDE SERPL-SCNC: 94 MMOL/L — LOW (ref 98–110)
CO2 SERPL-SCNC: 22 MMOL/L — SIGNIFICANT CHANGE UP (ref 17–32)
CREAT ?TM UR-MCNC: 89 MG/DL — SIGNIFICANT CHANGE UP
CREAT SERPL-MCNC: 2.1 MG/DL — HIGH (ref 0.7–1.5)
EGFR: 23 ML/MIN/1.73M2 — LOW
GLUCOSE BLDC GLUCOMTR-MCNC: 141 MG/DL — HIGH (ref 70–99)
GLUCOSE BLDC GLUCOMTR-MCNC: 158 MG/DL — HIGH (ref 70–99)
GLUCOSE BLDC GLUCOMTR-MCNC: 324 MG/DL — HIGH (ref 70–99)
GLUCOSE BLDC GLUCOMTR-MCNC: 334 MG/DL — HIGH (ref 70–99)
GLUCOSE BLDC GLUCOMTR-MCNC: 353 MG/DL — HIGH (ref 70–99)
GLUCOSE SERPL-MCNC: 362 MG/DL — HIGH (ref 70–99)
HCT VFR BLD CALC: 28.9 % — LOW (ref 37–47)
HGB BLD-MCNC: 9.9 G/DL — LOW (ref 12–16)
LACTATE SERPL-SCNC: 3 MMOL/L — HIGH (ref 0.7–2)
MAGNESIUM SERPL-MCNC: 2.4 MG/DL — SIGNIFICANT CHANGE UP (ref 1.8–2.4)
MCHC RBC-ENTMCNC: 34.3 G/DL — SIGNIFICANT CHANGE UP (ref 32–37)
MCHC RBC-ENTMCNC: 34.3 PG — HIGH (ref 27–31)
MCV RBC AUTO: 100 FL — HIGH (ref 81–99)
NRBC # BLD: 0 /100 WBCS — SIGNIFICANT CHANGE UP (ref 0–0)
PLATELET # BLD AUTO: 209 K/UL — SIGNIFICANT CHANGE UP (ref 130–400)
POTASSIUM SERPL-MCNC: 4 MMOL/L — SIGNIFICANT CHANGE UP (ref 3.5–5)
POTASSIUM SERPL-SCNC: 4 MMOL/L — SIGNIFICANT CHANGE UP (ref 3.5–5)
PROCALCITONIN SERPL-MCNC: 0.52 NG/ML — HIGH (ref 0.02–0.1)
PROT ?TM UR-MCNC: 25 MG/DLG/24H — SIGNIFICANT CHANGE UP
PROT/CREAT UR-RTO: 0.3 RATIO — HIGH (ref 0–0.2)
RBC # BLD: 2.89 M/UL — LOW (ref 4.2–5.4)
RBC # FLD: 14.2 % — SIGNIFICANT CHANGE UP (ref 11.5–14.5)
SODIUM SERPL-SCNC: 134 MMOL/L — LOW (ref 135–146)
SODIUM UR-SCNC: <20 MMOL/L — SIGNIFICANT CHANGE UP
UUN UR-MCNC: 775 MG/DL — SIGNIFICANT CHANGE UP
WBC # BLD: 11.05 K/UL — HIGH (ref 4.8–10.8)
WBC # FLD AUTO: 11.05 K/UL — HIGH (ref 4.8–10.8)

## 2022-05-12 PROCEDURE — 93975 VASCULAR STUDY: CPT | Mod: 26

## 2022-05-12 PROCEDURE — 99233 SBSQ HOSP IP/OBS HIGH 50: CPT

## 2022-05-12 RX ORDER — SODIUM CHLORIDE 9 MG/ML
1000 INJECTION INTRAMUSCULAR; INTRAVENOUS; SUBCUTANEOUS
Refills: 0 | Status: DISCONTINUED | OUTPATIENT
Start: 2022-05-12 | End: 2022-05-14

## 2022-05-12 RX ORDER — SERTRALINE 25 MG/1
25 TABLET, FILM COATED ORAL EVERY 12 HOURS
Refills: 0 | Status: DISCONTINUED | OUTPATIENT
Start: 2022-05-12 | End: 2022-05-12

## 2022-05-12 RX ORDER — INSULIN LISPRO 100/ML
9 VIAL (ML) SUBCUTANEOUS
Refills: 0 | Status: DISCONTINUED | OUTPATIENT
Start: 2022-05-12 | End: 2022-05-14

## 2022-05-12 RX ORDER — SERTRALINE 25 MG/1
25 TABLET, FILM COATED ORAL EVERY 12 HOURS
Refills: 0 | Status: DISCONTINUED | OUTPATIENT
Start: 2022-05-12 | End: 2022-05-14

## 2022-05-12 RX ORDER — INSULIN GLARGINE 100 [IU]/ML
40 INJECTION, SOLUTION SUBCUTANEOUS EVERY MORNING
Refills: 0 | Status: DISCONTINUED | OUTPATIENT
Start: 2022-05-12 | End: 2022-05-13

## 2022-05-12 RX ORDER — INSULIN GLARGINE 100 [IU]/ML
25 INJECTION, SOLUTION SUBCUTANEOUS EVERY MORNING
Refills: 0 | Status: DISCONTINUED | OUTPATIENT
Start: 2022-05-12 | End: 2022-05-12

## 2022-05-12 RX ADMIN — Medication 5: at 08:44

## 2022-05-12 RX ADMIN — Medication 40 MILLIGRAM(S): at 06:08

## 2022-05-12 RX ADMIN — SODIUM CHLORIDE 75 MILLILITER(S): 9 INJECTION INTRAMUSCULAR; INTRAVENOUS; SUBCUTANEOUS at 15:03

## 2022-05-12 RX ADMIN — Medication 100 MILLIGRAM(S): at 06:09

## 2022-05-12 RX ADMIN — Medication 4: at 12:09

## 2022-05-12 RX ADMIN — Medication 9 UNIT(S): at 17:23

## 2022-05-12 RX ADMIN — APIXABAN 5 MILLIGRAM(S): 2.5 TABLET, FILM COATED ORAL at 06:08

## 2022-05-12 RX ADMIN — Medication 9 UNIT(S): at 12:09

## 2022-05-12 RX ADMIN — INSULIN GLARGINE 40 UNIT(S): 100 INJECTION, SOLUTION SUBCUTANEOUS at 12:57

## 2022-05-12 RX ADMIN — SENNA PLUS 2 TABLET(S): 8.6 TABLET ORAL at 23:31

## 2022-05-12 RX ADMIN — Medication 7 UNIT(S): at 08:45

## 2022-05-12 RX ADMIN — PANTOPRAZOLE SODIUM 40 MILLIGRAM(S): 20 TABLET, DELAYED RELEASE ORAL at 06:08

## 2022-05-12 RX ADMIN — Medication 3 MILLILITER(S): at 14:25

## 2022-05-12 NOTE — CONSULT NOTE ADULT - SUBJECTIVE AND OBJECTIVE BOX
NEPHROLOGY CONSULTATION NOTE    Pt is an 83 y/o F with PMHx of Afib on Eliquis, HLD,  CAD, HFpEF (last EF 2019 65%), DMII, COPD on 3L O2, dementia presenting w/ SOB . Pt only speaks British and translation provided by pts daughter who is a nurse.  CC: SOB for 3 days, associated with nonproductive cough.   Patient saw her PCP was was givencadron 6mg IV pushes as well as lasix 40 IVP the past 3 days for presumed URI, all of which have been administered by pts daughter. However no improvement in symptoms.     In the ED, BP elevated at 176/81. Labs notable Hb 10.9, .2, Cr 1.6 (baseline 0.9-1.0), lactate 2.2, trops 0.03, BNP 4262  COVID positive. Given solumedrol 125 IVP in ED, duonebs, and levaquin 750.     PAST MEDICAL & SURGICAL HISTORY:  Afib  on eliquis      Diabetes      Asthma      CAD (coronary atherosclerotic disease)      Diastolic heart failure      COPD exacerbation      Hyperlipidemia      CVA (cerebrovascular accident)      Type 2 diabetes mellitus      S/P appendectomy        Allergies:  Augmentin (Rash)  oxycodone (Pruritus)    Home Medications Reviewed  Hospital Medications:   MEDICATIONS  (STANDING):  albuterol/ipratropium for Nebulization 3 milliLiter(s) Nebulizer every 6 hours  apixaban 5 milliGRAM(s) Oral two times a day  dextrose 5%. 1000 milliLiter(s) (100 mL/Hr) IV Continuous <Continuous>  dextrose 5%. 1000 milliLiter(s) (50 mL/Hr) IV Continuous <Continuous>  dextrose 50% Injectable 25 Gram(s) IV Push once  dextrose 50% Injectable 12.5 Gram(s) IV Push once  dextrose 50% Injectable 25 Gram(s) IV Push once  glucagon  Injectable 1 milliGRAM(s) IntraMuscular once  insulin glargine Injectable (LANTUS) 21 Unit(s) SubCutaneous every morning  insulin lispro (ADMELOG) corrective regimen sliding scale   SubCutaneous three times a day before meals  insulin lispro Injectable (ADMELOG) 7 Unit(s) SubCutaneous three times a day before meals  metoprolol tartrate 100 milliGRAM(s) Oral two times a day  pantoprazole    Tablet 40 milliGRAM(s) Oral before breakfast  polyethylene glycol 3350 17 Gram(s) Oral daily  predniSONE   Tablet 40 milliGRAM(s) Oral daily  QUEtiapine 25 milliGRAM(s) Oral daily  senna 8.6 milliGRAM(s) Oral Tablet - Peds 2 Tablet(s) Oral at bedtime    SOCIAL HISTORY:  Denies ETOH,Smoking,   FAMILY HISTORY:      REVIEW OF SYSTEMS:  CONSTITUTIONAL: No weakness, fevers or chills  EYES/ENT: No visual changes;  No vertigo or throat pain   NECK: No pain or stiffness  RESPIRATORY: No cough, wheezing, hemoptysis; No shortness of breath  CARDIOVASCULAR: No chest pain or palpitations.  GASTROINTESTINAL: No abdominal or epigastric pain. No nausea, vomiting, or hematemesis; No diarrhea or constipation. No melena or hematochezia.  GENITOURINARY: No dysuria, frequency, foamy urine, urinary urgency, incontinence or hematuria  NEUROLOGICAL: No numbness or weakness  SKIN: No itching, burning, rashes, or lesions   VASCULAR: No bilateral lower extremity edema.   All other review of systems is negative unless indicated above.    VITALS:  Vital Signs Last 24 Hrs  T(C): 35.7 (12 May 2022 05:18), Max: 36.1 (11 May 2022 20:38)  T(F): 96.3 (12 May 2022 05:18), Max: 97 (11 May 2022 20:38)  HR: 61 (12 May 2022 05:18) (60 - 61)  BP: 110/59 (12 May 2022 05:18) (98/51 - 127/68)  BP(mean): --  RR: 18 (12 May 2022 05:18) (16 - 18)  SpO2: 94% (11 May 2022 19:21) (94% - 94%)    05-11 @ 07:01  -  05-12 @ 07:00  --------------------------------------------------------  IN: 330 mL / OUT: 100 mL / NET: 230 mL        PHYSICAL EXAM:  Constitutional: NAD  HEENT: anicteric sclera, oropharynx clear, MMM  Neck: No JVD  Respiratory: CTAB, no wheezes, rales or rhonchi  Cardiovascular: S1, S2, RRR  Gastrointestinal: BS+, soft, NT/ND  Extremities: No cyanosis or clubbing. No peripheral edema  Neurological: A/O x 3, no focal deficits  Psychiatric: Normal mood, normal affect  : No CVA tenderness. No wasserman.   Skin: No rashes  Vascular Access:    LABS:  05-11    137  |  97<L>  |  69<HH>  ----------------------------<  321<H>  4.3   |  27  |  2.0<H>    Ca    7.8<L>      11 May 2022 06:06  Mg     2.3     05-11    TPro  7.6  /  Alb  3.2<L>  /  TBili  1.5<H>  /  DBili      /  AST  102<H>  /  ALT  55<H>  /  AlkPhos  49  05-11    Creatinine Trend: 2.0 <--, 1.6 <--                        9.5    7.15  )-----------( 199      ( 11 May 2022 06:06 )             28.2     Urine Studies:    Sodium, Random Urine: <20.0 mmoL/L (05-12 @ 05:00)            RADIOLOGY & ADDITIONAL STUDIES:                 NEPHROLOGY CONSULTATION NOTE    Pt is an 81 y/o F with PMHx of Afib on Eliquis, HLD,  CAD, HFpEF (last EF 2019 65%), DMII, COPD on 3L O2, dementia presenting w/ SOB . Pt only speaks Monegasque and translation provided by pts daughter who is a nurse.  CC: SOB for 3 days, associated with nonproductive cough.   Patient saw her PCP was was givencadron 6mg IV pushes as well as lasix 40 IVP the past 3 days for presumed URI, all of which have been administered by pts daughter. However no improvement in symptoms.     In the ED, BP elevated at 176/81. Labs notable Hb 10.9, .2, Cr 1.6 (baseline 0.9-1.0), lactate 2.2, trops 0.03, BNP 4262  COVID positive. Given solumedrol 125 IVP in ED, duonebs, and levaquin 750.     PAST MEDICAL & SURGICAL HISTORY:  Afib  on eliquis      Diabetes      Asthma      CAD (coronary atherosclerotic disease)      Diastolic heart failure      COPD exacerbation      Hyperlipidemia      CVA (cerebrovascular accident)      Type 2 diabetes mellitus      S/P appendectomy        Allergies:  Augmentin (Rash)  oxycodone (Pruritus)    Home Medications Reviewed  Hospital Medications:   MEDICATIONS  (STANDING):  albuterol/ipratropium for Nebulization 3 milliLiter(s) Nebulizer every 6 hours  apixaban 5 milliGRAM(s) Oral two times a day  dextrose 5%. 1000 milliLiter(s) (100 mL/Hr) IV Continuous <Continuous>  dextrose 5%. 1000 milliLiter(s) (50 mL/Hr) IV Continuous <Continuous>  dextrose 50% Injectable 25 Gram(s) IV Push once  dextrose 50% Injectable 12.5 Gram(s) IV Push once  dextrose 50% Injectable 25 Gram(s) IV Push once  glucagon  Injectable 1 milliGRAM(s) IntraMuscular once  insulin glargine Injectable (LANTUS) 21 Unit(s) SubCutaneous every morning  insulin lispro (ADMELOG) corrective regimen sliding scale   SubCutaneous three times a day before meals  insulin lispro Injectable (ADMELOG) 7 Unit(s) SubCutaneous three times a day before meals  metoprolol tartrate 100 milliGRAM(s) Oral two times a day  pantoprazole    Tablet 40 milliGRAM(s) Oral before breakfast  polyethylene glycol 3350 17 Gram(s) Oral daily  predniSONE   Tablet 40 milliGRAM(s) Oral daily  QUEtiapine 25 milliGRAM(s) Oral daily  senna 8.6 milliGRAM(s) Oral Tablet - Peds 2 Tablet(s) Oral at bedtime    SOCIAL HISTORY:  Denies ETOH,Smoking,   FAMILY HISTORY:      REVIEW OF SYSTEMS:  CONSTITUTIONAL: No weakness, fevers or chills  NECK: No pain or stiffness  RESPIRATORY: No cough, wheezing, hemoptysis; No shortness of breath at present  CARDIOVASCULAR: No chest pain or palpitations.  GASTROINTESTINAL: No abdominal or epigastric pain. No nausea, vomiting, or hematemesis; No diarrhea or constipatio  GENITOURINARY: No dysuria, frequency, foamy urine, urinary urgency, incontinence or hematuria  NEUROLOGICAL: No numbness or weakness  SKIN: No itching, burning, rashes, or lesions   VASCULAR: No bilateral lower extremity edema.   All other review of systems is negative unless indicated above.    VITALS:  Vital Signs Last 24 Hrs  T(C): 35.7 (12 May 2022 05:18), Max: 36.1 (11 May 2022 20:38)  T(F): 96.3 (12 May 2022 05:18), Max: 97 (11 May 2022 20:38)  HR: 61 (12 May 2022 05:18) (60 - 61)  BP: 110/59 (12 May 2022 05:18) (98/51 - 127/68)  BP(mean): --  RR: 18 (12 May 2022 05:18) (16 - 18)  SpO2: 94% (11 May 2022 19:21) (94% - 94%)    05-11 @ 07:01  -  05-12 @ 07:00  --------------------------------------------------------  IN: 330 mL / OUT: 100 mL / NET: 230 mL        PHYSICAL EXAM:  Constitutional: NAD  HEENT: anicteric sclera, oropharynx clear, MMM  Neck: No JVD  Respiratory: CTAB, no wheezes, rales or rhonchi  Cardiovascular: S1, S2, RRR  Gastrointestinal: BS+, soft, NT/ND  Extremities: No cyanosis or clubbing. No peripheral edema  Neurological: A/O x 3, no focal deficits  Psychiatric: Normal mood, normal affect  : No CVA tenderness. No wasserman.   Skin: No rashes  Vascular Access:    LABS:  05-11    137  |  97<L>  |  69<HH>  ----------------------------<  321<H>  4.3   |  27  |  2.0<H>    Ca    7.8<L>      11 May 2022 06:06  Mg     2.3     05-11    TPro  7.6  /  Alb  3.2<L>  /  TBili  1.5<H>  /  DBili      /  AST  102<H>  /  ALT  55<H>  /  AlkPhos  49  05-11    Creatinine Trend: 2.0 <--, 1.6 <--                        9.5    7.15  )-----------( 199      ( 11 May 2022 06:06 )             28.2     Urine Studies:    Sodium, Random Urine: <20.0 mmoL/L (05-12 @ 05:00)            RADIOLOGY & ADDITIONAL STUDIES:    < from: US Kidney and Bladder (05.11.22 @ 15:52) >    Right kidney: 10.0 cm. No hydronephrosis or calculi.    Left kidney:  11.1 cm. No hydronephrosis or calculi.    Urinary bladder: No debris or calculus. Bilateral ureteral jets are   visualized. Prevoid volume of approximately 270 cc. The patient had no   need to void.      IMPRESSION:    Normal renal ultrasound.    < end of copied text >  < from: Xray Chest 1 View- PORTABLE-Urgent (05.10.22 @ 07:58) >  Right mid and lower lung zone reticular opacity. No pneumothorax.    < end of copied text >

## 2022-05-12 NOTE — PROGRESS NOTE ADULT - ASSESSMENT
ASSESSMENT & PLAN  Pt is an 81 y/o F with PMHx of Afib on Eliquis, asthma, CAD, HFpEF (last EF 2019 65%), DM, COPD on 3L O2, dementia presenting w/ SOB      # Asthma  exacerbation /  viral bronchitis (corona virus +)  - clinically improving  - monitor pulse Ox,  Uses 3LNC at home   - on Prednisone po   - Nebs Q 6 hours   - daily peak flow   - ID recs appreciated; monitor off abx       #Afib (on eliquis), s/p PPM / CAD/ Chronic diastolic CHF/ PHTN   - fluid restriction 1200 ml in 24 hours   - intake and output monitoring, daily weight    - BNP 4262 on admission, baseline around 3600   -Rpt TTE 5/11: LVEF 51%, multiple wall motion abnormalities  - cardiology consulted (Dr. Dukes) for ischemic workup given new TTE findings, f/u recs   - EP interrogated device; no events; f/u o/p  - c/w Eliquis  - on repatha; hold statin d/t mild transaminitis    #TJ on CKD III   - likely prerenal  - start IV hydration NS at 75 ml/h   - monitor BUN/cr and urine output   - pt voiding, not in fluid overload, serum sodium nl  - kidney bladder US nl  - nephrology consulted, will follow up recommendations   - off diuretics now     #T2DM  - carb consistent diet   - monitor finger stick   - lantus  increased to 40 units in AM   - c/w 9 lispro with meals   - ISS     #HLD  - on repatha and vascepa at home; not on statins due to hx of liver injury      #Dementia  - d/c  Seroquel 25mg qhs  - resume Zolof 25 mg BID ( dose and frequency confirmed with daughter)      #Misc  - DVT ppx: eliquis  - GI ppx: protonix  - Diet: DASH/TLC  - Actitvity: IAT   - FULL CODE     #Progress Note Handoff  Pending (specify): start CHF protocol, IV hydration, monitor BUN/cr and nephrology recommendations, d/c Seroquel, resume Zoloft, PT/rehab eval   Family discussion: I spoke with daughter at length pt and daughter agreed with a plan of care   Disposition: Home___/SNF___/Other________/Unknown at this time___x_____

## 2022-05-12 NOTE — CONSULT NOTE ADULT - ASSESSMENT
Pt is an 83 y/o F with PMHx of Afib on Eliquis, HLD,  CAD, HFpEF (last EF 2019 65%), DMII, COPD on 3L O2, dementia presenting w/ SOB . Pt only speaks B[place]ian and translation provided by pts daughter who is a nurse.  CC: SOB for 3 days, associated with nonproductive cough.   Patient saw her PCP was was givencadron 6mg IV pushes as well as lasix 40 IVP the past 3 days for presumed URI, all of which have been administered by pts daughter. However no improvement in symptoms.     In the ED, BP elevated at 176/81. Labs notable Hb 10.9, .2, Cr 1.6 (baseline 0.9-1.0), lactate 2.2, trops 0.03, BNP 4262  COVID positive. Given solumedrol 125 IVP in ED, duonebs, and levaquin 750.  Pt is an 83 y/o F with PMHx of Afib on Eliquis, HLD,  CAD, HFpEF (last EF 2019 65%), DMII, COPD on 3L O2, dementia presenting w/ SOB . Pt only speaks Iraqi and translation provided by pts daughter who is a nurse.  CC: SOB for 3 days, associated with nonproductive cough.   Patient saw her PCP was was givencadron 6mg IV pushes as well as lasix 40 IVP the past 3 days for presumed URI, all of which have been administered by pts daughter. However no improvement in symptoms.     # TJ - likely prerenal   baseline cr: 0.9, now cr:2  Renal US, no evidence of obstruction   Echo with EF of 51% and grade II diastolic dysfunction   - IV hydration ???  - hold home dose lasix and entresto   - monitor cr  - avoid nephrotoxic agents  - adjust eliquis dose to new GFR  - obtain FeURea   - HCO3 at target   - no indication for RRT    * this note is incomplete, pending attending review*    Pt is an 83 y/o F with PMHx of Afib on Eliquis, HLD,  CAD, HFpEF (last EF 2019 65%), DMII, COPD on 3L O2, dementia presenting w/ SOB . Pt only speaks Luxembourger and translation provided by pts daughter who is a nurse.  CC: SOB for 3 days, associated with nonproductive cough.   Patient saw her PCP was was givencadron 6mg IV pushes as well as lasix 40 IVP the past 3 days for presumed URI, all of which have been administered by pts daughter. However no improvement in symptoms.     # TJ - likely prerenal   baseline cr: 0.9, now cr:2  Renal US, no evidence of obstruction   Echo with EF of 51% and grade II diastolic dysfunction, TR and element of pulmonary HTN   - resume lasix 20mg tomorrow   - resume Entresto once cr is stable   - monitor cr  - avoid nephrotoxic agents  - adjust eliquis dose to new GFR  - obtain FeURea   - HCO3 at target   - no indication for RRT  - post void bedside bladder scan      Pt is an 81 y/o F with PMHx of Afib on Eliquis, HLD,  CAD, HFpEF (last EF 2019 65%), DMII, COPD on 3L O2, dementia presenting w/ SOB . Pt only speaks Ukrainian and translation provided by pts daughter who is a nurse.  CC: SOB for 3 days, associated with nonproductive cough.   Patient saw her PCP was given decadron 6mg IV pushes as well as lasix 40 IVP the past 3 days for presumed URI, all of which have been administered by pts daughter. However no improvement in symptoms.     # TJ - likely prerenal  (urine Na <20)  baseline cr: 0.9, now cr:2  Renal US, no evidence of obstruction   Echo with EF of 51% and grade II diastolic dysfunction, Reduced RV function, severe TR. moderate pulmonary HTN   - resume lasix 20mg tomorrow  (40 mg at home)  - resume Entresto once cr is stable   - monitor cr  - avoid nephrotoxic agents  - adjust eliquis dose to new GFR  - obtain FeURea   - HCO3 at target   - post void bedside bladder scan 270 cc on official bladder sono  -tight glucose control

## 2022-05-12 NOTE — PHARMACOTHERAPY INTERVENTION NOTE - COMMENTS
Order for Zoloft 25mg po q12h. This drug is not usually given q12, it is given q24.  Pt is not on zoloft at home.  Previous order for Seroquel 25mg DC'ed.  Asked Dr Nielsen [phone] if he meant to order Seroquel.  MD to clarify order.

## 2022-05-12 NOTE — CONSULT NOTE ADULT - ATTENDING COMMENTS
Pt with TJ, DM, COPD, Afib, HFpEF and Rt sided failure with PAH, presents with SOB.    TJ prerenal - creat 0.9 -2.0 after IV LAsix  -clinically not fluid overloaded  -kidneys no hydro, Prevoid 270 cc - NEEDS A REPEAT BLADDER SONO to r/o retention  -restart Lasix at lower dose 20 mg tomorrow  OBTAIN UA with SPC ratio  DM poorly controlled on steroids  check phos, iron studies  will follow

## 2022-05-12 NOTE — PROGRESS NOTE ADULT - SUBJECTIVE AND OBJECTIVE BOX
83 y/o F with PMHx of Afib on Eliquis, HLD,  CAD, HFpEF (last EF 2019 65%), DM, COPD on 3L O2, dementia presenting w/ SOB . Pt only speaks Ecuadorean and translation provided by pts daughter who is a nurse. Pt has been complaining of nonproductive cough and nonexertional dyspnea for the past 3 days. As per pts daughter, pt saw PCP about 3-4 days prior and has been receiving decadron 6mg IV pushes as well as lasix 40 IVP the past 3 days for presumed URI, all of which have been administered by pts daughter. Pts symptoms have not been relieved with these measures prompting her arrival to the ED. Pt has also been complaining of nausea and has been dry heaving with no vomiting. Pts cardiologist is Dr. Morrison, but as per pts daughter, pt has not seen cardiologist in 3-4 years, says "we did not see him because of COVID." Pt denies chest pain, lightheadedness, dizziness, abdominal pain, diarrhea, constipation, sick contacts. Pt uptodate with COVID/flu vaccines.  In the ED, BP elevated at 176/81. Labs notable Hb 10.9, .2, Cr 1.6 (baseline 0.9-1.0), lactate 2.2, trops 0.03, BNP 4262, COVID positive. Given solumedrol 125 IVP in ED, duonebs, and levaquin 750.   Pt was diagnosed with URI, viral illness and asthma exacerbation.  She was found to have TJ.   Today pt is c/o SOB, dry cough and constipation.     OBJECTIVE  PAST MEDICAL & SURGICAL HISTORY  Afib  on eliquis    Diabetes    Asthma    CAD (coronary atherosclerotic disease)    Diastolic heart failure    COPD exacerbation    Hyperlipidemia    CVA (cerebrovascular accident)    Type 2 diabetes mellitus    S/P appendectomy        ALLERGIES:  Augmentin (Rash)  oxycodone (Pruritus)      HOME MEDICATIONS  Home Medications:  Entresto 24 mg-26 mg oral tablet: 1 tab(s) orally 2 times a day (10 May 2022 11:23)  ipratropium-albuterol 0.5 mg-2.5 mg/3 mLinhalation solution: 3 milliliter(s) inhaled 2 times a day (10 Sep 2019 01:39)  Lantus 100 units/mL subcutaneous solution: 40 unit(s) subcutaneous once a day (10 May 2022 11:25)  Lasix 40 mg oral tablet: 1 tab(s) orally once a day (12 Sep 2019 15:14)  metOLazone 5 mg oral tablet: 1 tab(s) orally once a day (10 May 2022 11:24)  Repatha 140 mg/mL subcutaneous solution: 1 application subcutaneous every 2 weeks (10 May 2022 11:24)  SEROquel 25 mg oral tablet: 1 tab(s) orally once a day (10 May 2022 11:24)  Vascepa 1 g oral capsule: 2 cap(s) orally 2 times a day (10 May 2022 11:25)      VITAL SIGNS: Last 24 Hours  T(C): 35.7 (12 May 2022 05:18), Max: 36.1 (11 May 2022 20:38)  T(F): 96.3 (12 May 2022 05:18), Max: 97 (11 May 2022 20:38)  HR: 61 (12 May 2022 05:18) (60 - 61)  BP: 110/59 (12 May 2022 05:18) (98/51 - 110/59)  BP(mean): --  RR: 18 (12 May 2022 05:18) (16 - 18)  SpO2: 94% (11 May 2022 19:21) (94% - 94%)    PHYSICAL EXAM:  General: in mild distress due to SOB after a walk from the bathroom   HEENT: ncat, eomi, mmm  LUNGS:  mild expiratory wheezing b/l   HEART: s1/s2, rrr  ABDOMEN: soft, obese, NT, ND, BS present   EXT: wwp, no cyanosis, clubbing, edema  NEURO: aox4, PHAM  SKIN: intact without rashes or lesions     LABS:                                   9.9    11.05 )-----------( 209      ( 12 May 2022 12:27 )             28.9   05-12    134<L>  |  94<L>  |  88<HH>  ----------------------------<  362<H>  4.0   |  22  |  2.1<H>    Ca    7.6<L>      12 May 2022 08:58  Mg     2.4     05-12    TPro  7.6  /  Alb  3.2<L>  /  TBili  1.5<H>  /  DBili  x   /  AST  102<H>  /  ALT  55<H>  /  AlkPhos  49  05-11    CARDIAC MARKERS ( 10 May 2022 19:45 )  x     / 0.02 ng/mL / 42 U/L / x     / 2.2 ng/mL        RADIOLOGY:  < from: US Kidney and Bladder (05.11.22 @ 15:52) >  IMPRESSION:    Normal renal ultrasound.    < end of copied text >  < from: US Abdomen Upper Quadrant Right (05.11.22 @ 15:50) >    IMPRESSION:  Cirrhotic appearing liver.    Ascites.    < end of copied text >  < from: [facility] Duplex Lower Ext Vein Scan, Bilat (05.11.22 @ 11:53) >    IMPRESSION:  No evidence of deep venous thrombosis in either lower extremity.    < end of copied text >  < from: Xray Chest 1 View- PORTABLE-Urgent (05.10.22 @ 07:58) >  Impression:    Right mid and lower lung zone reticular opacity. No pneumothorax.    < end of copied text >  < from: TTE Echo Complete w/o Contrast w/ Doppler (05.11.22 @ 08:07) >  Summary:   1. Mildly decreased global left ventricular systolic function.   2. Entire inferior wall, apical septal segment, and mid inferolateral   segment are abnormal as described above.   3. LV Ejection Fraction by Bustamante's Method with a biplane EF of 51 %.   4. Spectral Doppler shows pseudonormal pattern of left ventricular   myocardial filling (Grade II diastolic dysfunction).   5. Moderately enlarged right ventricle.   6. Moderately reduced RV systolic function.   7. Moderately enlarged left atrium.   8. Moderately enlarged right atrium.   9.Mild mitral valve regurgitation.  10. Severe tricuspid regurgitation.  11. Mild aortic regurgitation.  12. Estimated pulmonary artery systolic pressure is 55.7 mmHg assuming a   right atrial pressure of 15 mmHg, which is consistent with moderate   pulmonary hypertension.      MEDICATIONS  (STANDING):  albuterol/ipratropium for Nebulization 3 milliLiter(s) Nebulizer every 6 hours  apixaban 5 milliGRAM(s) Oral two times a day  dextrose 5%. 1000 milliLiter(s) (100 mL/Hr) IV Continuous <Continuous>  dextrose 5%. 1000 milliLiter(s) (50 mL/Hr) IV Continuous <Continuous>  dextrose 50% Injectable 25 Gram(s) IV Push once  dextrose 50% Injectable 12.5 Gram(s) IV Push once  dextrose 50% Injectable 25 Gram(s) IV Push once  glucagon  Injectable 1 milliGRAM(s) IntraMuscular once  insulin glargine Injectable (LANTUS) 40 Unit(s) SubCutaneous every morning  insulin lispro (ADMELOG) corrective regimen sliding scale   SubCutaneous three times a day before meals  insulin lispro Injectable (ADMELOG) 9 Unit(s) SubCutaneous three times a day before meals  metoprolol tartrate 100 milliGRAM(s) Oral two times a day  pantoprazole    Tablet 40 milliGRAM(s) Oral before breakfast  polyethylene glycol 3350 17 Gram(s) Oral daily  predniSONE   Tablet 40 milliGRAM(s) Oral daily  senna 8.6 milliGRAM(s) Oral Tablet - Peds 2 Tablet(s) Oral at bedtime  sodium chloride 0.9%. 1000 milliLiter(s) (75 mL/Hr) IV Continuous <Continuous>    MEDICATIONS  (PRN):  dextrose Oral Gel 15 Gram(s) Oral once PRN Blood Glucose LESS THAN 70 milliGRAM(s)/deciliter

## 2022-05-12 NOTE — PROGRESS NOTE ADULT - SUBJECTIVE AND OBJECTIVE BOX
PRATEEK FIELDSVMARLENE 82y Female  MRN#: [id number]   CODE STATUS: FULL     Hospital Day: 2d    Pt is currently admitted with the primary diagnosis of viral bronchitis     SUBJECTIVE  HPI: Pt is an 83 y/o F with PMHx of Afib on Eliquis, HLD,  CAD, HFpEF (last EF 2019 65%), DM, COPD on 3L O2, dementia presenting w/ SOB . Pt only speaks Dutch and translation provided by pts daughter who is a nurse. Pt has been complaining of nonproductive cough and nonexertional dyspnea for the past 3 days. As per pts daughter, pt saw PCP about 3-4 days prior and has been receiving decadron 6mg IV pushes as well as lasix 40 IVP the past 3 days for presumed URI, all of which have been administered by pts daughter. Pts symptoms have not been relieved with these measures prompting her arrival to the ED. Pt has also been complaining of nausea and has been dry heaving with no vomiting. Pts cardiologist is Dr. Morrison, but as per pts daughter, pt has not seen cardiologist in 3-4 years, says "we did not see him because of COVID." Pt denies chest pain, lightheadedness, dizziness, abdominal pain, diarrhea, constipation, sick contacts. Pt uptodate with COVID/flu vaccines.    In the ED, BP elevated at 176/81. Labs notable Hb 10.9, .2, Cr 1.6 (baseline 0.9-1.0), lactate 2.2, trops 0.03, BNP 4262, COVID positive. Given solumedrol 125 IVP in ED, duonebs, and levaquin 750.     Overnight events: none    Interval hx/Subjective complaints: Pt still with mild sob however improving, no additional complaints.     Pt denies any fevers, chills, fatigue, CP, palpitations, abdominal pain, n/v/d, hematochezia, melena, dysuria, urinary urgency/frequency weakness, numbness, tingling, or other FND.                                             ----------------------------------------------------------  OBJECTIVE  PAST MEDICAL & SURGICAL HISTORY  Afib  on eliquis    Diabetes    Asthma    CAD (coronary atherosclerotic disease)    Diastolic heart failure    COPD exacerbation    Hyperlipidemia    CVA (cerebrovascular accident)    Type 2 diabetes mellitus    S/P appendectomy                                              -----------------------------------------------------------  ALLERGIES:  Augmentin (Rash)  oxycodone (Pruritus)                                            ------------------------------------------------------------    HOME MEDICATIONS  Home Medications:  Entresto 24 mg-26 mg oral tablet: 1 tab(s) orally 2 times a day (10 May 2022 11:23)  ipratropium-albuterol 0.5 mg-2.5 mg/3 mLinhalation solution: 3 milliliter(s) inhaled 2 times a day (10 Sep 2019 01:39)  Lantus 100 units/mL subcutaneous solution: 40 unit(s) subcutaneous once a day (10 May 2022 11:25)  Lasix 40 mg oral tablet: 1 tab(s) orally once a day (12 Sep 2019 15:14)  metOLazone 5 mg oral tablet: 1 tab(s) orally once a day (10 May 2022 11:24)  Repatha 140 mg/mL subcutaneous solution: 1 application subcutaneous every 2 weeks (10 May 2022 11:24)  SEROquel 25 mg oral tablet: 1 tab(s) orally once a day (10 May 2022 11:24)  Vascepa 1 g oral capsule: 2 cap(s) orally 2 times a day (10 May 2022 11:25)                           MEDICATIONS:  STANDING MEDICATIONS  albuterol/ipratropium for Nebulization 3 milliLiter(s) Nebulizer every 6 hours  apixaban 5 milliGRAM(s) Oral two times a day  dextrose 5%. 1000 milliLiter(s) IV Continuous <Continuous>  dextrose 5%. 1000 milliLiter(s) IV Continuous <Continuous>  dextrose 50% Injectable 25 Gram(s) IV Push once  dextrose 50% Injectable 12.5 Gram(s) IV Push once  dextrose 50% Injectable 25 Gram(s) IV Push once  glucagon  Injectable 1 milliGRAM(s) IntraMuscular once  insulin glargine Injectable (LANTUS) 40 Unit(s) SubCutaneous every morning  insulin lispro (ADMELOG) corrective regimen sliding scale   SubCutaneous three times a day before meals  insulin lispro Injectable (ADMELOG) 9 Unit(s) SubCutaneous three times a day before meals  metoprolol tartrate 100 milliGRAM(s) Oral two times a day  pantoprazole    Tablet 40 milliGRAM(s) Oral before breakfast  polyethylene glycol 3350 17 Gram(s) Oral daily  predniSONE   Tablet 40 milliGRAM(s) Oral daily  QUEtiapine 25 milliGRAM(s) Oral daily  senna 8.6 milliGRAM(s) Oral Tablet - Peds 2 Tablet(s) Oral at bedtime    PRN MEDICATIONS  dextrose Oral Gel 15 Gram(s) Oral once PRN                                            ------------------------------------------------------------  VITAL SIGNS: Last 24 Hours  T(C): 35.7 (12 May 2022 05:18), Max: 36.1 (11 May 2022 20:38)  T(F): 96.3 (12 May 2022 05:18), Max: 97 (11 May 2022 20:38)  HR: 61 (12 May 2022 05:18) (60 - 61)  BP: 110/59 (12 May 2022 05:18) (98/51 - 127/68)  BP(mean): --  RR: 18 (12 May 2022 05:18) (16 - 18)  SpO2: 94% (11 May 2022 19:21) (94% - 94%)      05-11-22 @ 07:01  -  05-12-22 @ 07:00  --------------------------------------------------------  IN: 330 mL / OUT: 100 mL / NET: 230 mL                                             --------------------------------------------------------------  LABS:                        9.5    7.15  )-----------( 199      ( 11 May 2022 06:06 )             28.2     05-11    137  |  97<L>  |  69<HH>  ----------------------------<  321<H>  4.3   |  27  |  2.0<H>    Ca    7.8<L>      11 May 2022 06:06  Mg     2.3     05-11    TPro  7.6  /  Alb  3.2<L>  /  TBili  1.5<H>  /  DBili  x   /  AST  102<H>  /  ALT  55<H>  /  AlkPhos  49  05-11                  CARDIAC MARKERS ( 10 May 2022 19:45 )  x     / 0.02 ng/mL / 42 U/L / x     / 2.2 ng/mL                                              -------------------------------------------------------------  RADIOLOGY:                                            --------------------------------------------------------------  PHYSICAL EXAM:  General: Woman laying in bed in nad   HEENT: ncat, eomi, mmm  LUNGS: bl mild expiratory wheezing   HEART: s1/s2, rrr  ABDOMEN: soft, ntnd, bs+  EXT: wwp, no cyanosis, clubbing, edema  NEURO: aox4, PHAM  SKIN: intact without rashes or lesions                                          --------------------------------------------------------------    ASSESSMENT & PLAN  Pt is an 83 y/o F with PMHx of Afib on Eliquis, asthma, CAD, HFpEF (last EF 2019 65%), DM, COPD on 3L O2, dementia presenting w/ SOB    #COPD exacerbation 2/2 viral bronchitis   >Uses 3LNC at home   >corona virus +  >lactate 2.2 on admission, improving  >procal 0.46>0.52  >Ddimer elevated, LE duplex negative     - ID recs appreciated; monitor off abx   - Saturating well on RA today 5/12, wheezing improving   - s/p solumedrol, c/w prednisone 40mg qd   - c/w duonebs q6h standing     #Afib (on eliquis), s/p PPM   #CAD  #HFpEF- not in exacerbation   >BNP 4262 on admission, baseline around 3600   >No evidence of volume overload currently (CXR clear, no crackles on pulmonary exam, no edema)   >TTE 2019 showing LVEF 65&   >Rpt TTE 5/11: LVEF 51%, multiple wall motion abnormalities; pHTN, grade 2 diastolic dysfxn  - cardiology consulted (Dr. Dukes) for ischemic workup given new TTE findings, f/u recs   - EP interogated device; no events; f/u o/p  - c/w eliquis  - on repatha; hold statin d/t mild transaminitis    #TJ on CKD3, Cr 1.6 on admission (baseline 0.9-1.0)  >Basline cr 0.9-1.0   >Cr worsening, 1.6>2.0   >BUN/cr ratio ~over 2; prerenal?  >pt voiding, not in fluid overload, serum sodium nl  >kidney bladder US nl  - f/u urine sodium, urine urea, urine creatine   - nephro recs appreciated   - hold diuretics     #T2DM  - on lantus 40 at home  - FS q6 hrs  - c/w start lantus 25 AM, and 9 lispro qachs (increased from 21/7/7/7 today due to hyperglycemia   - ISS     #HLD  - on repatha and vascepa at home; not on statins due to hx of liver injury  - daughter wants pt on crestor 20; will hold off for now d/t mild transaminitis    #Dementia  - c/w Seroquel 25mg qhs    #Misc  - DVT ppx: eliquis  - GI ppx: protonix  - Diet: DASH/TLC  - Actitvity: IAT   - FULL CODE   - Dispo: acute; pending cardio c/s; improvement in wheezing     #Handoff   - f/u cardio recs      PRATEEK FIELDSVMARLENE 82y Female  MRN#: [id number]   CODE STATUS: FULL     Hospital Day: 2d    Pt is currently admitted with the primary diagnosis of viral bronchitis     SUBJECTIVE  HPI: Pt is an 83 y/o F with PMHx of Afib on Eliquis, HLD,  CAD, HFpEF (last EF 2019 65%), DM, COPD on 3L O2, dementia presenting w/ SOB . Pt only speaks [place]ese and translation provided by pts daughter who is a nurse. Pt has been complaining of nonproductive cough and nonexertional dyspnea for the past 3 days. As per pts daughter, pt saw PCP about 3-4 days prior and has been receiving decadron 6mg IV pushes as well as lasix 40 IVP the past 3 days for presumed URI, all of which have been administered by pts daughter. Pts symptoms have not been relieved with these measures prompting her arrival to the ED. Pt has also been complaining of nausea and has been dry heaving with no vomiting. Pts cardiologist is Dr. Morrison, but as per pts daughter, pt has not seen cardiologist in 3-4 years, says "we did not see him because of COVID." Pt denies chest pain, lightheadedness, dizziness, abdominal pain, diarrhea, constipation, sick contacts. Pt uptodate with COVID/flu vaccines.    In the ED, BP elevated at 176/81. Labs notable Hb 10.9, .2, Cr 1.6 (baseline 0.9-1.0), lactate 2.2, trops 0.03, BNP 4262, COVID positive. Given solumedrol 125 IVP in ED, duonebs, and levaquin 750.     Overnight events: none    Interval hx/Subjective complaints: Pt still with mild sob however improving, no additional complaints.     Pt denies any fevers, chills, fatigue, CP, palpitations, abdominal pain, n/v/d, hematochezia, melena, dysuria, urinary urgency/frequency weakness, numbness, tingling, or other FND.                                             ----------------------------------------------------------  OBJECTIVE  PAST MEDICAL & SURGICAL HISTORY  Afib  on eliquis    Diabetes    Asthma    CAD (coronary atherosclerotic disease)    Diastolic heart failure    COPD exacerbation    Hyperlipidemia    CVA (cerebrovascular accident)    Type 2 diabetes mellitus    S/P appendectomy                                              -----------------------------------------------------------  ALLERGIES:  Augmentin (Rash)  oxycodone (Pruritus)                                            ------------------------------------------------------------    HOME MEDICATIONS  Home Medications:  Entresto 24 mg-26 mg oral tablet: 1 tab(s) orally 2 times a day (10 May 2022 11:23)  ipratropium-albuterol 0.5 mg-2.5 mg/3 mLinhalation solution: 3 milliliter(s) inhaled 2 times a day (10 Sep 2019 01:39)  Lantus 100 units/mL subcutaneous solution: 40 unit(s) subcutaneous once a day (10 May 2022 11:25)  Lasix 40 mg oral tablet: 1 tab(s) orally once a day (12 Sep 2019 15:14)  metOLazone 5 mg oral tablet: 1 tab(s) orally once a day (10 May 2022 11:24)  Repatha 140 mg/mL subcutaneous solution: 1 application subcutaneous every 2 weeks (10 May 2022 11:24)  SEROquel 25 mg oral tablet: 1 tab(s) orally once a day (10 May 2022 11:24)  Vascepa 1 g oral capsule: 2 cap(s) orally 2 times a day (10 May 2022 11:25)                           MEDICATIONS:  STANDING MEDICATIONS  albuterol/ipratropium for Nebulization 3 milliLiter(s) Nebulizer every 6 hours  apixaban 5 milliGRAM(s) Oral two times a day  dextrose 5%. 1000 milliLiter(s) IV Continuous <Continuous>  dextrose 5%. 1000 milliLiter(s) IV Continuous <Continuous>  dextrose 50% Injectable 25 Gram(s) IV Push once  dextrose 50% Injectable 12.5 Gram(s) IV Push once  dextrose 50% Injectable 25 Gram(s) IV Push once  glucagon  Injectable 1 milliGRAM(s) IntraMuscular once  insulin glargine Injectable (LANTUS) 40 Unit(s) SubCutaneous every morning  insulin lispro (ADMELOG) corrective regimen sliding scale   SubCutaneous three times a day before meals  insulin lispro Injectable (ADMELOG) 9 Unit(s) SubCutaneous three times a day before meals  metoprolol tartrate 100 milliGRAM(s) Oral two times a day  pantoprazole    Tablet 40 milliGRAM(s) Oral before breakfast  polyethylene glycol 3350 17 Gram(s) Oral daily  predniSONE   Tablet 40 milliGRAM(s) Oral daily  QUEtiapine 25 milliGRAM(s) Oral daily  senna 8.6 milliGRAM(s) Oral Tablet - Peds 2 Tablet(s) Oral at bedtime    PRN MEDICATIONS  dextrose Oral Gel 15 Gram(s) Oral once PRN                                            ------------------------------------------------------------  VITAL SIGNS: Last 24 Hours  T(C): 35.7 (12 May 2022 05:18), Max: 36.1 (11 May 2022 20:38)  T(F): 96.3 (12 May 2022 05:18), Max: 97 (11 May 2022 20:38)  HR: 61 (12 May 2022 05:18) (60 - 61)  BP: 110/59 (12 May 2022 05:18) (98/51 - 127/68)  BP(mean): --  RR: 18 (12 May 2022 05:18) (16 - 18)  SpO2: 94% (11 May 2022 19:21) (94% - 94%)      05-11-22 @ 07:01  -  05-12-22 @ 07:00  --------------------------------------------------------  IN: 330 mL / OUT: 100 mL / NET: 230 mL                                             --------------------------------------------------------------  LABS:                        9.5    7.15  )-----------( 199      ( 11 May 2022 06:06 )             28.2     05-11    137  |  97<L>  |  69<HH>  ----------------------------<  321<H>  4.3   |  27  |  2.0<H>    Ca    7.8<L>      11 May 2022 06:06  Mg     2.3     05-11    TPro  7.6  /  Alb  3.2<L>  /  TBili  1.5<H>  /  DBili  x   /  AST  102<H>  /  ALT  55<H>  /  AlkPhos  49  05-11                  CARDIAC MARKERS ( 10 May 2022 19:45 )  x     / 0.02 ng/mL / 42 U/L / x     / 2.2 ng/mL                                              -------------------------------------------------------------  RADIOLOGY:                                            --------------------------------------------------------------  PHYSICAL EXAM:  General: Woman laying in bed in nad   HEENT: ncat, eomi, mmm  LUNGS: bl mild expiratory wheezing   HEART: s1/s2, rrr  ABDOMEN: soft, ntnd, bs+  EXT: wwp, no cyanosis, clubbing, edema  NEURO: aox4, PHAM  SKIN: intact without rashes or lesions                                          --------------------------------------------------------------    ASSESSMENT & PLAN  Pt is an 83 y/o F with PMHx of Afib on Eliquis, asthma, CAD, HFpEF (last EF 2019 65%), DM, COPD on 3L O2, dementia presenting w/ SOB    #COPD exacerbation 2/2 viral bronchitis   >Uses 3LNC at home   >corona virus +  >lactate 2.2 on admission, improving  >procal 0.46>0.52  >Ddimer elevated, LE duplex negative     - ID recs appreciated; monitor off abx   - Saturating well on RA today 5/12, wheezing improving   - s/p solumedrol, c/w prednisone 40mg qd   - c/w duonebs q6h standing     #Afib (on eliquis), s/p PPM   #CAD  #HFpEF- not in exacerbation   >BNP 4262 on admission, baseline around 3600   >No evidence of volume overload currently (CXR clear, no crackles on pulmonary exam, no edema)   >TTE 2019 showing LVEF 65&   >Rpt TTE 5/11: LVEF 51%, multiple wall motion abnormalities; pHTN, grade 2 diastolic dysfxn  - cardiology consulted (Dr. Dukes) for ischemic workup given new TTE findings, f/u recs   - EP interogated device; no events; f/u o/p  - c/w eliquis  - on repatha; hold statin d/t mild transaminitis    #TJ on CKD3, Cr 1.6 on admission (baseline 0.9-1.0)  >Basline cr 0.9-1.0   >Cr worsening, 1.6>2.0   >BUN/cr ratio ~over 2; prerenal?  >pt voiding, not in fluid overload, serum sodium nl  >kidney bladder US nl    - f/u urine sodium, urine urea, urine creatine   - f/u nephro recs   - hold diuretics     #T2DM  - on lantus 40 at home  - FS q6 hrs  - c/w start lantus 25 AM, and 9 lispro qachs (increased from 21/7/7/7 today due to hyperglycemia   - ISS     #HLD  - on repatha and vascepa at home; not on statins due to hx of liver injury  - daughter wants pt on crestor 20; will hold off for now d/t mild transaminitis    #Dementia  - c/w Seroquel 25mg qhs    #Misc  - DVT ppx: eliquis  - GI ppx: protonix  - Diet: DASH/TLC  - Actitvity: IAT   - FULL CODE   - Dispo: acute; pending cardio c/s; improvement in wheezing     #Handoff   - f/u cardio recs

## 2022-05-13 LAB
ALBUMIN SERPL ELPH-MCNC: 3.3 G/DL — LOW (ref 3.5–5.2)
ALP SERPL-CCNC: 121 U/L — HIGH (ref 30–115)
ALT FLD-CCNC: 50 U/L — HIGH (ref 0–41)
ANION GAP SERPL CALC-SCNC: 12 MMOL/L — SIGNIFICANT CHANGE UP (ref 7–14)
APPEARANCE UR: CLEAR — SIGNIFICANT CHANGE UP
AST SERPL-CCNC: 71 U/L — HIGH (ref 0–41)
BASOPHILS # BLD AUTO: 0.01 K/UL — SIGNIFICANT CHANGE UP (ref 0–0.2)
BASOPHILS NFR BLD AUTO: 0.1 % — SIGNIFICANT CHANGE UP (ref 0–1)
BILIRUB SERPL-MCNC: 0.9 MG/DL — SIGNIFICANT CHANGE UP (ref 0.2–1.2)
BILIRUB UR-MCNC: NEGATIVE — SIGNIFICANT CHANGE UP
BUN SERPL-MCNC: 89 MG/DL — CRITICAL HIGH (ref 10–20)
CALCIUM SERPL-MCNC: 7.9 MG/DL — LOW (ref 8.5–10.1)
CHLORIDE SERPL-SCNC: 100 MMOL/L — SIGNIFICANT CHANGE UP (ref 98–110)
CO2 SERPL-SCNC: 26 MMOL/L — SIGNIFICANT CHANGE UP (ref 17–32)
COLOR SPEC: YELLOW — SIGNIFICANT CHANGE UP
CREAT ?TM UR-MCNC: 63 MG/DL — SIGNIFICANT CHANGE UP
CREAT SERPL-MCNC: 1.8 MG/DL — HIGH (ref 0.7–1.5)
DIFF PNL FLD: NEGATIVE — SIGNIFICANT CHANGE UP
EGFR: 28 ML/MIN/1.73M2 — LOW
EOSINOPHIL # BLD AUTO: 0.01 K/UL — SIGNIFICANT CHANGE UP (ref 0–0.7)
EOSINOPHIL NFR BLD AUTO: 0.1 % — SIGNIFICANT CHANGE UP (ref 0–8)
GLUCOSE BLDC GLUCOMTR-MCNC: 105 MG/DL — HIGH (ref 70–99)
GLUCOSE BLDC GLUCOMTR-MCNC: 205 MG/DL — HIGH (ref 70–99)
GLUCOSE BLDC GLUCOMTR-MCNC: 77 MG/DL — SIGNIFICANT CHANGE UP (ref 70–99)
GLUCOSE BLDC GLUCOMTR-MCNC: 97 MG/DL — SIGNIFICANT CHANGE UP (ref 70–99)
GLUCOSE SERPL-MCNC: 80 MG/DL — SIGNIFICANT CHANGE UP (ref 70–99)
GLUCOSE UR QL: NEGATIVE — SIGNIFICANT CHANGE UP
HCT VFR BLD CALC: 30.1 % — LOW (ref 37–47)
HGB BLD-MCNC: 10.1 G/DL — LOW (ref 12–16)
IMM GRANULOCYTES NFR BLD AUTO: 0.4 % — HIGH (ref 0.1–0.3)
KETONES UR-MCNC: NEGATIVE — SIGNIFICANT CHANGE UP
LEUKOCYTE ESTERASE UR-ACNC: NEGATIVE — SIGNIFICANT CHANGE UP
LYMPHOCYTES # BLD AUTO: 1.68 K/UL — SIGNIFICANT CHANGE UP (ref 1.2–3.4)
LYMPHOCYTES # BLD AUTO: 16.3 % — LOW (ref 20.5–51.1)
MAGNESIUM SERPL-MCNC: 2.5 MG/DL — HIGH (ref 1.8–2.4)
MCHC RBC-ENTMCNC: 33.6 G/DL — SIGNIFICANT CHANGE UP (ref 32–37)
MCHC RBC-ENTMCNC: 33.8 PG — HIGH (ref 27–31)
MCV RBC AUTO: 100.7 FL — HIGH (ref 81–99)
MONOCYTES # BLD AUTO: 0.8 K/UL — HIGH (ref 0.1–0.6)
MONOCYTES NFR BLD AUTO: 7.8 % — SIGNIFICANT CHANGE UP (ref 1.7–9.3)
NEUTROPHILS # BLD AUTO: 7.75 K/UL — HIGH (ref 1.4–6.5)
NEUTROPHILS NFR BLD AUTO: 75.3 % — HIGH (ref 42.2–75.2)
NITRITE UR-MCNC: NEGATIVE — SIGNIFICANT CHANGE UP
NRBC # BLD: 0 /100 WBCS — SIGNIFICANT CHANGE UP (ref 0–0)
PH UR: 6 — SIGNIFICANT CHANGE UP (ref 5–8)
PHOSPHATE SERPL-MCNC: 3.6 MG/DL — SIGNIFICANT CHANGE UP (ref 2.1–4.9)
PLATELET # BLD AUTO: 254 K/UL — SIGNIFICANT CHANGE UP (ref 130–400)
POTASSIUM SERPL-MCNC: 3.5 MMOL/L — SIGNIFICANT CHANGE UP (ref 3.5–5)
POTASSIUM SERPL-SCNC: 3.5 MMOL/L — SIGNIFICANT CHANGE UP (ref 3.5–5)
PROT ?TM UR-MCNC: 17 MG/DLG/24H — SIGNIFICANT CHANGE UP
PROT SERPL-MCNC: 7.3 G/DL — SIGNIFICANT CHANGE UP (ref 6–8)
PROT UR-MCNC: SIGNIFICANT CHANGE UP
PROT/CREAT UR-RTO: 0.3 RATIO — HIGH (ref 0–0.2)
RBC # BLD: 2.99 M/UL — LOW (ref 4.2–5.4)
RBC # FLD: 14.2 % — SIGNIFICANT CHANGE UP (ref 11.5–14.5)
SODIUM SERPL-SCNC: 138 MMOL/L — SIGNIFICANT CHANGE UP (ref 135–146)
SP GR SPEC: 1.02 — SIGNIFICANT CHANGE UP (ref 1.01–1.03)
UROBILINOGEN FLD QL: SIGNIFICANT CHANGE UP
WBC # BLD: 10.29 K/UL — SIGNIFICANT CHANGE UP (ref 4.8–10.8)
WBC # FLD AUTO: 10.29 K/UL — SIGNIFICANT CHANGE UP (ref 4.8–10.8)

## 2022-05-13 PROCEDURE — 93010 ELECTROCARDIOGRAM REPORT: CPT

## 2022-05-13 PROCEDURE — 99233 SBSQ HOSP IP/OBS HIGH 50: CPT

## 2022-05-13 RX ORDER — HALOPERIDOL DECANOATE 100 MG/ML
1 INJECTION INTRAMUSCULAR ONCE
Refills: 0 | Status: COMPLETED | OUTPATIENT
Start: 2022-05-13 | End: 2022-05-13

## 2022-05-13 RX ORDER — INSULIN GLARGINE 100 [IU]/ML
20 INJECTION, SOLUTION SUBCUTANEOUS EVERY MORNING
Refills: 0 | Status: DISCONTINUED | OUTPATIENT
Start: 2022-05-13 | End: 2022-05-14

## 2022-05-13 RX ADMIN — Medication 40 MILLIGRAM(S): at 05:29

## 2022-05-13 RX ADMIN — INSULIN GLARGINE 20 UNIT(S): 100 INJECTION, SOLUTION SUBCUTANEOUS at 12:52

## 2022-05-13 RX ADMIN — SENNA PLUS 2 TABLET(S): 8.6 TABLET ORAL at 21:12

## 2022-05-13 RX ADMIN — Medication 3 MILLILITER(S): at 18:09

## 2022-05-13 RX ADMIN — SERTRALINE 25 MILLIGRAM(S): 25 TABLET, FILM COATED ORAL at 05:29

## 2022-05-13 RX ADMIN — SERTRALINE 25 MILLIGRAM(S): 25 TABLET, FILM COATED ORAL at 18:04

## 2022-05-13 RX ADMIN — Medication 100 MILLIGRAM(S): at 05:31

## 2022-05-13 RX ADMIN — SODIUM CHLORIDE 75 MILLILITER(S): 9 INJECTION INTRAMUSCULAR; INTRAVENOUS; SUBCUTANEOUS at 02:11

## 2022-05-13 RX ADMIN — Medication 3 MILLILITER(S): at 20:47

## 2022-05-13 RX ADMIN — PANTOPRAZOLE SODIUM 40 MILLIGRAM(S): 20 TABLET, DELAYED RELEASE ORAL at 05:29

## 2022-05-13 RX ADMIN — APIXABAN 5 MILLIGRAM(S): 2.5 TABLET, FILM COATED ORAL at 18:03

## 2022-05-13 RX ADMIN — SODIUM CHLORIDE 75 MILLILITER(S): 9 INJECTION INTRAMUSCULAR; INTRAVENOUS; SUBCUTANEOUS at 20:22

## 2022-05-13 RX ADMIN — APIXABAN 5 MILLIGRAM(S): 2.5 TABLET, FILM COATED ORAL at 05:30

## 2022-05-13 RX ADMIN — Medication 100 MILLIGRAM(S): at 18:04

## 2022-05-13 RX ADMIN — HALOPERIDOL DECANOATE 1 MILLIGRAM(S): 100 INJECTION INTRAMUSCULAR at 02:07

## 2022-05-13 NOTE — PROGRESS NOTE ADULT - ASSESSMENT
83 y/o F with PMHx of Afib on Eliquis, HLD,  CAD, HFpEF (last EF 2019 65%), DMII, COPD on 3L O2, dementia presenting w/ SOB .      # TJ - likely prerenal  (urine Na <20)  baseline cr: 0.9, presented 2 slight imrpovment today  Renal US, no evidence of obstruction   Echo with EF of 51% and grade II diastolic dysfunction, Reduced RV function, severe TR. moderate pulmonary HTN     - resume Entresto once cr is stable   - monitor cr  - avoid nephrotoxic agents  - adjust eliquis dose to new GFR  - obtain FeURea   - HCO3 at target   - post void bedside bladder scan 270 cc on official bladder sono - monitor  -tight glucose control, is on sterooids   - stop IVF

## 2022-05-13 NOTE — PROGRESS NOTE ADULT - SUBJECTIVE AND OBJECTIVE BOX
PRATEEK FIELDSVMARLENE 82y Female  MRN#: [id number]   CODE STATUS: FULL     Hospital Day: 3d    Pt is currently admitted with the primary diagnosis of viral bronchitis     SUBJECTIVE  HPI: Pt is an 83 y/o F with PMHx of Afib on Eliquis, HLD,  CAD, HFpEF (last EF 2019 65%), DM, COPD on 3L O2, dementia presenting w/ SOB . Pt only speaks Trinidadian and translation provided by pts daughter who is a nurse. Pt has been complaining of nonproductive cough and nonexertional dyspnea for the past 3 days. As per pts daughter, pt saw PCP about 3-4 days prior and has been receiving decadron 6mg IV pushes as well as lasix 40 IVP the past 3 days for presumed URI, all of which have been administered by pts daughter. Pts symptoms have not been relieved with these measures prompting her arrival to the ED. Pt has also been complaining of nausea and has been dry heaving with no vomiting. Pts cardiologist is Dr. Morrison, but as per pts daughter, pt has not seen cardiologist in 3-4 years, says "we did not see him because of COVID." Pt denies chest pain, lightheadedness, dizziness, abdominal pain, diarrhea, constipation, sick contacts. Pt uptodate with COVID/flu vaccines.    In the ED, BP elevated at 176/81. Labs notable Hb 10.9, .2, Cr 1.6 (baseline 0.9-1.0), lactate 2.2, trops 0.03, BNP 4262, COVID positive. Given solumedrol 125 IVP in ED, duonebs, and levaquin 750.     Overnight events: none    Interval hx/Subjective complaints: Pt feeling better, no complaints. Wheezing improved.     Pt denies any fevers, chills, fatigue, CP, palpitations, cough, SOB, abdominal pain, n/v/d, hematochezia, melena, dysuria, urinary urgency/frequency weakness, numbness, tingling, or other FND.                                             ----------------------------------------------------------  OBJECTIVE  PAST MEDICAL & SURGICAL HISTORY  Afib  on eliquis    Diabetes    Asthma    CAD (coronary atherosclerotic disease)    Diastolic heart failure    COPD exacerbation    Hyperlipidemia    CVA (cerebrovascular accident)    Type 2 diabetes mellitus    S/P appendectomy                                              -----------------------------------------------------------  ALLERGIES:  Augmentin (Rash)  oxycodone (Pruritus)                                            ------------------------------------------------------------    HOME MEDICATIONS  Home Medications:  Entresto 24 mg-26 mg oral tablet: 1 tab(s) orally 2 times a day (10 May 2022 11:23)  ipratropium-albuterol 0.5 mg-2.5 mg/3 mLinhalation solution: 3 milliliter(s) inhaled 2 times a day (10 Sep 2019 01:39)  Lantus 100 units/mL subcutaneous solution: 40 unit(s) subcutaneous once a day (10 May 2022 11:25)  Lasix 40 mg oral tablet: 1 tab(s) orally once a day (12 Sep 2019 15:14)  metOLazone 5 mg oral tablet: 1 tab(s) orally once a day (10 May 2022 11:24)  Repatha 140 mg/mL subcutaneous solution: 1 application subcutaneous every 2 weeks (10 May 2022 11:24)  SEROquel 25 mg oral tablet: 1 tab(s) orally once a day (10 May 2022 11:24)  Vascepa 1 g oral capsule: 2 cap(s) orally 2 times a day (10 May 2022 11:25)                           MEDICATIONS:  STANDING MEDICATIONS  albuterol/ipratropium for Nebulization 3 milliLiter(s) Nebulizer every 6 hours  apixaban 5 milliGRAM(s) Oral two times a day  dextrose 5%. 1000 milliLiter(s) IV Continuous <Continuous>  dextrose 5%. 1000 milliLiter(s) IV Continuous <Continuous>  dextrose 50% Injectable 25 Gram(s) IV Push once  dextrose 50% Injectable 12.5 Gram(s) IV Push once  dextrose 50% Injectable 25 Gram(s) IV Push once  glucagon  Injectable 1 milliGRAM(s) IntraMuscular once  insulin glargine Injectable (LANTUS) 20 Unit(s) SubCutaneous every morning  insulin lispro (ADMELOG) corrective regimen sliding scale   SubCutaneous three times a day before meals  insulin lispro Injectable (ADMELOG) 9 Unit(s) SubCutaneous three times a day before meals  metoprolol tartrate 100 milliGRAM(s) Oral two times a day  pantoprazole    Tablet 40 milliGRAM(s) Oral before breakfast  polyethylene glycol 3350 17 Gram(s) Oral daily  predniSONE   Tablet 40 milliGRAM(s) Oral daily  senna 8.6 milliGRAM(s) Oral Tablet - Peds 2 Tablet(s) Oral at bedtime  sertraline 25 milliGRAM(s) Oral every 12 hours  sodium chloride 0.9%. 1000 milliLiter(s) IV Continuous <Continuous>    PRN MEDICATIONS  dextrose Oral Gel 15 Gram(s) Oral once PRN                                            ------------------------------------------------------------  VITAL SIGNS: Last 24 Hours  T(C): 35.8 (13 May 2022 06:10), Max: 35.9 (12 May 2022 19:56)  T(F): 96.4 (13 May 2022 06:10), Max: 96.6 (12 May 2022 19:56)  HR: 59 (13 May 2022 06:10) (59 - 61)  BP: 106/60 (13 May 2022 06:10) (106/60 - 141/65)  BP(mean): 77 (13 May 2022 06:10) (77 - 77)  RR: 19 (13 May 2022 06:10) (18 - 19)  SpO2: 97% (13 May 2022 02:28) (97% - 98%)                                             --------------------------------------------------------------  LABS:                        10.1   10.29 )-----------( 254      ( 13 May 2022 08:31 )             30.1         138  |  100  |  89<HH>  ----------------------------<  80  3.5   |  26  |  1.8<H>    Ca    7.9<L>      13 May 2022 08:31  Phos  3.6       Mg     2.5         TPro  7.3  /  Alb  3.3<L>  /  TBili  0.9  /  DBili  x   /  AST  71<H>  /  ALT  50<H>  /  AlkPhos  121<H>        Urinalysis Basic - ( 13 May 2022 05:50 )    Color: Yellow / Appearance: Clear / S.017 / pH: x  Gluc: x / Ketone: Negative  / Bili: Negative / Urobili: <2 mg/dL   Blood: x / Protein: Trace / Nitrite: Negative   Leuk Esterase: Negative / RBC: x / WBC x   Sq Epi: x / Non Sq Epi: x / Bacteria: x                                                            -------------------------------------------------------------  RADIOLOGY:                                            --------------------------------------------------------------  PHYSICAL EXAM:  General: Well appearing in NAD  HEENT: ncat, eomi, mmm  LUNGS: CTAB  HEART: s1/s2, rrr  ABDOMEN: soft, ntnd, bs+  EXT: wwp, no cyanosis, clubbing, edema  NEURO: aox4, PHAM  SKIN: intact without rashes or lesions                                          --------------------------------------------------------------    ASSESSMENT & PLAN  Pt is an 83 y/o F with PMHx of Afib on Eliquis, asthma, CAD, HFpEF (last EF  65%), DM, COPD on 3L O2, dementia presenting w/ SOB    #COPD exacerbation 2/2 viral bronchitis - improved   >Uses 3LNC at home   >corona virus +  >lactate 2.2 on admission, improving  >procal 0.46>0.52  >Ddimer elevated, LE duplex negative     - ID recs appreciated; monitor off abx   - Saturating well on RA today , wheezing resolved   - s/p solumedrol, c/w prednisone 40mg qd   - c/w duonebs q6h standing     #Afib (on eliquis), s/p PPM   #CAD  #HFpEF- not in exacerbation   >BNP 4262 on admission, baseline around 3600   >No evidence of volume overload currently (CXR clear, no crackles on pulmonary exam, no edema)   >TTE  showing LVEF 65&   >Rpt TTE : LVEF 51%, multiple wall motion abnormalities; pHTN, grade 2 diastolic dysfxn  - cardiology consulted (Dr. Dukes) for ischemic workup given new TTE findings, f/u recs     - EP interogated device; no events; f/u o/p  - c/w eliquis  - on repatha; hold statin d/t mild transaminitis  - resume entresto once cr stable     #TJ on CKD3, Cr 1.6 on admission (baseline 0.9-1.0)- improving   >Basline cr 0.9-1.0   >Cr worsening, 1.6>2.0>1.8  >BUN/cr ratio ~over 2; prerenal?  >pt voiding, not in fluid overload, serum sodium nl  >kidney bladder US nl  >90cc on post void bladder scan  am    - Cr improving with fluids, will continue to hold diuretics   - nephro recs appreciated     #T2DM  - on lantus 40 at home  - FS q6 hrs  - c/w start lantus 25 AM, and 9 lispro qachs (increased from / today due to hyperglycemia   - ISS     #HLD  - on repatha and vascepa at home; not on statins due to hx of liver injury  - daughter wants pt on crestor 20; will hold off for now d/t mild transaminitis    #Dementia  - c/w Seroquel 25mg qhs    #Misc  - DVT ppx: eliquis  - GI ppx: protonix  - Diet: DASH/TLC  - Actitvity: IAT   - FULL CODE   - Dispo: acute; pending cardio c/s; improvement in wheezing     #Handoff   - f/u cardio recs

## 2022-05-13 NOTE — PROGRESS NOTE ADULT - SUBJECTIVE AND OBJECTIVE BOX
83 y/o F with PMHx of Afib on Eliquis, HLD,  CAD, HFpEF (last EF 2019 65%), DM, COPD on 3L O2, dementia presenting w/ SOB . Pt only speaks Pakistani and translation provided by pts daughter who is a nurse. Pt has been complaining of nonproductive cough and nonexertional dyspnea for the past 3 days. As per pts daughter, pt saw PCP about 3-4 days prior and has been receiving decadron 6mg IV pushes as well as lasix 40 IVP the past 3 days for presumed URI, all of which have been administered by pts daughter. Pts symptoms have not been relieved with these measures prompting her arrival to the ED. Pt has also been complaining of nausea and has been dry heaving with no vomiting. Pts cardiologist is Dr. Morrison, but as per pts daughter, pt has not seen cardiologist in 3-4 years, says "we did not see him because of COVID." Pt denies chest pain, lightheadedness, dizziness, abdominal pain, diarrhea, constipation, sick contacts. Pt uptodate with COVID/flu vaccines.  In the ED, BP elevated at 176/81. Labs notable Hb 10.9, .2, Cr 1.6 (baseline 0.9-1.0), lactate 2.2, trops 0.03, BNP 4262, COVID positive. Given solumedrol 125 IVP in ED, duonebs, and levaquin 750.   Pt was diagnosed with URI, viral illness and asthma exacerbation.  She was found to have TJ.   Today pt feels better today, was confused last night.    OBJECTIVE  PAST MEDICAL & SURGICAL HISTORY  Afib  on eliquis    Diabetes    Asthma    CAD (coronary atherosclerotic disease)    Diastolic heart failure    COPD exacerbation    Hyperlipidemia    CVA (cerebrovascular accident)    Type 2 diabetes mellitus    S/P appendectomy        ALLERGIES:  Augmentin (Rash)  oxycodone (Pruritus)      HOME MEDICATIONS  Home Medications:  Entresto 24 mg-26 mg oral tablet: 1 tab(s) orally 2 times a day (10 May 2022 11:23)  ipratropium-albuterol 0.5 mg-2.5 mg/3 mLinhalation solution: 3 milliliter(s) inhaled 2 times a day (10 Sep 2019 01:39)  Lantus 100 units/mL subcutaneous solution: 40 unit(s) subcutaneous once a day (10 May 2022 11:25)  Lasix 40 mg oral tablet: 1 tab(s) orally once a day (12 Sep 2019 15:14)  metOLazone 5 mg oral tablet: 1 tab(s) orally once a day (10 May 2022 11:24)  Repatha 140 mg/mL subcutaneous solution: 1 application subcutaneous every 2 weeks (10 May 2022 11:24)  SEROquel 25 mg oral tablet: 1 tab(s) orally once a day (10 May 2022 11:24)  Vascepa 1 g oral capsule: 2 cap(s) orally 2 times a day (10 May 2022 11:25)      VITAL SIGNS: Last 24 Hours  T(C): 35.9 (13 May 2022 13:17), Max: 35.9 (12 May 2022 19:56)  T(F): 96.6 (13 May 2022 13:17), Max: 96.6 (12 May 2022 19:56)  HR: 58 (13 May 2022 13:17) (58 - 61)  BP: 142/70 (13 May 2022 13:17) (106/60 - 142/70)  BP(mean): 77 (13 May 2022 06:10) (77 - 77)  RR: 18 (13 May 2022 13:17) (18 - 19)  SpO2: 97% (13 May 2022 02:28) (97% - 98%)    PHYSICAL EXAM:  General: in mild distress due to SOB after a walk from the bathroom   HEENT: ncat, eomi, mmm  LUNGS: decreases b/l   HEART: s1/s2, rrr  ABDOMEN: soft, obese, NT, ND, BS present   EXT: wwp, no cyanosis, clubbing, edema  NEURO: aox4, PHAM  SKIN: intact without rashes or lesions     LABS:                                   10.1   10.29 )-----------( 254      ( 13 May 2022 08:31 )             30.1   05-13    138  |  100  |  89<HH>  ----------------------------<  80  3.5   |  26  |  1.8<H>    Ca    7.9<L>      13 May 2022 08:31  Phos  3.6     05-13  Mg     2.5     05-13    TPro  7.3  /  Alb  3.3<L>  /  TBili  0.9  /  DBili  x   /  AST  71<H>  /  ALT  50<H>  /  AlkPhos  121<H>  05-13      CARDIAC MARKERS ( 10 May 2022 19:45 )  x     / 0.02 ng/mL / 42 U/L / x     / 2.2 ng/mL        RADIOLOGY:  < from: US Kidney and Bladder (05.11.22 @ 15:52) >  IMPRESSION:    Normal renal ultrasound.    < end of copied text >  < from: US Abdomen Upper Quadrant Right (05.11.22 @ 15:50) >    IMPRESSION:  Cirrhotic appearing liver.    Ascites.    < end of copied text >  < from: [facility] Duplex Lower Ext Vein Scan, Bilat (05.11.22 @ 11:53) >    IMPRESSION:  No evidence of deep venous thrombosis in either lower extremity.    < end of copied text >  < from: Xray Chest 1 View- PORTABLE-Urgent (05.10.22 @ 07:58) >  Impression:    Right mid and lower lung zone reticular opacity. No pneumothorax.    < end of copied text >  < from: TTE Echo Complete w/o Contrast w/ Doppler (05.11.22 @ 08:07) >  Summary:   1. Mildly decreased global left ventricular systolic function.   2. Entire inferior wall, apical septal segment, and mid inferolateral   segment are abnormal as described above.   3. LV Ejection Fraction by Bustamante's Method with a biplane EF of 51 %.   4. Spectral Doppler shows pseudonormal pattern of left ventricular   myocardial filling (Grade II diastolic dysfunction).   5. Moderately enlarged right ventricle.   6. Moderately reduced RV systolic function.   7. Moderately enlarged left atrium.   8. Moderately enlarged right atrium.   9.Mild mitral valve regurgitation.  10. Severe tricuspid regurgitation.  11. Mild aortic regurgitation.  12. Estimated pulmonary artery systolic pressure is 55.7 mmHg assuming a   right atrial pressure of 15 mmHg, which is consistent with moderate   pulmonary hypertension.      MEDICATIONS  (STANDING):  albuterol/ipratropium for Nebulization 3 milliLiter(s) Nebulizer every 6 hours  apixaban 5 milliGRAM(s) Oral two times a day  dextrose 5%. 1000 milliLiter(s) (100 mL/Hr) IV Continuous <Continuous>  dextrose 5%. 1000 milliLiter(s) (50 mL/Hr) IV Continuous <Continuous>  dextrose 50% Injectable 25 Gram(s) IV Push once  dextrose 50% Injectable 12.5 Gram(s) IV Push once  dextrose 50% Injectable 25 Gram(s) IV Push once  glucagon  Injectable 1 milliGRAM(s) IntraMuscular once  insulin glargine Injectable (LANTUS) 20 Unit(s) SubCutaneous every morning  insulin lispro (ADMELOG) corrective regimen sliding scale   SubCutaneous three times a day before meals  insulin lispro Injectable (ADMELOG) 9 Unit(s) SubCutaneous three times a day before meals  metoprolol tartrate 100 milliGRAM(s) Oral two times a day  pantoprazole    Tablet 40 milliGRAM(s) Oral before breakfast  polyethylene glycol 3350 17 Gram(s) Oral daily  predniSONE   Tablet 40 milliGRAM(s) Oral daily  senna 8.6 milliGRAM(s) Oral Tablet - Peds 2 Tablet(s) Oral at bedtime  sertraline 25 milliGRAM(s) Oral every 12 hours  sodium chloride 0.9%. 1000 milliLiter(s) (75 mL/Hr) IV Continuous <Continuous>    MEDICATIONS  (PRN):  dextrose Oral Gel 15 Gram(s) Oral once PRN Blood Glucose LESS THAN 70 milliGRAM(s)/deciliter

## 2022-05-13 NOTE — PROGRESS NOTE ADULT - ASSESSMENT
ASSESSMENT & PLAN  Pt is an 81 y/o F with PMHx of Afib on Eliquis, asthma, CAD, HFpEF (last EF 2019 65%), DM, COPD on 3L O2, dementia presenting w/ SOB      # Asthma  exacerbation / viral bronchitis (corona virus +)  - clinically improving  - monitor pulse Ox,  Uses 3LNC at home   - on Prednisone po   - Nebs Q 6 hours   - daily peak flow   - ID recs appreciated; monitor off abx       #Afib (on eliquis), s/p PPM / CAD/ Chronic diastolic CHF/ PHTN   - fluid restriction 1200 ml in 24 hours   - intake and output monitoring, daily weight    - BNP 4262 on admission, baseline around 3600   -Rpt TTE 5/11: LVEF 51%, multiple wall motion abnormalities  - cardiology consulted (Dr. Dukes) for ischemic workup given new TTE findings, f/u recs   - EP interrogated device; no events; f/u o/p  - c/w Eliquis  - on repatha; hold statin d/t mild transaminitis    #TJ on CKD III   - likely prerenal  - c/w  IV hydration NS at 75 ml/h   - monitor BUN/cr and urine output   - pt voiding, not in fluid overload, serum sodium nl  - kidney bladder US nl  - nephrology consulted, will follow up recommendations   - off diuretics now     #T2DM  - carb consistent diet   - monitor finger stick   - lantus  increased to 40 units in AM   - c/w 9 lispro with meals   - ISS     #HLD  - on repatha and vascepa at home; not on statins due to hx of liver injury      #Dementia  - d/c  Seroquel 25mg qhs  - resume Zolof 25 mg BID ( dose and frequency confirmed with daughter)      #Misc  - DVT ppx: eliquis  - GI ppx: protonix  - Diet: DASH/TLC  - Actitvity: IAT   - FULL CODE     #Progress Note Handoff  Pending (specify): c/w  IV hydration, monitor BUN/cr,  PT/rehab eval, anticipate discharge in 24 hours   Family discussion: I spoke with daughter at length pt and daughter agreed with a plan of care   Disposition: Home__x_/SNF___/Other________/Unknown at this time_____

## 2022-05-13 NOTE — PROGRESS NOTE ADULT - SUBJECTIVE AND OBJECTIVE BOX
Nephrology progress note    Patient was seen and examined, events over the last 24 h noted .    Allergies:  Augmentin (Rash)  oxycodone (Pruritus)    Hospital Medications:   MEDICATIONS  (STANDING):  albuterol/ipratropium for Nebulization 3 milliLiter(s) Nebulizer every 6 hours  apixaban 5 milliGRAM(s) Oral two times a day  dextrose 5%. 1000 milliLiter(s) (100 mL/Hr) IV Continuous <Continuous>  dextrose 5%. 1000 milliLiter(s) (50 mL/Hr) IV Continuous <Continuous>  dextrose 50% Injectable 25 Gram(s) IV Push once  dextrose 50% Injectable 12.5 Gram(s) IV Push once  dextrose 50% Injectable 25 Gram(s) IV Push once  glucagon  Injectable 1 milliGRAM(s) IntraMuscular once  insulin glargine Injectable (LANTUS) 20 Unit(s) SubCutaneous every morning  insulin lispro (ADMELOG) corrective regimen sliding scale   SubCutaneous three times a day before meals  insulin lispro Injectable (ADMELOG) 9 Unit(s) SubCutaneous three times a day before meals  metoprolol tartrate 100 milliGRAM(s) Oral two times a day  pantoprazole    Tablet 40 milliGRAM(s) Oral before breakfast  polyethylene glycol 3350 17 Gram(s) Oral daily  predniSONE   Tablet 40 milliGRAM(s) Oral daily  senna 8.6 milliGRAM(s) Oral Tablet - Peds 2 Tablet(s) Oral at bedtime  sertraline 25 milliGRAM(s) Oral every 12 hours  sodium chloride 0.9%. 1000 milliLiter(s) (75 mL/Hr) IV Continuous <Continuous>        VITALS:  T(F): 96.4 (22 @ 06:10), Max: 96.6 (22 @ 19:56)  HR: 59 (22 @ 06:10)  BP: 106/60 (22 @ 06:10)  RR: 19 (22 @ 06:10)  SpO2: 97% (22 @ 02:28)  Wt(kg): --     @ 07:01  -   @ 07:00  --------------------------------------------------------  IN: 330 mL / OUT: 100 mL / NET: 230 mL          PHYSICAL EXAM:  Constitutional: NAD  HEENT: anicteric sclera, oropharynx clear, MMM  Neck: No JVD  Respiratory: CTAB, no wheezes, rales or rhonchi  Cardiovascular: S1, S2, RRR  Gastrointestinal: BS+, soft, NT/ND  Extremities: No cyanosis or clubbing. No peripheral edema  :  No wasserman.   Skin: No rashes    LABS:      138  |  100  |  89<HH>  ----------------------------<  80  3.5   |  26  |  1.8<H>    Ca    7.9<L>      13 May 2022 08:31  Phos  3.6       Mg     2.5         TPro  7.3  /  Alb  3.3<L>  /  TBili  0.9  /  DBili      /  AST  71<H>  /  ALT  50<H>  /  AlkPhos  121<H>                            10.1   10.29 )-----------( 254      ( 13 May 2022 08:31 )             30.1       Urine Studies:  Urinalysis Basic - ( 13 May 2022 05:50 )    Color: Yellow / Appearance: Clear / S.017 / pH:   Gluc:  / Ketone: Negative  / Bili: Negative / Urobili: <2 mg/dL   Blood:  / Protein: Trace / Nitrite: Negative   Leuk Esterase: Negative / RBC:  / WBC    Sq Epi:  / Non Sq Epi:  / Bacteria:       Sodium, Random Urine: <20.0 mmoL/L ( @ 05:00)  Creatinine, Random Urine: 89 mg/dL ( @ 05:00)  Protein/Creatinine Ratio Calculation: 0.3 Ratio ( @ 05:00)    RADIOLOGY & ADDITIONAL STUDIES:

## 2022-05-14 ENCOUNTER — TRANSCRIPTION ENCOUNTER (OUTPATIENT)
Age: 82
End: 2022-05-14

## 2022-05-14 VITALS
RESPIRATION RATE: 18 BRPM | DIASTOLIC BLOOD PRESSURE: 57 MMHG | HEART RATE: 59 BPM | SYSTOLIC BLOOD PRESSURE: 114 MMHG | TEMPERATURE: 97 F

## 2022-05-14 LAB
ALBUMIN SERPL ELPH-MCNC: 3.1 G/DL — LOW (ref 3.5–5.2)
ALP SERPL-CCNC: 79 U/L — SIGNIFICANT CHANGE UP (ref 30–115)
ALT FLD-CCNC: 47 U/L — HIGH (ref 0–41)
ANION GAP SERPL CALC-SCNC: 12 MMOL/L — SIGNIFICANT CHANGE UP (ref 7–14)
AST SERPL-CCNC: 72 U/L — HIGH (ref 0–41)
BASOPHILS # BLD AUTO: 0.01 K/UL — SIGNIFICANT CHANGE UP (ref 0–0.2)
BASOPHILS NFR BLD AUTO: 0.1 % — SIGNIFICANT CHANGE UP (ref 0–1)
BILIRUB SERPL-MCNC: 1.2 MG/DL — SIGNIFICANT CHANGE UP (ref 0.2–1.2)
BUN SERPL-MCNC: 67 MG/DL — CRITICAL HIGH (ref 10–20)
CALCIUM SERPL-MCNC: 7.3 MG/DL — LOW (ref 8.5–10.1)
CHLORIDE SERPL-SCNC: 104 MMOL/L — SIGNIFICANT CHANGE UP (ref 98–110)
CO2 SERPL-SCNC: 25 MMOL/L — SIGNIFICANT CHANGE UP (ref 17–32)
CREAT SERPL-MCNC: 1.5 MG/DL — SIGNIFICANT CHANGE UP (ref 0.7–1.5)
EGFR: 34 ML/MIN/1.73M2 — LOW
EOSINOPHIL # BLD AUTO: 0 K/UL — SIGNIFICANT CHANGE UP (ref 0–0.7)
EOSINOPHIL NFR BLD AUTO: 0 % — SIGNIFICANT CHANGE UP (ref 0–8)
GLUCOSE BLDC GLUCOMTR-MCNC: 161 MG/DL — HIGH (ref 70–99)
GLUCOSE BLDC GLUCOMTR-MCNC: 70 MG/DL — SIGNIFICANT CHANGE UP (ref 70–99)
GLUCOSE SERPL-MCNC: 70 MG/DL — SIGNIFICANT CHANGE UP (ref 70–99)
HCT VFR BLD CALC: 29.1 % — LOW (ref 37–47)
HGB BLD-MCNC: 10 G/DL — LOW (ref 12–16)
IMM GRANULOCYTES NFR BLD AUTO: 0.7 % — HIGH (ref 0.1–0.3)
LYMPHOCYTES # BLD AUTO: 1.36 K/UL — SIGNIFICANT CHANGE UP (ref 1.2–3.4)
LYMPHOCYTES # BLD AUTO: 18.6 % — LOW (ref 20.5–51.1)
MAGNESIUM SERPL-MCNC: 2.4 MG/DL — SIGNIFICANT CHANGE UP (ref 1.8–2.4)
MCHC RBC-ENTMCNC: 34.1 PG — HIGH (ref 27–31)
MCHC RBC-ENTMCNC: 34.4 G/DL — SIGNIFICANT CHANGE UP (ref 32–37)
MCV RBC AUTO: 99.3 FL — HIGH (ref 81–99)
MONOCYTES # BLD AUTO: 0.72 K/UL — HIGH (ref 0.1–0.6)
MONOCYTES NFR BLD AUTO: 9.8 % — HIGH (ref 1.7–9.3)
NEUTROPHILS # BLD AUTO: 5.19 K/UL — SIGNIFICANT CHANGE UP (ref 1.4–6.5)
NEUTROPHILS NFR BLD AUTO: 70.8 % — SIGNIFICANT CHANGE UP (ref 42.2–75.2)
NRBC # BLD: 0 /100 WBCS — SIGNIFICANT CHANGE UP (ref 0–0)
PLATELET # BLD AUTO: 239 K/UL — SIGNIFICANT CHANGE UP (ref 130–400)
POTASSIUM SERPL-MCNC: 3.5 MMOL/L — SIGNIFICANT CHANGE UP (ref 3.5–5)
POTASSIUM SERPL-SCNC: 3.5 MMOL/L — SIGNIFICANT CHANGE UP (ref 3.5–5)
PROT SERPL-MCNC: 6.9 G/DL — SIGNIFICANT CHANGE UP (ref 6–8)
RBC # BLD: 2.93 M/UL — LOW (ref 4.2–5.4)
RBC # FLD: 14.4 % — SIGNIFICANT CHANGE UP (ref 11.5–14.5)
SODIUM SERPL-SCNC: 141 MMOL/L — SIGNIFICANT CHANGE UP (ref 135–146)
WBC # BLD: 7.33 K/UL — SIGNIFICANT CHANGE UP (ref 4.8–10.8)
WBC # FLD AUTO: 7.33 K/UL — SIGNIFICANT CHANGE UP (ref 4.8–10.8)

## 2022-05-14 PROCEDURE — 99233 SBSQ HOSP IP/OBS HIGH 50: CPT

## 2022-05-14 RX ORDER — SACUBITRIL AND VALSARTAN 24; 26 MG/1; MG/1
1 TABLET, FILM COATED ORAL
Qty: 0 | Refills: 0 | DISCHARGE

## 2022-05-14 RX ORDER — INSULIN GLARGINE 100 [IU]/ML
40 INJECTION, SOLUTION SUBCUTANEOUS
Qty: 0 | Refills: 0 | DISCHARGE

## 2022-05-14 RX ORDER — FLUTICASONE PROPIONATE AND SALMETEROL 50; 250 UG/1; UG/1
1 POWDER ORAL; RESPIRATORY (INHALATION)
Qty: 1 | Refills: 0
Start: 2022-05-14 | End: 2022-06-12

## 2022-05-14 RX ORDER — PANTOPRAZOLE SODIUM 20 MG/1
40 TABLET, DELAYED RELEASE ORAL AT BEDTIME
Refills: 0 | Status: DISCONTINUED | OUTPATIENT
Start: 2022-05-14 | End: 2022-05-14

## 2022-05-14 RX ORDER — ICOSAPENT ETHYL 500 MG/1
2 CAPSULE, LIQUID FILLED ORAL
Qty: 120 | Refills: 0
Start: 2022-05-14 | End: 2022-06-12

## 2022-05-14 RX ORDER — SENNA PLUS 8.6 MG/1
2 TABLET ORAL
Qty: 4 | Refills: 0
Start: 2022-05-14 | End: 2022-05-15

## 2022-05-14 RX ORDER — QUETIAPINE FUMARATE 200 MG/1
1 TABLET, FILM COATED ORAL
Qty: 0 | Refills: 0 | DISCHARGE

## 2022-05-14 RX ORDER — SERTRALINE 25 MG/1
1 TABLET, FILM COATED ORAL
Qty: 0 | Refills: 0 | DISCHARGE
Start: 2022-05-14

## 2022-05-14 RX ORDER — EVOLOCUMAB 140 MG/ML
1 INJECTION, SOLUTION SUBCUTANEOUS
Qty: 0 | Refills: 0 | DISCHARGE

## 2022-05-14 RX ORDER — EVOLOCUMAB 140 MG/ML
1 INJECTION, SOLUTION SUBCUTANEOUS
Qty: 1 | Refills: 0
Start: 2022-05-14

## 2022-05-14 RX ORDER — PANTOPRAZOLE SODIUM 20 MG/1
1 TABLET, DELAYED RELEASE ORAL
Qty: 30 | Refills: 3
Start: 2022-05-14 | End: 2022-09-10

## 2022-05-14 RX ORDER — ICOSAPENT ETHYL 500 MG/1
2 CAPSULE, LIQUID FILLED ORAL
Qty: 0 | Refills: 0 | DISCHARGE

## 2022-05-14 RX ORDER — ALBUTEROL 90 UG/1
2 AEROSOL, METERED ORAL
Qty: 2 | Refills: 0
Start: 2022-05-14

## 2022-05-14 RX ORDER — INSULIN GLARGINE 100 [IU]/ML
10 INJECTION, SOLUTION SUBCUTANEOUS EVERY MORNING
Refills: 0 | Status: DISCONTINUED | OUTPATIENT
Start: 2022-05-15 | End: 2022-05-14

## 2022-05-14 RX ORDER — IPRATROPIUM/ALBUTEROL SULFATE 18-103MCG
3 AEROSOL WITH ADAPTER (GRAM) INHALATION ONCE
Refills: 0 | Status: COMPLETED | OUTPATIENT
Start: 2022-05-14 | End: 2022-05-14

## 2022-05-14 RX ADMIN — Medication 1: at 12:26

## 2022-05-14 RX ADMIN — Medication 100 MILLIGRAM(S): at 05:58

## 2022-05-14 RX ADMIN — Medication 40 MILLIGRAM(S): at 05:59

## 2022-05-14 RX ADMIN — Medication 125 MILLIGRAM(S): at 12:26

## 2022-05-14 RX ADMIN — SERTRALINE 25 MILLIGRAM(S): 25 TABLET, FILM COATED ORAL at 05:59

## 2022-05-14 RX ADMIN — APIXABAN 5 MILLIGRAM(S): 2.5 TABLET, FILM COATED ORAL at 05:59

## 2022-05-14 RX ADMIN — Medication 3 MILLILITER(S): at 02:32

## 2022-05-14 RX ADMIN — Medication 9 UNIT(S): at 12:27

## 2022-05-14 RX ADMIN — Medication 3 MILLILITER(S): at 09:45

## 2022-05-14 RX ADMIN — Medication 3 MILLILITER(S): at 11:16

## 2022-05-14 NOTE — DISCHARGE NOTE NURSING/CASE MANAGEMENT/SOCIAL WORK - NSDCPEFALRISK_GEN_ALL_CORE
For information on Fall & Injury Prevention, visit: [email or web address] OR  [email or web address] OR  https://www.cdc.gov/steadi/patient.html

## 2022-05-14 NOTE — PROGRESS NOTE ADULT - SUBJECTIVE AND OBJECTIVE BOX
PRATEEK FIELDSVMARLENE 82y Female  MRN#: [id number]   CODE STATUS: FULL     Hospital Day: 4d    Pt is currently admitted with the primary diagnosis of viral bronchitis     SUBJECTIVE  HPI: Pt is an 81 y/o F with PMHx of Afib on Eliquis, HLD,  CAD, HFpEF (last EF 2019 65%), DM, COPD on 3L O2, dementia presenting w/ SOB . Pt only speaks [place]ese and translation provided by pts daughter who is a nurse. Pt has been complaining of nonproductive cough and nonexertional dyspnea for the past 3 days. As per pts daughter, pt saw PCP about 3-4 days prior and has been receiving decadron 6mg IV pushes as well as lasix 40 IVP the past 3 days for presumed URI, all of which have been administered by pts daughter. Pts symptoms have not been relieved with these measures prompting her arrival to the ED. Pt has also been complaining of nausea and has been dry heaving with no vomiting. Pts cardiologist is Dr. Morrison, but as per pts daughter, pt has not seen cardiologist in 3-4 years, says "we did not see him because of COVID." Pt denies chest pain, lightheadedness, dizziness, abdominal pain, diarrhea, constipation, sick contacts. Pt uptodate with COVID/flu vaccines.    In the ED, BP elevated at 176/81. Labs notable Hb 10.9, .2, Cr 1.6 (baseline 0.9-1.0), lactate 2.2, trops 0.03, BNP 4262, COVID positive. Given solumedrol 125 IVP in ED, duonebs, and levaquin 750.     Overnight events: none    Interval hx/Subjective complaints: Pt feeling okay, still with some wheezing but saturating well on RA.     Pt denies any fevers, chills, fatigue, CP, palpitations, cough, SOB, abdominal pain, n/v/d, hematochezia, melena, dysuria, urinary urgency/frequency weakness, numbness, tingling, or other FND.                                             ----------------------------------------------------------  OBJECTIVE  PAST MEDICAL & SURGICAL HISTORY  Afib  on eliquis    Diabetes    Asthma    CAD (coronary atherosclerotic disease)    Diastolic heart failure    COPD exacerbation    Hyperlipidemia    CVA (cerebrovascular accident)    Type 2 diabetes mellitus    S/P appendectomy                                              -----------------------------------------------------------  ALLERGIES:  Augmentin (Rash)  oxycodone (Pruritus)                                            ------------------------------------------------------------    HOME MEDICATIONS  Home Medications:  Entresto 24 mg-26 mg oral tablet: 1 tab(s) orally 2 times a day (10 May 2022 11:23)  ipratropium-albuterol 0.5 mg-2.5 mg/3 mLinhalation solution: 3 milliliter(s) inhaled 2 times a day (10 Sep 2019 01:39)  Lantus 100 units/mL subcutaneous solution: 40 unit(s) subcutaneous once a day (10 May 2022 11:25)  Lasix 40 mg oral tablet: 1 tab(s) orally once a day (12 Sep 2019 15:14)  metOLazone 5 mg oral tablet: 1 tab(s) orally once a day (10 May 2022 11:24)  Repatha 140 mg/mL subcutaneous solution: 1 application subcutaneous every 2 weeks (10 May 2022 11:24)  SEROquel 25 mg oral tablet: 1 tab(s) orally once a day (10 May 2022 11:24)  Vascepa 1 g oral capsule: 2 cap(s) orally 2 times a day (10 May 2022 11:25)                           MEDICATIONS:  STANDING MEDICATIONS  albuterol/ipratropium for Nebulization 3 milliLiter(s) Nebulizer every 6 hours  albuterol/ipratropium for Nebulization 3 milliLiter(s) Nebulizer once  apixaban 5 milliGRAM(s) Oral two times a day  dextrose 5%. 1000 milliLiter(s) IV Continuous <Continuous>  dextrose 5%. 1000 milliLiter(s) IV Continuous <Continuous>  dextrose 50% Injectable 25 Gram(s) IV Push once  dextrose 50% Injectable 12.5 Gram(s) IV Push once  dextrose 50% Injectable 25 Gram(s) IV Push once  glucagon  Injectable 1 milliGRAM(s) IntraMuscular once  insulin lispro (ADMELOG) corrective regimen sliding scale   SubCutaneous three times a day before meals  insulin lispro Injectable (ADMELOG) 9 Unit(s) SubCutaneous three times a day before meals  methylPREDNISolone sodium succinate Injectable 125 milliGRAM(s) IV Push once  metoprolol tartrate 100 milliGRAM(s) Oral two times a day  pantoprazole    Tablet 40 milliGRAM(s) Oral at bedtime  polyethylene glycol 3350 17 Gram(s) Oral daily  predniSONE   Tablet 40 milliGRAM(s) Oral daily  senna 8.6 milliGRAM(s) Oral Tablet - Peds 2 Tablet(s) Oral at bedtime  sertraline 25 milliGRAM(s) Oral every 12 hours    PRN MEDICATIONS  dextrose Oral Gel 15 Gram(s) Oral once PRN                                            ------------------------------------------------------------  VITAL SIGNS: Last 24 Hours  T(C): 36.2 (14 May 2022 05:01), Max: 36.2 (14 May 2022 05:01)  T(F): 97.2 (14 May 2022 05:01), Max: 97.2 (14 May 2022 05:01)  HR: 62 (14 May 2022 05:58) (58 - 62)  BP: 154/63 (14 May 2022 05:01) (129/65 - 154/63)  BP(mean): --  RR: 18 (14 May 2022 05:01) (18 - 18)  SpO2: --                                             --------------------------------------------------------------  LABS:                        10.0   7.33  )-----------( 239      ( 14 May 2022 06:55 )             29.1     05-14    141  |  104  |  67<HH>  ----------------------------<  70  3.5   |  25  |  1.5    Ca    7.3<L>      14 May 2022 06:55  Phos  3.6     05-13  Mg     2.4     -    TPro  6.9  /  Alb  3.1<L>  /  TBili  1.2  /  DBili  x   /  AST  72<H>  /  ALT  47<H>  /  AlkPhos  79  05-14      Urinalysis Basic - ( 13 May 2022 05:50 )    Color: Yellow / Appearance: Clear / S.017 / pH: x  Gluc: x / Ketone: Negative  / Bili: Negative / Urobili: <2 mg/dL   Blood: x / Protein: Trace / Nitrite: Negative   Leuk Esterase: Negative / RBC: x / WBC x   Sq Epi: x / Non Sq Epi: x / Bacteria: x                                                            -------------------------------------------------------------  RADIOLOGY:                                            --------------------------------------------------------------  PHYSICAL EXAM:  General: Well appearing, in NAD  HEENT: ncat, eomi, mmm  LUNGS: Mild BL expiratory wheezing   HEART: s1/s2, rrr  ABDOMEN: soft, ntnd, bs+  EXT: wwp, no cyanosis, clubbing, edema  NEURO: aox4, PHAM  SKIN: intact without rashes or lesions                                          --------------------------------------------------------------    ASSESSMENT & PLAN  Pt is an 81 y/o F with PMHx of Afib on Eliquis, asthma, CAD, HFpEF (last EF  65%), DM, COPD on 3L O2, dementia presenting w/ SOB    #COPD exacerbation 2/2 viral bronchitis - improved   >Uses 3LNC at home   >corona virus +  >lactate 2.2 on admission, improving  >procal 0.46>0.52  >Ddimer elevated, LE duplex negative     - ID recs appreciated; monitor off abx   - Saturating well on RA today , wheezing resolved   - s/p solumedrol, c/w prednisone 40mg qd (total 7d course)   - c/w duonebs q6h standing     #Afib (on eliquis), s/p PPM   #CAD  #HFpEF- not in exacerbation   >BNP 4262 on admission, baseline around 3600   >No evidence of volume overload currently (CXR clear, no crackles on pulmonary exam, no edema)   >TTE  showing LVEF 65&   >Rpt TTE : LVEF 51%, multiple wall motion abnormalities; pHTN, grade 2 diastolic dysfxn  - cardiology consulted (Dr. Dukes) for ischemic workup given new TTE findings, f/u recs     - EP interogated device; no events; f/u o/p  - c/w eliquis  - on repatha; hold statin d/t mild transaminitis  - resume entresto once cr stable (will be done as outpatient)   - f/u with cardiology outpt for ischemic workup   - hold diuretics on d/c     #TJ on CKD3, Cr 1.6 on admission (baseline 0.9-1.0)- improving   >Basline cr 0.9-1.0   >Cr worsening, 1.6>2.0>1.8>1.5  >BUN/cr ratio ~over 2; prerenal?  >pt voiding, not in fluid overload, serum sodium nl  >kidney bladder US nl  >90cc on post void bladder scan  am    - Cr improving with fluids, will continue to hold diuretics on discharge   - nephro recs appreciated     #T2DM  - on lantus 40 at home  - pt mildly hypoglycemia today on lantus 20, will decrease to lantus 10 on dc with outpt f/u      #HLD  - on repatha and vascepa at home; not on statins due to hx of liver injury  - daughter wants pt on crestor 20; will hold off for now d/t mild transaminitis    #Dementia  - c/w Seroquel 25mg qhs    #Misc  - DVT ppx: eliquis  - GI ppx: protonix  - Diet: DASH/TLC  - Actitvity: IAT   - FULL CODE   - Dispo: d/c home today

## 2022-05-14 NOTE — DISCHARGE NOTE PROVIDER - NSDCFUSCHEDAPPT_GEN_ALL_CORE_FT
Mayra Bowman  [facility] Physician [facility]  CARDIOLOGY [address] [facility]  Scheduled Appointment: 06/14/2022

## 2022-05-14 NOTE — PROGRESS NOTE ADULT - SUBJECTIVE AND OBJECTIVE BOX
83 y/o F with PMHx of Afib on Eliquis, HLD,  CAD, HFpEF (last EF 2019 65%), DM, COPD on 3L O2, dementia presenting w/ SOB . Pt only speaks [place]ran and translation provided by pts daughter who is a nurse. Pt has been complaining of nonproductive cough and nonexertional dyspnea for the past 3 days. As per pts daughter, pt saw PCP about 3-4 days prior and has been receiving decadron 6mg IV pushes as well as lasix 40 IVP the past 3 days for presumed URI, all of which have been administered by pts daughter. Pts symptoms have not been relieved with these measures prompting her arrival to the ED. Pt has also been complaining of nausea and has been dry heaving with no vomiting. Pts cardiologist is Dr. Morrison, but as per pts daughter, pt has not seen cardiologist in 3-4 years, says "we did not see him because of COVID." Pt denies chest pain, lightheadedness, dizziness, abdominal pain, diarrhea, constipation, sick contacts. Pt uptodate with COVID/flu vaccines.  In the ED, BP elevated at 176/81. Labs notable Hb 10.9, .2, Cr 1.6 (baseline 0.9-1.0), lactate 2.2, trops 0.03, BNP 4262, COVID positive. Given solumedrol 125 IVP in ED, duonebs, and levaquin 750.   Pt was diagnosed with URI, viral illness and asthma exacerbation.  She was found to have TJ.   Pt is getting confused at night, was wheezing in the morning improved after one dose of Solumedrol, daughter is at the bedside.    OBJECTIVE  PAST MEDICAL & SURGICAL HISTORY  Afib  on eliquis    Diabetes    Asthma    CAD (coronary atherosclerotic disease)    Diastolic heart failure    COPD exacerbation    Hyperlipidemia    CVA (cerebrovascular accident)    Type 2 diabetes mellitus    S/P appendectomy        ALLERGIES:  Augmentin (Rash)  oxycodone (Pruritus)      HOME MEDICATIONS  Home Medications:  Entresto 24 mg-26 mg oral tablet: 1 tab(s) orally 2 times a day (10 May 2022 11:23)  ipratropium-albuterol 0.5 mg-2.5 mg/3 mLinhalation solution: 3 milliliter(s) inhaled 2 times a day (10 Sep 2019 01:39)  Lantus 100 units/mL subcutaneous solution: 40 unit(s) subcutaneous once a day (10 May 2022 11:25)  Lasix 40 mg oral tablet: 1 tab(s) orally once a day (12 Sep 2019 15:14)  metOLazone 5 mg oral tablet: 1 tab(s) orally once a day (10 May 2022 11:24)  Repatha 140 mg/mL subcutaneous solution: 1 application subcutaneous every 2 weeks (10 May 2022 11:24)  SEROquel 25 mg oral tablet: 1 tab(s) orally once a day (10 May 2022 11:24)  Vascepa 1 g oral capsule: 2 cap(s) orally 2 times a day (10 May 2022 11:25)      VITAL SIGNS: Last 24 Hours  T(C): 36.2 (14 May 2022 14:07), Max: 36.2 (14 May 2022 05:01)  T(F): 97.1 (14 May 2022 14:07), Max: 97.2 (14 May 2022 05:01)  HR: 59 (14 May 2022 14:07) (59 - 62)  BP: 114/57 (14 May 2022 14:07) (114/57 - 154/63)  BP(mean): --  RR: 18 (14 May 2022 14:07) (18 - 18)  SpO2: --    PHYSICAL EXAM:  General: in mild distress due to SOB after a walk from the bathroom   HEENT: ncat, eomi, mmm  LUNGS: decreases b/l   HEART: s1/s2, rrr  ABDOMEN: soft, obese, NT, ND, BS present   EXT: wwp, no cyanosis, clubbing, edema  NEURO: aox4, PHAM  SKIN: intact without rashes or lesions     LABS:                          10.0   7.33  )-----------( 239      ( 14 May 2022 06:55 )             29.1   05-14    141  |  104  |  67<HH>  ----------------------------<  70  3.5   |  25  |  1.5    Ca    7.3<L>      14 May 2022 06:55  Phos  3.6     05-13  Mg     2.4     05-14    TPro  6.9  /  Alb  3.1<L>  /  TBili  1.2  /  DBili  x   /  AST  72<H>  /  ALT  47<H>  /  AlkPhos  79  05-14        CARDIAC MARKERS ( 10 May 2022 19:45 )  x     / 0.02 ng/mL / 42 U/L / x     / 2.2 ng/mL        RADIOLOGY:  < from: US Kidney and Bladder (05.11.22 @ 15:52) >  IMPRESSION:    Normal renal ultrasound.    < end of copied text >  < from: US Abdomen Upper Quadrant Right (05.11.22 @ 15:50) >    IMPRESSION:  Cirrhotic appearing liver.    Ascites.    < end of copied text >  < from: [facility] Duplex Lower Ext Vein Scan, Bilat (05.11.22 @ 11:53) >    IMPRESSION:  No evidence of deep venous thrombosis in either lower extremity.    < end of copied text >  < from: Xray Chest 1 View- PORTABLE-Urgent (05.10.22 @ 07:58) >  Impression:    Right mid and lower lung zone reticular opacity. No pneumothorax.    < end of copied text >  < from: TTE Echo Complete w/o Contrast w/ Doppler (05.11.22 @ 08:07) >  Summary:   1. Mildly decreased global left ventricular systolic function.   2. Entire inferior wall, apical septal segment, and mid inferolateral   segment are abnormal as described above.   3. LV Ejection Fraction by Bustamante's Method with a biplane EF of 51 %.   4. Spectral Doppler shows pseudonormal pattern of left ventricular   myocardial filling (Grade II diastolic dysfunction).   5. Moderately enlarged right ventricle.   6. Moderately reduced RV systolic function.   7. Moderately enlarged left atrium.   8. Moderately enlarged right atrium.   9.Mild mitral valve regurgitation.  10. Severe tricuspid regurgitation.  11. Mild aortic regurgitation.  12. Estimated pulmonary artery systolic pressure is 55.7 mmHg assuming a   right atrial pressure of 15 mmHg, which is consistent with moderate   pulmonary hypertension.      MEDICATIONS  (STANDING):  albuterol/ipratropium for Nebulization 3 milliLiter(s) Nebulizer every 6 hours  apixaban 5 milliGRAM(s) Oral two times a day  dextrose 5%. 1000 milliLiter(s) (100 mL/Hr) IV Continuous <Continuous>  dextrose 5%. 1000 milliLiter(s) (50 mL/Hr) IV Continuous <Continuous>  dextrose 50% Injectable 25 Gram(s) IV Push once  dextrose 50% Injectable 12.5 Gram(s) IV Push once  dextrose 50% Injectable 25 Gram(s) IV Push once  glucagon  Injectable 1 milliGRAM(s) IntraMuscular once  insulin lispro (ADMELOG) corrective regimen sliding scale   SubCutaneous three times a day before meals  insulin lispro Injectable (ADMELOG) 9 Unit(s) SubCutaneous three times a day before meals  metoprolol tartrate 100 milliGRAM(s) Oral two times a day  pantoprazole    Tablet 40 milliGRAM(s) Oral at bedtime  polyethylene glycol 3350 17 Gram(s) Oral daily  predniSONE   Tablet 40 milliGRAM(s) Oral daily  senna 8.6 milliGRAM(s) Oral Tablet - Peds 2 Tablet(s) Oral at bedtime  sertraline 25 milliGRAM(s) Oral every 12 hours    MEDICATIONS  (PRN):  dextrose Oral Gel 15 Gram(s) Oral once PRN Blood Glucose LESS THAN 70 milliGRAM(s)/deciliter

## 2022-05-14 NOTE — DISCHARGE NOTE PROVIDER - PROVIDER TOKENS
PROVIDER:[TOKEN:[[id number]],FOLLOWUP:[1 week]],PROVIDER:[TOKEN:[[id number]],FOLLOWUP:[2 weeks]],PROVIDER:[TOKEN:[[id number]],FOLLOWUP:[2 weeks]]

## 2022-05-14 NOTE — PROGRESS NOTE ADULT - SUBJECTIVE AND OBJECTIVE BOX
seen and examined  no distress   lying comfortable         PAST HISTORY  --------------------------------------------------------------------------------  No significant changes to PMH, PSH, FHx, SHx, unless otherwise noted    ALLERGIES & MEDICATIONS  --------------------------------------------------------------------------------  Allergies    Augmentin (Rash)  oxycodone (Pruritus)    Intolerances      Standing Inpatient Medications  albuterol/ipratropium for Nebulization 3 milliLiter(s) Nebulizer every 6 hours  apixaban 5 milliGRAM(s) Oral two times a day  dextrose 5%. 1000 milliLiter(s) IV Continuous <Continuous>  dextrose 5%. 1000 milliLiter(s) IV Continuous <Continuous>  dextrose 50% Injectable 25 Gram(s) IV Push once  dextrose 50% Injectable 12.5 Gram(s) IV Push once  dextrose 50% Injectable 25 Gram(s) IV Push once  glucagon  Injectable 1 milliGRAM(s) IntraMuscular once  insulin glargine Injectable (LANTUS) 20 Unit(s) SubCutaneous every morning  insulin lispro (ADMELOG) corrective regimen sliding scale   SubCutaneous three times a day before meals  insulin lispro Injectable (ADMELOG) 9 Unit(s) SubCutaneous three times a day before meals  metoprolol tartrate 100 milliGRAM(s) Oral two times a day  pantoprazole    Tablet 40 milliGRAM(s) Oral before breakfast  polyethylene glycol 3350 17 Gram(s) Oral daily  predniSONE   Tablet 40 milliGRAM(s) Oral daily  senna 8.6 milliGRAM(s) Oral Tablet - Peds 2 Tablet(s) Oral at bedtime  sertraline 25 milliGRAM(s) Oral every 12 hours  sodium chloride 0.9%. 1000 milliLiter(s) IV Continuous <Continuous>    PRN Inpatient Medications  dextrose Oral Gel 15 Gram(s) Oral once PRN        VITALS/PHYSICAL EXAM  --------------------------------------------------------------------------------  T(C): 36.2 (05-14-22 @ 05:01), Max: 36.2 (05-14-22 @ 05:01)  HR: 62 (05-14-22 @ 05:58) (58 - 62)  BP: 154/63 (05-14-22 @ 05:01) (129/65 - 154/63)  RR: 18 (05-14-22 @ 05:01) (18 - 18)  SpO2: --  Wt(kg): --        Physical Exam:  	Gen: NAD  	Pulm: B/L ginachi at bases   	CV:  S1S2; no rub  	Abd: distended  	LE:  no edema      LABS/STUDIES  --------------------------------------------------------------------------------              10.1   10.29 >-----------<  254      [05-13-22 @ 08:31]              30.1     138  |  100  |  89  ----------------------------<  80      [05-13-22 @ 08:31]  3.5   |  26  |  1.8        Ca     7.9     [05-13-22 @ 08:31]      Mg     2.5     [05-13-22 @ 08:31]      Phos  3.6     [05-13-22 @ 08:31]    TPro  7.3  /  Alb  3.3  /  TBili  0.9  /  DBili  x   /  AST  71  /  ALT  50  /  AlkPhos  121  [05-13-22 @ 08:31]      Creatinine Trend:  SCr 1.8 [05-13 @ 08:31]  SCr 2.1 [05-12 @ 08:58]  SCr 2.0 [05-11 @ 06:06]  SCr 1.6 [05-10 @ 05:45]    Urinalysis - [05-13-22 @ 05:50]      Color Yellow / Appearance Clear / SG 1.017 / pH 6.0      Gluc Negative / Ketone Negative  / Bili Negative / Urobili <2 mg/dL       Blood Negative / Protein Trace / Leuk Est Negative / Nitrite Negative      RBC  / WBC  / Hyaline  / Gran  / Sq Epi  / Non Sq Epi  / Bacteria     Urine Creatinine 63      [05-13-22 @ 05:50]  Urine Protein 17      [05-13-22 @ 05:50]  Urine Sodium <20.0      [05-12-22 @ 05:00]  Urine Urea Nitrogen 775      [05-12-22 @ 05:00]    Ferritin 87      [05-10-22 @ 19:45]  HbA1c 8.4      [09-11-19 @ 08:18]

## 2022-05-14 NOTE — PROGRESS NOTE ADULT - ASSESSMENT
83 y/o F with PMHx of Afib on Eliquis, HLD,  CAD, HFpEF (last EF 2019 65%), DMII, COPD on 3L O2, dementia presenting w/ SOB .    # TJ - likely prerenal  (urine Na <20)  baseline cr: 0.9, presented 2 slight imrpovment today  Renal US, no evidence of obstruction   Echo with EF of 51% and grade II diastolic dysfunction, Reduced RV function, severe TR. moderate pulmonary HTN   - resume Entresto if creatinine stable  - cr trending down   - document UO   - no iv fluids   - ph at goal   - post void bedside bladder scan 270 cc on official bladder sono - monitor  will follow

## 2022-05-14 NOTE — DISCHARGE NOTE NURSING/CASE MANAGEMENT/SOCIAL WORK - PATIENT PORTAL LINK FT
You can access the FollowMyHealth Patient Portal offered by [facility] by registering at the following website: [email or web address]. By joining [facility]’s FollowMyHealth portal, you will also be able to view your health information using other applications (apps) compatible with our system.

## 2022-05-14 NOTE — DISCHARGE NOTE PROVIDER - NSDCMRMEDTOKEN_GEN_ALL_CORE_FT
Advair Diskus 250 mcg-50 mcg inhalation powder: 1 puff(s) inhaled 2 times a day  Albuterol (Eqv-ProAir HFA) 90 mcg/inh inhalation aerosol: 2 puff(s) inhaled every 6 hours, As Needed -for shortness of breath and/or wheezing   docusate sodium 100 mg oral tablet: 1 tab(s) orally 2 times a day   Eliquis 5 mg oral tablet: 1 tab(s) orally 2 times a day  ipratropium-albuterol 0.5 mg-2.5 mg/3 mLinhalation solution: 3 milliliter(s) inhaled 2 times a day  Lantus 100 units/mL subcutaneous solution: 10 unit(s) subcutaneous once a day  metoprolol succinate 100 mg oral capsule, extended release: 1 cap(s) orally once a day   MiraLax oral powder for reconstitution: 17 gram(s) orally 2 times a day, As Needed -for constipation   Mucinex 600 mg oral tablet, extended release: 1 tab(s) orally 2 times a day   pantoprazole 40 mg oral delayed release tablet: 1 tab(s) orally once a day (at bedtime)   predniSONE 20 mg oral tablet: 2 tab(s) orally once a day  senna 8.6 mg oral tablet: 2 tab(s) orally once a day (at bedtime)   sertraline 25 mg oral tablet: 1 tab(s) orally every 12 hours

## 2022-05-14 NOTE — DISCHARGE NOTE PROVIDER - NSDCCPCAREPLAN_GEN_ALL_CORE_FT
PRINCIPAL DISCHARGE DIAGNOSIS  Diagnosis: Viral bronchitis  Assessment and Plan of Treatment: You came to the hospital with worsening shortness of breath and were found to be in asthma exacerbation due to a viral infection causing bronchitis. We treated you with steroids and nebulizer treatements and your symtoms are improving. Although your oxygen saturation is adequate on room air, you still are having mild wheezing so we are prescribing you an additional 5 day course of steroids. Please take all medications as presribed. We are also giving you a followup appointment with a pulmonologist, please follow up with them for future management of this condition.   If you develop worsening cough or shortness of breath, please seek evaluation by a medical professional.      SECONDARY DISCHARGE DIAGNOSES  Diagnosis: TJ (acute kidney injury)  Assessment and Plan of Treatment: During you hospital stay you were found to have an acute kidney injury, meaning your kidney was not working as well as it should be. We gave you fluids and your kidney function is improving. We stopped your water pills because your kidney function was likely due to over diuresis (dehydration). You did not need your water pills (diuretics) while in the hospital, so we are stopping them for now. Please follow with your cardiologist as a outpatient as well as your primary care doctor to decide if/when to restart your entresto. If you have reduced urine output or develop swelling, please seek evaluation by a medical professional.    Diagnosis: (HFpEF) heart failure with preserved ejection fraction  Assessment and Plan of Treatment: You have a history of heart failure. We did an echocardiogram on this admission and you were found to have a reduced ejection fraction from prior one in 2019 (65% decreased to 51% now) meaning your heart failure is slightly worse. Please follow with your cardiologist as a outpatient for additional workup of this condition.    Diagnosis: Type 2 diabetes mellitus  Assessment and Plan of Treatment: You have a history of type two diabetes. We resumed your home 40 units of insulin in your hospital however your blood sugar was too low during the day so we decreased it to 20 units. Your blood sugar was still slightly low on 20 units so on discharge we would like you to change your dose to 10 units once a day. Please continue to monitor your blood sugar levels at home and follow with your primary care doctor for monitoring of your diabetes.    Diagnosis: Viral bronchitis  Assessment and Plan of Treatment: You came to the hospital with worsening shortness of breath and were found to be in asthma exacerbation due to a viral infection causing bronchitis. We treated you with steroids and nebulizer treatements and your symtoms are improving. Although your oxygen saturation is adequate on room air, you still are having mild wheezing so we are prescribing you an additional 5 day course of steroids. Please take all medications as presribed. We are also giving you a followup appointment with a pulmonologist, please follow up with them for future management of this condition.   If you develop worsening cough or shortness of breath, please seek evaluation by a medical professional.    Diagnosis: Dementia  Assessment and Plan of Treatment: You have a history of dementia and take zolof and seroquel. Please continue Zolof but stop Seroquel as your EKG showed evidence of QTC prolongation, an electrical abnormality in the heart that can predisipose you to arrythmias. Seroquel can further prolong this making you at higher risk for arrythmia, so please stop taking it after discharge.

## 2022-05-14 NOTE — DISCHARGE NOTE PROVIDER - HOSPITAL COURSE
Pt is an 83 y/o F with PMHx of Afib on Eliquis, asthma, CAD, HFpEF (last EF 2019 65%), DM, COPD on 3L O2, dementia presenting w/ SOB    #COPD exacerbation 2/2 viral bronchitis - improved   >Uses 3LNC at home   >corona virus +  >lactate 2.2 on admission, improving  >procal 0.46>0.52  >Ddimer elevated, LE duplex negative     - ID recs appreciated; monitor off abx   - Saturating well on RA today 5/12, wheezing resolved   - s/p solumedrol, c/w prednisone 40mg qd (total 7d course)   - c/w duonebs q6h standing     #Afib (on eliquis), s/p PPM   #CAD  #HFpEF- not in exacerbation   >BNP 4262 on admission, baseline around 3600   >No evidence of volume overload currently (CXR clear, no crackles on pulmonary exam, no edema)   >TTE 2019 showing LVEF 65&   >Rpt TTE 5/11: LVEF 51%, multiple wall motion abnormalities; pHTN, grade 2 diastolic dysfxn  - cardiology consulted (Dr. Dukes) for ischemic workup given new TTE findings, f/u recs     - EP interogated device; no events; f/u o/p  - c/w eliquis  - on repatha; hold statin d/t mild transaminitis  - resume entresto once cr stable (will be done as outpatient)   - f/u with cardiology outpt for ischemic workup   - hold diuretics on d/c     #TJ on CKD3, Cr 1.6 on admission (baseline 0.9-1.0)- improving   >Basline cr 0.9-1.0   >Cr worsening, 1.6>2.0>1.8>1.5  >BUN/cr ratio ~over 2; prerenal?  >pt voiding, not in fluid overload, serum sodium nl  >kidney bladder US nl  >90cc on post void bladder scan 5/13 am    - Cr improving with fluids, will continue to hold diuretics on discharge   - nephro recs appreciated     #T2DM  - on lantus 40 at home  - pt mildly hypoglycemia today on lantus 20, will decrease to lantus 10 on dc with outpt f/u      #HLD  - on repatha and vascepa at home; not on statins due to hx of liver injury  - daughter wants pt on crestor 20; will hold off for now d/t mild transaminitis    
2/20/2018

## 2022-05-14 NOTE — PROGRESS NOTE ADULT - PROVIDER SPECIALTY LIST ADULT
Hospitalist
Internal Medicine
Nephrology
Hospitalist
Nephrology
Hospitalist
Internal Medicine
Hospitalist

## 2022-05-14 NOTE — PROGRESS NOTE ADULT - TIME BILLING
direct pt's, communication with medica team and family, chart review

## 2022-05-14 NOTE — PROGRESS NOTE ADULT - ASSESSMENT
ASSESSMENT & PLAN  Pt is an 81 y/o F with PMHx of Afib on Eliquis, asthma, CAD, HFpEF (last EF 2019 65%), DM, COPD on 3L O2, dementia presenting w/ SOB      # Asthma  exacerbation / viral bronchitis (corona virus +)  - pt received  one dose of Solumedrol 125 mg today, pt refusing to stay in the hospital   - monitor pulse Ox,  Uses 3LNC at home   - will d/c on  Prednisone po for 7 days total   - Nebs Q 6 hours       #Afib (on eliquis), s/p PPM / CAD/ Chronic diastolic CHF/ PHTN   - fluid restriction 1200 ml in 24 hours   - intake and output monitoring, daily weight    - BNP 4262 on admission, baseline around 3600   -Rpt TTE 5/11: LVEF 51%, multiple wall motion abnormalities  - cardiology consulted (Dr. Dukes) for ischemic workup given new TTE findings, f/u recs   - EP interrogated device; no events; f/u o/p  - c/w Eliquis  - on repatha; hold statin d/t mild transaminitis    #TJ on CKD III   - likely prerenal  - resolved after IV hydration   - monitor BUN/cr and urine output   - pt voiding, not in fluid overload, serum sodium nl  - kidney bladder US nl  - nephrology consulted, will follow up recommendations   - off diuretics now   - bedside bladder scan is negative for retention     #T2DM  - carb consistent diet   - monitor finger stick   - pt had a few episodes of hypoglycemia, will decrease the dose of Lantus to 10 units at AM   - ISS     #HLD  - on repatha and vascepa at home; not on statins due to hx of liver injury      #Dementia  - c/w  Zolof 25 mg BID      #Misc  - DVT ppx: eliquis  - GI ppx: protonix  - Diet: DASH/TLC  - Actitvity: IAT   - FULL CODE     #Progress Note Handoff  Pending (specify): none   Family discussion: I spoke with daughter at length pt and daughter agreed with a plan of care   Disposition: Home__x_/SNF___/Other________/Unknown at this time_____

## 2022-05-14 NOTE — DISCHARGE NOTE PROVIDER - CARE PROVIDER_API CALL
GIULIANO PALOMINO  Family Medicine  [address] [facility] [address]  [place] [address]  Phone: [phone]  Fax: [phone]  Follow Up Time: 1 week    Santiago Doherty)  Critical Care Medicine; Pulmonary Disease; Sleep Medicine  [address] [place] [address]  Phone: [phone]  Fax: [phone]  Follow Up Time: 2 weeks    Aretha Dukes  Diagnostic Radiology  [address] [place] [address]  Phone: [phone]  Fax: [phone]  Follow Up Time: 2 weeks

## 2022-05-14 NOTE — PROGRESS NOTE ADULT - REASON FOR ADMISSION
COVID-19, SOB

## 2022-05-24 DIAGNOSIS — E11.649 TYPE 2 DIABETES MELLITUS WITH HYPOGLYCEMIA WITHOUT COMA: ICD-10-CM

## 2022-05-24 DIAGNOSIS — Z95.0 PRESENCE OF CARDIAC PACEMAKER: ICD-10-CM

## 2022-05-24 DIAGNOSIS — J20.8 ACUTE BRONCHITIS DUE TO OTHER SPECIFIED ORGANISMS: ICD-10-CM

## 2022-05-24 DIAGNOSIS — I27.20 PULMONARY HYPERTENSION, UNSPECIFIED: ICD-10-CM

## 2022-05-24 DIAGNOSIS — Z79.01 LONG TERM (CURRENT) USE OF ANTICOAGULANTS: ICD-10-CM

## 2022-05-24 DIAGNOSIS — J44.1 CHRONIC OBSTRUCTIVE PULMONARY DISEASE WITH (ACUTE) EXACERBATION: ICD-10-CM

## 2022-05-24 DIAGNOSIS — I49.1 ATRIAL PREMATURE DEPOLARIZATION: ICD-10-CM

## 2022-05-24 DIAGNOSIS — N18.30 CHRONIC KIDNEY DISEASE, STAGE 3 UNSPECIFIED: ICD-10-CM

## 2022-05-24 DIAGNOSIS — Z79.4 LONG TERM (CURRENT) USE OF INSULIN: ICD-10-CM

## 2022-05-24 DIAGNOSIS — F03.90 UNSPECIFIED DEMENTIA WITHOUT BEHAVIORAL DISTURBANCE: ICD-10-CM

## 2022-05-24 DIAGNOSIS — E11.22 TYPE 2 DIABETES MELLITUS WITH DIABETIC CHRONIC KIDNEY DISEASE: ICD-10-CM

## 2022-05-24 DIAGNOSIS — J45.901 UNSPECIFIED ASTHMA WITH (ACUTE) EXACERBATION: ICD-10-CM

## 2022-05-24 DIAGNOSIS — R06.02 SHORTNESS OF BREATH: ICD-10-CM

## 2022-05-24 DIAGNOSIS — Z79.84 LONG TERM (CURRENT) USE OF ORAL HYPOGLYCEMIC DRUGS: ICD-10-CM

## 2022-05-24 DIAGNOSIS — J44.0 CHRONIC OBSTRUCTIVE PULMONARY DISEASE WITH (ACUTE) LOWER RESPIRATORY INFECTION: ICD-10-CM

## 2022-05-24 DIAGNOSIS — N17.9 ACUTE KIDNEY FAILURE, UNSPECIFIED: ICD-10-CM

## 2022-05-24 DIAGNOSIS — I13.0 HYPERTENSIVE HEART AND CHRONIC KIDNEY DISEASE WITH HEART FAILURE AND STAGE 1 THROUGH STAGE 4 CHRONIC KIDNEY DISEASE, OR UNSPECIFIED CHRONIC KIDNEY DISEASE: ICD-10-CM

## 2022-05-24 DIAGNOSIS — E78.5 HYPERLIPIDEMIA, UNSPECIFIED: ICD-10-CM

## 2022-05-24 DIAGNOSIS — Z20.822 CONTACT WITH AND (SUSPECTED) EXPOSURE TO COVID-19: ICD-10-CM

## 2022-05-24 DIAGNOSIS — I25.10 ATHEROSCLEROTIC HEART DISEASE OF NATIVE CORONARY ARTERY WITHOUT ANGINA PECTORIS: ICD-10-CM

## 2022-05-24 DIAGNOSIS — I48.20 CHRONIC ATRIAL FIBRILLATION, UNSPECIFIED: ICD-10-CM

## 2022-05-24 DIAGNOSIS — I50.32 CHRONIC DIASTOLIC (CONGESTIVE) HEART FAILURE: ICD-10-CM

## 2022-05-24 DIAGNOSIS — I07.1 RHEUMATIC TRICUSPID INSUFFICIENCY: ICD-10-CM

## 2022-06-02 NOTE — PATIENT PROFILE ADULT - ANY IMPAIRED UPPER EXTREMITY FUNCTION WITHIN A WEEK PRIOR TO ADMISSION RELATED TO?
eKsha Babin is a 40 year old female presenting with grieving. Feeling stressed and has trouble concentrating. Loss of appetite. Trouble sleeping.  Pt reports no other concerns.  Refills no  Advance Directives:  not discussed   PHQ-9 0            Denies known Latex allergy or symptoms of Latex sensitivity.    Medications reviewed and updated.  Social History     Tobacco Use   Smoking Status Never Smoker   Smokeless Tobacco Never Used           Health Maintenance Due   Topic Date Due   • Depression Screening  08/25/2022       Patient is due for the topics as listed above and wishes to proceed with them. Orders placed for Depression Screening .  Other    no

## 2022-06-14 ENCOUNTER — APPOINTMENT (OUTPATIENT)
Dept: CARDIOLOGY | Facility: CLINIC | Age: 82
End: 2022-06-14

## 2022-10-02 ENCOUNTER — INPATIENT (INPATIENT)
Facility: HOSPITAL | Age: 82
LOS: 3 days | Discharge: HOME HEALTH | End: 2022-10-06
Attending: INTERNAL MEDICINE | Admitting: INTERNAL MEDICINE

## 2022-10-02 VITALS
DIASTOLIC BLOOD PRESSURE: 103 MMHG | RESPIRATION RATE: 20 BRPM | OXYGEN SATURATION: 97 % | WEIGHT: 149.91 LBS | SYSTOLIC BLOOD PRESSURE: 167 MMHG | HEIGHT: 60 IN | TEMPERATURE: 100 F | HEART RATE: 126 BPM

## 2022-10-02 DIAGNOSIS — Z90.49 ACQUIRED ABSENCE OF OTHER SPECIFIED PARTS OF DIGESTIVE TRACT: Chronic | ICD-10-CM

## 2022-10-02 DIAGNOSIS — Z95.0 PRESENCE OF CARDIAC PACEMAKER: Chronic | ICD-10-CM

## 2022-10-02 LAB
ALBUMIN SERPL ELPH-MCNC: 3.4 G/DL — LOW (ref 3.5–5.2)
ALP SERPL-CCNC: 73 U/L — SIGNIFICANT CHANGE UP (ref 30–115)
ALT FLD-CCNC: 16 U/L — SIGNIFICANT CHANGE UP (ref 0–41)
AMMONIA BLD-MCNC: 69 UMOL/L — HIGH (ref 11–55)
ANION GAP SERPL CALC-SCNC: 14 MMOL/L — SIGNIFICANT CHANGE UP (ref 7–14)
APPEARANCE UR: ABNORMAL
APTT BLD: 34.2 SEC — SIGNIFICANT CHANGE UP (ref 27–39.2)
AST SERPL-CCNC: 40 U/L — SIGNIFICANT CHANGE UP (ref 0–41)
BACTERIA # UR AUTO: ABNORMAL
BASE EXCESS BLDV CALC-SCNC: 10.7 MMOL/L — HIGH (ref -2–3)
BASE EXCESS BLDV CALC-SCNC: 7 MMOL/L — HIGH (ref -2–3)
BASOPHILS # BLD AUTO: 0 K/UL — SIGNIFICANT CHANGE UP (ref 0–0.2)
BASOPHILS NFR BLD AUTO: 0 % — SIGNIFICANT CHANGE UP (ref 0–1)
BILIRUB SERPL-MCNC: 1.7 MG/DL — HIGH (ref 0.2–1.2)
BILIRUB UR-MCNC: NEGATIVE — SIGNIFICANT CHANGE UP
BUN SERPL-MCNC: 29 MG/DL — HIGH (ref 10–20)
CA-I SERPL-SCNC: 1.11 MMOL/L — LOW (ref 1.15–1.33)
CA-I SERPL-SCNC: 1.17 MMOL/L — SIGNIFICANT CHANGE UP (ref 1.15–1.33)
CALCIUM SERPL-MCNC: 8.8 MG/DL — SIGNIFICANT CHANGE UP (ref 8.4–10.5)
CHLORIDE SERPL-SCNC: 92 MMOL/L — LOW (ref 98–110)
CO2 SERPL-SCNC: 28 MMOL/L — SIGNIFICANT CHANGE UP (ref 17–32)
COLOR SPEC: SIGNIFICANT CHANGE UP
CREAT SERPL-MCNC: 1.1 MG/DL — SIGNIFICANT CHANGE UP (ref 0.7–1.5)
DIFF PNL FLD: ABNORMAL
EGFR: 50 ML/MIN/1.73M2 — LOW
EOSINOPHIL # BLD AUTO: 0 K/UL — SIGNIFICANT CHANGE UP (ref 0–0.7)
EOSINOPHIL NFR BLD AUTO: 0 % — SIGNIFICANT CHANGE UP (ref 0–8)
EPI CELLS # UR: 4 /HPF — SIGNIFICANT CHANGE UP (ref 0–5)
GAS PNL BLDV: 131 MMOL/L — LOW (ref 136–145)
GAS PNL BLDV: 133 MMOL/L — LOW (ref 136–145)
GAS PNL BLDV: SIGNIFICANT CHANGE UP
GAS PNL BLDV: SIGNIFICANT CHANGE UP
GIANT PLATELETS BLD QL SMEAR: PRESENT — SIGNIFICANT CHANGE UP
GLUCOSE SERPL-MCNC: 139 MG/DL — HIGH (ref 70–99)
GLUCOSE UR QL: NEGATIVE — SIGNIFICANT CHANGE UP
HCO3 BLDV-SCNC: 32 MMOL/L — HIGH (ref 22–29)
HCO3 BLDV-SCNC: 35 MMOL/L — HIGH (ref 22–29)
HCT VFR BLD CALC: 36.4 % — LOW (ref 37–47)
HCT VFR BLDA CALC: 38 % — LOW (ref 39–51)
HCT VFR BLDA CALC: 42 % — SIGNIFICANT CHANGE UP (ref 39–51)
HGB BLD CALC-MCNC: 12.5 G/DL — LOW (ref 12.6–17.4)
HGB BLD CALC-MCNC: 13.9 G/DL — SIGNIFICANT CHANGE UP (ref 12.6–17.4)
HGB BLD-MCNC: 12.7 G/DL — SIGNIFICANT CHANGE UP (ref 12–16)
HYALINE CASTS # UR AUTO: 2 /LPF — SIGNIFICANT CHANGE UP (ref 0–7)
INR BLD: 1.44 RATIO — HIGH (ref 0.65–1.3)
KETONES UR-MCNC: NEGATIVE — SIGNIFICANT CHANGE UP
LACTATE BLDV-MCNC: 3.3 MMOL/L — HIGH (ref 0.5–2)
LACTATE BLDV-MCNC: 3.8 MMOL/L — HIGH (ref 0.5–2)
LACTATE SERPL-SCNC: 2.7 MMOL/L — HIGH (ref 0.7–2)
LEUKOCYTE ESTERASE UR-ACNC: ABNORMAL
LYMPHOCYTES # BLD AUTO: 0.66 K/UL — LOW (ref 1.2–3.4)
LYMPHOCYTES # BLD AUTO: 19.3 % — LOW (ref 20.5–51.1)
MAGNESIUM SERPL-MCNC: 1.6 MG/DL — LOW (ref 1.8–2.4)
MANUAL SMEAR VERIFICATION: SIGNIFICANT CHANGE UP
MCHC RBC-ENTMCNC: 31.4 PG — HIGH (ref 27–31)
MCHC RBC-ENTMCNC: 34.9 G/DL — SIGNIFICANT CHANGE UP (ref 32–37)
MCV RBC AUTO: 89.9 FL — SIGNIFICANT CHANGE UP (ref 81–99)
METAMYELOCYTES # FLD: 1.8 % — HIGH (ref 0–0)
MONOCYTES # BLD AUTO: 0.06 K/UL — LOW (ref 0.1–0.6)
MONOCYTES NFR BLD AUTO: 1.7 % — SIGNIFICANT CHANGE UP (ref 1.7–9.3)
NEUTROPHILS # BLD AUTO: 2.59 K/UL — SIGNIFICANT CHANGE UP (ref 1.4–6.5)
NEUTROPHILS NFR BLD AUTO: 76.3 % — HIGH (ref 42.2–75.2)
NITRITE UR-MCNC: NEGATIVE — SIGNIFICANT CHANGE UP
NT-PROBNP SERPL-SCNC: 3635 PG/ML — HIGH (ref 0–300)
PCO2 BLDV: 44 MMHG — HIGH (ref 39–42)
PCO2 BLDV: 49 MMHG — HIGH (ref 39–42)
PH BLDV: 7.43 — SIGNIFICANT CHANGE UP (ref 7.32–7.43)
PH BLDV: 7.51 — HIGH (ref 7.32–7.43)
PH UR: 7 — SIGNIFICANT CHANGE UP (ref 5–8)
PLAT MORPH BLD: ABNORMAL
PLATELET # BLD AUTO: 172 K/UL — SIGNIFICANT CHANGE UP (ref 130–400)
PO2 BLDV: 47 MMHG — SIGNIFICANT CHANGE UP
PO2 BLDV: 54 MMHG — SIGNIFICANT CHANGE UP
POIKILOCYTOSIS BLD QL AUTO: SLIGHT — SIGNIFICANT CHANGE UP
POTASSIUM BLDV-SCNC: 2.2 MMOL/L — CRITICAL LOW (ref 3.5–5.1)
POTASSIUM BLDV-SCNC: 3.7 MMOL/L — SIGNIFICANT CHANGE UP (ref 3.5–5.1)
POTASSIUM SERPL-MCNC: 3.2 MMOL/L — LOW (ref 3.5–5)
POTASSIUM SERPL-SCNC: 3.2 MMOL/L — LOW (ref 3.5–5)
PROT SERPL-MCNC: 7.9 G/DL — SIGNIFICANT CHANGE UP (ref 6–8)
PROT UR-MCNC: SIGNIFICANT CHANGE UP
PROTHROM AB SERPL-ACNC: 16.6 SEC — HIGH (ref 9.95–12.87)
RBC # BLD: 4.05 M/UL — LOW (ref 4.2–5.4)
RBC # FLD: 13.9 % — SIGNIFICANT CHANGE UP (ref 11.5–14.5)
RBC BLD AUTO: NORMAL — SIGNIFICANT CHANGE UP
RBC CASTS # UR COMP ASSIST: 13 /HPF — HIGH (ref 0–4)
SAO2 % BLDV: 82 % — SIGNIFICANT CHANGE UP
SAO2 % BLDV: 88.7 % — SIGNIFICANT CHANGE UP
SARS-COV-2 RNA SPEC QL NAA+PROBE: DETECTED
SODIUM SERPL-SCNC: 134 MMOL/L — LOW (ref 135–146)
SP GR SPEC: 1.01 — SIGNIFICANT CHANGE UP (ref 1.01–1.03)
TROPONIN T SERPL-MCNC: 0.03 NG/ML — CRITICAL HIGH
UROBILINOGEN FLD QL: SIGNIFICANT CHANGE UP
VARIANT LYMPHS # BLD: 0.9 % — SIGNIFICANT CHANGE UP (ref 0–5)
WBC # BLD: 3.4 K/UL — LOW (ref 4.8–10.8)
WBC # FLD AUTO: 3.4 K/UL — LOW (ref 4.8–10.8)
WBC UR QL: 34 /HPF — HIGH (ref 0–5)

## 2022-10-02 PROCEDURE — 74177 CT ABD & PELVIS W/CONTRAST: CPT | Mod: 26,MA

## 2022-10-02 PROCEDURE — 70450 CT HEAD/BRAIN W/O DYE: CPT | Mod: 26,MA

## 2022-10-02 PROCEDURE — 93010 ELECTROCARDIOGRAM REPORT: CPT

## 2022-10-02 PROCEDURE — 99221 1ST HOSP IP/OBS SF/LOW 40: CPT | Mod: GC

## 2022-10-02 PROCEDURE — 71045 X-RAY EXAM CHEST 1 VIEW: CPT | Mod: 26

## 2022-10-02 PROCEDURE — 99285 EMERGENCY DEPT VISIT HI MDM: CPT | Mod: CS,GC

## 2022-10-02 PROCEDURE — 76705 ECHO EXAM OF ABDOMEN: CPT | Mod: 26

## 2022-10-02 RX ORDER — POTASSIUM CHLORIDE 20 MEQ
20 PACKET (EA) ORAL ONCE
Refills: 0 | Status: COMPLETED | OUTPATIENT
Start: 2022-10-02 | End: 2022-10-02

## 2022-10-02 RX ORDER — SERTRALINE 25 MG/1
25 TABLET, FILM COATED ORAL DAILY
Refills: 0 | Status: DISCONTINUED | OUTPATIENT
Start: 2022-10-02 | End: 2022-10-06

## 2022-10-02 RX ORDER — SODIUM CHLORIDE 9 MG/ML
2000 INJECTION, SOLUTION INTRAVENOUS ONCE
Refills: 0 | Status: COMPLETED | OUTPATIENT
Start: 2022-10-02 | End: 2022-10-02

## 2022-10-02 RX ORDER — VANCOMYCIN HCL 1 G
1000 VIAL (EA) INTRAVENOUS ONCE
Refills: 0 | Status: COMPLETED | OUTPATIENT
Start: 2022-10-02 | End: 2022-10-02

## 2022-10-02 RX ORDER — INSULIN GLARGINE 100 [IU]/ML
42 INJECTION, SOLUTION SUBCUTANEOUS AT BEDTIME
Refills: 0 | Status: DISCONTINUED | OUTPATIENT
Start: 2022-10-02 | End: 2022-10-05

## 2022-10-02 RX ORDER — METOPROLOL TARTRATE 50 MG
100 TABLET ORAL DAILY
Refills: 0 | Status: DISCONTINUED | OUTPATIENT
Start: 2022-10-02 | End: 2022-10-04

## 2022-10-02 RX ORDER — AZTREONAM 2 G
500 VIAL (EA) INJECTION EVERY 8 HOURS
Refills: 0 | Status: COMPLETED | OUTPATIENT
Start: 2022-10-02 | End: 2022-10-02

## 2022-10-02 RX ORDER — AZTREONAM 2 G
500 VIAL (EA) INJECTION ONCE
Refills: 0 | Status: COMPLETED | OUTPATIENT
Start: 2022-10-02 | End: 2022-10-02

## 2022-10-02 RX ORDER — NOREPINEPHRINE BITARTRATE/D5W 8 MG/250ML
0.05 PLASTIC BAG, INJECTION (ML) INTRAVENOUS
Qty: 8 | Refills: 0 | Status: DISCONTINUED | OUTPATIENT
Start: 2022-10-02 | End: 2022-10-04

## 2022-10-02 RX ORDER — ALBUTEROL 90 UG/1
2 AEROSOL, METERED ORAL EVERY 6 HOURS
Refills: 0 | Status: DISCONTINUED | OUTPATIENT
Start: 2022-10-02 | End: 2022-10-06

## 2022-10-02 RX ORDER — AZTREONAM 2 G
500 VIAL (EA) INJECTION EVERY 8 HOURS
Refills: 0 | Status: DISCONTINUED | OUTPATIENT
Start: 2022-10-02 | End: 2022-10-03

## 2022-10-02 RX ORDER — SODIUM CHLORIDE 9 MG/ML
500 INJECTION, SOLUTION INTRAVENOUS ONCE
Refills: 0 | Status: COMPLETED | OUTPATIENT
Start: 2022-10-02 | End: 2022-10-02

## 2022-10-02 RX ORDER — AZTREONAM 2 G
VIAL (EA) INJECTION
Refills: 0 | Status: COMPLETED | OUTPATIENT
Start: 2022-10-02 | End: 2022-10-02

## 2022-10-02 RX ORDER — HYDROCORTISONE 20 MG
50 TABLET ORAL EVERY 6 HOURS
Refills: 0 | Status: DISCONTINUED | OUTPATIENT
Start: 2022-10-02 | End: 2022-10-03

## 2022-10-02 RX ORDER — TIOTROPIUM BROMIDE 18 UG/1
1 CAPSULE ORAL; RESPIRATORY (INHALATION) DAILY
Refills: 0 | Status: DISCONTINUED | OUTPATIENT
Start: 2022-10-02 | End: 2022-10-06

## 2022-10-02 RX ORDER — ACETAMINOPHEN 500 MG
975 TABLET ORAL ONCE
Refills: 0 | Status: COMPLETED | OUTPATIENT
Start: 2022-10-02 | End: 2022-10-02

## 2022-10-02 RX ORDER — POTASSIUM CHLORIDE 20 MEQ
40 PACKET (EA) ORAL ONCE
Refills: 0 | Status: COMPLETED | OUTPATIENT
Start: 2022-10-02 | End: 2022-10-02

## 2022-10-02 RX ORDER — PANTOPRAZOLE SODIUM 20 MG/1
40 TABLET, DELAYED RELEASE ORAL
Refills: 0 | Status: DISCONTINUED | OUTPATIENT
Start: 2022-10-02 | End: 2022-10-06

## 2022-10-02 RX ORDER — APIXABAN 2.5 MG/1
5 TABLET, FILM COATED ORAL
Refills: 0 | Status: DISCONTINUED | OUTPATIENT
Start: 2022-10-03 | End: 2022-10-06

## 2022-10-02 RX ADMIN — Medication 975 MILLIGRAM(S): at 15:09

## 2022-10-02 RX ADMIN — Medication 50 MILLIGRAM(S): at 22:03

## 2022-10-02 RX ADMIN — Medication 50 MILLIEQUIVALENT(S): at 17:33

## 2022-10-02 RX ADMIN — SODIUM CHLORIDE 500 MILLILITER(S): 9 INJECTION, SOLUTION INTRAVENOUS at 14:02

## 2022-10-02 RX ADMIN — Medication 50 MILLIGRAM(S): at 15:07

## 2022-10-02 RX ADMIN — Medication 40 MILLIEQUIVALENT(S): at 17:38

## 2022-10-02 RX ADMIN — Medication 6.38 MICROGRAM(S)/KG/MIN: at 17:50

## 2022-10-02 RX ADMIN — Medication 250 MILLIGRAM(S): at 14:02

## 2022-10-02 RX ADMIN — SODIUM CHLORIDE 2000 MILLILITER(S): 9 INJECTION, SOLUTION INTRAVENOUS at 14:08

## 2022-10-02 RX ADMIN — Medication 975 MILLIGRAM(S): at 14:05

## 2022-10-02 NOTE — ED PROVIDER NOTE - NS ED ATTENDING STATEMENT MOD
Airway patent, Nasal mucosa clear. Mouth with normal mucosa. Throat has no vesicles, no oropharyngeal exudates and uvula is midline.
I have personally provided the amount of critical care time documented below concurrently with the resident/fellow.  This time excludes time spent on separate procedures and time spent teaching. I have reviewed the resident’s / fellow’s documentation and I agree with the history, exam, and assessment and plan of care.

## 2022-10-02 NOTE — ED ADULT NURSE NOTE - LANGUAGE ASSISTANCE NEEDED
Bepreve OU QD-BID; change to Pazeo due to insurance plan. No-Patient/Caregiver offered and refused free interpretation services.

## 2022-10-02 NOTE — ED PROVIDER NOTE - PHYSICAL EXAMINATION
CONSTITUTIONAL: Moans to pain  SKIN: Warm dry  HEAD: NCAT  EYES: NL inspection  ENT: MMM  NECK: Supple; non tender.  CARD: tachy  RESP: CTAB  ABD: S/NT no R/G  EXT: no pedal edema  NEURO: Grossly unremarkable  PSYCH: Cooperative

## 2022-10-02 NOTE — ED PROVIDER NOTE - OBJECTIVE STATEMENT
82 y f, pmh of cad, pmh of afib on eliquis, dm, hld, tm, copd pw chest pain, started yesterday, associated with diffuse weakness. Pt also less responsive than usual, not able to qualify the pain. +fever in triage.

## 2022-10-02 NOTE — ED PROVIDER NOTE - NSICDXPASTMEDICALHX_GEN_ALL_CORE_FT
PAST MEDICAL HISTORY:  Afib on eliquis    Asthma     CAD (coronary atherosclerotic disease)     COPD exacerbation     CVA (cerebrovascular accident)     Diabetes     Diastolic heart failure     Hyperlipidemia     Type 2 diabetes mellitus

## 2022-10-02 NOTE — ED PROVIDER NOTE - CARE PLAN
1 Principal Discharge DX:	Sepsis  Secondary Diagnosis:	Acute UTI  Secondary Diagnosis:	Demand ischemia of myocardium

## 2022-10-02 NOTE — H&P ADULT - NSHPLABSRESULTS_GEN_ALL_CORE
LABS:  cret                        12.7   3.40  )-----------( 172      ( 02 Oct 2022 13:40 )             36.4     10-02    134<L>  |  92<L>  |  29<H>  ----------------------------<  139<H>  3.2<L>   |  28  |  1.1    Ca    8.8      02 Oct 2022 13:40  Mg     1.6     10-02    TPro  7.9  /  Alb  3.4<L>  /  TBili  1.7<H>  /  DBili  x   /  AST  40  /  ALT  16  /  AlkPhos  73  10-02    PT/INR - ( 02 Oct 2022 13:40 )   PT: 16.60 sec;   INR: 1.44 ratio         PTT - ( 02 Oct 2022 13:40 )  PTT:34.2 sec

## 2022-10-02 NOTE — ED ADULT NURSE NOTE - NEGATIVE SCREENING
Following up this am to confirm no questions prior to d/c. No needs and pt has plans for the appropriate follow up care for her and baby. CM signing off. .

## 2022-10-02 NOTE — ED ADULT NURSE NOTE - NSICDXPASTMEDICALHX_GEN_ALL_CORE_FT
PAST MEDICAL HISTORY:  Afib on eliquis    Asthma     CAD (coronary atherosclerotic disease)     COPD exacerbation     CVA (cerebrovascular accident)     Dementia     Diabetes     Diastolic heart failure     Hyperlipidemia     Type 2 diabetes mellitus

## 2022-10-02 NOTE — ED PROVIDER NOTE - CLINICAL SUMMARY MEDICAL DECISION MAKING FREE TEXT BOX
Patient presented with reported chest pain, generalized weakness, AMS. Patient is somnolent but arousable on arrival, unable to provide further history. Otherwise on arrival patient (+) febrile with soft BP in high 90s systolic but protecting airway, normal O2 saturation on RA. Seen on arrival at which time patient started on IV abx and IVF bolus for presumed sepsis. EKG obtained which showed (+) afib with RVR but also showed (+) elevation in aVR with diffuse depressions and therefore code STEMI called - cardiology evaluated patient in ED and subsequently cancelled code STEMI but will follow. Obtained labs which showed (+) mild troponin elevation but otherwise were grossly unremarkable including no significant leukocytosis, anemia, signs of dehydration/TJ, transaminitis or significant electrolyte abnormalities. UA showed (+) UTI which is likely source of sepsis. CT head for AMS and CT abd/pelvis for UTI/sepsis both negative for emergent processes. Despite IVF patient's BP remained soft and ultimately required peripheral levophed with improvement of BP. Consulted ICU who will admit to their service for further management. HD stable on pressors at time of admission.

## 2022-10-02 NOTE — ED ADULT NURSE NOTE - NSICDXPASTSURGICALHX_GEN_ALL_CORE_FT
PAST SURGICAL HISTORY:  S/P appendectomy      PAST SURGICAL HISTORY:  Pacemaker     S/P appendectomy

## 2022-10-02 NOTE — CONSULT NOTE ADULT - SUBJECTIVE AND OBJECTIVE BOX
HPI:  82 PMH Afib on eliquis, asthma, CAD, HFpEF, DMII, COPD presenting for AMS, diarrhea and generalized pain. Family at bedside. reports pt has been lethargic and complaining of generalized pain with diarrhea for the past 48 hrs. On presentation pt had 103.6 fever. EKG performed. Code STEMI called    PAST MEDICAL & SURGICAL HISTORY  Afib  on eliquis    Diabetes    Asthma    CAD (coronary atherosclerotic disease)    Diastolic heart failure    COPD exacerbation    Hyperlipidemia    CVA (cerebrovascular accident)    Type 2 diabetes mellitus    Dementia    S/P appendectomy        FAMILY HISTORY:  FAMILY HISTORY:      SOCIAL HISTORY:  []smoker  []Alcohol  []Drug    ALLERGIES:  Augmentin (Rash)  oxycodone (Pruritus)      MEDICATIONS:  MEDICATIONS  (STANDING):  aztreonam  IVPB      lactated ringers Bolus 500 milliLiter(s) IV Bolus once  vancomycin  IVPB 1000 milliGRAM(s) IV Intermittent once    MEDICATIONS  (PRN):      HOME MEDICATIONS:  Home Medications:  ipratropium-albuterol 0.5 mg-2.5 mg/3 mLinhalation solution: 3 milliliter(s) inhaled 2 times a day (10 Sep 2019 01:39)  Lantus 100 units/mL subcutaneous solution: 10 unit(s) subcutaneous once a day (14 May 2022 11:58)  sertraline 25 mg oral tablet: 1 tab(s) orally every 12 hours (14 May 2022 11:58)      VITALS:   T(F): 100 (10-02 @ 12:57), Max: 100 (10-02 @ 12:57)  HR: 126 (10-02 @ 12:57) (126 - 126)  BP: 167/103 (10-02 @ 12:57) (167/103 - 167/103)  BP(mean): --  RR: 20 (10-02 @ 12:57) (20 - 20)  SpO2: 97% (10-02 @ 12:57) (97% - 97%)    I&O's Summary      REVIEW OF SYSTEMS:  CONSTITUTIONAL: Weakness, Fever  EYES: No visual changes  ENT: No vertigo or throat pain   NECK: No pain or stiffness  RESPIRATORY:  shortness of breath  CARDIOVASCULAR: Chest Pain  GASTROINTESTINAL: Abd pain and diarrhea  GENITOURINARY: No dysuria, frequency or hematuria  NEUROLOGICAL: No numbness or weakness  SKIN: No itching, no rashes  MSK: no    PHYSICAL EXAM:  NEURO: patient is lethargic  GEN: Not in acute distress  NECK: no thyroid enlargement, no JVD  LUNGS: Clear to auscultation bilaterally   CARDIOVASCULAR: Tachycardic irregular rate and rhythm  ABD: Soft, non-tender, non-distended, +BS  EXT: No VINNY  SKIN: Intact    LABS:                    Troponin trend:            RADIOLOGY:  -CXR:  -TTE:  -CCTA:  -STRESS TEST:  -CATHETERIZATION:    ECG:  Afib RVR, diffuse ST depression likely subendocardial ischemia  TELEMETRY EVENTS:

## 2022-10-02 NOTE — H&P ADULT - ATTENDING COMMENTS
events noted, altered MS, fever, toxic metabolic, covid +, not vaccinated, ua +, on NC, IVF, taper pressors, abx, swab for MRSA, trend markes, Eliquis, pancx, ID eval, GOC

## 2022-10-02 NOTE — H&P ADULT - ASSESSMENT
This is a case of a 82 year old lady, Peruvian speaking, Known to have Afib on eliquis, asthma, CAD, HFpEF, DMII, COPD presenting for AMS, diarrhea and generalized pain. Family at bedside, history obtained from daughter. She reports that pt has been lethargic and complaining of generalized pain with diarrhea for the past 48 hrs. Admit to  This is a case of a 82 year old lady, Georgian speaking, Known to have Afib on eliquis, asthma, CAD, HFpEF, DMII, COPD presenting for AMS, diarrhea and generalized pain. Family at bedside, history obtained from daughter. She reports that pt has been lethargic and complaining of generalized pain with diarrhea for the past 48 hrs. Admittd to ICU for further workup and management    Impression  # Septic shock  # COVID PCR +ve  # Urosepsis  # Troponemia  # AMS  # A-fib  # Asthma  # COPD  # Hypokalemia  # Hypomagnesemia    PLAN:     NEUROLOGY: off sedation, lethargic    CARDIOVASCULAR: afib on eliquis, metoprolol for rate control. on levophed 0.05mcg. wean pressors, cardiac monitoring.    PULMONARY: Currently on 3L NC, COVID PCR +ve, CXR no evidence of cardiopulmonary disease, monitor ABG in AM. hydrocortisone 50mg q6    GASTROINTESTINAL: protonix, asymptomatic, complaining of diarrhea, holding home laxatives. monitor BM    RENAL: Repleted Mg and K, F/u lytes. trend Cr. Keep I=O    INFECTIOUS DISEASE: UA +ve, COVID PCR +ve, s/p aztreonam and vanc in the ED. Continue with Vanc. ID consulted    ENDOCRINE: F/u AM A1c. on insulin protocol    HEME: trend CBC, transfuse if Hb<7      FLUIDS/ELECTROLYTES/NUTRITION:  -Monitor, Replete to K>4 and Mg>2  -Diet: NPO for now    PROPHYLAXIS  -DVT: HSQ  -GI- PPI qd    DISPO: MICU  FULL CODE This is a case of a 82 year old lady, [place] Islander speaking, Known to have Afib on eliquis, asthma, CAD, HFpEF, DMII, COPD presenting for AMS, diarrhea and generalized pain. Family at bedside, history obtained from daughter. She reports that pt has been lethargic and complaining of generalized pain with diarrhea for the past 48 hrs. Admittd to ICU for further workup and management    Impression    # Sepsis/ present on admission  # COVID PCR +ve  # Urosepsis  # Troponemia  # AMS  # A-fib  # Asthma  # COPD  # Hypokalemia  # Hypomagnesemia    PLAN:     NEUROLOGY: off sedation, lethargic    CARDIOVASCULAR: afib on eliquis, metoprolol for rate control. on levophed 0.05mcg. wean pressors, cardiac monitoring.    PULMONARY: Currently on 3L NC, COVID PCR +ve, CXR no evidence of cardiopulmonary disease, monitor ABG in AM. hydrocortisone 50mg q6    GASTROINTESTINAL: protonix, asymptomatic, complaining of diarrhea, holding home laxatives. monitor BM    RENAL: Repleted Mg and K, F/u lytes. trend Cr. Keep I=O    INFECTIOUS DISEASE: UA +ve, COVID PCR +ve, s/p aztreonam and vanc in the ED. Continue with Vanc. ID consulted    ENDOCRINE: F/u AM A1c. on insulin protocol    HEME: trend CBC, transfuse if Hb<7      FLUIDS/ELECTROLYTES/NUTRITION:  -Monitor, Replete to K>4 and Mg>2  -Diet: NPO for now    PROPHYLAXIS  -DVT: HSQ  -GI- PPI qd    DISPO: MICU  FULL CODE

## 2022-10-02 NOTE — ED ADULT NURSE NOTE - OBJECTIVE STATEMENT
Pt c/o generalized weakness, cough, chest pain from yesterday Pt c/o generalized weakness, cough, chest pain from yesterday. Pt was COVID "+" 3 weeks ago

## 2022-10-02 NOTE — H&P ADULT - NSHPPHYSICALEXAM_GEN_ALL_CORE
VITALS:   T(C): 36.4 (10-02-22 @ 21:45), Max: 39.8 (10-02-22 @ 13:45)  HR: 64 (10-02-22 @ 21:45) (60 - 128)  BP: 87/53 (10-02-22 @ 21:45) (76/50 - 167/103)  RR: 20 (10-02-22 @ 21:45) (17 - 20)  SpO2: 96% (10-02-22 @ 21:45) (96% - 100%)    GENERAL: NAD, lying in bed comfortably  HEAD:  Atraumatic, normocephalic  EYES: EOMI, PERRLA, conjunctiva and sclera clear  ENT: Moist mucous membranes  NECK: Supple, no JVD  HEART: irregularly irregular rate and rhythm   LUNGS: Unlabored respirations.  Clear to auscultation bilaterally, no crackles, wheezing, or rhonchi  ABDOMEN: Soft, nontender, nondistended, +BS  EXTREMITIES: 2+ peripheral pulses bilaterally. No clubbing, cyanosis, or edema  SKIN: No rashes or lesions

## 2022-10-02 NOTE — H&P ADULT - HISTORY OF PRESENT ILLNESS
This is a case of a 82 year old lady, Gambian speaking, Known to have Afib on eliquis, asthma, CAD, HFpEF, DMII, COPD presenting for AMS, diarrhea and generalized pain. Family at bedside, history obtained from daughter. She reports that pt has been lethargic and complaining of generalized pain with diarrhea for the past 48 hrs. Associated with fevers. Denies chills, cough, abdominal pain, back pain   On presentation pt had 103.6 fever. EKG performed. Code STEMI called in the ED for ST and T wave changes.  Seen and evaluated by cardiology in the ED --> diffuse subendocardial ischemia likely secondary to demand ischemia    During her stay in the ED, patient became hypotensive requiring levophed.  WBC 3.4K with 76% PMNs  Hb12.7  Trop 0.03  lactate 2.7  CTH and CTAP -ve for any acute pathology    UA +ve  COVID +ve    Admit to ICU for Septic shock 82 year old lady, [place] Islander speaking, Known to have Afib on eliquis, asthma, CAD, HFpEF, DMII, COPD presenting for AMS, diarrhea and generalized pain. Family at bedside, history obtained from daughter. She reports that pt has been lethargic and complaining of generalized pain with diarrhea for the past 48 hrs. Associated with fevers. Denies chills, cough, abdominal pain, back pain   On presentation pt had 103.6 fever. EKG performed. Code STEMI called in the ED for ST and T wave changes.  Seen and evaluated by cardiology in the ED --> diffuse subendocardial ischemia likely secondary to demand ischemia    During her stay in the ED, patient became hypotensive requiring levophed.  WBC 3.4K with 76% PMNs  Hb12.7  Trop 0.03  lactate 2.7  CTH and CTAP -ve for any acute pathology    UA +ve  COVID +ve    Admit to ICU for Septic shock

## 2022-10-02 NOTE — CONSULT NOTE ADULT - ASSESSMENT
81 y/o F with PMHx of Afib on Eliquis, asthma, CAD, HFpEF (last EF 2019 65%), DM, COPD on 3L O2, dementia     Code STEMI  Fever  Hx CAD, HFpEF. asthma, dementia and Afib    - EKG show diffuse subendocardial ischemia  - Pt is febrile and obtunded Likely Septic  - Afib with RVR likely reaction to sepsis  - STEMI code cancelled  - Medical therapy

## 2022-10-02 NOTE — ED ADULT NURSE NOTE - CHIEF COMPLAINT QUOTE
Pt c/o weakness, palpitations, and n/v/d x1 day. Pt also c/o tachycardia, as per pt daughter, pt given Metoprolol PTA. Denies chest pain. Code STEMI activated.     Pt h/o Dementia, poor historian.

## 2022-10-02 NOTE — ED ADULT TRIAGE NOTE - CHIEF COMPLAINT QUOTE
Pt c/o weakness, palpitations, and n/v/d x1 day. Pt also c/o tachycardia, as per pt daughter, pt given Metoprolol PTA. Denies chest pain.     Pt h/o Dementia, poor historian. Pt c/o weakness, palpitations, and n/v/d x1 day. Pt also c/o tachycardia, as per pt daughter, pt given Metoprolol PTA. Denies chest pain. Code STEMI activated.     Pt h/o Dementia, poor historian.

## 2022-10-03 LAB
-  CTX-M RESISTANCE MARKER: SIGNIFICANT CHANGE UP
-  ESBL: SIGNIFICANT CHANGE UP
A1C WITH ESTIMATED AVERAGE GLUCOSE RESULT: 8.3 % — HIGH (ref 4–5.6)
ALBUMIN SERPL ELPH-MCNC: 2.5 G/DL — LOW (ref 3.5–5.2)
ALP SERPL-CCNC: 52 U/L — SIGNIFICANT CHANGE UP (ref 30–115)
ALT FLD-CCNC: 15 U/L — SIGNIFICANT CHANGE UP (ref 0–41)
ANION GAP SERPL CALC-SCNC: 15 MMOL/L — HIGH (ref 7–14)
AST SERPL-CCNC: 32 U/L — SIGNIFICANT CHANGE UP (ref 0–41)
BASOPHILS # BLD AUTO: 0.02 K/UL — SIGNIFICANT CHANGE UP (ref 0–0.2)
BASOPHILS NFR BLD AUTO: 0.1 % — SIGNIFICANT CHANGE UP (ref 0–1)
BILIRUB DIRECT SERPL-MCNC: 0.4 MG/DL — HIGH (ref 0–0.3)
BILIRUB INDIRECT FLD-MCNC: 0.7 MG/DL — SIGNIFICANT CHANGE UP (ref 0.2–1.2)
BILIRUB SERPL-MCNC: 1.1 MG/DL — SIGNIFICANT CHANGE UP (ref 0.2–1.2)
BUN SERPL-MCNC: 34 MG/DL — HIGH (ref 10–20)
CALCIUM SERPL-MCNC: 7.6 MG/DL — LOW (ref 8.4–10.4)
CHLORIDE SERPL-SCNC: 96 MMOL/L — LOW (ref 98–110)
CHOLEST SERPL-MCNC: 148 MG/DL — SIGNIFICANT CHANGE UP
CO2 SERPL-SCNC: 23 MMOL/L — SIGNIFICANT CHANGE UP (ref 17–32)
CREAT SERPL-MCNC: 1.4 MG/DL — SIGNIFICANT CHANGE UP (ref 0.7–1.5)
CRP SERPL-MCNC: 126 MG/L — HIGH
CRP SERPL-MCNC: 91.8 MG/L — HIGH
D DIMER BLD IA.RAPID-MCNC: 1010 NG/ML DDU — HIGH (ref 0–230)
E COLI DNA BLD POS QL NAA+NON-PROBE: SIGNIFICANT CHANGE UP
EGFR: 37 ML/MIN/1.73M2 — LOW
EOSINOPHIL # BLD AUTO: 0.01 K/UL — SIGNIFICANT CHANGE UP (ref 0–0.7)
EOSINOPHIL NFR BLD AUTO: 0.1 % — SIGNIFICANT CHANGE UP (ref 0–8)
ESTIMATED AVERAGE GLUCOSE: 192 MG/DL — HIGH (ref 68–114)
GLUCOSE BLDC GLUCOMTR-MCNC: 141 MG/DL — HIGH (ref 70–99)
GLUCOSE BLDC GLUCOMTR-MCNC: 352 MG/DL — HIGH (ref 70–99)
GLUCOSE SERPL-MCNC: 100 MG/DL — HIGH (ref 70–99)
GRAM STN FLD: SIGNIFICANT CHANGE UP
HCT VFR BLD CALC: 28.9 % — LOW (ref 37–47)
HDLC SERPL-MCNC: 42 MG/DL — LOW
HGB BLD-MCNC: 10 G/DL — LOW (ref 12–16)
IMM GRANULOCYTES NFR BLD AUTO: 2.7 % — HIGH (ref 0.1–0.3)
LIPID PNL WITH DIRECT LDL SERPL: 84 MG/DL — SIGNIFICANT CHANGE UP
LYMPHOCYTES # BLD AUTO: 1.84 K/UL — SIGNIFICANT CHANGE UP (ref 1.2–3.4)
LYMPHOCYTES # BLD AUTO: 10.8 % — LOW (ref 20.5–51.1)
MAGNESIUM SERPL-MCNC: 1.5 MG/DL — LOW (ref 1.8–2.4)
MCHC RBC-ENTMCNC: 31.7 PG — HIGH (ref 27–31)
MCHC RBC-ENTMCNC: 34.6 G/DL — SIGNIFICANT CHANGE UP (ref 32–37)
MCV RBC AUTO: 91.7 FL — SIGNIFICANT CHANGE UP (ref 81–99)
METHOD TYPE: SIGNIFICANT CHANGE UP
MONOCYTES # BLD AUTO: 1.2 K/UL — HIGH (ref 0.1–0.6)
MONOCYTES NFR BLD AUTO: 7.1 % — SIGNIFICANT CHANGE UP (ref 1.7–9.3)
NEUTROPHILS # BLD AUTO: 13.49 K/UL — HIGH (ref 1.4–6.5)
NEUTROPHILS NFR BLD AUTO: 79.2 % — HIGH (ref 42.2–75.2)
NON HDL CHOLESTEROL: 106 MG/DL — SIGNIFICANT CHANGE UP
NRBC # BLD: 0 /100 WBCS — SIGNIFICANT CHANGE UP (ref 0–0)
PHOSPHATE SERPL-MCNC: 3.5 MG/DL — SIGNIFICANT CHANGE UP (ref 2.1–4.9)
PLATELET # BLD AUTO: 139 K/UL — SIGNIFICANT CHANGE UP (ref 130–400)
POTASSIUM SERPL-MCNC: 4.1 MMOL/L — SIGNIFICANT CHANGE UP (ref 3.5–5)
POTASSIUM SERPL-SCNC: 4.1 MMOL/L — SIGNIFICANT CHANGE UP (ref 3.5–5)
PROT SERPL-MCNC: 6.2 G/DL — SIGNIFICANT CHANGE UP (ref 6–8)
RBC # BLD: 3.15 M/UL — LOW (ref 4.2–5.4)
RBC # FLD: 15 % — HIGH (ref 11.5–14.5)
SODIUM SERPL-SCNC: 134 MMOL/L — LOW (ref 135–146)
SPECIMEN SOURCE: SIGNIFICANT CHANGE UP
SPECIMEN SOURCE: SIGNIFICANT CHANGE UP
TRIGL SERPL-MCNC: 112 MG/DL — SIGNIFICANT CHANGE UP
TROPONIN T SERPL-MCNC: 0.02 NG/ML — HIGH
TROPONIN T SERPL-MCNC: 0.04 NG/ML — CRITICAL HIGH
TSH SERPL-MCNC: 1.73 UIU/ML — SIGNIFICANT CHANGE UP (ref 0.27–4.2)
WBC # BLD: 17.02 K/UL — HIGH (ref 4.8–10.8)
WBC # FLD AUTO: 17.02 K/UL — HIGH (ref 4.8–10.8)

## 2022-10-03 PROCEDURE — 93970 EXTREMITY STUDY: CPT | Mod: 26

## 2022-10-03 PROCEDURE — 71045 X-RAY EXAM CHEST 1 VIEW: CPT | Mod: 26

## 2022-10-03 PROCEDURE — 99291 CRITICAL CARE FIRST HOUR: CPT

## 2022-10-03 RX ORDER — INSULIN LISPRO 100/ML
10 VIAL (ML) SUBCUTANEOUS ONCE
Refills: 0 | Status: COMPLETED | OUTPATIENT
Start: 2022-10-03 | End: 2022-10-03

## 2022-10-03 RX ORDER — INSULIN LISPRO 100/ML
VIAL (ML) SUBCUTANEOUS
Refills: 0 | Status: DISCONTINUED | OUTPATIENT
Start: 2022-10-03 | End: 2022-10-05

## 2022-10-03 RX ORDER — DEXTROSE 50 % IN WATER 50 %
25 SYRINGE (ML) INTRAVENOUS ONCE
Refills: 0 | Status: DISCONTINUED | OUTPATIENT
Start: 2022-10-03 | End: 2022-10-05

## 2022-10-03 RX ORDER — SODIUM CHLORIDE 9 MG/ML
1000 INJECTION, SOLUTION INTRAVENOUS
Refills: 0 | Status: COMPLETED | OUTPATIENT
Start: 2022-10-03 | End: 2022-10-04

## 2022-10-03 RX ORDER — DEXTROSE 50 % IN WATER 50 %
12.5 SYRINGE (ML) INTRAVENOUS ONCE
Refills: 0 | Status: DISCONTINUED | OUTPATIENT
Start: 2022-10-03 | End: 2022-10-05

## 2022-10-03 RX ORDER — DEXAMETHASONE 0.5 MG/5ML
6 ELIXIR ORAL EVERY 24 HOURS
Refills: 0 | Status: DISCONTINUED | OUTPATIENT
Start: 2022-10-03 | End: 2022-10-04

## 2022-10-03 RX ORDER — SODIUM CHLORIDE 9 MG/ML
1000 INJECTION, SOLUTION INTRAVENOUS
Refills: 0 | Status: DISCONTINUED | OUTPATIENT
Start: 2022-10-03 | End: 2022-10-05

## 2022-10-03 RX ORDER — MEROPENEM 1 G/30ML
INJECTION INTRAVENOUS
Refills: 0 | Status: DISCONTINUED | OUTPATIENT
Start: 2022-10-03 | End: 2022-10-06

## 2022-10-03 RX ORDER — MAGNESIUM SULFATE 500 MG/ML
2 VIAL (ML) INJECTION
Refills: 0 | Status: COMPLETED | OUTPATIENT
Start: 2022-10-03 | End: 2022-10-03

## 2022-10-03 RX ORDER — MEROPENEM 1 G/30ML
1000 INJECTION INTRAVENOUS ONCE
Refills: 0 | Status: COMPLETED | OUTPATIENT
Start: 2022-10-03 | End: 2022-10-03

## 2022-10-03 RX ORDER — DEXTROSE 50 % IN WATER 50 %
15 SYRINGE (ML) INTRAVENOUS ONCE
Refills: 0 | Status: DISCONTINUED | OUTPATIENT
Start: 2022-10-03 | End: 2022-10-05

## 2022-10-03 RX ORDER — MEROPENEM 1 G/30ML
1000 INJECTION INTRAVENOUS EVERY 12 HOURS
Refills: 0 | Status: DISCONTINUED | OUTPATIENT
Start: 2022-10-04 | End: 2022-10-06

## 2022-10-03 RX ORDER — CHLORHEXIDINE GLUCONATE 213 G/1000ML
1 SOLUTION TOPICAL DAILY
Refills: 0 | Status: DISCONTINUED | OUTPATIENT
Start: 2022-10-03 | End: 2022-10-06

## 2022-10-03 RX ORDER — SODIUM CHLORIDE 9 MG/ML
500 INJECTION, SOLUTION INTRAVENOUS ONCE
Refills: 0 | Status: COMPLETED | OUTPATIENT
Start: 2022-10-03 | End: 2022-10-03

## 2022-10-03 RX ORDER — GLUCAGON INJECTION, SOLUTION 0.5 MG/.1ML
1 INJECTION, SOLUTION SUBCUTANEOUS ONCE
Refills: 0 | Status: DISCONTINUED | OUTPATIENT
Start: 2022-10-03 | End: 2022-10-05

## 2022-10-03 RX ORDER — INSULIN GLARGINE 100 [IU]/ML
10 INJECTION, SOLUTION SUBCUTANEOUS AT BEDTIME
Refills: 0 | Status: DISCONTINUED | OUTPATIENT
Start: 2022-10-03 | End: 2022-10-04

## 2022-10-03 RX ADMIN — Medication 50 MILLIGRAM(S): at 05:28

## 2022-10-03 RX ADMIN — MEROPENEM 100 MILLIGRAM(S): 1 INJECTION INTRAVENOUS at 13:12

## 2022-10-03 RX ADMIN — SODIUM CHLORIDE 1000 MILLILITER(S): 9 INJECTION, SOLUTION INTRAVENOUS at 09:40

## 2022-10-03 RX ADMIN — Medication 25 GRAM(S): at 11:20

## 2022-10-03 RX ADMIN — Medication 12: at 17:55

## 2022-10-03 RX ADMIN — ALBUTEROL 2 PUFF(S): 90 AEROSOL, METERED ORAL at 14:49

## 2022-10-03 RX ADMIN — SODIUM CHLORIDE 1000 MILLILITER(S): 9 INJECTION, SOLUTION INTRAVENOUS at 15:00

## 2022-10-03 RX ADMIN — Medication 10 UNIT(S): at 17:54

## 2022-10-03 RX ADMIN — SODIUM CHLORIDE 75 MILLILITER(S): 9 INJECTION, SOLUTION INTRAVENOUS at 11:23

## 2022-10-03 RX ADMIN — CHLORHEXIDINE GLUCONATE 1 APPLICATION(S): 213 SOLUTION TOPICAL at 10:59

## 2022-10-03 RX ADMIN — SERTRALINE 25 MILLIGRAM(S): 25 TABLET, FILM COATED ORAL at 11:20

## 2022-10-03 RX ADMIN — Medication 25 GRAM(S): at 15:01

## 2022-10-03 RX ADMIN — PANTOPRAZOLE SODIUM 40 MILLIGRAM(S): 20 TABLET, DELAYED RELEASE ORAL at 06:21

## 2022-10-03 RX ADMIN — INSULIN GLARGINE 42 UNIT(S): 100 INJECTION, SOLUTION SUBCUTANEOUS at 00:19

## 2022-10-03 RX ADMIN — APIXABAN 5 MILLIGRAM(S): 2.5 TABLET, FILM COATED ORAL at 17:28

## 2022-10-03 RX ADMIN — INSULIN GLARGINE 42 UNIT(S): 100 INJECTION, SOLUTION SUBCUTANEOUS at 22:28

## 2022-10-03 RX ADMIN — Medication 6 MILLIGRAM(S): at 09:40

## 2022-10-03 RX ADMIN — ALBUTEROL 2 PUFF(S): 90 AEROSOL, METERED ORAL at 10:06

## 2022-10-03 RX ADMIN — SODIUM CHLORIDE 75 MILLILITER(S): 9 INJECTION, SOLUTION INTRAVENOUS at 15:00

## 2022-10-03 RX ADMIN — APIXABAN 5 MILLIGRAM(S): 2.5 TABLET, FILM COATED ORAL at 05:28

## 2022-10-03 NOTE — PROVIDER CONTACT NOTE (OTHER) - ACTION/TREATMENT ORDERED:
Blood glucose level did not populate on results tab in sunrise. MD notified the blood glucose level is documented under vital signs. MD ordered insulin and insulin was given.

## 2022-10-03 NOTE — PROVIDER CONTACT NOTE (OTHER) - SITUATION
Patient has history of diabetes and is due for blood glucose checks with meals.   At dinner time, Blood glucose check was 402.

## 2022-10-03 NOTE — PROGRESS NOTE ADULT - SUBJECTIVE AND OBJECTIVE BOX
MICU RESIDENT PROGRESS NOTE    Patient is a 82y old  Female who presents with a chief complaint of Urosepsis (02 Oct 2022 21:57)        INTERVAL HPI/OVERNIGHT EVENTS:   No overnight events   Afebrile, hemodynamically stable     GCS:  Sedatives:  Pressors:  Lines  CENTRAL LINE| Yes: [ ] | No: [ ]  INDWELLING MCMILLAN| Yes: [ ] | No: [ ]      ICU Vital Signs Last 24 Hrs  T(C): 36.6 (03 Oct 2022 08:00), Max: 39.8 (02 Oct 2022 13:45)  T(F): 97.9 (03 Oct 2022 08:00), Max: 103.6 (02 Oct 2022 13:45)  HR: 64 (03 Oct 2022 08:00) (54 - 128)  BP: 118/60 (03 Oct 2022 08:00) (76/50 - 167/103)  BP(mean): 91 (03 Oct 2022 08:00) (58 - 91)  ABP: --  ABP(mean): --  RR: 17 (03 Oct 2022 08:00) (17 - 25)  SpO2: 100% (03 Oct 2022 08:00) (96% - 100%)    O2 Parameters below as of 03 Oct 2022 08:00  Patient On (Oxygen Delivery Method): nasal cannula  O2 Flow (L/min): 2        I&O's Summary    02 Oct 2022 07:01  -  03 Oct 2022 07:00  --------------------------------------------------------  IN: 44.8 mL / OUT: 200 mL / NET: -155.2 mL    03 Oct 2022 07:01  -  03 Oct 2022 09:00  --------------------------------------------------------  IN: 18.2 mL / OUT: 55 mL / NET: -36.8 mL          LABS:                        10.0   17.02 )-----------( 139      ( 03 Oct 2022 05:38 )             28.9     10-03    134<L>  |  96<L>  |  34<H>  ----------------------------<  100<H>  4.1   |  23  |  1.4    Ca    7.6<L>      03 Oct 2022 05:38  Phos  3.5     10-03  Mg     1.5     10-    TPro  6.2  /  Alb  2.5<L>  /  TBili  1.1  /  DBili  0.4<H>  /  AST  32  /  ALT  15  /  AlkPhos  52  10-03    PT/INR - ( 02 Oct 2022 13:40 )   PT: 16.60 sec;   INR: 1.44 ratio         PTT - ( 02 Oct 2022 13:40 )  PTT:34.2 sec  Urinalysis Basic - ( 02 Oct 2022 14:55 )    Color: Light Yellow / Appearance: Slightly Turbid / S.009 / pH: x  Gluc: x / Ketone: Negative  / Bili: Negative / Urobili: <2 mg/dL   Blood: x / Protein: Trace / Nitrite: Negative   Leuk Esterase: Large / RBC: 13 /HPF / WBC 34 /HPF   Sq Epi: x / Non Sq Epi: 4 /HPF / Bacteria: Many      CAPILLARY BLOOD GLUCOSE      POCT Blood Glucose.: 141 mg/dL (03 Oct 2022 08:20)  POCT Blood Glucose.: 147 mg/dL (02 Oct 2022 13:21)        RADIOLOGY & ADDITIONAL TESTS:    Consultant(s) Notes Reviewed:  [x ] YES  [ ] NO    MEDICATIONS  (STANDING):  ALBUTerol    90 MICROgram(s) HFA Inhaler 2 Puff(s) Inhalation every 6 hours  apixaban 5 milliGRAM(s) Oral two times a day  aztreonam  IVPB 500 milliGRAM(s) IV Intermittent every 8 hours  chlorhexidine 2% Cloths 1 Application(s) Topical daily  hydrocortisone sodium succinate Injectable 50 milliGRAM(s) IV Push every 6 hours  insulin glargine SubCutaneous Injection (LANTUS) - Peds 42 Unit(s) SubCutaneous at bedtime  metoprolol succinate  milliGRAM(s) Oral daily  norepinephrine Infusion 0.05 MICROgram(s)/kG/Min (6.38 mL/Hr) IV Continuous <Continuous>  pantoprazole    Tablet 40 milliGRAM(s) Oral before breakfast  sertraline 25 milliGRAM(s) Oral daily  tiotropium 18 MICROgram(s) Capsule 1 Capsule(s) Inhalation daily    MEDICATIONS  (PRN):      PHYSICAL EXAM:  GENERAL:   HEAD:  Atraumatic, Normocephalic  EYES: EOMI, PERRLA, conjunctiva and sclera clear  NECK: Supple, No JVD, Normal thyroid, no enlarged nodes  NERVOUS SYSTEM:  Alert & Awake.  CHEST/LUNG: B/L good air entry; No rales, rhonchi, or wheezing  HEART: S1S2 normal, no S3, Regular rate and rhythm; No murmurs  ABDOMEN: Soft, Nontender, Nondistended; Bowel sounds present  EXTREMITIES:  2+ Peripheral Pulses, No clubbing, cyanosis, or edema  LYMPH: No lymphadenopathy noted  SKIN: No rashes or lesions    ASSESSMENT/PLAN                                                                                ----------------------------------------------------  # DVT prophylaxis     # GI prophylaxis     # Diet     # Disposition     CODE STATUS:  HEALTHCARE PROXY/EMERGENCY CONTACT:  Name:  Phone Number:                                                                             --------------------------------------------------------    # Handoff      MICU RESIDENT PROGRESS NOTE    Patient is a 82y old  Female who presents with a chief complaint of altered mental status (02 Oct 2022 21:57)        INTERVAL HPI/OVERNIGHT EVENTS:   No overnight events   Afebrile, hemodynamically stable     GCS: 15  Sedatives: None  Pressors: Levophed .05  Lines  CENTRAL LINE| Yes: [ ] | No: [X ]  INDWELLING MCMILLAN| Yes: [X ] | No: [ ]      ICU Vital Signs Last 24 Hrs  T(C): 36.6 (03 Oct 2022 08:00), Max: 39.8 (02 Oct 2022 13:45)  T(F): 97.9 (03 Oct 2022 08:00), Max: 103.6 (02 Oct 2022 13:45)  HR: 64 (03 Oct 2022 08:00) (54 - 128)  BP: 118/60 (03 Oct 2022 08:00) (76/50 - 167/103)  BP(mean): 91 (03 Oct 2022 08:00) (58 - 91)  ABP: --  ABP(mean): --  RR: 17 (03 Oct 2022 08:00) (17 - 25)  SpO2: 100% (03 Oct 2022 08:00) (96% - 100%)    O2 Parameters below as of 03 Oct 2022 08:00  Patient On (Oxygen Delivery Method): nasal cannula  O2 Flow (L/min): 2        I&O's Summary    02 Oct 2022 07:01  -  03 Oct 2022 07:00  --------------------------------------------------------  IN: 44.8 mL / OUT: 200 mL / NET: -155.2 mL    03 Oct 2022 07:01  -  03 Oct 2022 09:00  --------------------------------------------------------  IN: 18.2 mL / OUT: 55 mL / NET: -36.8 mL          LABS:                        10.0   17.02 )-----------( 139      ( 03 Oct 2022 05:38 )             28.9     10-03    134<L>  |  96<L>  |  34<H>  ----------------------------<  100<H>  4.1   |  23  |  1.4    Ca    7.6<L>      03 Oct 2022 05:38  Phos  3.5     10-  Mg     1.5     10-    TPro  6.2  /  Alb  2.5<L>  /  TBili  1.1  /  DBili  0.4<H>  /  AST  32  /  ALT  15  /  AlkPhos  52  10-    PT/INR - ( 02 Oct 2022 13:40 )   PT: 16.60 sec;   INR: 1.44 ratio         PTT - ( 02 Oct 2022 13:40 )  PTT:34.2 sec  Urinalysis Basic - ( 02 Oct 2022 14:55 )    Color: Light Yellow / Appearance: Slightly Turbid / S.009 / pH: x  Gluc: x / Ketone: Negative  / Bili: Negative / Urobili: <2 mg/dL   Blood: x / Protein: Trace / Nitrite: Negative   Leuk Esterase: Large / RBC: 13 /HPF / WBC 34 /HPF   Sq Epi: x / Non Sq Epi: 4 /HPF / Bacteria: Many      CAPILLARY BLOOD GLUCOSE      POCT Blood Glucose.: 141 mg/dL (03 Oct 2022 08:20)  POCT Blood Glucose.: 147 mg/dL (02 Oct 2022 13:21)        RADIOLOGY & ADDITIONAL TESTS:  10/2  CXR: Low lung volumes, without radiographic evidence of acute   cardiopulmonary disease.    RUQ US: No definite sonographic evidence of acute pathology  Nodular contour of the liver compatible with cirrhosis    CT Head: Motion degraded examination; No definite acute intracranial hemorrhage,   mass-effect or midline shift.  Visualized paranasal sinuses are predominantly opacified    CTAP:  Underdistention versus mild wall thickening of the sigmoid and descending   colon.  Nodular contour of the liver; correlate with liver function test    Consultant(s) Notes Reviewed:  [x ] YES  [ ] NO    MEDICATIONS  (STANDING):  ALBUTerol    90 MICROgram(s) HFA Inhaler 2 Puff(s) Inhalation every 6 hours  apixaban 5 milliGRAM(s) Oral two times a day  aztreonam  IVPB 500 milliGRAM(s) IV Intermittent every 8 hours  chlorhexidine 2% Cloths 1 Application(s) Topical daily  hydrocortisone sodium succinate Injectable 50 milliGRAM(s) IV Push every 6 hours  insulin glargine SubCutaneous Injection (LANTUS) - Peds 42 Unit(s) SubCutaneous at bedtime  metoprolol succinate  milliGRAM(s) Oral daily  norepinephrine Infusion 0.05 MICROgram(s)/kG/Min (6.38 mL/Hr) IV Continuous <Continuous>  pantoprazole    Tablet 40 milliGRAM(s) Oral before breakfast  sertraline 25 milliGRAM(s) Oral daily  tiotropium 18 MICROgram(s) Capsule 1 Capsule(s) Inhalation daily    MEDICATIONS  (PRN):      PHYSICAL EXAM:  GENERAL: NAD, resting comfortably  HEAD:  Atraumatic, Normocephalic  EYES: EOMI, PERRLA, conjunctiva and sclera clear  NECK: Supple, No JVD, Normal thyroid, no enlarged nodes  NERVOUS SYSTEM:  Alert & Awake; moving extremities and following commands  CHEST/LUNG: B/L good air entry; No rales, rhonchi, or wheezing  HEART: S1S2 normal, no S3, irregular rate and rhythm ; No murmurs  ABDOMEN: Soft, Nontender, Nondistended; Bowel sounds present  EXTREMITIES:  2+ Peripheral Pulses, No clubbing, cyanosis, or edema  LYMPH: No lymphadenopathy noted  SKIN: No rashes or lesions    ASSESSMENT/PLAN  Patient is a 82F, [place] speaking with PMHx significant for Afib on eliquis, asthma, CAD, HFpEF, DMII, COPD presenting for AMS, diarrhea and generalized pain. Family at bedside, history obtained from daughter. She reports that pt has been lethargic and complaining of generalized pain with diarrhea for the past 48 hrs. Admitted to ICU for further workup and management      #Altered mental status   #Toxic Metabolic Encephalopathy 2/2 COVID  # Sepsis POA/Septic Shock  # Troponemia  -ED: febrile to 103.6, leukopenic at 3.4; hypotensive 86/46  -s/p Code STEMI - ST and T wave changes on EKG   - diffuse subendocardial ischemia likely secondary to demand ischemia  -CXR. CTAP and CT Head unremarkable   -troponin .03; lactate 2.7  -s/p aztreonam and vancomycin in ED   -f/u ID consult for recs  f/u MRSA swab  -f/u blood and urine cx  -trend troponin and lactate  -f/u inflammatory markers: ESR, CRP, procal, ferritn  -on levo; taper as tolerated after fluid bolus    #COVID +  -asymptomatic; not vaccinated   -s/p hydrocortisone  -hx of COVID+ prior to admission; remdesivir not indicated   -start decadron     # UTI  - UA+  -f/u urine cultures  -c/w abx regimen    # A-fib  -c/w home Eliquis 5mg   -c/w metoprolol for rate control    # Asthma  # COPD  - c/w home albuterol, tiotropium     # Hypokalemia  # Hypomagnesemia  -replete as needed                                                                              ----------------------------------------------------  # DVT prophylaxis - Eliquis     # GI prophylaxis - Protonix    # Diet - Regular    # Disposition : MICU    CODE STATUS: FULL  HEALTHCARE PROXY/EMERGENCY CONTACT:  Name: Heaven Jassnoysapna  Phone Number: [phone]                                                                             --------------------------------------------------------    # Handoff   f/u ID consult, F/u cultures, troponin, lactate, inflammatory markers  MICU RESIDENT PROGRESS NOTE    Patient is a 82y old  Female who presents with a chief complaint of altered mental status (02 Oct 2022 21:57)        INTERVAL HPI/OVERNIGHT EVENTS:   Overnight febrile 103,  On pressors, examined in the morning, alert and responsive.    GCS: 15  Sedatives: None  Pressors: Levophed .05  Lines  CENTRAL LINE| Yes: [ ] | No: [X ]  INDWELLING MCMILLAN| Yes: [X ] | No: [ ]      ICU Vital Signs Last 24 Hrs  T(C): 36.6 (03 Oct 2022 08:00), Max: 39.8 (02 Oct 2022 13:45)  T(F): 97.9 (03 Oct 2022 08:00), Max: 103.6 (02 Oct 2022 13:45)  HR: 64 (03 Oct 2022 08:00) (54 - 128)  BP: 118/60 (03 Oct 2022 08:00) (76/50 - 167/103)  BP(mean): 91 (03 Oct 2022 08:00) (58 - 91)  ABP: --  ABP(mean): --  RR: 17 (03 Oct 2022 08:00) (17 - 25)  SpO2: 100% (03 Oct 2022 08:00) (96% - 100%)    O2 Parameters below as of 03 Oct 2022 08:00  Patient On (Oxygen Delivery Method): nasal cannula  O2 Flow (L/min): 2        I&O's Summary    02 Oct 2022 07:01  -  03 Oct 2022 07:00  --------------------------------------------------------  IN: 44.8 mL / OUT: 200 mL / NET: -155.2 mL    03 Oct 2022 07:01  -  03 Oct 2022 09:00  --------------------------------------------------------  IN: 18.2 mL / OUT: 55 mL / NET: -36.8 mL          LABS:                        10.0   17.02 )-----------( 139      ( 03 Oct 2022 05:38 )             28.9     10-03    134<L>  |  96<L>  |  34<H>  ----------------------------<  100<H>  4.1   |  23  |  1.4    Ca    7.6<L>      03 Oct 2022 05:38  Phos  3.5     10-  Mg     1.5     10-    TPro  6.2  /  Alb  2.5<L>  /  TBili  1.1  /  DBili  0.4<H>  /  AST  32  /  ALT  15  /  AlkPhos  52  10    PT/INR - ( 02 Oct 2022 13:40 )   PT: 16.60 sec;   INR: 1.44 ratio         PTT - ( 02 Oct 2022 13:40 )  PTT:34.2 sec  Urinalysis Basic - ( 02 Oct 2022 14:55 )    Color: Light Yellow / Appearance: Slightly Turbid / S.009 / pH: x  Gluc: x / Ketone: Negative  / Bili: Negative / Urobili: <2 mg/dL   Blood: x / Protein: Trace / Nitrite: Negative   Leuk Esterase: Large / RBC: 13 /HPF / WBC 34 /HPF   Sq Epi: x / Non Sq Epi: 4 /HPF / Bacteria: Many      CAPILLARY BLOOD GLUCOSE      POCT Blood Glucose.: 141 mg/dL (03 Oct 2022 08:20)  POCT Blood Glucose.: 147 mg/dL (02 Oct 2022 13:21)        RADIOLOGY & ADDITIONAL TESTS:  10/2  CXR: Low lung volumes, without radiographic evidence of acute   cardiopulmonary disease.    RUQ US: No definite sonographic evidence of acute pathology  Nodular contour of the liver compatible with cirrhosis    CT Head: Motion degraded examination; No definite acute intracranial hemorrhage,   mass-effect or midline shift.  Visualized paranasal sinuses are predominantly opacified    CTAP:  Underdistention versus mild wall thickening of the sigmoid and descending   colon.  Nodular contour of the liver; correlate with liver function test    Consultant(s) Notes Reviewed:  [x ] YES  [ ] NO    MEDICATIONS  (STANDING):  ALBUTerol    90 MICROgram(s) HFA Inhaler 2 Puff(s) Inhalation every 6 hours  apixaban 5 milliGRAM(s) Oral two times a day  aztreonam  IVPB 500 milliGRAM(s) IV Intermittent every 8 hours  chlorhexidine 2% Cloths 1 Application(s) Topical daily  hydrocortisone sodium succinate Injectable 50 milliGRAM(s) IV Push every 6 hours  insulin glargine SubCutaneous Injection (LANTUS) - Peds 42 Unit(s) SubCutaneous at bedtime  metoprolol succinate  milliGRAM(s) Oral daily  norepinephrine Infusion 0.05 MICROgram(s)/kG/Min (6.38 mL/Hr) IV Continuous <Continuous>  pantoprazole    Tablet 40 milliGRAM(s) Oral before breakfast  sertraline 25 milliGRAM(s) Oral daily  tiotropium 18 MICROgram(s) Capsule 1 Capsule(s) Inhalation daily    MEDICATIONS  (PRN):      PHYSICAL EXAM:  GENERAL: NAD, resting comfortably  HEAD:  Atraumatic, Normocephalic  EYES: EOMI, PERRLA, conjunctiva and sclera clear  NECK: Supple, No JVD, Normal thyroid, no enlarged nodes  NERVOUS SYSTEM:  Alert & Awake; moving extremities and following commands  CHEST/LUNG: B/L good air entry; No rales, rhonchi, or wheezing  HEART: S1S2 normal, no S3, irregular rate and rhythm ; No murmurs  ABDOMEN: Soft, Nontender, Nondistended; Bowel sounds present  EXTREMITIES:  2+ Peripheral Pulses, No clubbing, cyanosis, or edema  LYMPH: No lymphadenopathy noted  SKIN: No rashes or lesions    ASSESSMENT/PLAN  Patient is a 82F, [place] speaking with PMHx significant for Afib on eliquis, asthma, CAD, HFpEF, DMII, COPD presenting for AMS, diarrhea and generalized pain. Family at bedside, history obtained from daughter. She reports that pt has been lethargic and complaining of generalized pain with diarrhea for the past 48 hrs. Admitted to ICU for further workup and management      #Altered mental status   #Toxic Metabolic Encephalopathy 2/2 COVID  # Sepsis POA/Septic Shock  # Troponemia  -ED: febrile to 103.6, leukopenic at 3.4; hypotensive 86/46  -s/p Code STEMI - ST and T wave changes on EKG   - diffuse subendocardial ischemia likely secondary to demand ischemia  -CXR. CTAP and CT Head unremarkable   -troponin .03; lactate 2.7  -s/p aztreonam and vancomycin in ED   -f/u ID consult for recs  f/u MRSA swab  -f/u blood and urine cx  -trend troponin and lactate  -f/u inflammatory markers: ESR, CRP, procal, ferritn  -on levo; taper as tolerated after fluid bolus    #COVID +  -asymptomatic; not vaccinated   -s/p hydrocortisone  -hx of COVID+ prior to admission; remdesivir not indicated   -start decadron     # UTI  - UA+  -f/u urine cultures  -c/w abx regimen    # A-fib  -c/w home Eliquis 5mg   -c/w metoprolol for rate control    # Asthma  # COPD  - c/w home albuterol, tiotropium     # Hypokalemia  # Hypomagnesemia  -replete as needed                                                                              ----------------------------------------------------  # DVT prophylaxis - Eliquis     # GI prophylaxis - Protonix    # Diet - Regular    # Disposition : MICU    CODE STATUS: FULL  HEALTHCARE PROXY/EMERGENCY CONTACT:  Name: Heaven Mays  Phone Number: [phone]                                                                             --------------------------------------------------------    # Handoff   f/u ID consult, F/u cultures, troponin, lactate, inflammatory markers  MICU RESIDENT PROGRESS NOTE    Patient is a 82y old  Female who presents with a chief complaint of altered mental status (02 Oct 2022 21:57)        INTERVAL HPI/OVERNIGHT EVENTS:   Overnight febrile 103,  On pressors, examined in the morning, alert and responsive.    GCS: 15  Sedatives: None  Pressors: Levophed .05  Lines  CENTRAL LINE| Yes: [ ] | No: [X ]  INDWELLING MCMILLAN| Yes: [X ] | No: [ ]      ICU Vital Signs Last 24 Hrs  T(C): 36.6 (03 Oct 2022 08:00), Max: 39.8 (02 Oct 2022 13:45)  T(F): 97.9 (03 Oct 2022 08:00), Max: 103.6 (02 Oct 2022 13:45)  HR: 64 (03 Oct 2022 08:00) (54 - 128)  BP: 118/60 (03 Oct 2022 08:00) (76/50 - 167/103)  BP(mean): 91 (03 Oct 2022 08:00) (58 - 91)  ABP: --  ABP(mean): --  RR: 17 (03 Oct 2022 08:00) (17 - 25)  SpO2: 100% (03 Oct 2022 08:00) (96% - 100%)    O2 Parameters below as of 03 Oct 2022 08:00  Patient On (Oxygen Delivery Method): nasal cannula  O2 Flow (L/min): 2        I&O's Summary    02 Oct 2022 07:01  -  03 Oct 2022 07:00  --------------------------------------------------------  IN: 44.8 mL / OUT: 200 mL / NET: -155.2 mL    03 Oct 2022 07:01  -  03 Oct 2022 09:00  --------------------------------------------------------  IN: 18.2 mL / OUT: 55 mL / NET: -36.8 mL          LABS:                        10.0   17.02 )-----------( 139      ( 03 Oct 2022 05:38 )             28.9     10-03    134<L>  |  96<L>  |  34<H>  ----------------------------<  100<H>  4.1   |  23  |  1.4    Ca    7.6<L>      03 Oct 2022 05:38  Phos  3.5     10-  Mg     1.5     10-    TPro  6.2  /  Alb  2.5<L>  /  TBili  1.1  /  DBili  0.4<H>  /  AST  32  /  ALT  15  /  AlkPhos  52  10    PT/INR - ( 02 Oct 2022 13:40 )   PT: 16.60 sec;   INR: 1.44 ratio         PTT - ( 02 Oct 2022 13:40 )  PTT:34.2 sec  Urinalysis Basic - ( 02 Oct 2022 14:55 )    Color: Light Yellow / Appearance: Slightly Turbid / S.009 / pH: x  Gluc: x / Ketone: Negative  / Bili: Negative / Urobili: <2 mg/dL   Blood: x / Protein: Trace / Nitrite: Negative   Leuk Esterase: Large / RBC: 13 /HPF / WBC 34 /HPF   Sq Epi: x / Non Sq Epi: 4 /HPF / Bacteria: Many      CAPILLARY BLOOD GLUCOSE      POCT Blood Glucose.: 141 mg/dL (03 Oct 2022 08:20)  POCT Blood Glucose.: 147 mg/dL (02 Oct 2022 13:21)        RADIOLOGY & ADDITIONAL TESTS:  10/2  CXR: Low lung volumes, without radiographic evidence of acute   cardiopulmonary disease.    RUQ US: No definite sonographic evidence of acute pathology  Nodular contour of the liver compatible with cirrhosis    CT Head: Motion degraded examination; No definite acute intracranial hemorrhage,   mass-effect or midline shift.  Visualized paranasal sinuses are predominantly opacified    CTAP:  Underdistention versus mild wall thickening of the sigmoid and descending   colon.  Nodular contour of the liver; correlate with liver function test    Consultant(s) Notes Reviewed:  [x ] YES  [ ] NO    MEDICATIONS  (STANDING):  ALBUTerol    90 MICROgram(s) HFA Inhaler 2 Puff(s) Inhalation every 6 hours  apixaban 5 milliGRAM(s) Oral two times a day  aztreonam  IVPB 500 milliGRAM(s) IV Intermittent every 8 hours  chlorhexidine 2% Cloths 1 Application(s) Topical daily  hydrocortisone sodium succinate Injectable 50 milliGRAM(s) IV Push every 6 hours  insulin glargine SubCutaneous Injection (LANTUS) - Peds 42 Unit(s) SubCutaneous at bedtime  metoprolol succinate  milliGRAM(s) Oral daily  norepinephrine Infusion 0.05 MICROgram(s)/kG/Min (6.38 mL/Hr) IV Continuous <Continuous>  pantoprazole    Tablet 40 milliGRAM(s) Oral before breakfast  sertraline 25 milliGRAM(s) Oral daily  tiotropium 18 MICROgram(s) Capsule 1 Capsule(s) Inhalation daily    MEDICATIONS  (PRN):      PHYSICAL EXAM:  GENERAL: NAD, resting comfortably  HEAD:  Atraumatic, Normocephalic  EYES: EOMI, PERRLA, conjunctiva and sclera clear  NERVOUS SYSTEM:  Alert & Awake; moving extremities and following commands  CHEST/LUNG: B/L good air entry; No rales, rhonchi, or wheezing  HEART: S1S2 normal, no S3, irregular rate and rhythm ; No murmurs  ABDOMEN: Soft, Nontender, Nondistended; Bowel sounds present  EXTREMITIES:  2+ Peripheral Pulses, No clubbing, cyanosis, or edema  LYMPH: No lymphadenopathy noted  SKIN: No rashes or lesions    ASSESSMENT/PLAN  Patient is a 82F, [place] speaking with PMHx significant for Afib on eliquis, asthma, CAD, HFpEF, DMII, COPD presenting for AMS, diarrhea and generalized pain. Family at bedside, history obtained from daughter. She reports that pt has been lethargic and complaining of generalized pain with diarrhea for the past 48 hrs. Admitted to ICU for further workup and management.      #Altered mental status   #Toxic Metabolic Encephalopathy 2/2 COVID  # Sepsis POA/Septic Shock  # Troponemia  -ED: febrile to 103.6, leukopenic at 3.4; hypotensive 86/46  -s/p Code STEMI - ST and T wave changes on EKG   - Cardio cancelled code;  - diffuse subendocardial ischemia likely secondary to demand ischemia  -CXR. CTAP and CT Head unremarkable   -troponin .03; lactate 2.7  -s/p aztreonam and vancomycin in ED   -f/u ID consult for recs  f/u MRSA swab  -f/u blood and urine cx  -trend troponin and lactate  -f/u inflammatory markers: ESR, CRP, procal, ferritn  -on levo; taper as tolerated after fluid bolus    #COVID +  -asymptomatic; not vaccinated   -s/p hydrocortisone  -hx of COVID+ prior to admission; remdesivir not indicated   -start decadron     # Pre-renal TJ in setting of UTI  - BUN/Cr 29/1.1--->34/1.4  - C/w fluids, f/u BMP 4pm  - LR @75   - UA+  -f/u urine cultures  -c/w abx regimen    #Chronic A-fib  -c/w home Eliquis 5mg   -c/w metoprolol for rate control    # Asthma  # COPD  - c/w home albuterol, tiotropium     # Hypokalemia  # Hypomagnesemia  -replete as needed                                                                              ----------------------------------------------------  # DVT prophylaxis - Eliquis     # GI prophylaxis - Protonix    # Diet - Regular    # Disposition : MICU    CODE STATUS: FULL  HEALTHCARE PROXY/EMERGENCY CONTACT:  Name: Heaven Mays  Phone Number: [phone]                                                                             --------------------------------------------------------    # Handoff   f/u ID consult, F/u cultures, troponin, lactate, inflammatory markers  MICU RESIDENT PROGRESS NOTE    Patient is a 82y old  Female who presents with a chief complaint of altered mental status (02 Oct 2022 21:57)        INTERVAL HPI/OVERNIGHT EVENTS:   Overnight febrile 103,  On pressors, examined in the morning, alert and responsive.    GCS: 15  Sedatives: None  Pressors: Levophed .05  Lines  CENTRAL LINE| Yes: [ ] | No: [X ]  INDWELLING MCMILLAN| Yes: [X ] | No: [ ]      ICU Vital Signs Last 24 Hrs  T(C): 36.6 (03 Oct 2022 08:00), Max: 39.8 (02 Oct 2022 13:45)  T(F): 97.9 (03 Oct 2022 08:00), Max: 103.6 (02 Oct 2022 13:45)  HR: 64 (03 Oct 2022 08:00) (54 - 128)  BP: 118/60 (03 Oct 2022 08:00) (76/50 - 167/103)  BP(mean): 91 (03 Oct 2022 08:00) (58 - 91)  ABP: --  ABP(mean): --  RR: 17 (03 Oct 2022 08:00) (17 - 25)  SpO2: 100% (03 Oct 2022 08:00) (96% - 100%)    O2 Parameters below as of 03 Oct 2022 08:00  Patient On (Oxygen Delivery Method): nasal cannula  O2 Flow (L/min): 2        I&O's Summary    02 Oct 2022 07:01  -  03 Oct 2022 07:00  --------------------------------------------------------  IN: 44.8 mL / OUT: 200 mL / NET: -155.2 mL    03 Oct 2022 07:01  -  03 Oct 2022 09:00  --------------------------------------------------------  IN: 18.2 mL / OUT: 55 mL / NET: -36.8 mL          LABS:                        10.0   17.02 )-----------( 139      ( 03 Oct 2022 05:38 )             28.9     10-03    134<L>  |  96<L>  |  34<H>  ----------------------------<  100<H>  4.1   |  23  |  1.4    Ca    7.6<L>      03 Oct 2022 05:38  Phos  3.5     10-  Mg     1.5     10-    TPro  6.2  /  Alb  2.5<L>  /  TBili  1.1  /  DBili  0.4<H>  /  AST  32  /  ALT  15  /  AlkPhos  52  10    PT/INR - ( 02 Oct 2022 13:40 )   PT: 16.60 sec;   INR: 1.44 ratio         PTT - ( 02 Oct 2022 13:40 )  PTT:34.2 sec  Urinalysis Basic - ( 02 Oct 2022 14:55 )    Color: Light Yellow / Appearance: Slightly Turbid / S.009 / pH: x  Gluc: x / Ketone: Negative  / Bili: Negative / Urobili: <2 mg/dL   Blood: x / Protein: Trace / Nitrite: Negative   Leuk Esterase: Large / RBC: 13 /HPF / WBC 34 /HPF   Sq Epi: x / Non Sq Epi: 4 /HPF / Bacteria: Many      CAPILLARY BLOOD GLUCOSE      POCT Blood Glucose.: 141 mg/dL (03 Oct 2022 08:20)  POCT Blood Glucose.: 147 mg/dL (02 Oct 2022 13:21)        RADIOLOGY & ADDITIONAL TESTS:  10/2  CXR: Low lung volumes, without radiographic evidence of acute   cardiopulmonary disease.    RUQ US: No definite sonographic evidence of acute pathology  Nodular contour of the liver compatible with cirrhosis    CT Head: Motion degraded examination; No definite acute intracranial hemorrhage,   mass-effect or midline shift.  Visualized paranasal sinuses are predominantly opacified    CTAP:  Underdistention versus mild wall thickening of the sigmoid and descending   colon.  Nodular contour of the liver; correlate with liver function test    Consultant(s) Notes Reviewed:  [x ] YES  [ ] NO    MEDICATIONS  (STANDING):  ALBUTerol    90 MICROgram(s) HFA Inhaler 2 Puff(s) Inhalation every 6 hours  apixaban 5 milliGRAM(s) Oral two times a day  aztreonam  IVPB 500 milliGRAM(s) IV Intermittent every 8 hours  chlorhexidine 2% Cloths 1 Application(s) Topical daily  dexAMETHasone  Injectable 6 milliGRAM(s) IV Push every 24 hours  insulin glargine SubCutaneous Injection (LANTUS) - Peds 42 Unit(s) SubCutaneous at bedtime  lactated ringers. 1000 milliLiter(s) (75 mL/Hr) IV Continuous <Continuous>  magnesium sulfate  IVPB 2 Gram(s) IV Intermittent every 2 hours  metoprolol succinate  milliGRAM(s) Oral daily  norepinephrine Infusion 0.05 MICROgram(s)/kG/Min (6.38 mL/Hr) IV Continuous <Continuous>  pantoprazole    Tablet 40 milliGRAM(s) Oral before breakfast  sertraline 25 milliGRAM(s) Oral daily  tiotropium 18 MICROgram(s) Capsule 1 Capsule(s) Inhalation daily    MEDICATIONS  (PRN):        PHYSICAL EXAM:  GENERAL: NAD, resting comfortably  HEAD:  Atraumatic, Normocephalic  EYES: EOMI, PERRLA, conjunctiva and sclera clear  NERVOUS SYSTEM:  Alert & Awake; moving extremities and following commands  CHEST/LUNG: B/L good air entry; No rales, rhonchi, or wheezing  HEART: S1S2 normal, no S3, irregular rate and rhythm ; No murmurs  ABDOMEN: Soft, Nontender, Nondistended; Bowel sounds present  EXTREMITIES:  2+ Peripheral Pulses, No clubbing, cyanosis, or edema  LYMPH: No lymphadenopathy noted  SKIN: No rashes or lesions    ASSESSMENT/PLAN  Patient is a 82F, [place] speaking with PMHx significant for Afib on eliquis, asthma, CAD, HFpEF, DMII, COPD presenting for AMS, diarrhea and generalized pain. Family at bedside, history obtained from daughter. She reports that pt has been lethargic and complaining of generalized pain with diarrhea for the past 48 hrs. Admitted to ICU for further workup and management.      #Altered mental status   #Toxic Metabolic Encephalopathy 2/2 COVID  # Sepsis POA/Septic Shock  # Troponemia  -ED: febrile to 103.6, leukopenic at 3.4; hypotensive 86/46  -s/p Code STEMI - ST and T wave changes on EKG   - Cardio cancelled code;  - diffuse subendocardial ischemia likely secondary to demand ischemia  -CXR. CTAP and CT Head unremarkable   -troponin .03; lactate 2.7  -s/p aztreonam and vancomycin in ED   -f/u ID consult for recs  f/u MRSA swab  -f/u blood and urine cx  -trend troponin and lactate  -f/u inflammatory markers: ESR, CRP, procal, ferritn  -on levo; taper as tolerated after fluid bolus    #COVID +  -asymptomatic; not vaccinated   -s/p hydrocortisone  -hx of COVID+ prior to admission; remdesivir not indicated   -start decadron 6mg q24    # Pre-renal TJ in setting of UTI  - BUN/Cr 29/1.1--->34/1.4  - C/w fluids, f/u BMP 4pm  - LR @75   - UA+ Bacteria and LE  -f/u urine cultures  -c/w abx regimen    #Chronic A-fib  -c/w home Eliquis 5mg   -c/w metoprolol for rate control    # Asthma  # COPD  - c/w home albuterol, tiotropium     # Hypokalemia  # Hypomagnesemia  -replete as needed                                                                              ----------------------------------------------------  # DVT prophylaxis - Eliquis     # GI prophylaxis - Protonix    # Diet - DASH    # Disposition : MICU    CODE STATUS: FULL  HEALTHCARE PROXY/EMERGENCY CONTACT:  Name: Heaven Mays  Phone Number: [phone]                                                                             --------------------------------------------------------    # Handoff   f/u ID consult, F/u cultures, troponin, lactate, inflammatory markers

## 2022-10-03 NOTE — PHARMACOTHERAPY INTERVENTION NOTE - COMMENTS
Modified Augmentin allergy history to state that patient tolerated ceftriaxone in 2019 and meropenem during this admission. 
Patient has ESBL+ E. coli in blood. On aztreonam, as there was a listed Augmentin allergy. However, given culture result, and since patient tolerated ceftriaxone in 2019, recommended changing to meropenem 1g IV q12h. Recommended to initiate meropenem STAT and delivered medication to nurse to ensure timely administration.

## 2022-10-04 LAB
-  AMIKACIN: SIGNIFICANT CHANGE UP
-  AMOXICILLIN/CLAVULANIC ACID: SIGNIFICANT CHANGE UP
-  AMPICILLIN/SULBACTAM: SIGNIFICANT CHANGE UP
-  AMPICILLIN: SIGNIFICANT CHANGE UP
-  AZTREONAM: SIGNIFICANT CHANGE UP
-  CEFAZOLIN: SIGNIFICANT CHANGE UP
-  CEFEPIME: SIGNIFICANT CHANGE UP
-  CEFTRIAXONE: SIGNIFICANT CHANGE UP
-  CIPROFLOXACIN: SIGNIFICANT CHANGE UP
-  ERTAPENEM: SIGNIFICANT CHANGE UP
-  GENTAMICIN: SIGNIFICANT CHANGE UP
-  IMIPENEM: SIGNIFICANT CHANGE UP
-  LEVOFLOXACIN: SIGNIFICANT CHANGE UP
-  MEROPENEM: SIGNIFICANT CHANGE UP
-  NITROFURANTOIN: SIGNIFICANT CHANGE UP
-  PIPERACILLIN/TAZOBACTAM: SIGNIFICANT CHANGE UP
-  TIGECYCLINE: SIGNIFICANT CHANGE UP
-  TOBRAMYCIN: SIGNIFICANT CHANGE UP
-  TRIMETHOPRIM/SULFAMETHOXAZOLE: SIGNIFICANT CHANGE UP
ALBUMIN SERPL ELPH-MCNC: 2.8 G/DL — LOW (ref 3.5–5.2)
ALBUMIN SERPL ELPH-MCNC: 3.3 G/DL — LOW (ref 3.5–5.2)
ALP SERPL-CCNC: 68 U/L — SIGNIFICANT CHANGE UP (ref 30–115)
ALP SERPL-CCNC: 84 U/L — SIGNIFICANT CHANGE UP (ref 30–115)
ALT FLD-CCNC: 15 U/L — SIGNIFICANT CHANGE UP (ref 0–41)
ALT FLD-CCNC: 16 U/L — SIGNIFICANT CHANGE UP (ref 0–41)
ANION GAP SERPL CALC-SCNC: 11 MMOL/L — SIGNIFICANT CHANGE UP (ref 7–14)
ANION GAP SERPL CALC-SCNC: 16 MMOL/L — HIGH (ref 7–14)
APTT BLD: 31.5 SEC — SIGNIFICANT CHANGE UP (ref 27–39.2)
AST SERPL-CCNC: 27 U/L — SIGNIFICANT CHANGE UP (ref 0–41)
AST SERPL-CCNC: 29 U/L — SIGNIFICANT CHANGE UP (ref 0–41)
BASOPHILS # BLD AUTO: 0.03 K/UL — SIGNIFICANT CHANGE UP (ref 0–0.2)
BASOPHILS NFR BLD AUTO: 0.2 % — SIGNIFICANT CHANGE UP (ref 0–1)
BILIRUB SERPL-MCNC: 0.6 MG/DL — SIGNIFICANT CHANGE UP (ref 0.2–1.2)
BILIRUB SERPL-MCNC: 0.7 MG/DL — SIGNIFICANT CHANGE UP (ref 0.2–1.2)
BUN SERPL-MCNC: 42 MG/DL — HIGH (ref 10–20)
BUN SERPL-MCNC: 44 MG/DL — HIGH (ref 10–20)
CALCIUM SERPL-MCNC: 8.5 MG/DL — SIGNIFICANT CHANGE UP (ref 8.4–10.5)
CALCIUM SERPL-MCNC: 8.5 MG/DL — SIGNIFICANT CHANGE UP (ref 8.4–10.5)
CHLORIDE SERPL-SCNC: 96 MMOL/L — LOW (ref 98–110)
CHLORIDE SERPL-SCNC: 99 MMOL/L — SIGNIFICANT CHANGE UP (ref 98–110)
CO2 SERPL-SCNC: 24 MMOL/L — SIGNIFICANT CHANGE UP (ref 17–32)
CO2 SERPL-SCNC: 27 MMOL/L — SIGNIFICANT CHANGE UP (ref 17–32)
CREAT SERPL-MCNC: 1.3 MG/DL — SIGNIFICANT CHANGE UP (ref 0.7–1.5)
CREAT SERPL-MCNC: 1.5 MG/DL — SIGNIFICANT CHANGE UP (ref 0.7–1.5)
EGFR: 34 ML/MIN/1.73M2 — LOW
EGFR: 41 ML/MIN/1.73M2 — LOW
EOSINOPHIL # BLD AUTO: 0 K/UL — SIGNIFICANT CHANGE UP (ref 0–0.7)
EOSINOPHIL NFR BLD AUTO: 0 % — SIGNIFICANT CHANGE UP (ref 0–8)
FERRITIN SERPL-MCNC: 166 NG/ML — HIGH (ref 15–150)
GLUCOSE BLDC GLUCOMTR-MCNC: 172 MG/DL — HIGH (ref 70–99)
GLUCOSE BLDC GLUCOMTR-MCNC: 172 MG/DL — HIGH (ref 70–99)
GLUCOSE BLDC GLUCOMTR-MCNC: 187 MG/DL — HIGH (ref 70–99)
GLUCOSE BLDC GLUCOMTR-MCNC: 205 MG/DL — HIGH (ref 70–99)
GLUCOSE BLDC GLUCOMTR-MCNC: 402 MG/DL — HIGH (ref 70–99)
GLUCOSE SERPL-MCNC: 136 MG/DL — HIGH (ref 70–99)
GLUCOSE SERPL-MCNC: 181 MG/DL — HIGH (ref 70–99)
HCT VFR BLD CALC: 29.2 % — LOW (ref 37–47)
HGB BLD-MCNC: 10.1 G/DL — LOW (ref 12–16)
IMM GRANULOCYTES NFR BLD AUTO: 1.2 % — HIGH (ref 0.1–0.3)
INR BLD: 1.47 RATIO — HIGH (ref 0.65–1.3)
LACTATE SERPL-SCNC: 3.8 MMOL/L — HIGH (ref 0.7–2)
LACTATE SERPL-SCNC: 4.6 MMOL/L — CRITICAL HIGH (ref 0.7–2)
LYMPHOCYTES # BLD AUTO: 1.97 K/UL — SIGNIFICANT CHANGE UP (ref 1.2–3.4)
LYMPHOCYTES # BLD AUTO: 10.8 % — LOW (ref 20.5–51.1)
MAGNESIUM SERPL-MCNC: 2.9 MG/DL — HIGH (ref 1.8–2.4)
MCHC RBC-ENTMCNC: 31.2 PG — HIGH (ref 27–31)
MCHC RBC-ENTMCNC: 34.6 G/DL — SIGNIFICANT CHANGE UP (ref 32–37)
MCV RBC AUTO: 90.1 FL — SIGNIFICANT CHANGE UP (ref 81–99)
METHOD TYPE: SIGNIFICANT CHANGE UP
MONOCYTES # BLD AUTO: 1.5 K/UL — HIGH (ref 0.1–0.6)
MONOCYTES NFR BLD AUTO: 8.2 % — SIGNIFICANT CHANGE UP (ref 1.7–9.3)
MRSA PCR RESULT.: NEGATIVE — SIGNIFICANT CHANGE UP
NEUTROPHILS # BLD AUTO: 14.48 K/UL — HIGH (ref 1.4–6.5)
NEUTROPHILS NFR BLD AUTO: 79.6 % — HIGH (ref 42.2–75.2)
NRBC # BLD: 0 /100 WBCS — SIGNIFICANT CHANGE UP (ref 0–0)
PLATELET # BLD AUTO: 177 K/UL — SIGNIFICANT CHANGE UP (ref 130–400)
POTASSIUM SERPL-MCNC: 3.8 MMOL/L — SIGNIFICANT CHANGE UP (ref 3.5–5)
POTASSIUM SERPL-MCNC: 3.8 MMOL/L — SIGNIFICANT CHANGE UP (ref 3.5–5)
POTASSIUM SERPL-SCNC: 3.8 MMOL/L — SIGNIFICANT CHANGE UP (ref 3.5–5)
POTASSIUM SERPL-SCNC: 3.8 MMOL/L — SIGNIFICANT CHANGE UP (ref 3.5–5)
PROCALCITONIN SERPL-MCNC: 8.86 NG/ML — HIGH (ref 0.02–0.1)
PROT SERPL-MCNC: 6.5 G/DL — SIGNIFICANT CHANGE UP (ref 6–8)
PROT SERPL-MCNC: 6.9 G/DL — SIGNIFICANT CHANGE UP (ref 6–8)
PROTHROM AB SERPL-ACNC: 17 SEC — HIGH (ref 9.95–12.87)
RBC # BLD: 3.24 M/UL — LOW (ref 4.2–5.4)
RBC # FLD: 14.7 % — HIGH (ref 11.5–14.5)
SODIUM SERPL-SCNC: 136 MMOL/L — SIGNIFICANT CHANGE UP (ref 135–146)
SODIUM SERPL-SCNC: 137 MMOL/L — SIGNIFICANT CHANGE UP (ref 135–146)
WBC # BLD: 18.2 K/UL — HIGH (ref 4.8–10.8)
WBC # FLD AUTO: 18.2 K/UL — HIGH (ref 4.8–10.8)

## 2022-10-04 PROCEDURE — 93010 ELECTROCARDIOGRAM REPORT: CPT

## 2022-10-04 PROCEDURE — 71045 X-RAY EXAM CHEST 1 VIEW: CPT | Mod: 26

## 2022-10-04 PROCEDURE — 99233 SBSQ HOSP IP/OBS HIGH 50: CPT

## 2022-10-04 PROCEDURE — 76775 US EXAM ABDO BACK WALL LIM: CPT | Mod: 26

## 2022-10-04 RX ORDER — DEXMEDETOMIDINE HYDROCHLORIDE IN 0.9% SODIUM CHLORIDE 4 UG/ML
0.5 INJECTION INTRAVENOUS
Qty: 400 | Refills: 0 | Status: DISCONTINUED | OUTPATIENT
Start: 2022-10-04 | End: 2022-10-04

## 2022-10-04 RX ORDER — DIPHENHYDRAMINE HCL 50 MG
50 CAPSULE ORAL ONCE
Refills: 0 | Status: DISCONTINUED | OUTPATIENT
Start: 2022-10-04 | End: 2022-10-04

## 2022-10-04 RX ORDER — SODIUM CHLORIDE 9 MG/ML
1000 INJECTION, SOLUTION INTRAVENOUS
Refills: 0 | Status: DISCONTINUED | OUTPATIENT
Start: 2022-10-04 | End: 2022-10-05

## 2022-10-04 RX ORDER — METOPROLOL TARTRATE 50 MG
25 TABLET ORAL
Refills: 0 | Status: DISCONTINUED | OUTPATIENT
Start: 2022-10-04 | End: 2022-10-06

## 2022-10-04 RX ADMIN — SERTRALINE 25 MILLIGRAM(S): 25 TABLET, FILM COATED ORAL at 14:30

## 2022-10-04 RX ADMIN — INSULIN GLARGINE 42 UNIT(S): 100 INJECTION, SOLUTION SUBCUTANEOUS at 22:33

## 2022-10-04 RX ADMIN — ALBUTEROL 2 PUFF(S): 90 AEROSOL, METERED ORAL at 09:02

## 2022-10-04 RX ADMIN — APIXABAN 5 MILLIGRAM(S): 2.5 TABLET, FILM COATED ORAL at 06:48

## 2022-10-04 RX ADMIN — Medication 2: at 12:57

## 2022-10-04 RX ADMIN — PANTOPRAZOLE SODIUM 40 MILLIGRAM(S): 20 TABLET, DELAYED RELEASE ORAL at 06:48

## 2022-10-04 RX ADMIN — SODIUM CHLORIDE 75 MILLILITER(S): 9 INJECTION, SOLUTION INTRAVENOUS at 20:07

## 2022-10-04 RX ADMIN — CHLORHEXIDINE GLUCONATE 1 APPLICATION(S): 213 SOLUTION TOPICAL at 12:58

## 2022-10-04 RX ADMIN — ALBUTEROL 2 PUFF(S): 90 AEROSOL, METERED ORAL at 13:11

## 2022-10-04 RX ADMIN — Medication 2: at 17:30

## 2022-10-04 RX ADMIN — MEROPENEM 100 MILLIGRAM(S): 1 INJECTION INTRAVENOUS at 17:34

## 2022-10-04 RX ADMIN — DEXMEDETOMIDINE HYDROCHLORIDE IN 0.9% SODIUM CHLORIDE 8.66 MICROGRAM(S)/KG/HR: 4 INJECTION INTRAVENOUS at 17:30

## 2022-10-04 RX ADMIN — MEROPENEM 100 MILLIGRAM(S): 1 INJECTION INTRAVENOUS at 06:48

## 2022-10-04 RX ADMIN — TIOTROPIUM BROMIDE 1 CAPSULE(S): 18 CAPSULE ORAL; RESPIRATORY (INHALATION) at 09:26

## 2022-10-04 RX ADMIN — APIXABAN 5 MILLIGRAM(S): 2.5 TABLET, FILM COATED ORAL at 17:32

## 2022-10-04 RX ADMIN — SODIUM CHLORIDE 75 MILLILITER(S): 9 INJECTION, SOLUTION INTRAVENOUS at 12:58

## 2022-10-04 RX ADMIN — Medication 2: at 09:25

## 2022-10-04 NOTE — PROGRESS NOTE ADULT - SUBJECTIVE AND OBJECTIVE BOX
MICU RESIDENT PROGRESS NOTE    Patient is a 82y old  Female who presents with a chief complaint of Urosepsis (04 Oct 2022 08:51)        INTERVAL HPI/OVERNIGHT EVENTS:   Overnight, Pt  Afebrile, hemodynamically stable     GCS:  Sedatives:  Pressors:  Lines  CENTRAL LINE| Yes: [ ] | No: [ ]  INDWELLING MCMILLAN| Yes: [ ] | No: [ ]      ICU Vital Signs Last 24 Hrs  T(C): 35 (04 Oct 2022 08:00), Max: 36.9 (03 Oct 2022 13:00)  T(F): 95 (04 Oct 2022 08:00), Max: 98.4 (03 Oct 2022 13:00)  HR: 60 (04 Oct 2022 08:45) (60 - 68)  BP: 97/60 (04 Oct 2022 08:45) (86/50 - 154/74)  BP(mean): 85 (04 Oct 2022 08:45) (69 - 105)  ABP: --  ABP(mean): --  RR: 20 (04 Oct 2022 08:45) (12 - 33)  SpO2: 98% (04 Oct 2022 08:45) (98% - 100%)    O2 Parameters below as of 04 Oct 2022 08:00  Patient On (Oxygen Delivery Method): room air          I&O's Summary    03 Oct 2022 07:01  -  04 Oct 2022 07:00  --------------------------------------------------------  IN: 3309.5 mL / OUT: 1030 mL / NET: 2279.5 mL    04 Oct 2022 07:01  -  04 Oct 2022 09:20  --------------------------------------------------------  IN: 154.5 mL / OUT: 95 mL / NET: 59.5 mL          LABS:                        10.1   18.20 )-----------( 177      ( 04 Oct 2022 06:18 )             29.2     10-04    137  |  99  |  42<H>  ----------------------------<  181<H>  3.8   |  27  |  1.5    Ca    8.5      04 Oct 2022 06:18  Phos  3.5     10-03  Mg     2.9     10    TPro  6.5  /  Alb  2.8<L>  /  TBili  0.7  /  DBili  x   /  AST  27  /  ALT  15  /  AlkPhos  68  10-04    PT/INR - ( 04 Oct 2022 06:18 )   PT: 17.00 sec;   INR: 1.47 ratio         PTT - ( 04 Oct 2022 06:18 )  PTT:31.5 sec  Urinalysis Basic - ( 02 Oct 2022 14:55 )    Color: Light Yellow / Appearance: Slightly Turbid / S.009 / pH: x  Gluc: x / Ketone: Negative  / Bili: Negative / Urobili: <2 mg/dL   Blood: x / Protein: Trace / Nitrite: Negative   Leuk Esterase: Large / RBC: 13 /HPF / WBC 34 /HPF   Sq Epi: x / Non Sq Epi: 4 /HPF / Bacteria: Many      CAPILLARY BLOOD GLUCOSE  402 (03 Oct 2022 18:00)      POCT Blood Glucose.: 187 mg/dL (04 Oct 2022 08:40)  POCT Blood Glucose.: 205 mg/dL (04 Oct 2022 06:44)  POCT Blood Glucose.: 352 mg/dL (03 Oct 2022 21:48)  POCT Blood Glucose.: 402 mg/dL (03 Oct 2022 17:34)        RADIOLOGY & ADDITIONAL TESTS:    Consultant(s) Notes Reviewed:  [x ] YES  [ ] NO    MEDICATIONS  (STANDING):  ALBUTerol    90 MICROgram(s) HFA Inhaler 2 Puff(s) Inhalation every 6 hours  apixaban 5 milliGRAM(s) Oral two times a day  chlorhexidine 2% Cloths 1 Application(s) Topical daily  dextrose 5%. 1000 milliLiter(s) (50 mL/Hr) IV Continuous <Continuous>  dextrose 5%. 1000 milliLiter(s) (100 mL/Hr) IV Continuous <Continuous>  dextrose 50% Injectable 25 Gram(s) IV Push once  dextrose 50% Injectable 12.5 Gram(s) IV Push once  dextrose 50% Injectable 25 Gram(s) IV Push once  glucagon  Injectable 1 milliGRAM(s) IntraMuscular once  insulin glargine Injectable (LANTUS) 10 Unit(s) SubCutaneous at bedtime  insulin glargine SubCutaneous Injection (LANTUS) - Peds 42 Unit(s) SubCutaneous at bedtime  insulin lispro (ADMELOG) corrective regimen sliding scale   SubCutaneous three times a day before meals  lactated ringers. 1000 milliLiter(s) (75 mL/Hr) IV Continuous <Continuous>  meropenem  IVPB      meropenem  IVPB 1000 milliGRAM(s) IV Intermittent every 12 hours  metoprolol tartrate 25 milliGRAM(s) Oral two times a day  norepinephrine Infusion 0.05 MICROgram(s)/kG/Min (6.38 mL/Hr) IV Continuous <Continuous>  pantoprazole    Tablet 40 milliGRAM(s) Oral before breakfast  sertraline 25 milliGRAM(s) Oral daily  tiotropium 18 MICROgram(s) Capsule 1 Capsule(s) Inhalation daily    MEDICATIONS  (PRN):  dextrose Oral Gel 15 Gram(s) Oral once PRN Blood Glucose LESS THAN 70 milliGRAM(s)/deciliter      PHYSICAL EXAM:  GENERAL:   HEAD:  Atraumatic, Normocephalic  EYES: EOMI, PERRLA, conjunctiva and sclera clear  NECK: Supple, No JVD, Normal thyroid, no enlarged nodes  NERVOUS SYSTEM:  Alert & Awake.  CHEST/LUNG: B/L good air entry; No rales, rhonchi, or wheezing  HEART: S1S2 normal, no S3, Regular rate and rhythm; No murmurs  ABDOMEN: Soft, Nontender, Nondistended; Bowel sounds present  EXTREMITIES:  2+ Peripheral Pulses, No clubbing, cyanosis, or edema  LYMPH: No lymphadenopathy noted  SKIN: No rashes or lesions    ASSESSMENT/PLAN                                                                                ----------------------------------------------------  # DVT prophylaxis     # GI prophylaxis     # Diet     # Disposition     CODE STATUS:  HEALTHCARE PROXY/EMERGENCY CONTACT:  Name:  Phone Number:                                                                             --------------------------------------------------------    # Handoff      MICU RESIDENT PROGRESS NOTE    Patient is a 82y old  Female who presents with a chief complaint of Urosepsis (04 Oct 2022 08:51)        INTERVAL HPI/OVERNIGHT EVENTS:   No acute overnight events  Patient seen and examined at bedside; remains alert yet confused  Afebrile; hemodynamically stable    GCS: 15  Sedatives: None  Pressors: Levophed .01  Lines  CENTRAL LINE| Yes: [X ] | No: [ ]  INDWELLING MCMILLAN| Yes: [X ] | No: [ ]      ICU Vital Signs Last 24 Hrs  T(C): 35 (04 Oct 2022 08:00), Max: 36.9 (03 Oct 2022 13:00)  T(F): 95 (04 Oct 2022 08:00), Max: 98.4 (03 Oct 2022 13:00)  HR: 60 (04 Oct 2022 08:45) (60 - 68)  BP: 97/60 (04 Oct 2022 08:45) (86/50 - 154/74)  BP(mean): 85 (04 Oct 2022 08:45) (69 - 105)  ABP: --  ABP(mean): --  RR: 20 (04 Oct 2022 08:45) (12 - 33)  SpO2: 98% (04 Oct 2022 08:45) (98% - 100%)    O2 Parameters below as of 04 Oct 2022 08:00  Patient On (Oxygen Delivery Method): room air          I&O's Summary    03 Oct 2022 07:01  -  04 Oct 2022 07:00  --------------------------------------------------------  IN: 3309.5 mL / OUT: 1030 mL / NET: 2279.5 mL    04 Oct 2022 07:01  -  04 Oct 2022 09:20  --------------------------------------------------------  IN: 154.5 mL / OUT: 95 mL / NET: 59.5 mL          LABS:                        10.1   18.20 )-----------( 177      ( 04 Oct 2022 06:18 )             29.2     10-04    137  |  99  |  42<H>  ----------------------------<  181<H>  3.8   |  27  |  1.5    Ca    8.5      04 Oct 2022 06:18  Phos  3.5     10-03  Mg     2.9     10-04    TPro  6.5  /  Alb  2.8<L>  /  TBili  0.7  /  DBili  x   /  AST  27  /  ALT  15  /  AlkPhos  68  10-04    PT/INR - ( 04 Oct 2022 06:18 )   PT: 17.00 sec;   INR: 1.47 ratio         PTT - ( 04 Oct 2022 06:18 )  PTT:31.5 sec  Urinalysis Basic - ( 02 Oct 2022 14:55 )    Color: Light Yellow / Appearance: Slightly Turbid / S.009 / pH: x  Gluc: x / Ketone: Negative  / Bili: Negative / Urobili: <2 mg/dL   Blood: x / Protein: Trace / Nitrite: Negative   Leuk Esterase: Large / RBC: 13 /HPF / WBC 34 /HPF   Sq Epi: x / Non Sq Epi: 4 /HPF / Bacteria: Many      CAPILLARY BLOOD GLUCOSE  402 (03 Oct 2022 18:00)      POCT Blood Glucose.: 187 mg/dL (04 Oct 2022 08:40)  POCT Blood Glucose.: 205 mg/dL (04 Oct 2022 06:44)  POCT Blood Glucose.: 352 mg/dL (03 Oct 2022 21:48)  POCT Blood Glucose.: 402 mg/dL (03 Oct 2022 17:34)        RADIOLOGY & ADDITIONAL TESTS:  10/2  CXR: Low lung volumes, without radiographic evidence of acute   cardiopulmonary disease.    RUQ US: No definite sonographic evidence of acute pathology  Nodular contour of the liver compatible with cirrhosis    CT Head: Motion degraded examination; No definite acute intracranial hemorrhage,   mass-effect or midline shift.  Visualized paranasal sinuses are predominantly opacified    CTAP:  Underdistention versus mild wall thickening of the sigmoid and descending   colon.  Nodular contour of the liver; correlate with liver function test  Consultant(s) Notes Reviewed:  [x ] YES  [ ] NO    MEDICATIONS  (STANDING):  ALBUTerol    90 MICROgram(s) HFA Inhaler 2 Puff(s) Inhalation every 6 hours  apixaban 5 milliGRAM(s) Oral two times a day  chlorhexidine 2% Cloths 1 Application(s) Topical daily  dextrose 5%. 1000 milliLiter(s) (50 mL/Hr) IV Continuous <Continuous>  dextrose 5%. 1000 milliLiter(s) (100 mL/Hr) IV Continuous <Continuous>  dextrose 50% Injectable 25 Gram(s) IV Push once  dextrose 50% Injectable 12.5 Gram(s) IV Push once  dextrose 50% Injectable 25 Gram(s) IV Push once  glucagon  Injectable 1 milliGRAM(s) IntraMuscular once  insulin glargine Injectable (LANTUS) 10 Unit(s) SubCutaneous at bedtime  insulin glargine SubCutaneous Injection (LANTUS) - Peds 42 Unit(s) SubCutaneous at bedtime  insulin lispro (ADMELOG) corrective regimen sliding scale   SubCutaneous three times a day before meals  lactated ringers. 1000 milliLiter(s) (75 mL/Hr) IV Continuous <Continuous>  meropenem  IVPB      meropenem  IVPB 1000 milliGRAM(s) IV Intermittent every 12 hours  metoprolol tartrate 25 milliGRAM(s) Oral two times a day  norepinephrine Infusion 0.05 MICROgram(s)/kG/Min (6.38 mL/Hr) IV Continuous <Continuous>  pantoprazole    Tablet 40 milliGRAM(s) Oral before breakfast  sertraline 25 milliGRAM(s) Oral daily  tiotropium 18 MICROgram(s) Capsule 1 Capsule(s) Inhalation daily    MEDICATIONS  (PRN):  dextrose Oral Gel 15 Gram(s) Oral once PRN Blood Glucose LESS THAN 70 milliGRAM(s)/deciliter      PHYSICAL EXAM:  GENERAL: NAD; resting comfortably in bed; A&Ox1   HEAD:  Atraumatic, Normocephalic  EYES: EOMI, PERRLA, conjunctiva and sclera clear  NERVOUS SYSTEM:  Alert & Awake; moving extremities and following commands  CHEST/LUNG: B/L good air entry; No rales, rhonchi, or wheezing  HEART: S1S2 normal, no S3, irregular rate and rhythm ; No murmurs  ABDOMEN: Soft, Nontender, Nondistended; Bowel sounds present  EXTREMITIES:  2+ Peripheral Pulses, No clubbing, cyanosis, or edema  LYMPH: No lymphadenopathy noted  SKIN: No rashes or lesions    ASSESSMENT/PLAN    Patient is a 82F, [place] speaking with PMHx significant for Afib on eliquis, asthma, CAD, HFpEF, DMII, COPD presenting for AMS, diarrhea and generalized pain. Family at bedside, history obtained from daughter. She reports that pt has been lethargic and complaining of generalized pain with diarrhea for the past 48 hrs. Admitted to ICU for further workup and management.    #Altered mental status   # Sepsis POA/Septic Shock  #Bacteriemia   -ED: febrile to 103.6, leukopenic at 3.4; hypotensive 86/46  -s/p Code STEMI - ST and T wave changes on EKG   - Cardio cancelled code;  - diffuse subendocardial ischemia likely secondary to demand ischemia  -CXR. CTAP and CT Head unremarkable   -troponin .03>.04>.02 ; lactate 2.7 >4.6  -s/p aztreonam and vancomycin in ED   -10/3 bcx: ESBL E.coli   -c/w meropenum 1g IV  --f/u ID recs  - procal 8.86; ferritin 166;   -f/u repeat CMP, lactate, MRSA swab       #COVID +  -asymptomatic; not vaccinated   -s/p hydrocortisone  -hx of COVID+ prior to admission; remdesivir not indicated   -uptrending WBC; d/c decadron     # Pre-renal TJ in setting of UTI  - BUN/Cr 29/1.1--->34/1.4  - c/w LR @75   - UA+ Bacteria and LE  - Ucx: E.coli  -c/w abx regimen    #Chronic A-fib  -c/w home Eliquis 5mg   -start metoprolol 25mg q12    # Asthma  # COPD  - c/w home albuterol, tiotropium     # Hypokalemia  # Hypomagnesemia  -replete as needed                                                                              ----------------------------------------------------  # DVT prophylaxis - Eliquis     # GI prophylaxis - Protonix    # Diet - DASH    # Disposition : MICU    CODE STATUS: FULL  HEALTHCARE PROXY/EMERGENCY CONTACT:  Name: Heaven Mays  Phone Number: [phone]                                                                             --------------------------------------------------------    # Handoff   f/u ID recs, repeat CMP and lactate; TOV d/c Felicity, start metoprolol 25 q12 MICU RESIDENT PROGRESS NOTE    Patient is a 82y old  Female who presents with a chief complaint of Urosepsis (04 Oct 2022 08:51)        INTERVAL HPI/OVERNIGHT EVENTS:   No acute overnight events  Patient seen and examined at bedside; remains alert yet confused  Afebrile; hemodynamically stable    GCS: 15  Sedatives: None  Pressors: Levophed .01  Lines  CENTRAL LINE| Yes: [X ] | No: [ ]  INDWELLING MCMILLAN| Yes: [X ] | No: [ ]      ICU Vital Signs Last 24 Hrs  T(C): 35 (04 Oct 2022 08:00), Max: 36.9 (03 Oct 2022 13:00)  T(F): 95 (04 Oct 2022 08:00), Max: 98.4 (03 Oct 2022 13:00)  HR: 60 (04 Oct 2022 08:45) (60 - 68)  BP: 97/60 (04 Oct 2022 08:45) (86/50 - 154/74)  BP(mean): 85 (04 Oct 2022 08:45) (69 - 105)  ABP: --  ABP(mean): --  RR: 20 (04 Oct 2022 08:45) (12 - 33)  SpO2: 98% (04 Oct 2022 08:45) (98% - 100%)    O2 Parameters below as of 04 Oct 2022 08:00  Patient On (Oxygen Delivery Method): room air          I&O's Summary    03 Oct 2022 07:01  -  04 Oct 2022 07:00  --------------------------------------------------------  IN: 3309.5 mL / OUT: 1030 mL / NET: 2279.5 mL    04 Oct 2022 07:01  -  04 Oct 2022 09:20  --------------------------------------------------------  IN: 154.5 mL / OUT: 95 mL / NET: 59.5 mL          LABS:                        10.1   18.20 )-----------( 177      ( 04 Oct 2022 06:18 )             29.2     10-04    137  |  99  |  42<H>  ----------------------------<  181<H>  3.8   |  27  |  1.5    Ca    8.5      04 Oct 2022 06:18  Phos  3.5     10-03  Mg     2.9     10-04    TPro  6.5  /  Alb  2.8<L>  /  TBili  0.7  /  DBili  x   /  AST  27  /  ALT  15  /  AlkPhos  68  10-04    PT/INR - ( 04 Oct 2022 06:18 )   PT: 17.00 sec;   INR: 1.47 ratio         PTT - ( 04 Oct 2022 06:18 )  PTT:31.5 sec  Urinalysis Basic - ( 02 Oct 2022 14:55 )    Color: Light Yellow / Appearance: Slightly Turbid / S.009 / pH: x  Gluc: x / Ketone: Negative  / Bili: Negative / Urobili: <2 mg/dL   Blood: x / Protein: Trace / Nitrite: Negative   Leuk Esterase: Large / RBC: 13 /HPF / WBC 34 /HPF   Sq Epi: x / Non Sq Epi: 4 /HPF / Bacteria: Many      CAPILLARY BLOOD GLUCOSE  402 (03 Oct 2022 18:00)      POCT Blood Glucose.: 187 mg/dL (04 Oct 2022 08:40)  POCT Blood Glucose.: 205 mg/dL (04 Oct 2022 06:44)  POCT Blood Glucose.: 352 mg/dL (03 Oct 2022 21:48)  POCT Blood Glucose.: 402 mg/dL (03 Oct 2022 17:34)        RADIOLOGY & ADDITIONAL TESTS:  10/2  CXR: Low lung volumes, without radiographic evidence of acute   cardiopulmonary disease.    RUQ US: No definite sonographic evidence of acute pathology  Nodular contour of the liver compatible with cirrhosis    CT Head: Motion degraded examination; No definite acute intracranial hemorrhage,   mass-effect or midline shift.  Visualized paranasal sinuses are predominantly opacified    CTAP:  Underdistention versus mild wall thickening of the sigmoid and descending   colon.  Nodular contour of the liver; correlate with liver function test  Consultant(s) Notes Reviewed:  [x ] YES  [ ] NO    MEDICATIONS  (STANDING):  ALBUTerol    90 MICROgram(s) HFA Inhaler 2 Puff(s) Inhalation every 6 hours  apixaban 5 milliGRAM(s) Oral two times a day  chlorhexidine 2% Cloths 1 Application(s) Topical daily  dextrose 5%. 1000 milliLiter(s) (50 mL/Hr) IV Continuous <Continuous>  dextrose 5%. 1000 milliLiter(s) (100 mL/Hr) IV Continuous <Continuous>  dextrose 50% Injectable 25 Gram(s) IV Push once  dextrose 50% Injectable 12.5 Gram(s) IV Push once  dextrose 50% Injectable 25 Gram(s) IV Push once  glucagon  Injectable 1 milliGRAM(s) IntraMuscular once  insulin glargine Injectable (LANTUS) 10 Unit(s) SubCutaneous at bedtime  insulin glargine SubCutaneous Injection (LANTUS) - Peds 42 Unit(s) SubCutaneous at bedtime  insulin lispro (ADMELOG) corrective regimen sliding scale   SubCutaneous three times a day before meals  lactated ringers. 1000 milliLiter(s) (75 mL/Hr) IV Continuous <Continuous>  meropenem  IVPB      meropenem  IVPB 1000 milliGRAM(s) IV Intermittent every 12 hours  metoprolol tartrate 25 milliGRAM(s) Oral two times a day  norepinephrine Infusion 0.05 MICROgram(s)/kG/Min (6.38 mL/Hr) IV Continuous <Continuous>  pantoprazole    Tablet 40 milliGRAM(s) Oral before breakfast  sertraline 25 milliGRAM(s) Oral daily  tiotropium 18 MICROgram(s) Capsule 1 Capsule(s) Inhalation daily    MEDICATIONS  (PRN):  dextrose Oral Gel 15 Gram(s) Oral once PRN Blood Glucose LESS THAN 70 milliGRAM(s)/deciliter      PHYSICAL EXAM:  GENERAL: NAD; resting comfortably in bed; A&Ox1   HEAD:  Atraumatic, Normocephalic  EYES: EOMI, PERRLA, conjunctiva and sclera clear  NERVOUS SYSTEM:  Alert & Awake; moving extremities and following commands  CHEST/LUNG: B/L good air entry; No rales, rhonchi, or wheezing  HEART: S1S2 normal, no S3, irregular rate and rhythm ; No murmurs  ABDOMEN: Soft, Nontender, Nondistended; Bowel sounds present  EXTREMITIES:  2+ Peripheral Pulses, No clubbing, cyanosis, or edema  LYMPH: No lymphadenopathy noted  SKIN: No rashes or lesions    ASSESSMENT/PLAN    Patient is a 82F, [place] speaking with PMHx significant for Afib on eliquis, asthma, CAD, HFpEF, DMII, COPD presenting for AMS, diarrhea and generalized pain. Family at bedside, history obtained from daughter. She reports that pt has been lethargic and complaining of generalized pain with diarrhea for the past 48 hrs. Admitted to ICU for further workup and management.    #Altered mental status   # Sepsis POA/Septic Shock  #E coli ESBL Bacteriemia   -ED: febrile to 103.6, leukopenic at 3.4; hypotensive 86/46  -s/p Code STEMI - ST and T wave changes on EKG   - Cardio cancelled code;  - diffuse subendocardial ischemia likely secondary to demand ischemia  -CXR. CTAP and CT Head unremarkable   -troponin .03>.04>.02 ; lactate 2.7 >4.6  -s/p aztreonam and vancomycin in ED   -10/3 bcx: ESBL E.coli   -c/w meropenum 1g IV  --f/u ID recs  - procal 8.86; ferritin 166;   -f/u repeat CMP, lactate, MRSA swab       #COVID +  -asymptomatic; not vaccinated   -s/p hydrocortisone  -hx of COVID+ prior to admission; remdesivir not indicated   -uptrending WBC; d/c decadron     # Pre-renal TJ in setting of UTI  - BUN/Cr 29/1.1--->34/1.4--> 42/1.5  - c/w LR @75   - UA+ Bacteria and LE  - Ucx: E.coli  -c/w abx regimen  - 10/4 Urine output 50-55ccs  - f/u renal US  - f/u Trial of Void    #Chronic A-fib  -c/w home Eliquis 5mg   -start metoprolol 25mg q12    # Asthma  # COPD  - c/w home albuterol, tiotropium     # Hypokalemia  # Hypomagnesemia  -replete as needed                                                                              ----------------------------------------------------  # DVT prophylaxis - Eliquis     # GI prophylaxis - Protonix    # Diet - DASH    # Disposition : MICU    CODE STATUS: FULL  HEALTHCARE PROXY/EMERGENCY CONTACT:  Name: Haeven Mays  Phone Number: [phone]                                                                             --------------------------------------------------------    # Handoff   f/u ID recs, repeat CMP and lactate; TOMARTY d/c Felicity, start metoprolol 25 q12

## 2022-10-04 NOTE — PROGRESS NOTE ADULT - ASSESSMENT
This is a case of a 82 year old lady, Vietnamese speaking, Known to have Afib on eliquis, asthma, CAD, HFpEF, DMII, COPD presenting for AMS, diarrhea and generalized pain. Family at bedside, history obtained from daughter. She reports that pt has been lethargic and complaining of generalized pain with diarrhea for the past 48 hrs. Admittd to ICU for further workup and management    Impression    # Sepsis/ present on admission/ Septic shock  # COVID PCR +ve  # Urosepsis/ ESBL bacteremia ( CT AP reviewed)  # Troponemia  # AMS/ delirium  # A-fib  # Asthma  # COPD    PLAN:     NEUROLOGY: off sedation    CARDIOVASCULAR: afib on eliquis, metoprolol for rate control. wean pressors, cardiac monitoring. repeat LA    PULMONARY: Currently on 3L NC, COVID PCR +ve, CXR no evidence of cardiopulmonary disease, monitor ABG in AM. dc decadron    GASTROINTESTINAL: protonix, asymptomatic    RENAL: Trend CMP    INFECTIOUS DISEASE: ID, fup, meropenem    ENDOCRINE: F/u AM A1c. on insulin protocol    HEME: trend CBC, transfuse if Hb<7      FLUIDS/ELECTROLYTES/NUTRITION:  -Monitor, Replete to K>4 and Mg>2    PROPHYLAXIS  -DVT: HSQ  -GI- PPI qd  dc wasserman    DISPO: MICU  FULL CODE

## 2022-10-04 NOTE — PROGRESS NOTE ADULT - SUBJECTIVE AND OBJECTIVE BOX
Over Night Events: events noted, on levophed 0.01, LR 75 cc/h, increased LA    PHYSICAL EXAM    ICU Vital Signs Last 24 Hrs  T(C): 35 (04 Oct 2022 08:00), Max: 36.9 (03 Oct 2022 13:00)  T(F): 95 (04 Oct 2022 08:00), Max: 98.4 (03 Oct 2022 13:00)  HR: 60 (04 Oct 2022 08:45) (60 - 68)  BP: 97/60 (04 Oct 2022 08:45) (86/50 - 154/74)  BP(mean): 85 (04 Oct 2022 08:45) (69 - 105)  RR: 20 (04 Oct 2022 08:45) (12 - 33)  SpO2: 98% (04 Oct 2022 08:45) (98% - 100%)    O2 Parameters below as of 04 Oct 2022 08:00  Patient On (Oxygen Delivery Method): room air            General: ill looking  Lungs: Bilateral BS  Cardiovascular: Regular   Abdomen: Soft, Positive BS  Extremities: No clubbing   Skin: Warm  Neurological: Non focal       10-03-22 @ 07:  -  10-04-22 @ 07:00  --------------------------------------------------------  IN:    IV PiggyBack: 200 mL    Lactated Ringers: 1500 mL    Lactated Ringers Bolus: 1500 mL    Norepinephrine: 109.5 mL  Total IN: 3309.5 mL    OUT:    Indwelling Catheter - Urethral (mL): 1030 mL  Total OUT: 1030 mL    Total NET: 2279.5 mL      10-04-22 @ 07:01  -  10-04-22 @ 08:51  --------------------------------------------------------  IN:    Lactated Ringers: 150 mL    Norepinephrine: 4.5 mL  Total IN: 154.5 mL    OUT:    Indwelling Catheter - Urethral (mL): 75 mL  Total OUT: 75 mL    Total NET: 79.5 mL          LABS:                          10.1   18.20 )-----------( 177      ( 04 Oct 2022 06:18 )             29.2                                               10-04    137  |  99  |  42<H>  ----------------------------<  181<H>  3.8   |  27  |  1.5    Ca    8.5      04 Oct 2022 06:18  Phos  3.5     10-03  Mg     2.9     10-04    TPro  6.5  /  Alb  2.8<L>  /  TBili  0.7  /  DBili  x   /  AST  27  /  ALT  15  /  AlkPhos  68  10-04      PT/INR - ( 04 Oct 2022 06:18 )   PT: 17.00 sec;   INR: 1.47 ratio         PTT - ( 04 Oct 2022 06:18 )  PTT:31.5 sec                                       Urinalysis Basic - ( 02 Oct 2022 14:55 )    Color: Light Yellow / Appearance: Slightly Turbid / S.009 / pH: x  Gluc: x / Ketone: Negative  / Bili: Negative / Urobili: <2 mg/dL   Blood: x / Protein: Trace / Nitrite: Negative   Leuk Esterase: Large / RBC: 13 /HPF / WBC 34 /HPF   Sq Epi: x / Non Sq Epi: 4 /HPF / Bacteria: Many        CARDIAC MARKERS ( 03 Oct 2022 11:56 )  x     / 0.02 ng/mL / x     / x     / x      CARDIAC MARKERS ( 03 Oct 2022 05:38 )  x     / 0.04 ng/mL / x     / x     / x      CARDIAC MARKERS ( 02 Oct 2022 13:40 )  x     / 0.03 ng/mL / x     / x     / x                                                LIVER FUNCTIONS - ( 04 Oct 2022 06:18 )  Alb: 2.8 g/dL / Pro: 6.5 g/dL / ALK PHOS: 68 U/L / ALT: 15 U/L / AST: 27 U/L / GGT: x                                                  Culture - Blood (collected 02 Oct 2022 14:55)  Source: .Blood Blood-Peripheral  Gram Stain (03 Oct 2022 12:08):    Growth in anaerobic bottle: Gram Negative Rods    Growth in aerobic bottle: Gram Negative Rods  Preliminary Report (03 Oct 2022 12:08):    Growth in anaerobic bottle: Gram Negative Rods    Growth in aerobic bottle: Gram Negative Rods    ***Blood Panel PCR results on this specimen are available    approximately 3 hours after the Gram stain result.***    Gram stain, PCR, and/or culture results may not always    correspond due to difference in methodologies.    ************************************************************    This PCR assay was performed by multiplex PCR. This    Assay tests for 66 bacterial and resistance gene targets.    Please refer to the [facility] test directory    at [email or web address] for details.  Organism: Blood Culture PCR (03 Oct 2022 12:12)  Organism: Blood Culture PCR (03 Oct 2022 12:12)    Culture - Blood (collected 02 Oct 2022 14:55)  Source: .Blood Blood-Peripheral  Gram Stain (03 Oct 2022 12:10):    Growth in anaerobic bottle: Gram Negative Rods    Growth in aerobic bottle: Gram Negative Rods  Preliminary Report (03 Oct 2022 12:10):    Growth in anaerobic bottle: Gram Negative Rods    Growth in aerobic bottle: Gram Negative Rods    Culture - Urine (collected 02 Oct 2022 14:55)  Source: Clean Catch Clean Catch (Midstream)  Preliminary Report (03 Oct 2022 22:00):    >100,000 CFU/ml Escherichia coli                                                                                           MEDICATIONS  (STANDING):  ALBUTerol    90 MICROgram(s) HFA Inhaler 2 Puff(s) Inhalation every 6 hours  apixaban 5 milliGRAM(s) Oral two times a day  chlorhexidine 2% Cloths 1 Application(s) Topical daily  dexAMETHasone  Injectable 6 milliGRAM(s) IV Push every 24 hours  dextrose 5%. 1000 milliLiter(s) (50 mL/Hr) IV Continuous <Continuous>  dextrose 5%. 1000 milliLiter(s) (100 mL/Hr) IV Continuous <Continuous>  dextrose 50% Injectable 25 Gram(s) IV Push once  dextrose 50% Injectable 12.5 Gram(s) IV Push once  dextrose 50% Injectable 25 Gram(s) IV Push once  glucagon  Injectable 1 milliGRAM(s) IntraMuscular once  insulin glargine Injectable (LANTUS) 10 Unit(s) SubCutaneous at bedtime  insulin glargine SubCutaneous Injection (LANTUS) - Peds 42 Unit(s) SubCutaneous at bedtime  insulin lispro (ADMELOG) corrective regimen sliding scale   SubCutaneous three times a day before meals  lactated ringers. 1000 milliLiter(s) (75 mL/Hr) IV Continuous <Continuous>  meropenem  IVPB      meropenem  IVPB 1000 milliGRAM(s) IV Intermittent every 12 hours  metoprolol succinate  milliGRAM(s) Oral daily  norepinephrine Infusion 0.05 MICROgram(s)/kG/Min (6.38 mL/Hr) IV Continuous <Continuous>  pantoprazole    Tablet 40 milliGRAM(s) Oral before breakfast  sertraline 25 milliGRAM(s) Oral daily  tiotropium 18 MICROgram(s) Capsule 1 Capsule(s) Inhalation daily    MEDICATIONS  (PRN):  dextrose Oral Gel 15 Gram(s) Oral once PRN Blood Glucose LESS THAN 70 milliGRAM(s)/deciliter

## 2022-10-04 NOTE — CHART NOTE - NSCHARTNOTEFT_GEN_A_CORE
Spoke to the patients daughter. Explained to her that the patient is ready for step down level care instead of ICU. Daughter was understanding and agreeable to it.

## 2022-10-04 NOTE — CONSULT NOTE ADULT - SUBJECTIVE AND OBJECTIVE BOX
PRATEEK LAWRENCE  82y, Female  Allergy: Augmentin (Rash)  oxycodone (Pruritus)      All historical available data reviewed.    HPI:  82 year old lady, Tristanian speaking, Known to have Afib on eliquis, asthma, CAD, HFpEF, DMII, COPD presenting for AMS, diarrhea and generalized pain. Family at bedside, history obtained from daughter. She reports that pt has been lethargic and complaining of generalized pain with diarrhea for the past 48 hrs. Associated with fevers. Denies chills, cough, abdominal pain, back pain   On presentation pt had 103.6 fever. EKG performed. Code STEMI called in the ED for ST and T wave changes.  Seen and evaluated by cardiology in the ED --> diffuse subendocardial ischemia likely secondary to demand ischemia    During her stay in the ED, patient became hypotensive requiring levophed.  WBC 3.4K with 76% PMNs  Hb12.7  Trop 0.03  lactate 2.7  CTH and CTAP -ve for any acute pathology    UA +ve  COVID +ve    Admit to ICU for Septic shock (02 Oct 2022 21:57)    FAMILY HISTORY:    PAST MEDICAL & SURGICAL HISTORY:  Afib  on eliquis      Diabetes      Asthma      CAD (coronary atherosclerotic disease)      Diastolic heart failure      COPD exacerbation      Hyperlipidemia      CVA (cerebrovascular accident)      Type 2 diabetes mellitus      Dementia      S/P appendectomy      Pacemaker            VITALS:  T(F): 96.8, Max: 98.4 (10-03-22 @ 13:00)  HR: 60  BP: 106/70  RR: 18Vital Signs Last 24 Hrs  T(C): 36 (04 Oct 2022 04:00), Max: 36.9 (03 Oct 2022 13:00)  T(F): 96.8 (04 Oct 2022 04:00), Max: 98.4 (03 Oct 2022 13:00)  HR: 60 (04 Oct 2022 05:00) (60 - 68)  BP: 106/70 (04 Oct 2022 05:00) (86/50 - 154/74)  BP(mean): 91 (04 Oct 2022 05:00) (69 - 91)  RR: 18 (04 Oct 2022 05:00) (15 - 33)  SpO2: 99% (04 Oct 2022 05:00) (99% - 100%)    Parameters below as of 04 Oct 2022 04:00  Patient On (Oxygen Delivery Method): nasal cannula  O2 Flow (L/min): 2      TESTS & MEASUREMENTS:                        10.0   17.02 )-----------( 139      ( 03 Oct 2022 05:38 )             28.9     10-03    134<L>  |  96<L>  |  34<H>  ----------------------------<  100<H>  4.1   |  23  |  1.4    Ca    7.6<L>      03 Oct 2022 05:38  Phos  3.5     10-03  Mg     1.5     10-03    TPro  6.2  /  Alb  2.5<L>  /  TBili  1.1  /  DBili  0.4<H>  /  AST  32  /  ALT  15  /  AlkPhos  52  10-03    LIVER FUNCTIONS - ( 03 Oct 2022 05:38 )  Alb: 2.5 g/dL / Pro: 6.2 g/dL / ALK PHOS: 52 U/L / ALT: 15 U/L / AST: 32 U/L / GGT: x             Culture - Blood (collected 10-02-22 @ 14:55)  Source: .Blood Blood-Peripheral  Gram Stain (10-03-22 @ 12:08):    Growth in anaerobic bottle: Gram Negative Rods    Growth in aerobic bottle: Gram Negative Rods  Preliminary Report (10-03-22 @ 12:08):    Growth in anaerobic bottle: Gram Negative Rods    Growth in aerobic bottle: Gram Negative Rods    ***Blood Panel PCR results on this specimen are available    approximately 3 hours after the Gram stain result.***    Gram stain, PCR, and/or culture results may not always    correspond due to difference in methodologies.    ************************************************************    This PCR assay was performed by multiplex PCR. This    Assay tests for 66 bacterial and resistance gene targets.    Please refer to the [facility] test directory    at [email or web address] for details.  Organism: Blood Culture PCR (10-03-22 @ 12:12)  Organism: Blood Culture PCR (10-03-22 @ 12:12)      -  CTX-M Resistance Marker: Detec      -  ESBL: Detec      -  Escherichia coli: Detec      Method Type: PCR    Culture - Blood (collected 10-02-22 @ 14:55)  Source: .Blood Blood-Peripheral  Gram Stain (10-03-22 @ 12:10):    Growth in anaerobic bottle: Gram Negative Rods    Growth in aerobic bottle: Gram Negative Rods  Preliminary Report (10-03-22 @ 12:10):    Growth in anaerobic bottle: Gram Negative Rods    Growth in aerobic bottle: Gram Negative Rods    Culture - Urine (collected 10-02-22 @ 14:55)  Source: Clean Catch Clean Catch (Midstream)  Preliminary Report (10-03-22 @ 22:00):    >100,000 CFU/ml Escherichia coli      Urinalysis Basic - ( 02 Oct 2022 14:55 )    Color: Light Yellow / Appearance: Slightly Turbid / S.009 / pH: x  Gluc: x / Ketone: Negative  / Bili: Negative / Urobili: <2 mg/dL   Blood: x / Protein: Trace / Nitrite: Negative   Leuk Esterase: Large / RBC: 13 /HPF / WBC 34 /HPF   Sq Epi: x / Non Sq Epi: 4 /HPF / Bacteria: Many          RADIOLOGY & ADDITIONAL TESTS:  Personal review of radiological diagnostics performed  Echo and EKG results noted when applicable.     MEDICATIONS:  ALBUTerol    90 MICROgram(s) HFA Inhaler 2 Puff(s) Inhalation every 6 hours  apixaban 5 milliGRAM(s) Oral two times a day  chlorhexidine 2% Cloths 1 Application(s) Topical daily  dexAMETHasone  Injectable 6 milliGRAM(s) IV Push every 24 hours  dextrose 5%. 1000 milliLiter(s) IV Continuous <Continuous>  dextrose 5%. 1000 milliLiter(s) IV Continuous <Continuous>  dextrose 50% Injectable 25 Gram(s) IV Push once  dextrose 50% Injectable 12.5 Gram(s) IV Push once  dextrose 50% Injectable 25 Gram(s) IV Push once  dextrose Oral Gel 15 Gram(s) Oral once PRN  glucagon  Injectable 1 milliGRAM(s) IntraMuscular once  insulin glargine Injectable (LANTUS) 10 Unit(s) SubCutaneous at bedtime  insulin glargine SubCutaneous Injection (LANTUS) - Peds 42 Unit(s) SubCutaneous at bedtime  insulin lispro (ADMELOG) corrective regimen sliding scale   SubCutaneous three times a day before meals  lactated ringers. 1000 milliLiter(s) IV Continuous <Continuous>  meropenem  IVPB      meropenem  IVPB 1000 milliGRAM(s) IV Intermittent every 12 hours  metoprolol succinate  milliGRAM(s) Oral daily  norepinephrine Infusion 0.05 MICROgram(s)/kG/Min IV Continuous <Continuous>  pantoprazole    Tablet 40 milliGRAM(s) Oral before breakfast  sertraline 25 milliGRAM(s) Oral daily  tiotropium 18 MICROgram(s) Capsule 1 Capsule(s) Inhalation daily      ANTIBIOTICS:  meropenem  IVPB      meropenem  IVPB 1000 milliGRAM(s) IV Intermittent every 12 hours

## 2022-10-04 NOTE — CONSULT NOTE ADULT - ASSESSMENT
82 year old lady, [place] speaking, Known to have Afib on eliquis, asthma, CAD, HFpEF, DMII, COPD presenting for AMS, diarrhea and generalized pain. Family at bedside, history obtained from daughter. She reports that pt has been lethargic and complaining of generalized pain with diarrhea for the past 48 hrs. Associated with fevers. Denies chills, cough, abdominal pain, back pain   On presentation pt had 103.6 fever. EKG performed. Code STEMI called in the ED for ST and T wave changes.  Seen and evaluated by cardiology in the ED --> diffuse subendocardial ischemia likely secondary to demand ischemia    During her stay in the ED, patient became hypotensive requiring levophed.  WBC 3.4K with 76% PMNs  Hb12.7  Trop 0.03  lactate 2.7  CTH and CTAP -ve for any acute pathology    UA +ve  COVID +ve    IMPRESSION;  82 year old lady, Kazakh speaking, Known to have Afib on eliquis, asthma, CAD, HFpEF, DMII, COPD presenting for AMS, diarrhea and generalized pain. Family at bedside, history obtained from daughter. She reports that pt has been lethargic and complaining of generalized pain with diarrhea for the past 48 hrs. Associated with fevers. Denies chills, cough, abdominal pain, back pain  .6 fever. EKG performed. Code STEMI called in the ED for ST and T wave changes.  Seen and evaluated by cardiology in the ED --> diffuse subendocardial ischemia likely secondary to demand ischemia  lactate 2.7  CTH and CTAP -ve for any acute pathology  UA +ve  COVID +ve    IMPRESSION;   Septic shock ( T 103.6, on pressors lactate 4.6, WBC 17 ) secondary to acute non obstructive pyelonephritis with E coli bacteremia.  10/2 BCx E coli,   10/2 UCx Ecoli    COVID with a positive test and asymptomatic  On RA  CXR no GGO  CT no GGO  Timeline of infection is unclear based on the clinical history     RECOMMENDATIONS;  No further diagnostic/therapeutic recommendations for COVID 19   No steroids  Meropenem 1 gm iv q8h  D/c other ABx  Off loading to prevent pressure sores and preventive measures to avoid aspiration

## 2022-10-05 LAB
-  AMIKACIN: SIGNIFICANT CHANGE UP
-  AMPICILLIN/SULBACTAM: SIGNIFICANT CHANGE UP
-  AMPICILLIN: SIGNIFICANT CHANGE UP
-  AZTREONAM: SIGNIFICANT CHANGE UP
-  CEFAZOLIN: SIGNIFICANT CHANGE UP
-  CEFEPIME: SIGNIFICANT CHANGE UP
-  CEFTRIAXONE: SIGNIFICANT CHANGE UP
-  CIPROFLOXACIN: SIGNIFICANT CHANGE UP
-  ERTAPENEM: SIGNIFICANT CHANGE UP
-  GENTAMICIN: SIGNIFICANT CHANGE UP
-  IMIPENEM: SIGNIFICANT CHANGE UP
-  LEVOFLOXACIN: SIGNIFICANT CHANGE UP
-  MEROPENEM: SIGNIFICANT CHANGE UP
-  PIPERACILLIN/TAZOBACTAM: SIGNIFICANT CHANGE UP
-  TOBRAMYCIN: SIGNIFICANT CHANGE UP
-  TRIMETHOPRIM/SULFAMETHOXAZOLE: SIGNIFICANT CHANGE UP
ALBUMIN SERPL ELPH-MCNC: 2.7 G/DL — LOW (ref 3.5–5.2)
ALP SERPL-CCNC: 55 U/L — SIGNIFICANT CHANGE UP (ref 30–115)
ALT FLD-CCNC: 14 U/L — SIGNIFICANT CHANGE UP (ref 0–41)
ANION GAP SERPL CALC-SCNC: 11 MMOL/L — SIGNIFICANT CHANGE UP (ref 7–14)
AST SERPL-CCNC: 24 U/L — SIGNIFICANT CHANGE UP (ref 0–41)
BASOPHILS # BLD AUTO: 0.01 K/UL — SIGNIFICANT CHANGE UP (ref 0–0.2)
BASOPHILS NFR BLD AUTO: 0.1 % — SIGNIFICANT CHANGE UP (ref 0–1)
BILIRUB SERPL-MCNC: 0.6 MG/DL — SIGNIFICANT CHANGE UP (ref 0.2–1.2)
BUN SERPL-MCNC: 49 MG/DL — HIGH (ref 10–20)
CALCIUM SERPL-MCNC: 8.3 MG/DL — LOW (ref 8.4–10.5)
CHLORIDE SERPL-SCNC: 96 MMOL/L — LOW (ref 98–110)
CO2 SERPL-SCNC: 27 MMOL/L — SIGNIFICANT CHANGE UP (ref 17–32)
CREAT SERPL-MCNC: 1.4 MG/DL — SIGNIFICANT CHANGE UP (ref 0.7–1.5)
CULTURE RESULTS: SIGNIFICANT CHANGE UP
CULTURE RESULTS: SIGNIFICANT CHANGE UP
EGFR: 37 ML/MIN/1.73M2 — LOW
EOSINOPHIL # BLD AUTO: 0.07 K/UL — SIGNIFICANT CHANGE UP (ref 0–0.7)
EOSINOPHIL NFR BLD AUTO: 0.6 % — SIGNIFICANT CHANGE UP (ref 0–8)
GLUCOSE BLDC GLUCOMTR-MCNC: 107 MG/DL — HIGH (ref 70–99)
GLUCOSE BLDC GLUCOMTR-MCNC: 117 MG/DL — HIGH (ref 70–99)
GLUCOSE BLDC GLUCOMTR-MCNC: 152 MG/DL — HIGH (ref 70–99)
GLUCOSE BLDC GLUCOMTR-MCNC: 61 MG/DL — LOW (ref 70–99)
GLUCOSE BLDC GLUCOMTR-MCNC: 95 MG/DL — SIGNIFICANT CHANGE UP (ref 70–99)
GLUCOSE SERPL-MCNC: 60 MG/DL — LOW (ref 70–99)
HCT VFR BLD CALC: 26.8 % — LOW (ref 37–47)
HGB BLD-MCNC: 9.2 G/DL — LOW (ref 12–16)
IMM GRANULOCYTES NFR BLD AUTO: 0.8 % — HIGH (ref 0.1–0.3)
LACTATE SERPL-SCNC: 3.1 MMOL/L — HIGH (ref 0.7–2)
LYMPHOCYTES # BLD AUTO: 2.71 K/UL — SIGNIFICANT CHANGE UP (ref 1.2–3.4)
LYMPHOCYTES # BLD AUTO: 23.2 % — SIGNIFICANT CHANGE UP (ref 20.5–51.1)
MAGNESIUM SERPL-MCNC: 2.6 MG/DL — HIGH (ref 1.8–2.4)
MCHC RBC-ENTMCNC: 31.5 PG — HIGH (ref 27–31)
MCHC RBC-ENTMCNC: 34.3 G/DL — SIGNIFICANT CHANGE UP (ref 32–37)
MCV RBC AUTO: 91.8 FL — SIGNIFICANT CHANGE UP (ref 81–99)
METHOD TYPE: SIGNIFICANT CHANGE UP
MONOCYTES # BLD AUTO: 1.29 K/UL — HIGH (ref 0.1–0.6)
MONOCYTES NFR BLD AUTO: 11 % — HIGH (ref 1.7–9.3)
NEUTROPHILS # BLD AUTO: 7.51 K/UL — HIGH (ref 1.4–6.5)
NEUTROPHILS NFR BLD AUTO: 64.3 % — SIGNIFICANT CHANGE UP (ref 42.2–75.2)
NRBC # BLD: 0 /100 WBCS — SIGNIFICANT CHANGE UP (ref 0–0)
ORGANISM # SPEC MICROSCOPIC CNT: SIGNIFICANT CHANGE UP
PLATELET # BLD AUTO: 165 K/UL — SIGNIFICANT CHANGE UP (ref 130–400)
POTASSIUM SERPL-MCNC: 3.5 MMOL/L — SIGNIFICANT CHANGE UP (ref 3.5–5)
POTASSIUM SERPL-SCNC: 3.5 MMOL/L — SIGNIFICANT CHANGE UP (ref 3.5–5)
PROT SERPL-MCNC: 6.1 G/DL — SIGNIFICANT CHANGE UP (ref 6–8)
RBC # BLD: 2.92 M/UL — LOW (ref 4.2–5.4)
RBC # FLD: 15.5 % — HIGH (ref 11.5–14.5)
SODIUM SERPL-SCNC: 134 MMOL/L — LOW (ref 135–146)
SPECIMEN SOURCE: SIGNIFICANT CHANGE UP
SPECIMEN SOURCE: SIGNIFICANT CHANGE UP
WBC # BLD: 11.68 K/UL — HIGH (ref 4.8–10.8)
WBC # FLD AUTO: 11.68 K/UL — HIGH (ref 4.8–10.8)

## 2022-10-05 PROCEDURE — 99233 SBSQ HOSP IP/OBS HIGH 50: CPT

## 2022-10-05 PROCEDURE — 71045 X-RAY EXAM CHEST 1 VIEW: CPT | Mod: 26

## 2022-10-05 RX ORDER — DEXTROSE 50 % IN WATER 50 %
12.5 SYRINGE (ML) INTRAVENOUS ONCE
Refills: 0 | Status: DISCONTINUED | OUTPATIENT
Start: 2022-10-05 | End: 2022-10-06

## 2022-10-05 RX ORDER — SODIUM CHLORIDE 9 MG/ML
1000 INJECTION, SOLUTION INTRAVENOUS
Refills: 0 | Status: DISCONTINUED | OUTPATIENT
Start: 2022-10-05 | End: 2022-10-06

## 2022-10-05 RX ORDER — DEXTROSE 50 % IN WATER 50 %
25 SYRINGE (ML) INTRAVENOUS ONCE
Refills: 0 | Status: DISCONTINUED | OUTPATIENT
Start: 2022-10-05 | End: 2022-10-06

## 2022-10-05 RX ORDER — DEXTROSE 50 % IN WATER 50 %
15 SYRINGE (ML) INTRAVENOUS ONCE
Refills: 0 | Status: DISCONTINUED | OUTPATIENT
Start: 2022-10-05 | End: 2022-10-06

## 2022-10-05 RX ORDER — GLUCAGON INJECTION, SOLUTION 0.5 MG/.1ML
1 INJECTION, SOLUTION SUBCUTANEOUS ONCE
Refills: 0 | Status: DISCONTINUED | OUTPATIENT
Start: 2022-10-05 | End: 2022-10-06

## 2022-10-05 RX ORDER — INSULIN GLARGINE 100 [IU]/ML
20 INJECTION, SOLUTION SUBCUTANEOUS AT BEDTIME
Refills: 0 | Status: DISCONTINUED | OUTPATIENT
Start: 2022-10-05 | End: 2022-10-06

## 2022-10-05 RX ORDER — INSULIN LISPRO 100/ML
VIAL (ML) SUBCUTANEOUS
Refills: 0 | Status: DISCONTINUED | OUTPATIENT
Start: 2022-10-05 | End: 2022-10-06

## 2022-10-05 RX ADMIN — Medication 25 MILLIGRAM(S): at 17:25

## 2022-10-05 RX ADMIN — MEROPENEM 100 MILLIGRAM(S): 1 INJECTION INTRAVENOUS at 05:44

## 2022-10-05 RX ADMIN — CHLORHEXIDINE GLUCONATE 1 APPLICATION(S): 213 SOLUTION TOPICAL at 12:38

## 2022-10-05 RX ADMIN — SERTRALINE 25 MILLIGRAM(S): 25 TABLET, FILM COATED ORAL at 12:39

## 2022-10-05 RX ADMIN — PANTOPRAZOLE SODIUM 40 MILLIGRAM(S): 20 TABLET, DELAYED RELEASE ORAL at 10:10

## 2022-10-05 RX ADMIN — APIXABAN 5 MILLIGRAM(S): 2.5 TABLET, FILM COATED ORAL at 17:24

## 2022-10-05 RX ADMIN — Medication 2: at 17:24

## 2022-10-05 NOTE — PROGRESS NOTE ADULT - SUBJECTIVE AND OBJECTIVE BOX
Patient is a 82y old  Female who presents with a chief complaint of Urosepsis (04 Oct 2022 09:19)        Over Night Events: On room air. Off pressors.     ROS:     All ROS are negative except HPI         PHYSICAL EXAM    ICU Vital Signs Last 24 Hrs  T(C): 36.8 (05 Oct 2022 04:00), Max: 36.8 (05 Oct 2022 04:00)  T(F): 98.2 (05 Oct 2022 04:00), Max: 98.2 (05 Oct 2022 04:00)  HR: 59 (05 Oct 2022 04:00) (59 - 77)  BP: 116/55 (05 Oct 2022 04:00) (87/51 - 133/80)  BP(mean): 65 (04 Oct 2022 23:00) (65 - 95)  ABP: --  ABP(mean): --  RR: 20 (05 Oct 2022 04:00) (15 - 26)  SpO2: 96% (05 Oct 2022 04:00) (94% - 99%)    O2 Parameters below as of 04 Oct 2022 19:00  Patient On (Oxygen Delivery Method): room air            CONSTITUTIONAL:  NAD    ENT:   Airway patent,   Mouth with normal mucosa.     EYES:   Pupils equal,   Round and reactive to light.    CARDIAC:   Normal rate,   Regular rhythm.    No edema      Vascular:  Normal systolic impulse  No Carotid bruits    RESPIRATORY:   No wheezing  Bilateral BS  Normal chest expansion  Not tachypneic,  No use of accessory muscles    GASTROINTESTINAL:  Abdomen soft,   Non-tender,   No guarding,   + BS    MUSCULOSKELETAL:   Range of motion is not limited,  No clubbing, cyanosis    NEUROLOGICAL:   Alert and oriented   No motor  deficits.    SKIN:   Skin normal color for race,   Warm and dry and intact.   No evidence of rash.    PSYCHIATRIC:   Normal mood and affect.   No apparent risk to self or others.    HEMATOLOGICAL:  No cervical  lymphadenopathy.  no inguinal lymphadenopathy      10-04-22 @ 07:01  -  10-05-22 @ 07:00  --------------------------------------------------------  IN:    Dexmedetomidine: 5.1 mL    IV PiggyBack: 50 mL    Lactated Ringers: 375 mL    Lactated Ringers: 1050 mL    Norepinephrine: 5.8 mL  Total IN: 1485.9 mL    OUT:    Indwelling Catheter - Urethral (mL): 160 mL  Total OUT: 160 mL    Total NET: 1325.9 mL          LABS:                            9.2    11.68 )-----------( 165      ( 05 Oct 2022 06:01 )             26.8                                               10-05    134<L>  |  96<L>  |  49<H>  ----------------------------<  60<L>  3.5   |  27  |  1.4    Ca    8.3<L>      05 Oct 2022 06:01  Mg     2.6     10-05    TPro  6.1  /  Alb  2.7<L>  /  TBili  0.6  /  DBili  x   /  AST  24  /  ALT  14  /  AlkPhos  55  10-05      PT/INR - ( 04 Oct 2022 06:18 )   PT: 17.00 sec;   INR: 1.47 ratio         PTT - ( 04 Oct 2022 06:18 )  PTT:31.5 sec                                           CARDIAC MARKERS ( 03 Oct 2022 11:56 )  x     / 0.02 ng/mL / x     / x     / x                                                LIVER FUNCTIONS - ( 05 Oct 2022 06:01 )  Alb: 2.7 g/dL / Pro: 6.1 g/dL / ALK PHOS: 55 U/L / ALT: 14 U/L / AST: 24 U/L / GGT: x                                                  Culture - Blood (collected 02 Oct 2022 14:55)  Source: .Blood Blood-Peripheral  Gram Stain (03 Oct 2022 12:08):    Growth in anaerobic bottle: Gram Negative Rods    Growth in aerobic bottle: Gram Negative Rods  Final Report (05 Oct 2022 09:30):    Growth in aerobic and anaerobic bottles: Escherichia coli ESBL    ***Blood Panel PCR results on this specimen are available    approximately 3 hours after the Gram stain result.***    Gram stain, PCR, and/or culture results may not always    correspond dueto difference in methodologies.    ************************************************************    This PCR assay was performed by multiplex PCR. This    Assay tests for 66 bacterial and resistance gene targets.    Please refer to the [facility] test directory    at [email or web address] for details.  Organism: Blood Culture PCR  Escherichia coli ESBL (05 Oct 2022 09:30)  Organism: Escherichia coli ESBL (05 Oct 2022 09:30)  Organism: Blood Culture PCR (05 Oct 2022 09:30)    Culture - Blood (collected 02 Oct 2022 14:55)  Source: .Blood Blood-Peripheral  Gram Stain (03 Oct 2022 12:10):    Growth in anaerobic bottle: Gram Negative Rods    Growth in aerobic bottle: Gram Negative Rods  Final Report (05 Oct 2022 09:30):    Growth in aerobic and anaerobic bottles: Escherichia coli ESBL    See previous culture [id number]    Culture - Urine (collected 02 Oct 2022 14:55)  Source: Clean Catch Clean Catch (Midstream)  Preliminary Report (04 Oct 2022 22:03):    >100,000 CFU/ml Escherichia coli ESBL  Organism: Escherichia coli ESBL (04 Oct 2022 22:02)  Organism: Escherichia coli ESBL (04 Oct 2022 22:02)                                                                                           MEDICATIONS  (STANDING):  ALBUTerol    90 MICROgram(s) HFA Inhaler 2 Puff(s) Inhalation every 6 hours  apixaban 5 milliGRAM(s) Oral two times a day  chlorhexidine 2% Cloths 1 Application(s) Topical daily  dextrose 5%. 1000 milliLiter(s) (100 mL/Hr) IV Continuous <Continuous>  dextrose 5%. 1000 milliLiter(s) (50 mL/Hr) IV Continuous <Continuous>  dextrose 50% Injectable 25 Gram(s) IV Push once  dextrose 50% Injectable 25 Gram(s) IV Push once  dextrose 50% Injectable 12.5 Gram(s) IV Push once  glucagon  Injectable 1 milliGRAM(s) IntraMuscular once  insulin glargine SubCutaneous Injection (LANTUS) - Peds 42 Unit(s) SubCutaneous at bedtime  insulin lispro (ADMELOG) corrective regimen sliding scale   SubCutaneous three times a day before meals  lactated ringers. 1000 milliLiter(s) (75 mL/Hr) IV Continuous <Continuous>  meropenem  IVPB      meropenem  IVPB 1000 milliGRAM(s) IV Intermittent every 12 hours  metoprolol tartrate 25 milliGRAM(s) Oral two times a day  pantoprazole    Tablet 40 milliGRAM(s) Oral before breakfast  sertraline 25 milliGRAM(s) Oral daily  tiotropium 18 MICROgram(s) Capsule 1 Capsule(s) Inhalation daily    MEDICATIONS  (PRN):  dextrose Oral Gel 15 Gram(s) Oral once PRN Blood Glucose LESS THAN 70 milliGRAM(s)/deciliter      New X-rays reviewed:                                                                                  ECHO    CXR interpreted by me:       Patient is a 82y old  Female who presents with a chief complaint of Urosepsis (04 Oct 2022 09:19)        Over Night Events: On room air. Off pressors.     ROS:     All ROS are negative except HPI         PHYSICAL EXAM    ICU Vital Signs Last 24 Hrs  T(C): 36.8 (05 Oct 2022 04:00), Max: 36.8 (05 Oct 2022 04:00)  T(F): 98.2 (05 Oct 2022 04:00), Max: 98.2 (05 Oct 2022 04:00)  HR: 59 (05 Oct 2022 04:00) (59 - 77)  BP: 116/55 (05 Oct 2022 04:00) (87/51 - 133/80)  BP(mean): 65 (04 Oct 2022 23:00) (65 - 95)  ABP: --  ABP(mean): --  RR: 20 (05 Oct 2022 04:00) (15 - 26)  SpO2: 96% (05 Oct 2022 04:00) (94% - 99%)    O2 Parameters below as of 04 Oct 2022 19:00  Patient On (Oxygen Delivery Method): room air            CONSTITUTIONAL:  NAD    ENT:   Airway patent,   Mouth with normal mucosa.     EYES:   Pupils equal,   Round and reactive to light.    CARDIAC:   Normal rate,   Regular rhythm.        RESPIRATORY:   No wheezing  Bilateral BS  Normal chest expansion  Not tachypneic,  No use of accessory muscles    GASTROINTESTINAL:  Abdomen soft,   Non-tender,   No guarding,   + BS    MUSCULOSKELETAL:   Range of motion is not limited,  No clubbing, cyanosis    NEUROLOGICAL:   Alert and oriented   No motor  deficits.    SKIN:   Skin normal color for race,   No evidence of rash.    PSYCHIATRIC:   No apparent risk to self or others.        10-04-22 @ 07:01  -  10-05-22 @ 07:00  --------------------------------------------------------  IN:    Dexmedetomidine: 5.1 mL    IV PiggyBack: 50 mL    Lactated Ringers: 375 mL    Lactated Ringers: 1050 mL    Norepinephrine: 5.8 mL  Total IN: 1485.9 mL    OUT:    Indwelling Catheter - Urethral (mL): 160 mL  Total OUT: 160 mL    Total NET: 1325.9 mL          LABS:                            9.2    11.68 )-----------( 165      ( 05 Oct 2022 06:01 )             26.8                                               10-05    134<L>  |  96<L>  |  49<H>  ----------------------------<  60<L>  3.5   |  27  |  1.4    Creatinine Trend  BUN 49, Cr 1.4, (10-05-22 @ 06:01)  Creatinine Trend  BUN 44, Cr 1.3, (10-04-22 @ 17:07)  Creatinine Trend  BUN 42, Cr 1.5, (10-04-22 @ 06:18)  Creatinine Trend  BUN 34, Cr 1.4, (10-03-22 @ 05:38)  Creatinine Trend  BUN 29, Cr 1.1, (10-02-22 @ 13:40)      Ca    8.3<L>      05 Oct 2022 06:01  Mg     2.6     10-05    TPro  6.1  /  Alb  2.7<L>  /  TBili  0.6  /  DBili  x   /  AST  24  /  ALT  14  /  AlkPhos  55  10-05      PT/INR - ( 04 Oct 2022 06:18 )   PT: 17.00 sec;   INR: 1.47 ratio         PTT - ( 04 Oct 2022 06:18 )  PTT:31.5 sec                                           CARDIAC MARKERS ( 03 Oct 2022 11:56 )  x     / 0.02 ng/mL / x     / x     / x                                                LIVER FUNCTIONS - ( 05 Oct 2022 06:01 )  Alb: 2.7 g/dL / Pro: 6.1 g/dL / ALK PHOS: 55 U/L / ALT: 14 U/L / AST: 24 U/L / GGT: x                                                  Culture - Blood (collected 02 Oct 2022 14:55)  Source: .Blood Blood-Peripheral  Gram Stain (03 Oct 2022 12:08):    Growth in anaerobic bottle: Gram Negative Rods    Growth in aerobic bottle: Gram Negative Rods  Final Report (05 Oct 2022 09:30):    Growth in aerobic and anaerobic bottles: Escherichia coli ESBL    ***Blood Panel PCR results on this specimen are available    approximately 3 hours after the Gram stain result.***    Gram stain, PCR, and/or culture results may not always    correspond dueto difference in methodologies.    ************************************************************    This PCR assay was performed by multiplex PCR. This    Assay tests for 66 bacterial and resistance gene targets.    Please refer to the [facility] Labs test directory    at [email or web address] for details.  Organism: Blood Culture PCR  Escherichia coli ESBL (05 Oct 2022 09:30)  Organism: Escherichia coli ESBL (05 Oct 2022 09:30)  Organism: Blood Culture PCR (05 Oct 2022 09:30)    Culture - Blood (collected 02 Oct 2022 14:55)  Source: .Blood Blood-Peripheral  Gram Stain (03 Oct 2022 12:10):    Growth in anaerobic bottle: Gram Negative Rods    Growth in aerobic bottle: Gram Negative Rods  Final Report (05 Oct 2022 09:30):    Growth in aerobic and anaerobic bottles: Escherichia coli ESBL    See previous culture [id number]    Culture - Urine (collected 02 Oct 2022 14:55)  Source: Clean Catch Clean Catch (Midstream)  Preliminary Report (04 Oct 2022 22:03):    >100,000 CFU/ml Escherichia coli ESBL  Organism: Escherichia coli ESBL (04 Oct 2022 22:02)  Organism: Escherichia coli ESBL (04 Oct 2022 22:02)                                                                                           MEDICATIONS  (STANDING):  ALBUTerol    90 MICROgram(s) HFA Inhaler 2 Puff(s) Inhalation every 6 hours  apixaban 5 milliGRAM(s) Oral two times a day  chlorhexidine 2% Cloths 1 Application(s) Topical daily  dextrose 5%. 1000 milliLiter(s) (100 mL/Hr) IV Continuous <Continuous>  dextrose 5%. 1000 milliLiter(s) (50 mL/Hr) IV Continuous <Continuous>  dextrose 50% Injectable 25 Gram(s) IV Push once  dextrose 50% Injectable 25 Gram(s) IV Push once  dextrose 50% Injectable 12.5 Gram(s) IV Push once  glucagon  Injectable 1 milliGRAM(s) IntraMuscular once  insulin glargine SubCutaneous Injection (LANTUS) - Peds 42 Unit(s) SubCutaneous at bedtime  insulin lispro (ADMELOG) corrective regimen sliding scale   SubCutaneous three times a day before meals  lactated ringers. 1000 milliLiter(s) (75 mL/Hr) IV Continuous <Continuous>  meropenem  IVPB      meropenem  IVPB 1000 milliGRAM(s) IV Intermittent every 12 hours  metoprolol tartrate 25 milliGRAM(s) Oral two times a day  pantoprazole    Tablet 40 milliGRAM(s) Oral before breakfast  sertraline 25 milliGRAM(s) Oral daily  tiotropium 18 MICROgram(s) Capsule 1 Capsule(s) Inhalation daily    MEDICATIONS  (PRN):  dextrose Oral Gel 15 Gram(s) Oral once PRN Blood Glucose LESS THAN 70 milliGRAM(s)/deciliter      New X-rays reviewed:                                                                                  ECHO    CXR interpreted by me:

## 2022-10-05 NOTE — PROGRESS NOTE ADULT - ASSESSMENT
Impression    # Sepsis/ present on admission/ Septic shock - improved  # COVID PCR +ve - asymptomatic infection  # Urosepsis/ ESBL bacteremia ( CT AP reviewed)  # Troponemia  # AMS/ delirium  # A-fib  # Asthma  # COPD    PLAN:     NEUROLOGY: off sedation    HEENT: Oral care.    CARDIOVASCULAR: afib on eliquis, metoprolol for rate control. cardiac monitoring. Repeat lactate    PULMONARY: Keep pulse ox 88%-92%. aspiration precaution.    GASTROINTESTINAL: Protonix, PO feeds    RENAL: Trend CMP, monitor UO    INFECTIOUS DISEASE: ID, fup cultures, c/w meropenem    ENDOCRINE: F/u AM A1c. on insulin protocol    HEME: trend CBC, transfuse if Hb<7    PROPHYLAXIS  -DVT: Eliquis  -GI- PPI qd  dc wasserman    DISPO: DGTF in PM    FULL CODE Impression    # Sepsis/ present on admission/ Septic shock - improved  # COVID PCR +ve - asymptomatic infection  # Urosepsis/ ESBL ECOLI bacteremia   # Troponemia  # AMS/ delirium resolved   # A-fib rate controlled       PLAN:    CNS:  Avoid depressants     HEENT: Oral care.    CARDIOVASCULAR: Rate control.  Avoid overload.  Repeat lactate    PULMONARY: Keep pulse ox 90%-95%.  Aspiration precaution.    GASTROINTESTINAL: Protonix, PO feeds    RENAL: Trend CMP.  Correct as needed.  Monitor UO    INFECTIOUS DISEASE: ID, fup cultures, c/w meropenem per ID     ENDOCRINE:  FU FS     HEME: trend CBC, transfuse if Hb<7.  DVT prophylaxis/.  On ELiquis     DISPO: DGTF in PM    FULL CODE

## 2022-10-05 NOTE — PROGRESS NOTE ADULT - SUBJECTIVE AND OBJECTIVE BOX
PRATEEK LAWRENCE  82y Female    CHIEF COMPLAINT:    Patient is a 82y old  Female who presents with a chief complaint of Urosepsis (05 Oct 2022 09:29)    INTERVAL HPI/OVERNIGHT EVENTS:    Patient seen and examined. No acute events overnight. Comfortable on room air     ROS: All other systems are negative.    Vital Signs:    T(F): 98.1 (10-05-22 @ 12:04), Max: 98.2 (10-05-22 @ 04:00)  HR: 59 (10-05-22 @ 12:04) (59 - 77)  BP: 133/64 (10-05-22 @ 12:04) (100/53 - 133/80)  RR: 18 (10-05-22 @ 12:04) (16 - 23)  SpO2: 100% (10-05-22 @ 12:04) (95% - 100%)    04 Oct 2022 07:01  -  05 Oct 2022 07:00  --------------------------------------------------------  IN: 1485.9 mL / OUT: 160 mL / NET: 1325.9 mL       Daily Weight in k (05 Oct 2022 00:00)    POCT Blood Glucose.: 117 mg/dL (05 Oct 2022 11:11)  POCT Blood Glucose.: 95 mg/dL (05 Oct 2022 08:33)  POCT Blood Glucose.: 61 mg/dL (05 Oct 2022 07:50)  POCT Blood Glucose.: 172 mg/dL (04 Oct 2022 21:29)  POCT Blood Glucose.: 152 mg/dL (04 Oct 2022 16:39)    PHYSICAL EXAM:    GENERAL:  NAD  SKIN: No rashes or lesions  HEENT: Atraumatic. Normocephalic.    NECK: Supple, No JVD.   PULMONARY: CTA B/L. No wheezing. No rales  CVS: Normal S1, S2. Rate and Rhythm are regular.    ABDOMEN/GI: Soft, Nontender, Nondistended   MSK:  No clubbing or cyanosis   NEUROLOGIC:  moves all extremities   PSYCH: Alert & oriented x 3, normal affect    Consultant(s) Notes Reviewed:  [x ] YES  [ ] NO  Care Discussed with Consultants/Other Providers [ x] YES  [ ] NO    LABS:                        9.2    11.68 )-----------( 165      ( 05 Oct 2022 06:01 )             26.8     134<L>  |  96<L>  |  49<H>  ----------------------------<  60<L>  3.5   |  27  |  1.4    Ca    8.3<L>      05 Oct 2022 06:01  Mg     2.6     10    TPro  6.1  /  Alb  2.7<L>  /  TBili  0.6  /  DBili  x   /  AST  24  /  ALT  14  /  AlkPhos  55  10    PT/INR - ( 04 Oct 2022 06:18 )   PT: 17.00 sec;   INR: 1.47 ratio      PTT - ( 04 Oct 2022 06:18 )  PTT:31.5 sec  Serum Pro-Brain Natriuretic Peptide: 3635 pg/mL (10-02-22 @ 13:40)    Trop 0.02, CKMB --, CK --, 10-03-22 @ 11:56  Trop 0.04, CKMB --, CK --, 10-03-22 @ 05:38  Trop 0.03, CKMB --, CK --, 10-02-22 @ 13:40    Culture - Blood (collected 02 Oct 2022 14:55)  Source: .Blood Blood-Peripheral  Gram Stain (03 Oct 2022 12:08):    Growth in anaerobic bottle: Gram Negative Rods    Growth in aerobic bottle: Gram Negative Rods  Final Report (05 Oct 2022 09:30):    Growth in aerobic and anaerobic bottles: Escherichia coli ESBL    ***Blood Panel PCR results on this specimen are available    approximately 3 hours after the Gram stain result.***    Gram stain, PCR, and/or culture results may not always    correspond dueto difference in methodologies.    ************************************************************    This PCR assay was performed by multiplex PCR. This    Assay tests for 66 bacterial and resistance gene targets.    Please refer to the [facility] Labs test directory    at [email or web address] for details.  Organism: Blood Culture PCR  Escherichia coli ESBL (05 Oct 2022 09:30)  Organism: Escherichia coli ESBL (05 Oct 2022 09:30)  Organism: Blood Culture PCR (05 Oct 2022 09:30)    Culture - Blood (collected 02 Oct 2022 14:55)  Source: .Blood Blood-Peripheral  Gram Stain (03 Oct 2022 12:10):    Growth in anaerobic bottle: Gram Negative Rods    Growth in aerobic bottle: Gram Negative Rods  Final Report (05 Oct 2022 09:30):    Growth in aerobic and anaerobic bottles: Escherichia coli ESBL    See previous culture [id number]    Culture - Urine (collected 02 Oct 2022 14:55)  Source: Clean Catch Clean Catch (Midstream)  Preliminary Report (04 Oct 2022 22:03):    >100,000 CFU/ml Escherichia coli ESBL  Organism: Escherichia coli ESBL (04 Oct 2022 22:02)  Organism: Escherichia coli ESBL (04 Oct 2022 22:02)    RADIOLOGY & ADDITIONAL TESTS:  Imaging or report Personally Reviewed:  [x] YES  [ ] NO  EKG reviewed: [x] YES  [ ] NO    Medications:  Standing  ALBUTerol    90 MICROgram(s) HFA Inhaler 2 Puff(s) Inhalation every 6 hours  apixaban 5 milliGRAM(s) Oral two times a day  chlorhexidine 2% Cloths 1 Application(s) Topical daily  dextrose 5%. 1000 milliLiter(s) IV Continuous <Continuous>  dextrose 5%. 1000 milliLiter(s) IV Continuous <Continuous>  dextrose 50% Injectable 25 Gram(s) IV Push once  dextrose 50% Injectable 12.5 Gram(s) IV Push once  dextrose 50% Injectable 25 Gram(s) IV Push once  glucagon  Injectable 1 milliGRAM(s) IntraMuscular once  insulin glargine Injectable (LANTUS) 20 Unit(s) SubCutaneous at bedtime  insulin lispro (ADMELOG) corrective regimen sliding scale   SubCutaneous three times a day before meals  meropenem  IVPB      meropenem  IVPB 1000 milliGRAM(s) IV Intermittent every 12 hours  metoprolol tartrate 25 milliGRAM(s) Oral two times a day  pantoprazole    Tablet 40 milliGRAM(s) Oral before breakfast  sertraline 25 milliGRAM(s) Oral daily  tiotropium 18 MICROgram(s) Capsule 1 Capsule(s) Inhalation daily    PRN Meds  dextrose Oral Gel 15 Gram(s) Oral once PRN

## 2022-10-05 NOTE — CHART NOTE - NSCHARTNOTEFT_GEN_A_CORE
Transfer Note    Transfer from: CEU  Transfer to: General Medical Floor    HPI:  82 year old lady, Ugandan speaking, Known to have Afib on eliquis, asthma, CAD, HFpEF, DMII, COPD presenting for AMS, diarrhea and generalized pain. Family at bedside, history obtained from daughter. She reports that pt has been lethargic and complaining of generalized pain with diarrhea for the past 48 hrs. Associated with fevers. Denies chills, cough, abdominal pain, back pain  On presentation pt had 103.6 fever. EKG performed. Code STEMI called in the ED for ST and T wave changes.  Seen and evaluated by cardiology in the ED --> diffuse subendocardial ischemia likely secondary to demand ischemia    ASSESSMENT & PLAN:     Patient is a 82F, Ugandan speaking with PMHx significant for Afib on eliquis, asthma, CAD, HFpEF, DMII, COPD presenting for AMS, diarrhea and generalized pain. She reports that pt has been lethargic and complaining of generalized pain with diarrhea for the past 48 hrs. Admitted to ICU for further workup and management.    #Septic Shock - Resolved  - Pyelonephritis UCx ESBL (10/2)  - ESBL Bacteremia (10/2)  - s/p aztreonam and vancomycin in ED   - On meropenem (Started 10/3)  - F/u with ID on duration of abx  - Lactic acid 3.1, trending down, was 4.6    #ST and T wave changes on admission  - Cardio cancelled code;  - EKG changes likely secondary to demand ischemia  - troponin .03>.04>.02    #COPD  - COVID +  - not vaccinated   - asymptomatic  - s/p hydrocortisone  - hx of COVID+ prior to admission  - On albuterol and tiotropium    # TJ  - Likely pre-renal  - Monitor I&Os  - s/p LR fluids  - Normal US of kidneys (10/4)  - Patient is voiding freely, monitor post void residue    #Chronic A-fib  - c/w home Eliquis 5mg   - started metoprolol 25mg q12    # Hypokalemia  - Resolved  - Monitor potassium level    # Hypomagnesemia  - Resolved  - Monitor Mg level                                                                              ----------------------------------------------------  # DVT prophylaxis - Eliquis   # GI prophylaxis - Protonix  # Diet - DASH  # Disposition : MICU  # Code: Full Code                                                                             -------------------------------------------------------    FOR FOLLOW UP:  [ ] F/u ID for duration of abx  [ ] F/u BCx Specimen received 10/5 Transfer Note    Transfer from: CEU  Transfer to: General Medical Floor    HPI:  82 year old lady, Maltese speaking, Known to have Afib on eliquis, asthma, CAD, HFpEF, DMII, COPD presenting for AMS, diarrhea and generalized pain. Family at bedside, history obtained from daughter. She reports that pt has been lethargic and complaining of generalized pain with diarrhea for the past 48 hrs. Associated with fevers. Denies chills, cough, abdominal pain, back pain  On presentation pt had 103.6 fever. EKG performed. Code STEMI called in the ED for ST and T wave changes.  Seen and evaluated by cardiology in the ED --> diffuse subendocardial ischemia likely secondary to demand ischemia    ASSESSMENT & PLAN:     Patient is a 82F, Maltese speaking with PMHx significant for Afib on eliquis, asthma, CAD, HFpEF, DMII, COPD presenting for AMS, diarrhea and generalized pain. She reports that pt has been lethargic and complaining of generalized pain with diarrhea for the past 48 hrs. Admitted to ICU for further workup and management.    #Septic Shock - Resolved  - Pyelonephritis UCx ESBL (10/2)  - ESBL Bacteremia (10/2)  - s/p aztreonam and vancomycin in ED   - On meropenem (Started 10/3)  - F/u with ID on duration of abx  - Lactic acid 3.1, trending down, was 4.6    #ST and T wave changes on admission  - Cardio cancelled code;  - EKG changes likely secondary to demand ischemia  - troponin .03>.04>.02    #COPD  - COVID +  - not vaccinated   - asymptomatic  - s/p hydrocortisone  - hx of COVID+ prior to admission  - On albuterol and tiotropium    # TJ  - Likely pre-renal  - Monitor I&Os  - s/p LR fluids  - Normal US of kidneys (10/4)  - Patient is voiding freely, monitor post void residue    #Chronic A-fib  - c/w home Eliquis 5mg   - started metoprolol 25mg q12    # Hypokalemia  - Resolved  - Monitor potassium level    # Hypomagnesemia  - Resolved  - Monitor Mg level                                                                              ----------------------------------------------------  # DVT prophylaxis - Eliquis   # GI prophylaxis - Protonix  # Diet - DASH  # Disposition : MICU  # Code: Full Code                                                                             -------------------------------------------------------    FOR FOLLOW UP:  [ ] F/u ID for duration of abx  [ ] F/u BCx Specimen received 10/5  [ ] F/u Chest xray Transfer Note    Transfer from: CEU  Transfer to: General Medical Floor    HPI:  82 year old lady, Panamanian speaking, Known to have Afib on eliquis, asthma, CAD, HFpEF, DMII, COPD presenting for AMS, diarrhea and generalized pain. Family at bedside, history obtained from daughter. She reports that pt has been lethargic and complaining of generalized pain with diarrhea for the past 48 hrs. Associated with fevers. Denies chills, cough, abdominal pain, back pain  On presentation pt had 103.6 fever. EKG performed. Code STEMI called in the ED for ST and T wave changes.  Seen and evaluated by cardiology in the ED --> diffuse subendocardial ischemia likely secondary to demand ischemia    ASSESSMENT & PLAN:     Patient is a 82F, Panamanian speaking with PMHx significant for Afib on eliquis, asthma, CAD, HFpEF, DMII, COPD presenting for AMS, diarrhea and generalized pain. She reports that pt has been lethargic and complaining of generalized pain with diarrhea for the past 48 hrs. Admitted to ICU for further workup and management.    #Septic Shock - Resolved  - Pyelonephritis UCx ESBL (10/2)  - ESBL Bacteremia (10/2)  - s/p aztreonam and vancomycin in ED   - On meropenem (Started 10/3)  - F/u with ID on duration of abx  - Lactic acid 3.1, trending down, was 4.6    #ST and T wave changes on admission  - Cardio cancelled code;  - EKG changes likely secondary to demand ischemia  - troponin .03>.04>.02    #COPD  - COVID +  - not vaccinated   - asymptomatic  - s/p hydrocortisone  - hx of COVID+ prior to admission  - On albuterol and tiotropium    # TJ  - Likely pre-renal  - Monitor I&Os  - s/p LR fluids  - Normal US of kidneys (10/4)  - Patient is voiding freely, monitor post void residue    #DM  - on insulin sliding scale  - Lantus decreased from 42 to 20 (10/5), monitor AM glucose level     #Chronic A-fib  - c/w home Eliquis 5mg   - started metoprolol 25mg q12    # Hypokalemia  - Resolved  - Monitor potassium level    # Hypomagnesemia  - Resolved  - Monitor Mg level                                                                              ----------------------------------------------------  # DVT prophylaxis - Eliquis   # GI prophylaxis - Protonix  # Diet - DASH  # Disposition : [facility]  # Code: Full Code                                                                             -------------------------------------------------------    FOR FOLLOW UP:  [ ] F/u ID for duration of abx  [ ] F/u BCx Specimen received 10/5  [ ] F/u Chest xray

## 2022-10-05 NOTE — PROGRESS NOTE ADULT - ASSESSMENT
82 year old Jordanian speaking F, Known to have Afib on eliquis, asthma, CAD, HFpEF, DMII, COPD presenting for AMS, diarrhea and generalized pain for 48 hours associated with fevers.   On presentation pt had 103.6 fever. EKG performed. Code STEMI called in the ED for ST and T wave changes.  Seen and evaluated by cardiology in the ED --> diffuse subendocardial ischemia likely secondary to demand ischemia. Patient was also hypotensive requireing levophed, admitted initially to ICU, now downgraded to SDU    Sepsis POA/Septic Shock due to ESBL Ecoli Bacteremia due ESBL Ecoli UTI/Pyelo  Metabolic encephalopathy s/p precedex, now resolved  COVID 19 infection, unvaccinated - asymptomatic   Lactic Acidosis - improved   -ED: febrile to 103.6, leukopenic at 3.4; hypotensive 86/46  -s/p levophed, BP now better  - f/u Ucx/Blood cx sensitivities, currently on meropenem per ID   - monitor WBC and fever curve, leukocytosis improving      Uptrending renal fxn  - Scr 1.1 on admission, peaked 1.5, now downtrending   - did not void overnight reportedly  - bedside bladder scan, may need to be straight cathed  - daily BMP    Chronic A-fib  -c/w home Eliquis 5mg q12H   -started metoprolol 25mg q12    Asthma  COPD, no home o2  - c/w home albuterol, tiotropium     Hypokalemia - improving, can give 20 mEQ of K today   Hypomagnesemia - resolved    #Progress Note Handoff  Pending (specify):   Ucx/blood cx sensitivities, labs, UO  Family discussion: Plan of care discussed with patient, aware and agreeable   Disposition:  from home     Aparna Vega MD  s. [id number]

## 2022-10-05 NOTE — PROGRESS NOTE ADULT - ATTENDING COMMENTS
Impression    # Sepsis/ present on admission/ Septic shock - improved  # COVID PCR +ve - asymptomatic infection  # Urosepsis/ ESBL ECOLI bacteremia   # Troponemia  # AMS/ delirium resolved   # A-fib rate controlled       Plan as outlined above

## 2022-10-06 ENCOUNTER — TRANSCRIPTION ENCOUNTER (OUTPATIENT)
Age: 82
End: 2022-10-06

## 2022-10-06 VITALS
TEMPERATURE: 98 F | OXYGEN SATURATION: 96 % | SYSTOLIC BLOOD PRESSURE: 118 MMHG | HEART RATE: 66 BPM | RESPIRATION RATE: 17 BRPM | DIASTOLIC BLOOD PRESSURE: 68 MMHG

## 2022-10-06 LAB
-  AMPICILLIN: SIGNIFICANT CHANGE UP
-  CIPROFLOXACIN: SIGNIFICANT CHANGE UP
-  LEVOFLOXACIN: SIGNIFICANT CHANGE UP
-  NITROFURANTOIN: SIGNIFICANT CHANGE UP
-  TETRACYCLINE: SIGNIFICANT CHANGE UP
-  VANCOMYCIN: SIGNIFICANT CHANGE UP
ALBUMIN SERPL ELPH-MCNC: 2.8 G/DL — LOW (ref 3.5–5.2)
ALP SERPL-CCNC: 54 U/L — SIGNIFICANT CHANGE UP (ref 30–115)
ALT FLD-CCNC: 13 U/L — SIGNIFICANT CHANGE UP (ref 0–41)
ANION GAP SERPL CALC-SCNC: 9 MMOL/L — SIGNIFICANT CHANGE UP (ref 7–14)
AST SERPL-CCNC: 26 U/L — SIGNIFICANT CHANGE UP (ref 0–41)
BASOPHILS # BLD AUTO: 0.01 K/UL — SIGNIFICANT CHANGE UP (ref 0–0.2)
BASOPHILS NFR BLD AUTO: 0.1 % — SIGNIFICANT CHANGE UP (ref 0–1)
BILIRUB SERPL-MCNC: 0.8 MG/DL — SIGNIFICANT CHANGE UP (ref 0.2–1.2)
BUN SERPL-MCNC: 38 MG/DL — HIGH (ref 10–20)
CALCIUM SERPL-MCNC: 8.1 MG/DL — LOW (ref 8.4–10.5)
CHLORIDE SERPL-SCNC: 97 MMOL/L — LOW (ref 98–110)
CO2 SERPL-SCNC: 28 MMOL/L — SIGNIFICANT CHANGE UP (ref 17–32)
CREAT SERPL-MCNC: 1.3 MG/DL — SIGNIFICANT CHANGE UP (ref 0.7–1.5)
CULTURE RESULTS: SIGNIFICANT CHANGE UP
EGFR: 41 ML/MIN/1.73M2 — LOW
EOSINOPHIL # BLD AUTO: 0.07 K/UL — SIGNIFICANT CHANGE UP (ref 0–0.7)
EOSINOPHIL NFR BLD AUTO: 0.9 % — SIGNIFICANT CHANGE UP (ref 0–8)
GLUCOSE BLDC GLUCOMTR-MCNC: 138 MG/DL — HIGH (ref 70–99)
GLUCOSE BLDC GLUCOMTR-MCNC: 180 MG/DL — HIGH (ref 70–99)
GLUCOSE BLDC GLUCOMTR-MCNC: 69 MG/DL — LOW (ref 70–99)
GLUCOSE SERPL-MCNC: 43 MG/DL — CRITICAL LOW (ref 70–99)
HCT VFR BLD CALC: 28.1 % — LOW (ref 37–47)
HGB BLD-MCNC: 9.5 G/DL — LOW (ref 12–16)
IMM GRANULOCYTES NFR BLD AUTO: 0.8 % — HIGH (ref 0.1–0.3)
LYMPHOCYTES # BLD AUTO: 2.03 K/UL — SIGNIFICANT CHANGE UP (ref 1.2–3.4)
LYMPHOCYTES # BLD AUTO: 25.9 % — SIGNIFICANT CHANGE UP (ref 20.5–51.1)
MAGNESIUM SERPL-MCNC: 2.3 MG/DL — SIGNIFICANT CHANGE UP (ref 1.8–2.4)
MCHC RBC-ENTMCNC: 31.4 PG — HIGH (ref 27–31)
MCHC RBC-ENTMCNC: 33.8 G/DL — SIGNIFICANT CHANGE UP (ref 32–37)
MCV RBC AUTO: 92.7 FL — SIGNIFICANT CHANGE UP (ref 81–99)
METHOD TYPE: SIGNIFICANT CHANGE UP
MONOCYTES # BLD AUTO: 1.16 K/UL — HIGH (ref 0.1–0.6)
MONOCYTES NFR BLD AUTO: 14.8 % — HIGH (ref 1.7–9.3)
NEUTROPHILS # BLD AUTO: 4.5 K/UL — SIGNIFICANT CHANGE UP (ref 1.4–6.5)
NEUTROPHILS NFR BLD AUTO: 57.5 % — SIGNIFICANT CHANGE UP (ref 42.2–75.2)
NRBC # BLD: 0 /100 WBCS — SIGNIFICANT CHANGE UP (ref 0–0)
ORGANISM # SPEC MICROSCOPIC CNT: SIGNIFICANT CHANGE UP
PHOSPHATE SERPL-MCNC: 3.8 MG/DL — SIGNIFICANT CHANGE UP (ref 2.1–4.9)
PLATELET # BLD AUTO: 183 K/UL — SIGNIFICANT CHANGE UP (ref 130–400)
POTASSIUM SERPL-MCNC: 3.4 MMOL/L — LOW (ref 3.5–5)
POTASSIUM SERPL-SCNC: 3.4 MMOL/L — LOW (ref 3.5–5)
PROT SERPL-MCNC: 6.1 G/DL — SIGNIFICANT CHANGE UP (ref 6–8)
RBC # BLD: 3.03 M/UL — LOW (ref 4.2–5.4)
RBC # FLD: 15 % — HIGH (ref 11.5–14.5)
SODIUM SERPL-SCNC: 134 MMOL/L — LOW (ref 135–146)
SPECIMEN SOURCE: SIGNIFICANT CHANGE UP
WBC # BLD: 7.83 K/UL — SIGNIFICANT CHANGE UP (ref 4.8–10.8)
WBC # FLD AUTO: 7.83 K/UL — SIGNIFICANT CHANGE UP (ref 4.8–10.8)

## 2022-10-06 PROCEDURE — 99239 HOSP IP/OBS DSCHRG MGMT >30: CPT

## 2022-10-06 PROCEDURE — 71045 X-RAY EXAM CHEST 1 VIEW: CPT | Mod: 26

## 2022-10-06 RX ORDER — POTASSIUM CHLORIDE 20 MEQ
40 PACKET (EA) ORAL ONCE
Refills: 0 | Status: COMPLETED | OUTPATIENT
Start: 2022-10-06 | End: 2022-10-06

## 2022-10-06 RX ORDER — ERTAPENEM SODIUM 1 G/1
500 INJECTION, POWDER, LYOPHILIZED, FOR SOLUTION INTRAMUSCULAR; INTRAVENOUS ONCE
Refills: 0 | Status: COMPLETED | OUTPATIENT
Start: 2022-10-06 | End: 2022-10-06

## 2022-10-06 RX ORDER — INSULIN GLARGINE 100 [IU]/ML
10 INJECTION, SOLUTION SUBCUTANEOUS
Qty: 0 | Refills: 0 | DISCHARGE

## 2022-10-06 RX ADMIN — APIXABAN 5 MILLIGRAM(S): 2.5 TABLET, FILM COATED ORAL at 17:29

## 2022-10-06 RX ADMIN — Medication 25 MILLIGRAM(S): at 05:37

## 2022-10-06 RX ADMIN — SERTRALINE 25 MILLIGRAM(S): 25 TABLET, FILM COATED ORAL at 11:40

## 2022-10-06 RX ADMIN — Medication 25 MILLIGRAM(S): at 17:29

## 2022-10-06 RX ADMIN — Medication 40 MILLIEQUIVALENT(S): at 14:43

## 2022-10-06 RX ADMIN — MEROPENEM 100 MILLIGRAM(S): 1 INJECTION INTRAVENOUS at 17:34

## 2022-10-06 RX ADMIN — APIXABAN 5 MILLIGRAM(S): 2.5 TABLET, FILM COATED ORAL at 05:36

## 2022-10-06 RX ADMIN — PANTOPRAZOLE SODIUM 40 MILLIGRAM(S): 20 TABLET, DELAYED RELEASE ORAL at 05:37

## 2022-10-06 RX ADMIN — ERTAPENEM SODIUM 110 MILLIGRAM(S): 1 INJECTION, POWDER, LYOPHILIZED, FOR SOLUTION INTRAMUSCULAR; INTRAVENOUS at 16:38

## 2022-10-06 RX ADMIN — MEROPENEM 100 MILLIGRAM(S): 1 INJECTION INTRAVENOUS at 05:36

## 2022-10-06 NOTE — DISCHARGE NOTE PROVIDER - HOSPITAL COURSE
82 year old lady, English speaking, Known to have Afib on eliquis, asthma, CAD, HFpEF, DMII, COPD presenting for AMS, diarrhea and generalized pain. Family at bedside, history obtained from daughter. She reports that pt has been lethargic and complaining of generalized pain with diarrhea for the past 48 hrs. Associated with fevers. Denies chills, cough, abdominal pain, back pain  On presentation pt had 103.6 fever. EKG performed. Code STEMI called in the ED for ST and T wave changes.  Seen and evaluated by cardiology in the ED --> diffuse subendocardial ischemia likely secondary to demand ischemia    #Septic Shock - Resolved  - Pyelonephritis UCx ESBL (10/2)  - ESBL Bacteremia (10/2)  - ID recs:  No further diagnostic/therapeutic recommendations for COVID 19   No steroids  Midline  Meropenem 1 gm iv q8h. Change to Ertapenem 500 mg iv q24h on discharge  till 10/18  Off loading to prevent pressure sores and preventive measures to avoid aspiration   f/u with Dr Hooper [id number] at [address] on tuesday via telehealth . Pt will be called  between 10-12.30 am.  [address] [facility]  [phone]  Fax [phone]     #ST and T wave changes on admission  - Cardio cancelled code;  - EKG changes likely secondary to demand ischemia  - troponin .03>.04>.02    #COPD  - COVID +  - not vaccinated   - asymptomatic  - s/p hydrocortisone  - hx of COVID+ prior to admission  - On albuterol and tiotropium    # TJ  - Likely pre-renal  - Monitor I&Os  - s/p LR fluids  - Normal US of kidneys (10/4)  - Patient is voiding freely, monitor post void residue    #DM  - on insulin sliding scale    #Chronic A-fib  - c/w home Eliquis 5mg   - started metoprolol 25mg q12    # Hypokalemia  - Resolved  - Monitor potassium level    # Hypomagnesemia  - Resolved   82 year old lady, Tajik speaking, Known to have Afib on eliquis, asthma, CAD, HFpEF, DMII, COPD presenting for AMS, diarrhea and generalized pain. Family at bedside, history obtained from daughter. She reports that pt has been lethargic and complaining of generalized pain with diarrhea for the past 48 hrs. Associated with fevers. Denies chills, cough, abdominal pain, back pain  On presentation pt had 103.6 fever. EKG performed. Code STEMI called in the ED for ST and T wave changes.  Seen and evaluated by cardiology in the ED --> diffuse subendocardial ischemia likely secondary to demand ischemia    #Septic Shock - Resolved  - Pyelonephritis UCx ESBL (10/2)  - ESBL Bacteremia (10/2)  - ID recs:  No further diagnostic/therapeutic recommendations for COVID 19   No steroids  Midline  Meropenem 1 gm iv q8h. Change to Ertapenem 500 mg iv q24h on discharge  till 10/18  Off loading to prevent pressure sores and preventive measures to avoid aspiration   f/u with Dr Hooper [id number] at [address] [facility] on tuesday via telehealth . Pt will be called  between 10-12.30 am.  [address] [facility]  [phone]  Fax [phone]     #ST and T wave changes on admission  - Cardio cancelled code;  - EKG changes likely secondary to demand ischemia  - troponin .03>.04>.02    #COPD  - COVID +  - not vaccinated   - asymptomatic  - s/p hydrocortisone  - hx of COVID+ prior to admission  - On albuterol and tiotropium    # TJ - resolved  - Likely pre-renal  - Monitor I&Os  - s/p LR fluids  - Normal US of kidneys (10/4)  - Patient is voiding freely, monitor post void residue    #DM  - on insulin sliding scale    #Chronic A-fib  - c/w home Eliquis 5mg   - started metoprolol 25mg q12    # Hypokalemia  - Resolved  - Monitor potassium level    # Hypomagnesemia  - Resolved    Patient is medically optimized for discharge home today with daily IV abx infusion until 10.18

## 2022-10-06 NOTE — PROGRESS NOTE ADULT - PROVIDER SPECIALTY LIST ADULT
Critical Care
Critical Care
MICU
Internal Medicine
MICU
Internal Medicine
Internal Medicine
Infectious Disease

## 2022-10-06 NOTE — DISCHARGE NOTE PROVIDER - CARE PROVIDER_API CALL
Nicole Hooper)  Infectious Disease; Internal Medicine  [address] [place] [address]  Phone: [phone]  Fax: [phone]  Follow Up Time: 1 week

## 2022-10-06 NOTE — DISCHARGE NOTE PROVIDER - NSDCMRMEDTOKEN_GEN_ALL_CORE_FT
Advair Diskus 250 mcg-50 mcg inhalation powder: 1 puff(s) inhaled 2 times a day  Albuterol (Eqv-ProAir HFA) 90 mcg/inh inhalation aerosol: 2 puff(s) inhaled every 6 hours, As Needed -for shortness of breath and/or wheezing   docusate sodium 100 mg oral tablet: 1 tab(s) orally 2 times a day   Eliquis 5 mg oral tablet: 1 tab(s) orally 2 times a day  ipratropium-albuterol 0.5 mg-2.5 mg/3 mLinhalation solution: 3 milliliter(s) inhaled 2 times a day  Lantus 100 units/mL subcutaneous solution: 10 unit(s) subcutaneous once a day  metoprolol succinate 100 mg oral capsule, extended release: 1 cap(s) orally once a day   MiraLax oral powder for reconstitution: 17 gram(s) orally 2 times a day, As Needed -for constipation   Mucinex 600 mg oral tablet, extended release: 1 tab(s) orally 2 times a day   pantoprazole 40 mg oral delayed release tablet: 1 tab(s) orally once a day (at bedtime)   predniSONE 20 mg oral tablet: 2 tab(s) orally once a day  senna 8.6 mg oral tablet: 2 tab(s) orally once a day (at bedtime)   sertraline 25 mg oral tablet: 1 tab(s) orally every 12 hours   Advair Diskus 250 mcg-50 mcg inhalation powder: 1 puff(s) inhaled 2 times a day  Albuterol (Eqv-ProAir HFA) 90 mcg/inh inhalation aerosol: 2 puff(s) inhaled every 6 hours, As Needed -for shortness of breath and/or wheezing   docusate sodium 100 mg oral tablet: 1 tab(s) orally 2 times a day   Eliquis 5 mg oral tablet: 1 tab(s) orally 2 times a day  ertapenem: 0.5 gram(s) injectable once a day until 10/18  ipratropium-albuterol 0.5 mg-2.5 mg/3 mLinhalation solution: 3 milliliter(s) inhaled 2 times a day  metoprolol succinate 100 mg oral capsule, extended release: 1 cap(s) orally once a day   MiraLax oral powder for reconstitution: 17 gram(s) orally 2 times a day, As Needed -for constipation   senna 8.6 mg oral tablet: 2 tab(s) orally once a day (at bedtime)   sertraline 25 mg oral tablet: 1 tab(s) orally every 12 hours

## 2022-10-06 NOTE — DISCHARGE NOTE PROVIDER - NSDCCPCAREPLAN_GEN_ALL_CORE_FT
PRINCIPAL DISCHARGE DIAGNOSIS  Diagnosis: Sepsis  Assessment and Plan of Treatment: Sepsis is a serious condition that occurs when the body overreacts to an infection. It is also called systemic inflammatory response syndrome (SIRS) with infection. An infection is usually caused by bacteria that attack the body. The body's defense system normally fights off infection within the affected body part. With sepsis, the body overreacts and causes symptoms to occur throughout the body. This leads to uncontrolled and widespread inflammation and clotting in small blood vessels. Blood flow to different body parts decreases and may lead to organ failure. Sepsis requires immediate treatment.  You were treated in the hospital with IV antibiotics, and your symptoms resolved.   Please seek immediate medical attention if you develop fever >100.4 F, feel dizzy, have nausea or vomiting, coughing blood, have sudden trouble breathing or chest pain, severe weakness, or your skin or lips are turning blue.  Please follow-up with Dr. Hooper [id number] at [address] [place] [address] on Tuesday via telehealth . You will be called  between 10-12.30 am.  [address] [place] [address]  [phone]  Fax [phone]         SECONDARY DISCHARGE DIAGNOSES  Diagnosis: Acute UTI  Assessment and Plan of Treatment:     Diagnosis: Demand ischemia of myocardium  Assessment and Plan of Treatment:

## 2022-10-06 NOTE — PROGRESS NOTE ADULT - ASSESSMENT
82F, Qatari speaking with PMHx significant for Afib on eliquis, asthma, CAD, HFpEF, DMII, COPD presenting for AMS, diarrhea and generalized pain. She reports that pt has been lethargic and complaining of generalized pain with diarrhea for the past 48 hrs. Admitted to ICU for further workup and management.    #Septic Shock - Resolved  - Pyelonephritis UCx ESBL (10/2)  - ESBL Bacteremia (10/2)  - s/p aztreonam and vancomycin in ED   - On meropenem (Started 10/3)  - F/u with ID on duration of abx  - Lactic acid 3.1, trending down, was 4.6  - f/u Blood Cx daily    #ST and T wave changes on admission  - Cardio cancelled code;  - EKG changes likely secondary to demand ischemia  - troponin .03>.04>.02    #COPD  - COVID +  - not vaccinated   - asymptomatic  - s/p hydrocortisone  - hx of COVID+ prior to admission  - On albuterol and tiotropium    # TJ  - Likely pre-renal  - Monitor I&Os  - s/p LR fluids  - Normal US of kidneys (10/4)  - Patient is voiding freely, monitor post void residue    #DM  - on insulin sliding scale  - Lantus decreased from 42 to 20 (10/5), monitor AM glucose level     #Chronic A-fib  - c/w home Eliquis 5mg   - started metoprolol 25mg q12    # Hypokalemia  - Resolved  - Monitor potassium level    # Hypomagnesemia  - Resolved  - Monitor Mg level                                                                           # DVT prophylaxis - Eliquis   # GI prophylaxis - Protonix  # Diet - DASH  # Disposition : MICU  # Code: Full Code   82F, Maltese speaking with PMHx significant for Afib on eliquis, asthma, CAD, HFpEF, DMII, COPD presenting for AMS, diarrhea and generalized pain. She reports that pt has been lethargic and complaining of generalized pain with diarrhea for the past 48 hrs. Admitted to ICU for further workup and management.    #Septic Shock - Resolved  - Pyelonephritis UCx ESBL (10/2)  - ESBL Bacteremia (10/2)  - s/p aztreonam and vancomycin in ED   - On meropenem (Started 10/3)  - F/u with ID on duration of abx  - Lactic acid 3.1, trending down, was 4.6  - ID recs:  No further diagnostic/therapeutic recommendations for COVID 19   No steroids  Midline  Meropenem 1 gm iv q8h. Change to Ertapenem 500 mg iv q24h on discharge  till 10/18  Off loading to prevent pressure sores and preventive measures to avoid aspiration   f/u with Dr Hooper [id number] at [address] [place] [address] on tuesday via telehealth . Pt will be called  between 10-12.30 am.  [address] [place] [address]  [phone]  Fax [phone]     #ST and T wave changes on admission  - Cardio cancelled code;  - EKG changes likely secondary to demand ischemia  - troponin .03>.04>.02    #COPD  - COVID +  - not vaccinated   - asymptomatic  - s/p hydrocortisone  - hx of COVID+ prior to admission  - On albuterol and tiotropium    # TJ  - Likely pre-renal  - Monitor I&Os  - s/p LR fluids  - Normal US of kidneys (10/4)  - Patient is voiding freely, monitor post void residue    #DM  - on insulin sliding scale  - Lantus decreased from 42 to 20 (10/5), monitor AM glucose level     #Chronic A-fib  - c/w home Eliquis 5mg   - started metoprolol 25mg q12    # Hypokalemia  - Resolved  - Monitor potassium level    # Hypomagnesemia  - Resolved  - Monitor Mg level                                                                           # DVT prophylaxis - Eliquis   # GI prophylaxis - Protonix  # Diet - DASH  # Disposition : MICU  # Code: Full Code

## 2022-10-06 NOTE — DISCHARGE NOTE PROVIDER - NSDCFUSCHEDAPPT_GEN_ALL_CORE_FT
Mayra Bowman  [facility] Physician [facility]  CARDIOLOGY [address] [facility]  Scheduled Appointment: 10/12/2022

## 2022-10-06 NOTE — DISCHARGE NOTE PROVIDER - ATTENDING DISCHARGE PHYSICAL EXAMINATION:
PHYSICAL EXAM:  GENERAL: NAD, speaks in full sentences, no signs of respiratory distress  HEAD:  Atraumatic, Normocephalic  EYES: EOMI,   conjunctiva and sclera clear  NECK: Supple, No JVD  CHEST/LUNG: Clear to auscultation bilaterally; No wheeze; No crackles   HEART: Regular rate and rhythm; No murmurs;   ABDOMEN: Soft, Nontender, Nondistended; Bowel sounds present   EXTREMITIES:  2+ Peripheral Pulses, No cyanosis or edema  PSYCH: AAOx3  NEUROLOGY: non-focal  SKIN: No rashes or lesions

## 2022-10-06 NOTE — PROGRESS NOTE ADULT - ASSESSMENT
· Assessment	  82 year old lady, Lebanese speaking, Known to have Afib on eliquis, asthma, CAD, HFpEF, DMII, COPD presenting for AMS, diarrhea and generalized pain. Family at bedside, history obtained from daughter. She reports that pt has been lethargic and complaining of generalized pain with diarrhea for the past 48 hrs. Associated with fevers. Denies chills, cough, abdominal pain, back pain  .6 fever. EKG performed. Code STEMI called in the ED for ST and T wave changes.  Seen and evaluated by cardiology in the ED --> diffuse subendocardial ischemia likely secondary to demand ischemia  lactate 2.7  CTH and CTAP -ve for any acute pathology  UA +ve  COVID +ve    IMPRESSION;   Resolved septic shock  secondary to acute non obstructive pyelonephritis with ESBL E coli bacteremia.  10/2 BCx E coli,   10/2 UCx Ecoli  WBC 17>11.6    COVID with a positive test and asymptomatic  On RA  CXR no GGO  CT no GGO  Timeline of infection is unclear based on the clinical history     RECOMMENDATIONS;  No further diagnostic/therapeutic recommendations for COVID 19   No steroids  Midline  Meropenem 1 gm iv q8h. Change to Ertapenem 500 mg iv q24h on discharge  till 10/18  Off loading to prevent pressure sores and preventive measures to avoid aspiration   f/u with Dr Hooper [id number] at [address] [facility] on tuesday via telehealth . Pt will be called  between 10-12.30 am.  [address] [facility]  [phone]  Fax [phone]   Please do not hesitate to recall ID if any questions arise either through [employer] or through [employer]

## 2022-10-06 NOTE — DISCHARGE NOTE PROVIDER - NSDCFUADDAPPT_GEN_ALL_CORE_FT
Lmom for the pt that forms are ready and that the charge is $15 00 and if she has any questions to give our office a call   Completed forms were scanned into chart Please follow-up with Dr. Hooper [id number] at [address] [place] [address] on Tuesday via telehealth. You will be called between 10-12:30 am. [address] [place] [address] [phone] Fax [phone]

## 2022-10-06 NOTE — DISCHARGE NOTE NURSING/CASE MANAGEMENT/SOCIAL WORK - PATIENT PORTAL LINK FT
You can access the FollowMyHealth Patient Portal offered by [facility] by registering at the following website: [email or web address]. By joining [facility]’s FollowMyHealth portal, you will also be able to view your health information using other applications (apps) compatible with our system.

## 2022-10-06 NOTE — PROGRESS NOTE ADULT - SUBJECTIVE AND OBJECTIVE BOX
PRATEEK LAWRENCE  82y Female    CHIEF COMPLAINT:    Patient is a 82y old  Female who presents with a chief complaint of Urosepsis (06 Oct 2022 12:28)    INTERVAL HPI/OVERNIGHT EVENTS:    Patient seen and examined. No acute events overnight. Feels well, denies any complaints     ROS: All other systems are negative.    Vital Signs:    T(F): 98 (10-06-22 @ 11:00), Max: 98.9 (10-05-22 @ 16:00)  HR: 67 (10-06-22 @ 11:00) (59 - 67)  BP: 121/69 (10-06-22 @ 11:00) (116/58 - 123/64)  RR: 18 (10-06-22 @ 11:00) (17 - 18)  SpO2: 98% (10-06-22 @ 09:00) (98% - 99%)    POCT Blood Glucose.: 138 mg/dL (06 Oct 2022 11:23)  POCT Blood Glucose.: 69 mg/dL (06 Oct 2022 07:43)  POCT Blood Glucose.: 107 mg/dL (05 Oct 2022 22:05)    PHYSICAL EXAM:    GENERAL:  NAD  SKIN: No rashes or lesions  HEENT: Atraumatic. Normocephalic.  NECK: Supple, No JVD.   PULMONARY: CTA B/L. No wheezing. No rales  CVS: Normal S1, S2. Rate and Rhythm are regular.   ABDOMEN/GI: Soft, Nontender, Nondistended; BS present  MSK:  No edema B/L LE. No clubbing or cyanosis   NEUROLOGIC: Moves all extremities   PSYCH: Awake and alert, forgetful, follows commands     Consultant(s) Notes Reviewed:  [x ] YES  [ ] NO  Care Discussed with Consultants/Other Providers [ x] YES  [ ] NO    LABS:                        9.5    7.83  )-----------( 183      ( 06 Oct 2022 07:08 )             28.1    134<L>  |  97<L>  |  38<H>  ----------------------------<  43<LL>  3.4<L>   |  28  |  1.3    Ca    8.1<L>      06 Oct 2022 07:08  Phos  3.8     10-06  Mg     2.3     10-06    TPro  6.1  /  Alb  2.8<L>  /  TBili  0.8  /  DBili  x   /  AST  26  /  ALT  13  /  AlkPhos  54  10-06    Serum Pro-Brain Natriuretic Peptide: 3635 pg/mL (10-02-22 @ 13:40)    RADIOLOGY & ADDITIONAL TESTS:  Imaging or report Personally Reviewed:  [x] YES  [ ] NO  EKG reviewed: [x] YES  [ ] NO    Medications:  Standing  ALBUTerol    90 MICROgram(s) HFA Inhaler 2 Puff(s) Inhalation every 6 hours  apixaban 5 milliGRAM(s) Oral two times a day  chlorhexidine 2% Cloths 1 Application(s) Topical daily  dextrose 5%. 1000 milliLiter(s) IV Continuous <Continuous>  dextrose 5%. 1000 milliLiter(s) IV Continuous <Continuous>  dextrose 50% Injectable 25 Gram(s) IV Push once  dextrose 50% Injectable 12.5 Gram(s) IV Push once  dextrose 50% Injectable 25 Gram(s) IV Push once  ertapenem  IVPB 500 milliGRAM(s) IV Intermittent once  glucagon  Injectable 1 milliGRAM(s) IntraMuscular once  insulin glargine Injectable (LANTUS) 20 Unit(s) SubCutaneous at bedtime  insulin lispro (ADMELOG) corrective regimen sliding scale   SubCutaneous three times a day before meals  meropenem  IVPB      meropenem  IVPB 1000 milliGRAM(s) IV Intermittent every 12 hours  metoprolol tartrate 25 milliGRAM(s) Oral two times a day  pantoprazole    Tablet 40 milliGRAM(s) Oral before breakfast  potassium chloride    Tablet ER 40 milliEquivalent(s) Oral once  sertraline 25 milliGRAM(s) Oral daily  tiotropium 18 MICROgram(s) Capsule 1 Capsule(s) Inhalation daily    PRN Meds  dextrose Oral Gel 15 Gram(s) Oral once PRN

## 2022-10-06 NOTE — PHYSICAL THERAPY INITIAL EVALUATION ADULT - GENERAL OBSERVATIONS, REHAB EVAL
9:00-9:36 pt encountered in bed, Croatian judahkingu used interpretor #[id number]. Pt performed bed mobility transfers and ambulated to bathroom with hand held assistance. She declined udse of RW, thoughtshe was very unsteady.  recommend home PT and will need a rolling walker

## 2022-10-06 NOTE — PHYSICAL THERAPY INITIAL EVALUATION ADULT - PERTINENT HX OF CURRENT PROBLEM, REHAB EVAL
82 year old lady, Vietnamese speaking, Known to have Afib on eliquis, asthma, CAD, HFpEF, DMII, COPD presenting for AMS, diarrhea and generalized pain. Dx of Urosepsis/septic shock, STEMI, fever

## 2022-10-06 NOTE — PROGRESS NOTE ADULT - SUBJECTIVE AND OBJECTIVE BOX
SUBJECTIVE:    Patient is a 82y old Female who presents with a chief complaint of Urosepsis (06 Oct 2022 09:05)    Currently admitted to medicine with the primary diagnosis of Sepsis       Today is hospital day 4d. This morning she is resting comfortably in bed and reports no new issues or overnight events.     PAST MEDICAL & SURGICAL HISTORY  Afib  on eliquis    Diabetes    Asthma    CAD (coronary atherosclerotic disease)    Diastolic heart failure    COPD exacerbation    Hyperlipidemia    CVA (cerebrovascular accident)    Type 2 diabetes mellitus    Dementia    S/P appendectomy    Pacemaker      SOCIAL HISTORY:  Negative for smoking/alcohol/drug use.     ALLERGIES:  Augmentin (Rash)  oxycodone (Pruritus)    MEDICATIONS:  STANDING MEDICATIONS  ALBUTerol    90 MICROgram(s) HFA Inhaler 2 Puff(s) Inhalation every 6 hours  apixaban 5 milliGRAM(s) Oral two times a day  chlorhexidine 2% Cloths 1 Application(s) Topical daily  dextrose 5%. 1000 milliLiter(s) IV Continuous <Continuous>  dextrose 5%. 1000 milliLiter(s) IV Continuous <Continuous>  dextrose 50% Injectable 25 Gram(s) IV Push once  dextrose 50% Injectable 12.5 Gram(s) IV Push once  dextrose 50% Injectable 25 Gram(s) IV Push once  glucagon  Injectable 1 milliGRAM(s) IntraMuscular once  insulin glargine Injectable (LANTUS) 20 Unit(s) SubCutaneous at bedtime  insulin lispro (ADMELOG) corrective regimen sliding scale   SubCutaneous three times a day before meals  meropenem  IVPB      meropenem  IVPB 1000 milliGRAM(s) IV Intermittent every 12 hours  metoprolol tartrate 25 milliGRAM(s) Oral two times a day  pantoprazole    Tablet 40 milliGRAM(s) Oral before breakfast  sertraline 25 milliGRAM(s) Oral daily  tiotropium 18 MICROgram(s) Capsule 1 Capsule(s) Inhalation daily    PRN MEDICATIONS  dextrose Oral Gel 15 Gram(s) Oral once PRN    VITALS:   T(F): 97.8  HR: 60  BP: 116/58  RR: 17  SpO2: 98%    LABS:                        9.5    7.83  )-----------( 183      ( 06 Oct 2022 07:08 )             28.1     10-05    134<L>  |  96<L>  |  49<H>  ----------------------------<  60<L>  3.5   |  27  |  1.4    Ca    8.3<L>      05 Oct 2022 06:01  Mg     2.6     10-05    TPro  6.1  /  Alb  2.7<L>  /  TBili  0.6  /  DBili  x   /  AST  24  /  ALT  14  /  AlkPhos  55  10-05          Lactate, Blood: 3.1 mmol/L *H* (10-05-22 @ 11:59)          RADIOLOGY:    PHYSICAL EXAM:  GEN: No acute distress  LUNGS: Clear to auscultation bilaterally   HEART: S1/S2 present. RRR.   ABD: Soft, non-tender, non-distended. Bowel sounds present  EXT: NC/NC/NE/2+PP/PHAM/Skin Intact.   NEURO: AAOX3    Intravenous access:   NG tube:   Blevins Catheter:   Indwelling Urethral Catheter:     Connect To:  Straight Drainage/[place]    Indication:  Urinary Retention / Obstruction (10-03-22 @ 03:46) (not performed) [Active]  Indwelling Urethral Catheter:     Connect To:  Leg Bag    Indication:  Urine Output Monitoring in Critically Ill (10-02-22 @ 15:03) (not performed) [Active]

## 2022-10-06 NOTE — PROGRESS NOTE ADULT - SUBJECTIVE AND OBJECTIVE BOX
PRATEEK LAWRENCE  82y, Female    All available historical data reviewed    OVERNIGHT EVENTS:  no fevers  feels well and has no new complaints  alert  on RA  fred wasserman      ROS:  General: Denies rigors, nightsweats  HEENT: Denies headache, rhinorrhea, sore throat, eye pain  CV: Denies CP, palpitations  PULM: Denies wheezing, hemoptysis  GI: Denies hematemesis, hematochezia, melena  : Denies discharge, hematuria  MSK: Denies arthralgias, myalgias  SKIN: Denies rash, lesions  NEURO: weakness  PSYCH: no anxiety    VITALS:  T(F): 97.8, Max: 98.9 (10-05-22 @ 16:00)  HR: 60  BP: 116/58  RR: 17Vital Signs Last 24 Hrs  T(C): 36.6 (06 Oct 2022 09:00), Max: 37.2 (05 Oct 2022 16:00)  T(F): 97.8 (06 Oct 2022 09:00), Max: 98.9 (05 Oct 2022 16:00)  HR: 60 (06 Oct 2022 09:00) (59 - 60)  BP: 116/58 (06 Oct 2022 09:00) (116/58 - 133/64)  BP(mean): 92 (05 Oct 2022 12:04) (92 - 92)  RR: 17 (06 Oct 2022 09:00) (17 - 18)  SpO2: 98% (06 Oct 2022 09:00) (98% - 100%)    Parameters below as of 06 Oct 2022 09:00  Patient On (Oxygen Delivery Method): room air        TESTS & MEASUREMENTS:                        9.2    11.68 )-----------( 165      ( 05 Oct 2022 06:01 )             26.8     10-05    134<L>  |  96<L>  |  49<H>  ----------------------------<  60<L>  3.5   |  27  |  1.4    Ca    8.3<L>      05 Oct 2022 06:01  Mg     2.6     10-05    TPro  6.1  /  Alb  2.7<L>  /  TBili  0.6  /  DBili  x   /  AST  24  /  ALT  14  /  AlkPhos  55  10-05    LIVER FUNCTIONS - ( 05 Oct 2022 06:01 )  Alb: 2.7 g/dL / Pro: 6.1 g/dL / ALK PHOS: 55 U/L / ALT: 14 U/L / AST: 24 U/L / GGT: x             Culture - Blood (collected 10-02-22 @ 14:55)  Source: .Blood Blood-Peripheral  Gram Stain (10-03-22 @ 12:08):    Growth in anaerobic bottle: Gram Negative Rods    Growth in aerobic bottle: Gram Negative Rods  Final Report (10-05-22 @ 09:30):    Growth in aerobic and anaerobic bottles: Escherichia coli ESBL    ***Blood Panel PCR results on this specimen are available    approximately 3 hours after the Gram stain result.***    Gram stain, PCR, and/or culture results may not always    correspond dueto difference in methodologies.    ************************************************************    This PCR assay was performed by multiplex PCR. This    Assay tests for 66 bacterial and resistance gene targets.    Please refer to the [facility] test directory    at [email or web address] for details.  Organism: Blood Culture PCR  Escherichia coli ESBL (10-05-22 @ 09:30)  Organism: Escherichia coli ESBL (10-05-22 @ 09:30)      -  Amikacin: S <=16      -  Ampicillin: R >16 These ampicillin results predict results for amoxicillin      -  Ampicillin/Sulbactam: R >16/8 Enterobacter, Klebsiella aerogenes, Citrobacter, and Serratia may develop resistance during prolonged therapy (3-4 days)      -  Aztreonam: R >16      -  Cefazolin: R >16 Enterobacter, Klebsiella aerogenes, Citrobacter, and Serratia may develop resistance during prolonged therapy (3-4 days)      -  Cefepime: R >16      -  Ceftriaxone: R >32 Enterobacter, Klebsiella aerogenes, Citrobacter, and Serratia may develop resistance during prolonged therapy      -  Ciprofloxacin: R >2      -  Ertapenem: S <=0.5      -  Gentamicin: S <=2      -  Imipenem: S <=1      -  Levofloxacin: R >4      -  Meropenem: S <=1      -  Piperacillin/Tazobactam: R <=8      -  Tobramycin: S <=2      -  Trimethoprim/Sulfamethoxazole: S <=0.5/9.5      Method Type: JOSE  Organism: Blood Culture PCR (10-05-22 @ 09:30)      -  CTX-M Resistance Marker: Detec      -  ESBL: Detec      -  Escherichia coli: Detec      Method Type: PCR    Culture - Blood (collected 10-02-22 @ 14:55)  Source: .Blood Blood-Peripheral  Gram Stain (10-03-22 @ 12:10):    Growth in anaerobic bottle: Gram Negative Rods    Growth in aerobic bottle: Gram Negative Rods  Final Report (10-05-22 @ 09:30):    Growth in aerobic and anaerobic bottles: Escherichia coli ESBL    See previous culture [id number]    Culture - Urine (collected 10-02-22 @ 14:55)  Source: Clean Catch Clean Catch (Midstream)  Preliminary Report (10-04-22 @ 22:03):    >100,000 CFU/ml Escherichia coli ESBL  Organism: Escherichia coli ESBL (10-04-22 @ 22:02)  Organism: Escherichia coli ESBL (10-04-22 @ 22:02)      -  Amikacin: S <=16      -  Amoxicillin/Clavulanic Acid: I 16/8      -  Ampicillin: R >16 These ampicillin results predict results for amoxicillin      -  Ampicillin/Sulbactam: R >16/8 Enterobacter, Klebsiella aerogenes, Citrobacter, and Serratia may develop resistance during prolonged therapy (3-4 days)      -  Aztreonam: R >16      -  Cefazolin: R >16 For uncomplicated UTI with K. pneumoniae, E. coli, or P. mirablis: JOSE <=16 is sensitive and JOSE >=32 is resistant. This also predicts results for oral agents cefaclor, cefdinir, cefpodoxime, cefprozil, cefuroxime axetil, cephalexin and locarbef for uncomplicated UTI. Note that some isolates may be susceptible to these agents while testing resistant to cefazolin.      -  Cefepime: R >16      -  Ceftriaxone: R >32 Enterobacter, Klebsiella aerogenes, Citrobacter, and Serratia may develop resistance during prolonged therapy      -  Ciprofloxacin: R >2      -  Ertapenem: S <=0.5      -  Gentamicin: S <=2      -  Imipenem: S <=1      -  Levofloxacin: R >4      -  Meropenem: S <=1      -  Nitrofurantoin: S <=32 Should not be used to treat pyelonephritis      -  Piperacillin/Tazobactam: S <=8      -  Tigecycline: S <=2      -  Tobramycin: S <=2      -  Trimethoprim/Sulfamethoxazole: S <=0.5/9.5      Method Type: JOSE            RADIOLOGY & ADDITIONAL TESTS:  Personal review of radiological diagnostics performed  Echo and EKG results noted when applicable.     MEDICATIONS:  ALBUTerol    90 MICROgram(s) HFA Inhaler 2 Puff(s) Inhalation every 6 hours  apixaban 5 milliGRAM(s) Oral two times a day  chlorhexidine 2% Cloths 1 Application(s) Topical daily  dextrose 5%. 1000 milliLiter(s) IV Continuous <Continuous>  dextrose 5%. 1000 milliLiter(s) IV Continuous <Continuous>  dextrose 50% Injectable 25 Gram(s) IV Push once  dextrose 50% Injectable 12.5 Gram(s) IV Push once  dextrose 50% Injectable 25 Gram(s) IV Push once  dextrose Oral Gel 15 Gram(s) Oral once PRN  glucagon  Injectable 1 milliGRAM(s) IntraMuscular once  insulin glargine Injectable (LANTUS) 20 Unit(s) SubCutaneous at bedtime  insulin lispro (ADMELOG) corrective regimen sliding scale   SubCutaneous three times a day before meals  meropenem  IVPB      meropenem  IVPB 1000 milliGRAM(s) IV Intermittent every 12 hours  metoprolol tartrate 25 milliGRAM(s) Oral two times a day  pantoprazole    Tablet 40 milliGRAM(s) Oral before breakfast  sertraline 25 milliGRAM(s) Oral daily  tiotropium 18 MICROgram(s) Capsule 1 Capsule(s) Inhalation daily      ANTIBIOTICS:  meropenem  IVPB      meropenem  IVPB 1000 milliGRAM(s) IV Intermittent every 12 hours

## 2022-10-07 LAB — GLUCOSE BLDC GLUCOMTR-MCNC: 200 MG/DL — HIGH (ref 70–99)

## 2022-10-10 LAB
CULTURE RESULTS: SIGNIFICANT CHANGE UP
CULTURE RESULTS: SIGNIFICANT CHANGE UP
SPECIMEN SOURCE: SIGNIFICANT CHANGE UP
SPECIMEN SOURCE: SIGNIFICANT CHANGE UP

## 2022-10-11 DIAGNOSIS — I24.8 OTHER FORMS OF ACUTE ISCHEMIC HEART DISEASE: ICD-10-CM

## 2022-10-11 DIAGNOSIS — I25.10 ATHEROSCLEROTIC HEART DISEASE OF NATIVE CORONARY ARTERY WITHOUT ANGINA PECTORIS: ICD-10-CM

## 2022-10-11 DIAGNOSIS — N17.9 ACUTE KIDNEY FAILURE, UNSPECIFIED: ICD-10-CM

## 2022-10-11 DIAGNOSIS — E87.6 HYPOKALEMIA: ICD-10-CM

## 2022-10-11 DIAGNOSIS — E11.9 TYPE 2 DIABETES MELLITUS WITHOUT COMPLICATIONS: ICD-10-CM

## 2022-10-11 DIAGNOSIS — Z79.01 LONG TERM (CURRENT) USE OF ANTICOAGULANTS: ICD-10-CM

## 2022-10-11 DIAGNOSIS — G93.41 METABOLIC ENCEPHALOPATHY: ICD-10-CM

## 2022-10-11 DIAGNOSIS — A41.51 SEPSIS DUE TO ESCHERICHIA COLI [E. COLI]: ICD-10-CM

## 2022-10-11 DIAGNOSIS — E83.42 HYPOMAGNESEMIA: ICD-10-CM

## 2022-10-11 DIAGNOSIS — I50.32 CHRONIC DIASTOLIC (CONGESTIVE) HEART FAILURE: ICD-10-CM

## 2022-10-11 DIAGNOSIS — E87.29 OTHER ACIDOSIS: ICD-10-CM

## 2022-10-11 DIAGNOSIS — U07.1 COVID-19: ICD-10-CM

## 2022-10-11 DIAGNOSIS — R65.21 SEVERE SEPSIS WITH SEPTIC SHOCK: ICD-10-CM

## 2022-10-11 DIAGNOSIS — Z71.85 ENCOUNTER FOR IMMUNIZATION SAFETY COUNSELING: ICD-10-CM

## 2022-10-11 DIAGNOSIS — I48.20 CHRONIC ATRIAL FIBRILLATION, UNSPECIFIED: ICD-10-CM

## 2022-10-11 DIAGNOSIS — R53.1 WEAKNESS: ICD-10-CM

## 2022-10-11 DIAGNOSIS — Z16.12 EXTENDED SPECTRUM BETA LACTAMASE (ESBL) RESISTANCE: ICD-10-CM

## 2022-10-11 DIAGNOSIS — Z28.310 UNVACCINATED FOR COVID-19: ICD-10-CM

## 2022-10-11 DIAGNOSIS — Z79.4 LONG TERM (CURRENT) USE OF INSULIN: ICD-10-CM

## 2022-10-11 DIAGNOSIS — J44.9 CHRONIC OBSTRUCTIVE PULMONARY DISEASE, UNSPECIFIED: ICD-10-CM

## 2022-10-11 DIAGNOSIS — N12 TUBULO-INTERSTITIAL NEPHRITIS, NOT SPECIFIED AS ACUTE OR CHRONIC: ICD-10-CM

## 2022-10-12 ENCOUNTER — APPOINTMENT (OUTPATIENT)
Dept: CARDIOLOGY | Facility: CLINIC | Age: 82
End: 2022-10-12

## 2022-10-12 VITALS
BODY MASS INDEX: 28.86 KG/M2 | HEART RATE: 60 BPM | SYSTOLIC BLOOD PRESSURE: 103 MMHG | HEIGHT: 60 IN | TEMPERATURE: 97.2 F | WEIGHT: 147 LBS | DIASTOLIC BLOOD PRESSURE: 69 MMHG

## 2022-10-12 DIAGNOSIS — J45.909 UNSPECIFIED ASTHMA, UNCOMPLICATED: ICD-10-CM

## 2022-10-12 DIAGNOSIS — G45.9 TRANSIENT CEREBRAL ISCHEMIC ATTACK, UNSPECIFIED: ICD-10-CM

## 2022-10-12 PROBLEM — F03.90 UNSPECIFIED DEMENTIA, UNSPECIFIED SEVERITY, WITHOUT BEHAVIORAL DISTURBANCE, PSYCHOTIC DISTURBANCE, MOOD DISTURBANCE, AND ANXIETY: Chronic | Status: ACTIVE | Noted: 2022-10-02

## 2022-10-12 PROBLEM — F03.90 UNSPECIFIED DEMENTIA WITHOUT BEHAVIORAL DISTURBANCE: Chronic | Status: ACTIVE | Noted: 2022-10-02

## 2022-10-12 PROCEDURE — 93280 PM DEVICE PROGR EVAL DUAL: CPT

## 2022-10-12 PROCEDURE — 99204 OFFICE O/P NEW MOD 45 MIN: CPT

## 2022-10-12 RX ORDER — SERTRALINE 25 MG/1
25 TABLET, FILM COATED ORAL DAILY
Refills: 0 | Status: ACTIVE | COMMUNITY

## 2022-10-12 RX ORDER — INSULIN GLARGINE 100 [IU]/ML
100 INJECTION, SOLUTION SUBCUTANEOUS
Refills: 0 | Status: ACTIVE | COMMUNITY

## 2022-10-12 RX ORDER — OMEGA-3/DHA/EPA/FISH OIL 300-1000MG
400 CAPSULE ORAL 3 TIMES DAILY
Refills: 0 | Status: ACTIVE | COMMUNITY

## 2022-10-12 NOTE — HISTORY OF PRESENT ILLNESS
[de-identified] : \par Patient with history of Diabetes, Hypertension, COPD, Asthma, Complete Heart Block s/p Pacemaker Implant, TIA, and Paroxysmal Atrial Fibrillation with CHADSVASC Score of 7. Patient comes to the office to initiate care for her Pacemaker. Patient was recently in the hospital with Afib RVR last week; patient admitted to the hospital due to Urinary sepsis. Today, patient feels well and is in normal sinus rhythm. No palpitations or shortness of breath. \par \par - EKG Atrial Ventricular pace\par - Echo 9/11/2022 showed ejection fraction of 51% with moderate enlarged left atrium, mild AI and severe TR. \par

## 2022-10-12 NOTE — ADDENDUM
[FreeTextEntry1] : I, Flavia Yanez assisted in documentation on 10/12/2022  acting as a scribe for Dr. Joao Junior.\par

## 2022-10-12 NOTE — PROCEDURE
[See Scanned Paceart Report] : See scanned paceart report [See Device Printout] : See device printout [Pacemaker] : pacemaker [Programmed for Longevity] : output reprogrammed for improved battery longevity [de-identified] : [employer]

## 2022-10-12 NOTE — ASSESSMENT
[FreeTextEntry1] : Atrial Fibrillation\par - CHADSVASC Score of 7, currently normal sinus rhythm.\par - Continue Eliquis 5 mg Q12, patient was taking once a day I informed patient to take it twice a day. Patient creatinine on December 6th was 1.3. \par - Continue Metoprolol.\par \par Hypertension \par - Well controlled today. Continue Metoprolol 100 mg BID. \par - Follow-up with new cardiologist as a new patient.\par

## 2022-10-19 ENCOUNTER — EMERGENCY (EMERGENCY)
Facility: HOSPITAL | Age: 82
LOS: 0 days | Discharge: HOME | End: 2022-10-19
Attending: EMERGENCY MEDICINE | Admitting: EMERGENCY MEDICINE

## 2022-10-19 VITALS
SYSTOLIC BLOOD PRESSURE: 110 MMHG | DIASTOLIC BLOOD PRESSURE: 59 MMHG | RESPIRATION RATE: 20 BRPM | HEIGHT: 62 IN | WEIGHT: 149.91 LBS | HEART RATE: 78 BPM | TEMPERATURE: 97 F | OXYGEN SATURATION: 94 %

## 2022-10-19 VITALS
DIASTOLIC BLOOD PRESSURE: 68 MMHG | OXYGEN SATURATION: 98 % | SYSTOLIC BLOOD PRESSURE: 112 MMHG | RESPIRATION RATE: 18 BRPM | HEART RATE: 71 BPM | TEMPERATURE: 98 F
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
## 2022-10-19 DIAGNOSIS — Z88.1 ALLERGY STATUS TO OTHER ANTIBIOTIC AGENTS STATUS: ICD-10-CM

## 2022-10-19 DIAGNOSIS — I25.10 ATHEROSCLEROTIC HEART DISEASE OF NATIVE CORONARY ARTERY WITHOUT ANGINA PECTORIS: ICD-10-CM

## 2022-10-19 DIAGNOSIS — I50.30 UNSPECIFIED DIASTOLIC (CONGESTIVE) HEART FAILURE: ICD-10-CM

## 2022-10-19 DIAGNOSIS — S09.90XA UNSPECIFIED INJURY OF HEAD, INITIAL ENCOUNTER: ICD-10-CM

## 2022-10-19 DIAGNOSIS — M25.561 PAIN IN RIGHT KNEE: ICD-10-CM
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
## 2022-10-19 DIAGNOSIS — D68.9 COAGULATION DEFECT, UNSPECIFIED: ICD-10-CM

## 2022-10-19 DIAGNOSIS — E11.9 TYPE 2 DIABETES MELLITUS WITHOUT COMPLICATIONS: ICD-10-CM

## 2022-10-19 DIAGNOSIS — F03.90 UNSPECIFIED DEMENTIA WITHOUT BEHAVIORAL DISTURBANCE: ICD-10-CM

## 2022-10-19 DIAGNOSIS — E78.00 PURE HYPERCHOLESTEROLEMIA, UNSPECIFIED: ICD-10-CM

## 2022-10-19 DIAGNOSIS — S00.531A CONTUSION OF LIP, INITIAL ENCOUNTER: ICD-10-CM

## 2022-10-19 DIAGNOSIS — S00.11XA CONTUSION OF RIGHT EYELID AND PERIOCULAR AREA, INITIAL ENCOUNTER: ICD-10-CM

## 2022-10-19 DIAGNOSIS — J44.1 CHRONIC OBSTRUCTIVE PULMONARY DISEASE WITH (ACUTE) EXACERBATION: ICD-10-CM

## 2022-10-19 DIAGNOSIS — Z90.49 ACQUIRED ABSENCE OF OTHER SPECIFIED PARTS OF DIGESTIVE TRACT: Chronic | ICD-10-CM

## 2022-10-19 DIAGNOSIS — Z88.8 ALLERGY STATUS TO OTHER DRUGS, MEDICAMENTS AND BIOLOGICAL SUBSTANCES STATUS: ICD-10-CM

## 2022-10-19 DIAGNOSIS — S82.001A UNSPECIFIED FRACTURE OF RIGHT PATELLA, INITIAL ENCOUNTER FOR CLOSED FRACTURE: ICD-10-CM

## 2022-10-19 DIAGNOSIS — Z95.0 PRESENCE OF CARDIAC PACEMAKER: Chronic | ICD-10-CM

## 2022-10-19 DIAGNOSIS — Z95.0 PRESENCE OF CARDIAC PACEMAKER: ICD-10-CM

## 2022-10-19 DIAGNOSIS — Z79.01 LONG TERM (CURRENT) USE OF ANTICOAGULANTS: ICD-10-CM

## 2022-10-19 DIAGNOSIS — Z20.822 CONTACT WITH AND (SUSPECTED) EXPOSURE TO COVID-19: ICD-10-CM

## 2022-10-19 DIAGNOSIS — Z90.49 ACQUIRED ABSENCE OF OTHER SPECIFIED PARTS OF DIGESTIVE TRACT: ICD-10-CM

## 2022-10-19 DIAGNOSIS — Z86.73 PERSONAL HISTORY OF TRANSIENT ISCHEMIC ATTACK (TIA), AND CEREBRAL INFARCTION WITHOUT RESIDUAL DEFICITS: ICD-10-CM

## 2022-10-19 DIAGNOSIS — I48.91 UNSPECIFIED ATRIAL FIBRILLATION: ICD-10-CM

## 2022-10-19 LAB
ALBUMIN SERPL ELPH-MCNC: 3.2 G/DL — LOW (ref 3.5–5.2)
ALP SERPL-CCNC: 82 U/L — SIGNIFICANT CHANGE UP (ref 30–115)
ALT FLD-CCNC: 13 U/L — SIGNIFICANT CHANGE UP (ref 0–41)
ANION GAP SERPL CALC-SCNC: 11 MMOL/L — SIGNIFICANT CHANGE UP (ref 7–14)
APTT BLD: 38.9 SEC — SIGNIFICANT CHANGE UP (ref 27–39.2)
AST SERPL-CCNC: 28 U/L — SIGNIFICANT CHANGE UP (ref 0–41)
BASOPHILS # BLD AUTO: 0.02 K/UL — SIGNIFICANT CHANGE UP (ref 0–0.2)
BASOPHILS NFR BLD AUTO: 0.3 % — SIGNIFICANT CHANGE UP (ref 0–1)
BILIRUB SERPL-MCNC: 1.1 MG/DL — SIGNIFICANT CHANGE UP (ref 0.2–1.2)
BUN SERPL-MCNC: 33 MG/DL — HIGH (ref 10–20)
CALCIUM SERPL-MCNC: 8.8 MG/DL — SIGNIFICANT CHANGE UP (ref 8.4–10.4)
CHLORIDE SERPL-SCNC: 95 MMOL/L — LOW (ref 98–110)
CO2 SERPL-SCNC: 30 MMOL/L — SIGNIFICANT CHANGE UP (ref 17–32)
CREAT SERPL-MCNC: 1.3 MG/DL — SIGNIFICANT CHANGE UP (ref 0.7–1.5)
EGFR: 41 ML/MIN/1.73M2 — LOW
EOSINOPHIL # BLD AUTO: 0.05 K/UL — SIGNIFICANT CHANGE UP (ref 0–0.7)
EOSINOPHIL NFR BLD AUTO: 0.7 % — SIGNIFICANT CHANGE UP (ref 0–8)
ETHANOL SERPL-MCNC: <10 MG/DL — SIGNIFICANT CHANGE UP
GLUCOSE SERPL-MCNC: 252 MG/DL — HIGH (ref 70–99)
HCT VFR BLD CALC: 29.2 % — LOW (ref 37–47)
HGB BLD-MCNC: 9.7 G/DL — LOW (ref 12–16)
IMM GRANULOCYTES NFR BLD AUTO: 0.1 % — SIGNIFICANT CHANGE UP (ref 0.1–0.3)
INR BLD: 1.88 RATIO — HIGH (ref 0.65–1.3)
LACTATE SERPL-SCNC: 2 MMOL/L — SIGNIFICANT CHANGE UP (ref 0.7–2)
LIDOCAIN IGE QN: 92 U/L — HIGH (ref 7–60)
LYMPHOCYTES # BLD AUTO: 1.89 K/UL — SIGNIFICANT CHANGE UP (ref 1.2–3.4)
LYMPHOCYTES # BLD AUTO: 24.9 % — SIGNIFICANT CHANGE UP (ref 20.5–51.1)
MCHC RBC-ENTMCNC: 31.4 PG — HIGH (ref 27–31)
MCHC RBC-ENTMCNC: 33.2 G/DL — SIGNIFICANT CHANGE UP (ref 32–37)
MCV RBC AUTO: 94.5 FL — SIGNIFICANT CHANGE UP (ref 81–99)
MONOCYTES # BLD AUTO: 0.69 K/UL — HIGH (ref 0.1–0.6)
MONOCYTES NFR BLD AUTO: 9.1 % — SIGNIFICANT CHANGE UP (ref 1.7–9.3)
NEUTROPHILS # BLD AUTO: 4.92 K/UL — SIGNIFICANT CHANGE UP (ref 1.4–6.5)
NEUTROPHILS NFR BLD AUTO: 64.9 % — SIGNIFICANT CHANGE UP (ref 42.2–75.2)
NRBC # BLD: 0 /100 WBCS — SIGNIFICANT CHANGE UP (ref 0–0)
PLATELET # BLD AUTO: 250 K/UL — SIGNIFICANT CHANGE UP (ref 130–400)
POTASSIUM SERPL-MCNC: 3.4 MMOL/L — LOW (ref 3.5–5)
POTASSIUM SERPL-SCNC: 3.4 MMOL/L — LOW (ref 3.5–5)
PROT SERPL-MCNC: 7.3 G/DL — SIGNIFICANT CHANGE UP (ref 6–8)
PROTHROM AB SERPL-ACNC: 21.8 SEC — HIGH (ref 9.95–12.87)
RBC # BLD: 3.09 M/UL — LOW (ref 4.2–5.4)
RBC # FLD: 14.3 % — SIGNIFICANT CHANGE UP (ref 11.5–14.5)
SARS-COV-2 RNA SPEC QL NAA+PROBE: SIGNIFICANT CHANGE UP
SODIUM SERPL-SCNC: 136 MMOL/L — SIGNIFICANT CHANGE UP (ref 135–146)
TROPONIN T SERPL-MCNC: 0.02 NG/ML — HIGH
WBC # BLD: 7.58 K/UL — SIGNIFICANT CHANGE UP (ref 4.8–10.8)
WBC # FLD AUTO: 7.58 K/UL — SIGNIFICANT CHANGE UP (ref 4.8–10.8)

## 2022-10-19 PROCEDURE — 72125 CT NECK SPINE W/O DYE: CPT | Mod: 26,MA

## 2022-10-19 PROCEDURE — 70450 CT HEAD/BRAIN W/O DYE: CPT | Mod: 26,MA

## 2022-10-19 PROCEDURE — 73552 X-RAY EXAM OF FEMUR 2/>: CPT | Mod: 26,RT

## 2022-10-19 PROCEDURE — 74177 CT ABD & PELVIS W/CONTRAST: CPT | Mod: 26,MA

## 2022-10-19 PROCEDURE — 73562 X-RAY EXAM OF KNEE 3: CPT | Mod: 26,RT

## 2022-10-19 PROCEDURE — 72170 X-RAY EXAM OF PELVIS: CPT | Mod: 26

## 2022-10-19 PROCEDURE — 71260 CT THORAX DX C+: CPT | Mod: 26,MA

## 2022-10-19 PROCEDURE — 71045 X-RAY EXAM CHEST 1 VIEW: CPT | Mod: 26

## 2022-10-19 PROCEDURE — 99285 EMERGENCY DEPT VISIT HI MDM: CPT | Mod: FS

## 2022-10-19 PROCEDURE — 93010 ELECTROCARDIOGRAM REPORT: CPT

## 2022-10-19 PROCEDURE — 99284 EMERGENCY DEPT VISIT MOD MDM: CPT

## 2022-10-19 RX ORDER — SODIUM CHLORIDE 9 MG/ML
250 INJECTION INTRAMUSCULAR; INTRAVENOUS; SUBCUTANEOUS ONCE
Refills: 0 | Status: COMPLETED | OUTPATIENT
Start: 2022-10-19 | End: 2022-10-19

## 2022-10-19 RX ADMIN — SODIUM CHLORIDE 250 MILLILITER(S): 9 INJECTION INTRAMUSCULAR; INTRAVENOUS; SUBCUTANEOUS at 12:42

## 2022-10-19 NOTE — ED PROVIDER NOTE - PHYSICAL EXAMINATION
Physical Exam    Vital Signs: I have reviewed the initial vital signs.  Constitutional: well-nourished, appears stated age, no acute distress  Eyes: Conjunctiva pink, Sclera clear, PERRLA, EOMI  Cardiovascular: S1 and S2, regular rate, regular rhythm, well-perfused extremities, radial pulses equal and 2+ b/l.   Respiratory: unlabored respiratory effort, clear to auscultation bilaterally no wheezing, rales and rhonchi. pt is speaking full sentences. no accessory muscle use. no chest wall tenderness or crepitus.   Gastrointestinal: soft, non-tender, nondistended abdomen, no pulsatile mass, normal bowl sounds, no rebound, no guarding  Musculoskeletal: supple neck, no lower extremity edema, no calf tenderness, no midline tenderness, no palpable spinal step offs. (+) tenderness and swelling over the right knee.   Integumentary: warm, dry, no rash  Neurologic: awake, alert, cranial nerves II-XII grossly intact, extremities’ motor and sensory functions grossly intact.   Psychiatric: appropriate mood, appropriate affect

## 2022-10-19 NOTE — CONSULT NOTE ADULT - SUBJECTIVE AND OBJECTIVE BOX
TRAUMA SERVICE CONSULT NOTE  --------------------------------------------------------------------------------------------    TRAUMA ACTIVATION LEVEL:     MECHANISM OF INJURY:      [] Blunt	[] MVC	[x] Fall	[] Pedestrian Struck	[] Motorcycle accident         [] Penetrating	[] Gun Shot Wound 		[] Stab Wound    GCS: 14 	E: 4	V: 4	M: 6   (-1 for confusion, which patient is Oriented to self only at baseline 2/2 dementia)    Patient is a 82y old  Female who presents with a chief complaint of recent fall (around 2-3 AM), +HT, -LOC, +AC. Patient was getting up from toilet and tripped and fell , hitting her right side of the face on the doorknob and her right knee on the floor. Since the fall she complained of pain and decreased range of motion of right knee. Patient last took Eliquis at 10:30 am    Primary Survey:    A - airway intact  B - bilateral breath sounds and good chest rise  C - initial BP  BP: 110/59 (10-19-22 @ 12:23) HR: 78 (10-19-22 @ 12:23) , palpable pulses in all extremities  D - GCS 14 on arrival (Patient's baseline)  Exposure obtained      General: NAD  HEENT: Normocephalic, +small right sided periorbital ecchymosis, EOMI, PERRLA.  Neck: Soft, midline trachea.  Chest: No chest wall tenderness.   Cardiac: normotensive, palpable pulses in all extremities  Respiratory: Bilateral breath sounds, clear and equal bilaterally  Abdomen: Soft, non-distended, non-tender, no rebound, no guarding, no masses palpated  Groin: Normal appearing  Ext: palp radial b/l UE, b/l DP palp in Lower Extrem, motor and sensory grossly intact, decreased ROM in right knee flexion 2/2 pain and swelling.   Back: no TTP, no palpable runoff/stepoff/deformity  Rectal: No hellen blood, RENETTA with good tone    Patient denies fevers/chills, denies lightheadedness/dizziness, denies SOB/chest pain, denies nausea/vomiting, denies constipation/diarrhea.    ROS: 10-system review is otherwise negative except HPI above.      PAST MEDICAL & SURGICAL HISTORY:  Afib  on eliquis      Diabetes      Asthma      CAD (coronary atherosclerotic disease)      Diastolic heart failure      COPD exacerbation      Hyperlipidemia      CVA (cerebrovascular accident)      Type 2 diabetes mellitus      Dementia      S/P appendectomy      Pacemaker        FAMILY HISTORY:    [] Family history not pertinent as reviewed with the patient and family    SOCIAL HISTORY:      ALLERGIES: Augmentin (Rash)  oxycodone (Pruritus)      HOME MEDICATIONS:     CURRENT MEDICATIONS  MEDICATIONS (STANDING):   MEDICATIONS (PRN):  --------------------------------------------------------------------------------------------    Vitals:   T(C): 36.2 (10-19-22 @ 12:23), Max: 36.2 (10-19-22 @ 12:23)  HR: 78 (10-19-22 @ 12:23) (78 - 78)  BP: 110/59 (10-19-22 @ 12:23) (110/59 - 110/59)  RR: 20 (10-19-22 @ 12:23) (20 - 20)  SpO2: 84% (10-19-22 @ 12:23) (84% - 84%)  CAPILLARY BLOOD GLUCOSE      POCT Blood Glucose.: 268 mg/dL (19 Oct 2022 12:40)    CAPILLARY BLOOD GLUCOSE      POCT Blood Glucose.: 268 mg/dL (19 Oct 2022 12:40)      Height (cm): 157.5 (10-19 @ 12:23)  Weight (kg): 68 (10-19 @ 12:23)  BMI (kg/m2): 27.4 (10-19 @ 12:23)  BSA (m2): 1.69 (10-19 @ 12:23)        --------------------------------------------------------------------------------------------    LABS  CBC (10-19 @ 12:35)                              9.7<L>                         7.58    )----------------(  250        64.9  % Neutrophils, 24.9  % Lymphocytes, ANC: 4.92                                29.2<L>    BMP (10-19 @ 12:35)             136     |  95<L>   |  33<H> 		Ca++ --      Ca 8.8                ---------------------------------( 252<H>		Mg --                 3.4<L>  |  30      |  1.3   			Ph --        LFTs (10-19 @ 12:35)      TPro 7.3 / Alb 3.2<L> / TBili 1.1 / DBili -- / AST 28 / ALT 13 / AlkPhos 82    Coags (10-19 @ 12:35)  aPTT 38.9 / INR 1.88<H> / PT 21.80<H>      ABG (10-19 @ 12:35)      /  /  /  /  / %     Lactate:  2.0      --------------------------------------------------------------------------------------------    MICROBIOLOGY      --------------------------------------------------------------------------------------------    IMAGING  C< from: CT Head No Cont (10.19.22 @ 13:26) >  IMPRESSION:    CT HEAD:  No acuteintracranial findings.    Stable mild chronic microvascular ischemic changes and small chronic   infarct in the right centrum semiovale.    CT CERVICAL SPINE:  No acute cervical fracture or dislocation.    < end of copied text >  < from: CT Chest w/ IV Cont (10.19.22 @ 13:39) >  IMPRESSION:  1.  No evidence of acute traumatic injury to the chest, abdomen or pelvis.  2.  Chronic and incidental findings as above.    < end of copied text >  < from: CT Abdomen and Pelvis w/ IV Cont (10.19.22 @ 13:40) >  IMPRESSION:  1.  No evidence of acute traumatic injury to the chest, abdomen or pelvis.  2.  Chronic and incidental findings as above.    < end of copied text >    --------------------------------------------------------------------------------------------

## 2022-10-19 NOTE — ED PROVIDER NOTE - CLINICAL SUMMARY MEDICAL DECISION MAKING FREE TEXT BOX
Mechanical fall.  Labs and imaging reviewed.  Right patella fracture.  Nonoperative management.  Trial of ambulation in ED.  Trauma alert on arrival.  Seen by trauma service.

## 2022-10-19 NOTE — ED ADULT TRIAGE NOTE - CHIEF COMPLAINT QUOTE
patient unwitnessed fall . hitting head on door knob . also complaining on right knee . on Eliquis patient unable to ambulate

## 2022-10-19 NOTE — ED PROVIDER NOTE - CARE PROVIDER_API CALL
Pepito Whitaker)  Orthopaedic Surgery  [address] [place] [address]  Phone: [phone]  Fax: [phone]  Follow Up Time: 7-10 Days

## 2022-10-19 NOTE — ED PROVIDER NOTE - ATTENDING APP SHARED VISIT CONTRIBUTION OF CARE
82-year-old female status post mechanical fall.  Hurt her right knee.  Also hit her face.  Denies any LOC.  Patient is Arabic speaking.  But understands [place].  Only complaining of the right knee pain.    vss, nontoxic, well appearing, airway intact, GCS 15, PERRL, EOMI, MMM, ecchymosis to the right lower lip, no cervical-thoracic-lumbar spine tenderness, neck supple, CTAB, no crepitus over chest wall, RRR, equal radial pulses bilat, abd soft/nt/nd, no fnd. FROMx4, positive swelling and tenderness to the right patella but full range of motion of the right knee.

## 2022-10-19 NOTE — CONSULT NOTE ADULT - ASSESSMENT
ASSESSMENT:  This is a 82y Female with PMH as above, presenting as a Trauma Alert, GCS 14, AAOx1, PHAM, with complaint of Right knee pain , external signs of trauma include right periorbital ecchymosis and right knee swelling/tenderness .  Injuries:  - Right Patellar fracture    PLAN:   Trauma Labs: CBC, CMP, PT/PTT/INR,   Trauma Imaging to include the following:  - CXR  - PXR  - CTH    - CT C-spine  - CT Chest  - CT Ab/Pelvis  - Extremity films: Right knee and right femur    Additional studies:  EKG  UA    Additional consultations:  Orthopedics      Disposition per ED, if admitted Trauma will follow for Tertiary survey. No further Trauma workup needed at this time  Above plan discussed with Trauma attending, Dr. Crain, and ED team  --------------------------------------------------------------------------------------  10-19-22 @ 15:19

## 2022-10-19 NOTE — ED PROVIDER NOTE - OBJECTIVE STATEMENT
83 y/o female with a PMH of  Afib on eliquis, asthma, CAD, HFpEF, DMII, COPD presents to the ED for evaluation of mechanical fall that was unwitnessed. pt reports she hit her head on the door knob and has right knee pain. pt daufghter she heard a thud and went to saw her mother was already on the floor. pt denies loc, chest pain, neck pain, or sob.

## 2022-10-19 NOTE — ED ADULT NURSE NOTE - OBJECTIVE STATEMENT
Patient came in for unwitnessed fall around 2 am. Complaints of right lower leg pain. Patient on Eliquis. Patient unable to recall event, history of Alzheimer's

## 2022-10-19 NOTE — ED PROVIDER NOTE - PATIENT PORTAL LINK FT
You can access the FollowMyHealth Patient Portal offered by [facility] by registering at the following website: [email or web address]. By joining [facility]’s FollowMyHealth portal, you will also be able to view your health information using other applications (apps) compatible with our system.

## 2022-10-19 NOTE — ED PROVIDER NOTE - NS ED ATTENDING STATEMENT MOD
This was a shared visit with the BERNARDINO. I reviewed and verified the documentation and independently performed the documented:

## 2022-10-19 NOTE — ED PROVIDER NOTE - PROGRESS NOTE DETAILS
Note authored by Dr. Iglesias: E fast negative.  Dr. douglas is at the bedside Note authored by Dr. Iglesias: C-spine cleared.  No pain.  Imaging reviewed.  Right patella fracture.Knee immobilizer.  Trial of ambulation.  Trauma service aware. Authored by Dr. Iglesias: ambulatory in the ED.  daughter at bedside. will take her home. d/w dr manzo

## 2022-10-19 NOTE — ED PROVIDER NOTE - HIV OFFER
PROCEDURES:  Bronchoscopy, adult 17-Apr-2020 18:44:32  Faby Toure L Previously Declined (within the last year)

## 2022-10-19 NOTE — ED PROVIDER NOTE - CARE PLAN
1 Principal Discharge DX:	Right patella fracture  Secondary Diagnosis:	Closed head injury  Secondary Diagnosis:	Fall in elderly patient

## 2022-10-19 NOTE — ED PROVIDER NOTE - CARE PROVIDERS DIRECT ADDRESSES
[email or web address]

## 2022-10-19 NOTE — CONSULT NOTE ADULT - ATTENDING COMMENTS
This is 82y old  female who presents with a chief complaint of recent fall (around 2-3 AM), +HT, -LOC, +AC. Patient was getting up from toilet and tripped and fell , hitting her right side of the face on the doorknob and her right knee on the floor. Since the fall she complained of pain and decreased range of motion of right knee. Patient last took Eliquis at 10:30 am today.    Primary and secondary surveys were performed.    AAO x3  GCS 15.  Neuro intact.  Neck: no step-offs  Chest: clear.  CV : rrr  Abdomen: soft, nontender  Extr: Right Knee pain to motion.    All images  were reviewed.    ASSESSMENT:  83 y/o female, S/P Fall.  Closed head injury.  Right Patella Fracture.  Acute pain due to trauma.  Coagulopathy due to Eliquis.    PLAN:  Patient was observed over several hours and remained hemodynamically and neurologically stable.  Ortho consulted and put knee immobilizer on right leg. Patient was able to ambulate.  Further disposition as per ED

## 2022-10-26 ENCOUNTER — APPOINTMENT (OUTPATIENT)
Dept: ORTHOPEDIC SURGERY | Facility: CLINIC | Age: 82
End: 2022-10-26

## 2022-10-26 DIAGNOSIS — S82.024A NONDISPLACED LONGITUDINAL FRACTURE OF RIGHT PATELLA, INITIAL ENCOUNTER FOR CLOSED FRACTURE: ICD-10-CM

## 2022-10-26 PROCEDURE — 73562 X-RAY EXAM OF KNEE 3: CPT | Mod: RT

## 2022-10-26 PROCEDURE — 99203 OFFICE O/P NEW LOW 30 MIN: CPT

## 2022-10-26 PROCEDURE — 99213 OFFICE O/P EST LOW 20 MIN: CPT

## 2022-10-26 NOTE — HISTORY OF PRESENT ILLNESS
[de-identified] :  82-year-old female here for an evaluation  of injury sustained to the right knee, patient  is on company of her daughter, as per daughter her mom fell down about a week ago October 19, 2022 from the toilet injuring her knee, patient was taken to [facility] x-rays were done and she was advised to follow up with Orthopedics for a fracture patella.\par   As per daughter, at this time the pain is improving, the 1st couple of days she could not min tolerate a blanket over her knee, this time she still has pain but is more bearable, she is using a knee immobilizer, as per daughter mom can apply weight on her right knee but she has pain.

## 2022-10-26 NOTE — DISCUSSION/SUMMARY
[de-identified] : \par IMPRESSION:  Nondisplaced longitudinal patella fracture right knee\par \par \par PLAN: patient was advised to continue with the knee immobilizer.  \par Weightbearing as tolerated.  \par Patient was advised to follow up in 2 weeks for repeat evaluation and repeat x-ray.\par \par \par FOLLOW-UP:  Two weeks\par \par \par \par SUPERVISING PHYSICIAN DR. PRADHAN.\par

## 2022-10-26 NOTE — IMAGING
[de-identified] : Examination of the right knee, patient has generalized swelling over the patella, no ecchymosis or erythema notice.  \par Patient has significant tenderness when palpating over the patella.  \par  mild discomfort when palpating over the medial and lateral tibial plateau.  \par Patient is able to do active straight leg raise.  Calf is soft, nontender.  \par Neurovascular intact.  \par \par X-ray of the right knee was done at [facility] reviewed in office today, there is a nondisplaced longitudinal patella fracture, there is a lucency over the lateral tibial plateau questioning a possible fracture, but clinically patient has no tenderness when palpating over the tibial plateau.  \par X-ray was repeated today because is a week off from the injury.

## 2022-11-07 ENCOUNTER — APPOINTMENT (OUTPATIENT)
Dept: CARDIOLOGY | Facility: CLINIC | Age: 82
End: 2022-11-07

## 2022-11-07 ENCOUNTER — RESULT CHARGE (OUTPATIENT)
Age: 82
End: 2022-11-07

## 2022-11-14 ENCOUNTER — APPOINTMENT (OUTPATIENT)
Dept: ORTHOPEDIC SURGERY | Facility: CLINIC | Age: 82
End: 2022-11-14

## 2022-11-14 DIAGNOSIS — S82.024D NONDISPLACED LONGITUDINAL FRACTURE OF RIGHT PATELLA, SUBSEQUENT ENCOUNTER FOR CLOSED FRACTURE WITH ROUTINE HEALING: ICD-10-CM

## 2022-11-14 PROCEDURE — 99213 OFFICE O/P EST LOW 20 MIN: CPT

## 2022-11-14 PROCEDURE — 73562 X-RAY EXAM OF KNEE 3: CPT | Mod: RT

## 2022-11-14 NOTE — DISCUSSION/SUMMARY
[de-identified] : I reviewed the x-ray findings with the patient and her daughter.  I recommend she goes back into the knee immobilizer.  She may weightbear as tolerated.  I will see her back on 11/30/2022 to discontinue the knee immobilizer and get her into formal physical therapy.  She will need repeat x-rays at that office visit.\par \par Supervising physician:

## 2022-11-14 NOTE — PHYSICAL EXAM
[Right] : right knee [NL (0)] : extension 0 degrees [] : uses walker [FreeTextEntry9] :   Range of motion assessed with the patient sitting on a seated walker. [TWNoteComboBox7] : flexion 90 degrees

## 2022-11-14 NOTE — HISTORY OF PRESENT ILLNESS
[de-identified] :  The patient is an 82-year-old female accompanied by her daughter here for a subsequent re-evaluation of her right knee.  She fell on 10/19/2022 and fractured the patella.  Her pain is decreasing in severity.  She has been wearing her knee immobilizer but is not wearing it here in the office today.

## 2022-11-14 NOTE — DATA REVIEWED
[Right] : of the right [Knee] : knee [FreeTextEntry1] :   X-rays of the right knee here in the office today were obtained:  X-rays show the fracture in acceptable alignment, some healing noted.

## 2022-11-30 ENCOUNTER — APPOINTMENT (OUTPATIENT)
Dept: ORTHOPEDIC SURGERY | Facility: CLINIC | Age: 82
End: 2022-11-30

## 2023-01-19 NOTE — PROGRESS NOTE ADULT - ASSESSMENT
Hospitalist Discharge Summary   Admit Date:  2023  3:40 PM   DC Note date: 2023  Name:  Lilibeth Alvarez   Age:  70 y.o. Sex:  male  :  1954   MRN:  [id number]   Room:  [facility]/  PCP:  Nieves Rodriguez MD    Presenting Complaint: Chest Pain and Shortness of Breath     Initial Admission Diagnosis: Acute respiratory failure with hypoxia ([address] [J96.01]  Primary spontaneous pneumothorax [J93.11]  Spontaneous pneumothorax [J93.83]     Problem List for this Hospitalization (present on admission):    Principal Problem:    Sepsis due to methicillin resistant Staphylococcus aureus (MRSA) ([address]  Active Problems:    Anticoagulant long-term use    Herpes simplex virus infection    Edema due to hypoalbuminemia    Volume overload    Primary hypertension    Benign prostatic hyperplasia with nocturia    Class 3 severe obesity due to excess calories with serious comorbidity and body mass index (BMI) of 40.0 to 44.9 in adult [facility])    Atrial fibrillation (HCC)    CASSY on CPAP    Type 2 diabetes mellitus with hyperglycemia, without long-term current use of insulin ([address]  Resolved Problems:    Acute respiratory failure with hypoxia (HCC)    Spontaneous pneumothorax    Primary spontaneous pneumothorax      Hospital Course:  70 y.o. male with medical history hypertension, A. fib on Pradaxa, CASSY, CPAP at night, bullous emphysema, history of bilateral subdural hematomas 2017 and diabetes mellitus presented to ED with acute onset right-sided chest pain. Patient reports he was talking to his neighbor when he developed sharp severe right-sided chest pain, associated with shortness of breath. Reports he has history of spontaneous left pneumothorax in 2017, shortly after putting on his CPAP while hospitalized for subdural hematoma. He is a former smoker, quit years ago. Denies nausea, vomiting, fever, chills or abdominal pain. Admitted with PTX. Chest tube was placed.   He had air leak after chest tube was clamped. CT surgery was consulted. He underwent VATS surgery on 1/9. Chest removed 1/14. Developed fever, BCx grew MRSA. Vancomycin started. ID consulted. Suspected from chest tube site. Negative echo EF 45-50%. Repeat cultures drawn 1-16 and are negative; spoke with lab today and they are still negative. Per ID pt will need 2 weeks vancomycin via PICC through 1-30-23. He has ID followup 2-7-23 at 2pm.    He is volume overloaded which is multifactorial from IVF, weight, lack of mobility, low albumin as result of surgery and malnutrition in hospital (previously albumin wnl). He was also getting salt tabs for hyponatremia which were stopped yesterday. Echo no CHF. Overload should improve with time as he gets back into normal dietary pattern. More mobility will help also. He already has some lasix PRN at home but will adjust so he can take 40mg PRN until edema has resolved. Discussed with them and daughter is a PA also and can help them monitor fluid status. Got some bumex here in hospital last few days. Kidney function has remained stable. It will take some time for fluid to resolve as it cannot be removed too quickly without complications. CXR shows continued atelectasis which should improve with mobility and IS usage though weight will likely make this persist to some degree. Hb dropped some perhaps from chest tube losses but has stabilized. He has bruising around the site which could be contributing also. CXR does not show evidence of bloody pleural effusion or other complication. No overt bleeding. He will monitor for bleeding at home. Recheck labs at follow up    He has been on lantus here with better DM control. Will consult DM educator to instruct on home BG monitoring and insulin use as this is new to him. Encouraged weight loss as even a 10lb reduction would help. Disposition: home health  Diet: ADULT DIET;  Regular; 4 carb choices (60 gm/meal)  Code Status: Full 82 year old Belarusian speaking F, Known to have Afib on eliquis, asthma, CAD, HFpEF, DMII, COPD presenting for AMS, diarrhea and generalized pain for 48 hours associated with fevers.   On presentation pt had 103.6 fever. EKG performed. Code STEMI called in the ED for ST and T wave changes.  Seen and evaluated by cardiology in the ED --> diffuse subendocardial ischemia likely secondary to demand ischemia. Patient was also hypotensive requireing levophed, admitted initially to ICU, now downgraded to SDU    Sepsis POA/Septic Shock due to ESBL Ecoli Bacteremia due ESBL Ecoli UTI/Pyelo - resolved   Metabolic encephalopathy s/p precedex, now resolved  COVID 19 infection, unvaccinated - asymptomatic   Lactic Acidosis - improved   -ED: febrile to 103.6, leukopenic at 3.4; hypotensive 86/46  -s/p levophed, BP now better  - ID following, Ertapenem via midline until 10/18  - midline placed, awaiting to infuse ertapenem  - daughter bedside is an RN, will do infusions at home     Uptrending renal fxn  - Scr 1.1 on admission, peaked 1.5, now downtrending   - voiding well   - daily BMP    Chronic A-fib  -c/w home Eliquis 5mg q12H   -started metoprolol 25mg q12    Asthma  COPD, no home o2  - c/w home albuterol, tiotropium     Hypokalemia - improving, can give 40 mEQ of K today   Hypomagnesemia - resolved    #Progress Note Handoff  Pending (specify): IV abx set up at home and ertapenem first infusion inpt   Family discussion: Plan of care discussed with patient and daughter at bedside, aware and agreeable   Disposition:  from home, dc home pending above     Aparna Vega MD  s. [phone] Code    Follow Ups:   Contact information for follow-up providers     Inga Jacobs MD. Schedule an appointment as soon as possible for a   visit in 1 week(s). Specialties: General Surgery, Thoracic Surgery, General Surgery  Why: Vats 1/9/23  Follow up CT site  1/26/23 @ 9:30  Contact information:  [address] Al[address] [place] MD Follow up. Specialty: Internal Medicine  Why: next week for follow up with repeat labwork. please keep a blood sugar log before meals and nightly and take to visit   with you. Contact information:  [address]  [address] [facility] [address] [place] [address]  Ajay Lynn MD Follow up on 2/7/2023. Specialty: Infectious Diseases  Why: 2pm  Contact information:  [address] [address]  [phone]                   Contact information for after-discharge care     Discharge Dialysis/Infusion     [facility] . Service: Infusion and IV Therapy  Contact information:  [address]  [address] [facility] [id number] [phone]                 Discharge [address] [facility] . Service: Home Health Services  Contact information:  [address] [facility] [address]  [address] [facility].                           Time spent in patient discharge and coordination 40 minutes. Follow up labs/diagnostics (ultimately defer to outpatient provider):  BMP, CBC, Mg, CXR    Plan was discussed with pt, wife, daughter. All questions answered. Patient was stable at time of discharge. Instructions given to call a physician or return if any concerns.     Current Discharge Medication List        START taking these medications    Details   acyclovir (ZOVIRAX) 200 MG capsule Take 2 capsules by mouth 3 times daily for 2 days  Qty: 12 capsule, Refills: 0      insulin glargine (LANTUS SOLOSTAR) 100 UNIT/ML injection pen Inject 25 Units into the skin nightly  Qty: 7.5 mL, Refills: 2      glucose monitoring (FREESTYLE FREEDOM) kit Please provide one glucometer compatible with patient's test strips  Qty: 1 kit, Refills: 0      blood glucose monitor strips by Other route See Admin Instructions Test before meals and bedtime & as needed for symptoms of irregular blood glucose. Dispense sufficient amount for indicated testing frequency plus additional to accommodate PRN testing needs. Qty: 100 strip, Refills: 1      Lancet Device MISC 1 Device by Does not apply route See Admin Instructions For use to test before meals and bedtime & as needed for symptoms of irregular blood glucose. Dispense sufficient amount for indicated testing frequency plus additional to accommodate PRN testing needs. Qty: 100 each, Refills: 1      Insulin Pen Needle (PEN NEEDLES) 31G X 5 MM MISC Please dispense pen needles for use with patient's insulin pen. Qty: 100 each, Refills: 1      methocarbamol (ROBAXIN-750) 750 MG tablet Take 1 tablet by mouth 4 times daily for 10 days  Qty: 40 tablet, Refills: 0           CONTINUE these medications which have CHANGED    Details   furosemide (LASIX) 20 MG tablet Take 2 tablets by mouth daily as needed (edema or fluid retention)  Qty: 60 tablet, Refills: 1      potassium chloride (KLOR-CON M) 10 MEQ extended release tablet Take 10meq when taking 20mg lasix.   Take 20meq when taking 40mg lasix  Qty: 60 tablet, Refills: 3           CONTINUE these medications which have NOT CHANGED    Details   hydroCHLOROthiazide (HYDRODIURIL) 25 MG tablet Take 1 tablet by mouth daily  Qty: 90 tablet, Refills: 0    Associated Diagnoses: Essential hypertension      enalapril (VASOTEC) 10 MG tablet Take 1 tablet by mouth daily TAKE 1 TABLET DAILY  Qty: 90 tablet, Refills: 3    Associated Diagnoses: Essential hypertension      spironolactone (ALDACTONE) 25 MG tablet Take 1 tablet by mouth daily  Qty: 90 tablet, Refills: 3      CPAP Machine MISC by Other route      clotrimazole-betamethasone (LOTRISONE) 1-0.05 % cream Apply to affected area bid as directed  Qty: 45 g, Refills: 0      simvastatin (ZOCOR) 40 MG tablet Take 1 tablet by mouth daily  Qty: 90 tablet, Refills: 3    Associated Diagnoses: Mixed dyslipidemia      gabapentin (NEURONTIN) 600 MG tablet Take 1 tablet by mouth 2 times daily. carvedilol (COREG) 25 MG tablet Take 25 mg by mouth 2 times daily (with meals)      cetirizine (ZYRTEC) 10 MG tablet Take by mouth daily as needed      dabigatran (PRADAXA) 150 MG capsule TAKE 1 CAPSULE EVERY 12 HOURS      metFORMIN (GLUCOPHAGE) 500 MG tablet TAKE 1 TABLET TWICE A DAY WITH A MEAL           STOP taking these medications       oxyCODONE-acetaminophen (PERCOCET) 5-325 MG per tablet Comments:   Reason for Stopping:         empagliflozin (JARDIANCE) 10 MG tablet Comments:   Reason for Stopping:         guaiFENesin (MUCINEX) 600 MG extended release tablet Comments:   Reason for Stopping:               Procedures done this admission:  Procedure(s):  RIGHT VATS/WEDGE BLEBECTOMY/RIGHT UPPER LOBE WEDGE BLEBECTOMY/RIGHT LOWER LOBE WEDGE BLEBECTOMY/TALC PLEURODESIS    Consults this admission:  IP CONSULT TO PULMONOLOGY  IP CONSULT TO THORACIC SURGERY  IP [facility]  IP CONSULT TO PHARMACY  IP CONSULT TO INFECTIOUS DISEASES  IP WOUND CARE NURSE CONSULT TO EVAL  IP CONSULT TO DIABETES MANAGEMENT    Echocardiogram results:  01/04/23    TRANSTHORACIC ECHOCARDIOGRAM (TTE) COMPLETE (CONTRAST/BUBBLE/3D PRN) 01/15/2023  2:30 PM, 01/15/2023 12:00 AM (Final)    Interpretation Summary    Left Ventricle: Very poor quality study, multiple off axis views and poor acoustic windows. Left ventricle size is normal. Normal wall thickness. LV function appears mildly reduced, unable to assess wall motion, recommend repeat echo or further cardiac imaging if clinincally indicated. The EF by visual approximation is 45 - 50%.     Left Atrium: Left atrium is dilated. Aorta: Mildly dilated aortic root. Ao root diameter is 3.6 cm. Technical qualifiers: Technically difficult study due to patient's body habitus, technically difficult study due to patient's heart rhythm, procedure performed with the patient in a supine position, color flow Doppler was performed and pulse wave and/or continuous wave Doppler was performed. Contrast used: Definity. Signed by: Trent Gonzalez MD on 1/15/2023  2:30 PM, Signed by: Unknown Provider Result on 1/15/2023 12:00 AM      Diagnostic Imaging/Tests:   XR CHEST (2 VW)    Result Date: 1/16/2023  No significant interval change. CT CHEST WO CONTRAST    Result Date: 1/4/2023  1. Persisting large right pneumothorax. 2.  Leftward deviation of mediastinal contours concerning for tension component. 3.  Partial atelectasis of right lung. 4.  Emphysema. 5.  Vascular disease. Urgent findings communicated to the ordering NP Jefferson Valle by the secure text messaging system at 6:30 PM.    XR CHEST PORTABLE    Result Date: 1/15/2023  1. Removal of the right chest tubes. No pneumothorax. 2.  Little interval change otherwise. XR CHEST PORTABLE    Result Date: 1/14/2023  -No significant interval change. No definite visible pneumothorax. XR CHEST PORTABLE    Result Date: 1/13/2023  1. Stable right chest tubes. No visible pneumothorax. 2. Persistent bibasilar atelectasis. 3. No significant change. XR CHEST PORTABLE    Result Date: 1/12/2023  No appreciable pneumothorax in the current study. XR CHEST PORTABLE    Result Date: 1/11/2023  1. Stable right chest tubes. No significant pneumothorax. 2. Right basilar atelectasis. 3. No significant change. XR CHEST PORTABLE    Result Date: 1/10/2023  1. Stable right chest tubes. No visible pneumothorax on today's study. 2. Right basilar atelectasis.     XR CHEST PORTABLE    Result Date: 1/9/2023  Placement of 2 basilar chest tubes with probable small residual right apical pneumothorax. XR CHEST PORTABLE    Result Date: 1/9/2023  1. Small right apical pneumothorax. Right chest tube is in place. 2. Right lung base opacity, likely atelectasis. 3. No significant change compared to prior. XR CHEST PORTABLE    Result Date: 1/8/2023  1. Persistent small right apical pneumothorax. Right chest tube is stable. XR CHEST PORTABLE    Result Date: 1/7/2023  1. New small right apical pneumothorax, despite presence of the right-sided chest tube. 2. Bibasilar atelectasis. XR CHEST PORTABLE    Result Date: 1/6/2023  Unchanged bibasilar lung atelectasis. XR CHEST PORTABLE    Result Date: 1/5/2023  No evidence of pneumothorax     XR CHEST PORTABLE    Result Date: 1/5/2023  1. Resolved right pneumothorax. 2. Mild bibasilar lung atelectasis. XR CHEST PORTABLE    Result Date: 1/4/2023  1. Right chest tube placed, with markedly smaller pneumothorax. 2.  Resolution of the previous tension component. 3.  Mild bibasilar atelectasis. XR CHEST PORTABLE    Result Date: 1/4/2023  1. Large right pneumothorax. 2.  Pulmonary vascular congestion.  Findings discussed with Dr. Georges Rubinstein by myself at 4:42 PM.        Labs: Results:       BMP, Mg, Phos Recent Labs     01/17/23  1151 01/18/23  0400 01/19/23 0227    137 139   K 3.2* 3.2* 3.1*    103 102   CO2 24 27 25   ANIONGAP 7 7 12*   BUN 5* 5* 6*   CREATININE 0.60* 0.60* 0.70*   LABGLOM >60 >60 >60   CALCIUM 8.6 8.3 9.5   GLUCOSE 193* 144* 167*   MG 2.3 2.1 2.0   PHOS  --  2.7  --       CBC Recent Labs     01/17/23  0142 01/18/23  0400 01/19/23 0227   WBC 11.2* 8.4 9.2   RBC 3.68* 3.49* 3.56*   HGB 12.2* 11.4* 11.5*   HCT 35.5* 32.9* 34.0*   MCV 96.5 94.3 95.5   MCH 33.2* 32.7 32.3   MCHC 34.4 34.7 33.8   RDW 12.9 13.0 13.2    269 292   MPV 10.3 9.0* 8.8*   NRBC 0.00 0.00 0.00   SEGS 86*  --   --    LYMPHOPCT 5*  --   --    EOSRELPCT 2  --   --    MONOPCT 6  --   --    BASOPCT 0  --   --    IMMGRAN 1  --   --    SEGSABS 9.7*  -- --    LYMPHSABS 0.5  --   --    EOSABS 0.2  --   --    MONOSABS 0.7  --   --    BASOSABS 0.0  --   --    ABSIMMGRAN 0.1  --   --       LFT Recent Labs     01/17/23  1151 01/18/23  0400   BILITOT 0.7  --    ALKPHOS 101  --    AST 21  --    ALT 27  --    PROT 6.2*  --    LABALBU 2.2* 2.5*   GLOB 4.0  --       Cardiac  Lab Results   Component Value Date/Time    NTPROBNP 463 01/04/2023 03:45 PM    TROPHS 4.7 01/04/2023 06:06 PM    TROPHS 3.9 01/04/2023 03:45 PM      Coags No results found for: PROTIME, INR, APTT   A1c Lab Results   Component Value Date/Time    LABA1C 7.8 07/26/2022 02:59 PM    LABA1C 7.8 01/25/2022 08:23 AM    LABA1C 6.9 07/20/2021 08:36 AM     07/26/2022 02:59 PM     01/25/2022 08:23 AM     07/20/2021 08:36 AM      Lipids Lab Results   Component Value Date/Time    CHOL 133 07/26/2022 02:59 PM    LDLCALC 66.8 07/26/2022 02:59 PM    LABVLDL 27.2 07/26/2022 02:59 PM    HDL 39 07/26/2022 02:59 PM    CHOLHDLRATIO 3.4 07/26/2022 02:59 PM    TRIG 136 07/26/2022 02:59 PM      Thyroid  No results found for: TSHELE, VLC5PWT     Most Recent UA Lab Results   Component Value Date/Time    COLORU YELLOW 01/14/2023 10:44 AM    APPEARANCE CLEAR 01/14/2023 10:44 AM    SPECGRAV >1.035 01/14/2023 10:44 AM    LABPH 5.0 01/14/2023 10:44 AM    PROTEINU 30 01/14/2023 10:44 AM    GLUCOSEU >1000 01/14/2023 10:44 AM    KETUA 40 01/14/2023 10:44 AM    BILIRUBINUR MODERATE 01/14/2023 10:44 AM    BILIRUBINUR NEGATIVE 12/03/2019 06:54 AM    BLOODU Negative 01/14/2023 10:44 AM    UROBILINOGEN 1.0 01/14/2023 10:44 AM    NITRU Positive 01/14/2023 10:44 AM    LEUKOCYTESUR TRACE 01/14/2023 10:44 AM    WBCUA 10-20 01/14/2023 10:44 AM    RBCUA 0 01/14/2023 10:44 AM    EPITHUA 3-5 01/14/2023 10:44 AM    BACTERIA TRACE 01/14/2023 10:44 AM    LABCAST 0 01/14/2023 10:44 AM    MUCUS 3+ 01/14/2023 10:44 AM        Recent Labs     01/16/23  0456 01/14/23  1044 01/14/23  0807   CULTURE NO GROWTH 3 DAYS  NO GROWTH 3 DAYS NO GROWTH 2 DAYS NO GROWTH 5 DAYS  * Methicillin Resistant Staphylococcus aureus **  Refer to Blood Culture ID Panel Accession [id number]       All Labs from Last 24 Hrs:  Recent Results (from the past 24 hour(s))   Cortisol 60 Min, Total    Collection Time: 01/18/23  9:25 AM   Result Value Ref Range    Cortisol, 60 Min 35.3 ug/dL   POCT Glucose    Collection Time: 01/18/23 11:50 AM   Result Value Ref Range    POC Glucose 259 (H) 65 - 100 mg/dL    Performed by: [employer]    POCT Glucose    Collection Time: 01/18/23  4:42 PM   Result Value Ref Range    POC Glucose 218 (H) 65 - 100 mg/dL    Performed by: [employer]    POCT Glucose    Collection Time: 01/18/23  8:43 PM   Result Value Ref Range    POC Glucose 175 (H) 65 - 100 mg/dL    Performed by: [facility]    Basic Metabolic Panel w/ Reflex to MG    Collection Time: 01/19/23  2:27 AM   Result Value Ref Range    Sodium 139 133 - 143 mmol/L    Potassium 3.1 (L) 3.5 - 5.1 mmol/L    Chloride 102 101 - 110 mmol/L    CO2 25 21 - 32 mmol/L    Anion Gap 12 (H) 2 - 11 mmol/L    Glucose 167 (H) 65 - 100 mg/dL    BUN 6 (L) 8 - 23 MG/DL    Creatinine 0.70 (L) 0.8 - 1.5 MG/DL    Est, Glom Filt Rate >60 >60 ml/min/1.73m2    Calcium 9.5 8.3 - 10.4 MG/DL   CBC    Collection Time: 01/19/23  2:27 AM   Result Value Ref Range    WBC 9.2 4.3 - 11.1 K/uL    RBC 3.56 (L) 4.23 - 5.6 M/uL    Hemoglobin 11.5 (L) 13.6 - 17.2 g/dL    Hematocrit 34.0 (L) 41.1 - 50.3 %    MCV 95.5 82 - 102 FL    MCH 32.3 26.1 - 32.9 PG    MCHC 33.8 31.4 - 35.0 g/dL    RDW 13.2 11.9 - 14.6 %    Platelets 767 928 - 211 K/uL    MPV 8.8 (L) 9.4 - 12.3 FL    nRBC 0.00 0.0 - 0.2 K/uL   Magnesium    Collection Time: 01/19/23  2:27 AM   Result Value Ref Range    Magnesium 2.0 1.8 - 2.4 mg/dL   POCT Glucose    Collection Time: 01/19/23  7:41 AM   Result Value Ref Range    POC Glucose 165 (H) 65 - 100 mg/dL    Performed by: Brijesh        Allergies   Allergen Reactions    Azithromycin Other (See Comments)     Skin dryness/peeling     Immunization History   Administered Date(s) Administered    COVID-19, PFIZER GRAY top, DO NOT Dilute, (age 15 y+), IM, 30 mcg/0.3 mL 08/04/2022    COVID-19, PFIZER PURPLE top, DILUTE for use, (age 15 y+), 30mcg/0.3mL 01/20/2021, 02/16/2021, 10/05/2021    Influenza Trivalent 11/07/2013    Influenza Virus Vaccine 10/05/2017    Influenza, FLUAD, (age 72 y+), Adjuvanted, 0.5mL 09/11/2022    Influenza, FLUARIX, FLULAVAL, FLUZONE (age 10 mo+) AND AFLURIA, (age 1 y+), PF, 0.5mL 10/05/2017    Influenza, High Dose (Fluzone 65 yrs and older) 10/05/2016, 10/05/2018, 10/20/2020, 10/01/2021    Influenza, Intradermal, Preservative free 09/29/2015    Influenza, Triv, inactivated, subunit, adjuvanted, IM (Fluad 65 yrs and older) 10/02/2019    Pneumococcal Conjugate 13-valent ([id number]) 09/28/2018    Pneumococcal Polysaccharide ([id number]) 10/02/2019    Td vaccine (adult) 09/29/2015    Tdap (Boostrix, Adacel) 03/07/2015, 09/29/2015    Zoster Recombinant (Shingrix) 06/13/2019, 08/17/2019       Recent Vital Data:  Patient Vitals for the past 24 hrs:   Temp Pulse Resp BP SpO2   01/19/23 0738 97.7 °F (36.5 °C) 94 18 (!) 142/77 95 %   01/19/23 0436 97.9 °F (36.6 °C) 87 17 139/75 93 %   01/19/23 0111 98.1 °F (36.7 °C) 87 18 139/69 93 %   01/18/23 2059 -- -- 18 -- --   01/18/23 2043 -- (!) 108 -- -- --   01/18/23 2038 97.9 °F (36.6 °C) -- 16 (!) 138/90 96 %   01/18/23 2029 -- -- 18 -- --   01/18/23 1833 98.4 °F (36.9 °C) 93 20 131/67 96 %   01/18/23 1710 -- 77 -- (!) 152/88 --   01/18/23 1247 98.1 °F (36.7 °C) 94 20 (!) 156/71 95 %       Oxygen Therapy  SpO2: 95 %  Pulse Oximetry Type:  Intermittent  Pulse via Oximetry: 82 beats per minute  Pulse Oximeter Device Mode: Intermittent  Pulse Oximeter Device Location: Finger  O2 Device: None (Room air)  Skin Assessment: Clean, dry, & intact  O2 Flow Rate (L/min): 2 L/min    Estimated body mass index is 43.29 kg/m² as calculated from the following:    Height as of this encounter: 6' 2\" (1.88 m). Weight as of this encounter: 337 lb 3.2 oz (153 kg). Intake/Output Summary (Last 24 hours) at 1/19/2023 0905  Last data filed at 1/19/2023 6132  Gross per 24 hour   Intake 1200 ml   Output 1325 ml   Net -125 ml         Physical Exam:    General:    Well nourished. No overt distress  Head:  Normocephalic, atraumatic  Eyes:  Sclerae appear normal.  Pupils equally round. HENT:  Nares appear normal, no drainage. Moist mucous membranes  Neck:  No restricted ROM. CV:   RRR. No JVD  Lungs:   Respirations even, unlabored  Abdomen:   Nondistended. Extremities: No cyanosis or clubbing. 2+ BLE edema. Skin:     No rashes. Normal coloration  Neuro:  CN II-XII grossly intact. Psych:  Normal mood and affect. Signed:  Wesley White MD    Part of this note may have been written by using a voice dictation software. The note has been proof read but may still contain some grammatical/other typographical errors.

## 2023-04-19 ENCOUNTER — APPOINTMENT (OUTPATIENT)
Dept: ELECTROPHYSIOLOGY | Facility: CLINIC | Age: 83
End: 2023-04-19

## 2023-05-02 NOTE — ED ADULT NURSE NOTE - SUICIDE SCREENING QUESTION 3
Quality 226: Preventive Care And Screening: Tobacco Use: Screening And Cessation Intervention: Patient screened for tobacco use and is an ex/non-smoker Quality 431: Preventive Care And Screening: Unhealthy Alcohol Use - Screening: Patient not identified as an unhealthy alcohol user when screened for unhealthy alcohol use using a systematic screening method Quality 110: Preventive Care And Screening: Influenza Immunization: Influenza immunization was not ordered or administered, reason not given Detail Level: Detailed Patient unable to complete

## 2023-05-03 ENCOUNTER — NON-APPOINTMENT (OUTPATIENT)
Age: 83
End: 2023-05-03

## 2023-05-03 ENCOUNTER — APPOINTMENT (OUTPATIENT)
Dept: CARDIOLOGY | Facility: CLINIC | Age: 83
End: 2023-05-03
Payer: MEDICARE

## 2023-05-03 VITALS
DIASTOLIC BLOOD PRESSURE: 60 MMHG | SYSTOLIC BLOOD PRESSURE: 105 MMHG | HEART RATE: 60 BPM | RESPIRATION RATE: 18 BRPM | TEMPERATURE: 96 F | OXYGEN SATURATION: 93 % | BODY MASS INDEX: 31.41 KG/M2 | WEIGHT: 160 LBS | HEIGHT: 60 IN

## 2023-05-03 DIAGNOSIS — I10 ESSENTIAL (PRIMARY) HYPERTENSION: ICD-10-CM

## 2023-05-03 DIAGNOSIS — Z78.9 OTHER SPECIFIED HEALTH STATUS: ICD-10-CM

## 2023-05-03 PROCEDURE — 93000 ELECTROCARDIOGRAM COMPLETE: CPT

## 2023-05-03 PROCEDURE — 99204 OFFICE O/P NEW MOD 45 MIN: CPT

## 2023-05-03 RX ORDER — QUETIAPINE 25 MG/1
25 TABLET, FILM COATED ORAL TWICE DAILY
Refills: 0 | Status: ACTIVE | COMMUNITY

## 2023-05-03 RX ORDER — APIXABAN 5 MG/1
5 TABLET, FILM COATED ORAL DAILY
Refills: 0 | Status: DISCONTINUED | COMMUNITY
End: 2023-05-03

## 2023-05-03 RX ORDER — TAMSULOSIN HCL 0.4 MG
0.4 CAPSULE ORAL DAILY
Refills: 0 | Status: ACTIVE | COMMUNITY

## 2023-05-03 RX ORDER — FUROSEMIDE 40 MG/1
40 TABLET ORAL TWICE DAILY
Refills: 0 | Status: ACTIVE | COMMUNITY

## 2023-05-03 RX ORDER — METOLAZONE 5 MG/1
5 TABLET ORAL DAILY
Refills: 0 | Status: ACTIVE | COMMUNITY

## 2023-05-03 RX ORDER — APIXABAN 5 MG/1
5 TABLET, FILM COATED ORAL TWICE DAILY
Refills: 0 | Status: ACTIVE | COMMUNITY

## 2023-05-03 RX ORDER — METOPROLOL TARTRATE 100 MG/1
100 TABLET, FILM COATED ORAL TWICE DAILY
Refills: 0 | Status: DISCONTINUED | COMMUNITY
End: 2023-05-03

## 2023-05-03 RX ORDER — METOPROLOL SUCCINATE 100 MG/1
100 TABLET, EXTENDED RELEASE ORAL TWICE DAILY
Refills: 0 | Status: ACTIVE | COMMUNITY

## 2023-05-03 RX ORDER — FUROSEMIDE 40 MG/1
40 TABLET ORAL DAILY
Refills: 0 | Status: DISCONTINUED | COMMUNITY
End: 2023-05-03

## 2023-05-03 RX ORDER — NITROGLYCERIN 20 MG/1
0.1 PATCH TRANSDERMAL DAILY
Qty: 30 | Refills: 3 | Status: ACTIVE | COMMUNITY
Start: 2023-05-03 | End: 1900-01-01

## 2023-05-03 RX ORDER — IPRATROPIUM BROMIDE AND ALBUTEROL SULFATE .5; 3 MG/3ML; MG/3ML
2.5-0.5 SOLUTION RESPIRATORY (INHALATION)
Refills: 0 | Status: ACTIVE | COMMUNITY

## 2023-05-03 RX ORDER — LEVALBUTEROL HYDROCHLORIDE 1.25 MG/3ML
1.25 SOLUTION RESPIRATORY (INHALATION)
Refills: 0 | Status: ACTIVE | COMMUNITY

## 2023-05-03 RX ORDER — ICOSAPENT ETHYL 1000 MG/1
1 CAPSULE ORAL TWICE DAILY
Refills: 0 | Status: ACTIVE | COMMUNITY

## 2023-05-03 RX ORDER — MAGNESIUM OXIDE 241.3 MG/1000MG
400 TABLET ORAL EVERY 8 HOURS
Refills: 0 | Status: ACTIVE | COMMUNITY

## 2023-05-03 NOTE — PHYSICAL EXAM
[No Acute Distress] : no acute distress [Frail] : frail [Ill-Appearing] : ill-appearing [Normal Conjunctiva] : normal conjunctiva [Normal Venous Pressure] : normal venous pressure [No Carotid Bruit] : no carotid bruit [Normal S1, S2] : normal S1, S2 [No Rub] : no rub [No Gallop] : no gallop [Clear Lung Fields] : clear lung fields [Good Air Entry] : good air entry [No Respiratory Distress] : no respiratory distress  [Soft] : abdomen soft [Non Tender] : non-tender [No Masses/organomegaly] : no masses/organomegaly [Normal Bowel Sounds] : normal bowel sounds [Abnormal Gait] : abnormal gait [No Cyanosis] : no cyanosis [No Clubbing] : no clubbing [No Varicosities] : no varicosities [No Rash] : no rash [No Skin Lesions] : no skin lesions [Moves all extremities] : moves all extremities [No Focal Deficits] : no focal deficits [Normal Speech] : normal speech [Alert and Oriented] : alert and oriented [Normal memory] : normal memory [de-identified] : NC/AT [de-identified] : 2/6 systolic murmur [de-identified] : trace edema [de-identified] : uses waker [de-identified] : trace edema

## 2023-05-03 NOTE — REVIEW OF SYSTEMS
[Feeling Fatigued] : feeling fatigued [Blurry Vision] : blurred vision [SOB] : shortness of breath [Dyspnea on exertion] : dyspnea during exertion [Chest Discomfort] : chest discomfort [Lower Ext Edema] : lower extremity edema [Abdominal Pain] : no abdominal pain [Nausea] : nausea [Vomiting] : no vomiting [Heartburn] : heartburn [Change In The Stool] : no change in stool [Dysuria] : no dysuria [Joint Pain] : joint pain [Dizziness] : dizziness [Numbness (Hypoesthesia)] : no numbness [Weakness] : weakness [Speech Disturbance] : no speech disturbance [Negative] : Heme/Lymph [FreeTextEntry9] : uses walker

## 2023-05-03 NOTE — HISTORY OF PRESENT ILLNESS
[FreeTextEntry1] : 84 y/o female with history of HTN, DL, DM, COPD/asthma, PAF (CHADS-VASC 7), CHB, s/p PPM, arthritis, CRI, TIA, CHF (HFpEF), hospitalized a few months ago for CHF, UTI/urosepsis, possible ischemic changes on ECG. Presents to establish care. Patient was seen in the hospital by Dr. Tello and Roseline, but would like to switch to our group. Was seen by Dr. Junior for EP. Reports episodes of chest discomfort and dyspnea. No syncope. No edema with current dose of Lasix and metolazone. BP is controlled.

## 2023-05-03 NOTE — ASSESSMENT
[FreeTextEntry1] : 84 y/o female with history as above\par Multiple medical problems.\par \par Plan:\par \par CAD\par Anginal symptoms\par Option fo w/u discussed. patient is not interested in ischemic w/u\par Reports she would not agree for cath/PCI even if abnormal stress test.\par Will treat medically\par Add nitroglycerin patch. C/w BB\par Secondary prevention.\par \par CHF\par Obtain echo\par Valvular heart disease - mild to moderate\par HFpEF\par C/w Lasix and metolazone. C/w Entresto\par \par AF\par High CHADS-VASC score\par C/w BB, c/w anticoagulation\par \par CHB\par S/p PPM\par EP follow-up\par \par DM, lipid control\par F/u in 3 months.\par

## 2023-06-14 ENCOUNTER — APPOINTMENT (OUTPATIENT)
Dept: CARDIOLOGY | Facility: CLINIC | Age: 83
End: 2023-06-14
Payer: MEDICARE

## 2023-06-14 PROCEDURE — 93306 TTE W/DOPPLER COMPLETE: CPT

## 2023-06-14 PROCEDURE — 99213 OFFICE O/P EST LOW 20 MIN: CPT | Mod: 25

## 2023-06-14 NOTE — ASSESSMENT
[FreeTextEntry1] : 82 y/o female with history as above\par Multiple medical problems.\par \par Plan:\par \par CAD\par Anginal symptoms\par Patient is not interested in ischemic w/u\par Reports she would not agree for cath/PCI even if abnormal stress test.\par Will treat medically\par C/w nitroglycerin patch. C/w BB\par Secondary prevention.\par \par CHF\par 2D echo reviewed, discussed with the patient\par Valvular heart disease - mild to moderate\par HFpEF\par C/w Lasix and metolazone. C/w Entresto\par \par AF\par High CHADS-VASC score\par C/w BB, c/w anticoagulation\par \par CHB\par S/p PPM\par EP follow-up\par \par DM, lipid control\par F/u in 3 months.\par

## 2023-06-14 NOTE — HISTORY OF PRESENT ILLNESS
[FreeTextEntry1] : 82 y/o female with history of HTN, DL, DM, COPD/asthma, PAF (CHADS-VASC 7), CHB, s/p PPM, arthritis, CRI, TIA, CHF (HFpEF), hospitalized a few months ago for CHF, UTI/urosepsis, possible ischemic changes on ECG. Presents for f/u. Patient was seen in the hospital by Dr. Tello and Roseline, but would like to switch to our group. Was seen by Dr. Junior for EP. Reports episodes of chest discomfort and dyspnea. No syncope. No edema with current dose of Lasix and metolazone. BP is controlled. Echo today - LVH, preserved LV EF.  normal for race

## 2023-06-14 NOTE — PHYSICAL EXAM
[No Acute Distress] : no acute distress [Frail] : frail [Ill-Appearing] : ill-appearing [Normal Conjunctiva] : normal conjunctiva [Normal Venous Pressure] : normal venous pressure [No Carotid Bruit] : no carotid bruit [Normal S1, S2] : normal S1, S2 [No Rub] : no rub [No Gallop] : no gallop [Clear Lung Fields] : clear lung fields [Good Air Entry] : good air entry [No Respiratory Distress] : no respiratory distress  [Soft] : abdomen soft [Non Tender] : non-tender [No Masses/organomegaly] : no masses/organomegaly [Normal Bowel Sounds] : normal bowel sounds [Abnormal Gait] : abnormal gait [No Cyanosis] : no cyanosis [No Clubbing] : no clubbing [No Varicosities] : no varicosities [No Rash] : no rash [No Skin Lesions] : no skin lesions [Moves all extremities] : moves all extremities [No Focal Deficits] : no focal deficits [Normal Speech] : normal speech [Alert and Oriented] : alert and oriented [Normal memory] : normal memory [de-identified] : NC/AT [de-identified] : 2/6 systolic murmur [de-identified] : trace edema [de-identified] : uses waker [de-identified] : trace edema

## 2023-09-13 ENCOUNTER — APPOINTMENT (OUTPATIENT)
Dept: CARDIOLOGY | Facility: CLINIC | Age: 83
End: 2023-09-13
Payer: MEDICARE

## 2023-09-13 VITALS — RESPIRATION RATE: 16 BRPM | HEART RATE: 56 BPM | SYSTOLIC BLOOD PRESSURE: 98 MMHG | DIASTOLIC BLOOD PRESSURE: 60 MMHG

## 2023-09-13 PROCEDURE — 93000 ELECTROCARDIOGRAM COMPLETE: CPT

## 2023-09-13 PROCEDURE — 99214 OFFICE O/P EST MOD 30 MIN: CPT

## 2023-09-13 RX ORDER — SACUBITRIL AND VALSARTAN 24; 26 MG/1; MG/1
24-26 TABLET, FILM COATED ORAL TWICE DAILY
Qty: 180 | Refills: 3 | Status: ACTIVE | COMMUNITY
Start: 1900-01-01 | End: 1900-01-01

## 2023-11-29 NOTE — ED PROVIDER NOTE - DISPOSITION TYPE
Time: 12:32 AM EST  Date of encounter:  2023  Independent Historian/Clinical History and Information was obtained by:   Patient    History is limited by: N/A    Chief Complaint: Abdominal pain      History of Present Illness:  Patient is a 43 y.o. year old female who presents to the emergency department for evaluation of abdominal pain with associated nausea/vomiting that began yesterday.  Patient states she has been constipated since last Wednesday approximately 6 days ago.  Patient states she used an enema last night with some relief.  Patient denies fever, dysuria, flank pain.  Patient denies chest pain or shortness of air.      HPI    Patient Care Team  Primary Care Provider: Patricia Hernandez APRN    Past Medical History:     Allergies   Allergen Reactions    Morphine Itching and Nausea And Vomiting     Past Medical History:   Diagnosis Date    Acid reflux     Anxiety     Anxiety and depression     Benign essential hypertension     Cancer     renal cancer/mass, PARTIAL NEPH, RIGHT    Diabetes     Diabetes mellitus     type ii, doesn't check bg at home     Diabetes mellitus, type 2     Frozen shoulder 2023    GERD (gastroesophageal reflux disease)     High triglycerides     History of bariatric surgery 2021    GASTRIC SLEEVE    History of kidney stones     HTN (hypertension)     Hyperlipemia     Hyperlipidemia     Hypertriglyceridemia     Lumbar herniated disc     Obesity     Panic attacks     PCOS (polycystic ovarian syndrome)     Periarthritis of shoulder     Psoriasis     ELBOWS    Rotator cuff syndrome     Seasonal allergies     Spinal headache     AFTER PAIN EPIDURALS.  NO BLOOD PATCH     Past Surgical History:   Procedure Laterality Date    ADENOIDECTOMY  1984     SECTION  ,    CYSTOSCOPY BLADDER STONE LITHOTRIPSY      EAR TUBES  1984    ENDOSCOPY N/A 10/20/2020    Procedure: ESOPHAGOGASTRODUODENOSCOPY WITH BIOPSY;  Surgeon: Fidel Grajeda Jr., MD;   Location:  [facility];  Service: General;  Laterality: N/A;  PRE- GERD  POST- RETAINED FOOD, GASTRITIS, GASTRIC POLYPS    ENDOSCOPY  2014    FOOT FRACTURE SURGERY Left 2017    screw placed    GASTRIC SLEEVE LAPAROSCOPIC N/A 12/07/2020    Procedure: GASTRIC SLEEVE LAPAROSCOPIC;  Surgeon: Fidel Grajeda Jr., MD;  Location:  [facility];  Service: Bariatric;  Laterality: N/A;    NEPHRECTOMY PARTIAL Right 11/15/2021    Procedure: NEPHRECTOMY PARTIAL LAPAROSCOPIC WITH DAVINCI ROBOT;  Surgeon: Lori Troy MD;  Location: [facility] MA[facility] OR;  Service: Robotics - DaVinci;  Laterality: Right;    SHOULDER ARTHROSCOPY Left 8/14/2023    Procedure: LEFT SHOULDER MANIPULATION;  Surgeon: Domenic Bradley MD;  Location: [facility];  Service: Orthopedics;  Laterality: Left;    TONSILLECTOMY  1984    URETEROSCOPY LASER LITHOTRIPSY WITH STENT INSERTION Right 09/28/2021    Procedure: CYSTOSCOPY, RIGHT URETEROSCOPY, LASERTRIPSY, STONE BASKET EXTRACTION AND STENT INSERTION;  Surgeon: Lori Troy MD;  Location: [facility];  Service: Urology;  Laterality: Right;    URETEROSCOPY LASER LITHOTRIPSY WITH STENT INSERTION Left 11/08/2022    Procedure: URETEROSCOPY LASER LITHOTRIPSY WITH URETERAL STENT INSERTION;  Surgeon: Lori Troy MD;  Location: [facility] MAIN OR;  Service: Urology;  Laterality: Left;     Family History   Problem Relation Age of Onset    Diabetes Father     Hypertension Father     Heart disease Father     Cancer Father         Bladder prostate liver    Colon cancer Father         malignant, currently 62    Stroke Maternal Grandmother     Heart disease Maternal Grandmother     Diabetes Paternal Grandfather     Heart disease Paternal Grandfather     Cancer Mother         Breast    Malig Hyperthermia Neg Hx        Home Medications:  Prior to Admission medications    Medication Sig Start Date End Date Taking? Authorizing Provider   Apremilast (Otezla) 30 MG tablet Take 30 mg by mouth 2 (Two) Times a  Day. 8/28/23      Biotin 57009 MCG tablet Take 1 tablet by mouth Every Night.    Heron Campuzano MD   buPROPion XL (WELLBUTRIN XL) 300 MG 24 hr tablet Take 1 tablet by mouth Daily. 11/3/23   Patricia Hernandez APRN   CALCIUM-MAGNESIUM-ZINC PO Take 3 tablets by mouth Daily.    Heron Campuzano MD   cetirizine (zyrTEC) 10 MG tablet Take 1 tablet by mouth Daily As Needed. 5/31/22   Heron Campuzano MD   Cholecalciferol 25 MCG (1000 UT) capsule Take 2 capsules by mouth Daily. 11/3/23   Patricia Hernandez APRN   clobetasol (TEMOVATE) 0.05 % external solution Apply 10-15 drops every day to affected areas in the scalp after shampooing until clear.  Patient taking differently: Apply  topically to the appropriate area as directed Daily As Needed. 8/16/22      COLLAGEN PO Take 3 tablets by mouth Daily.    Heron Campuzano MD   Continuous Blood Gluc Sensor (FreeStyle Ling 14 Day Sensor) misc 1 each Every 14 (Fourteen) Days. 2/15/23   Patricia Hernandez APRN   Diclofenac Sodium (VOLTAREN) 1 % gel gel Apply 4 g topically to the appropriate area as directed 4 (Four) Times a Day As Needed (pain). 6/12/23   Nicolasa Meade PA-C   fluticasone (FLONASE) 50 MCG/ACT nasal spray USE 2 SPRAYS IN EACH NOSTRIL ONCE DAILY AS DIRECTED  Patient taking differently: 2 sprays into the nostril(s) as directed by provider At Night As Needed. 5/19/23   Patricia Hernandez APRN   IRON-VITAMIN C PO Take 1 tablet by mouth Daily.    Heron Campuzano MD   metoprolol succinate XL (Toprol XL) 25 MG 24 hr tablet Take 1 tablet by mouth Every Night. 11/3/23   Patricia Hernandez APRN   Multiple Vitamins-Minerals (MULTIVITAMIN PO) Take 1 tablet by mouth Every Night.    Heron Campuzano MD   ondansetron ODT (ZOFRAN-ODT) 4 MG disintegrating tablet Place 1 tablet on the tongue Every 8 (Eight) Hours As Needed for Nausea. 11/4/22   Colasanti, Chrysalis Breann, APRN   pravastatin (PRAVACHOL) 10 MG tablet  Take 1 tablet by mouth Every Night. 11/3/23   Patricia Hernandez APRN   Probiotic Product (PROBIOTIC-10 PO) Take 1 tablet by mouth Every Night.    Provider, MD Heron   promethazine (PHENERGAN) 25 MG suppository Insert 1 suppository into the rectum Every 6 (Six) Hours As Needed for Nausea. 11/9/22   Lori Troy MD   Semaglutide,0.25 or 0.5MG/DOS, (Ozempic, 0.25 or 0.5 MG/DOSE,) 2 MG/3ML solution pen-injector Inject 0.5 mg under the skin into the appropriate area as directed 1 (One) Time Per Week. 11/3/23   Patricia Hernandez APRN   topiramate (TOPAMAX) 50 MG tablet Take 1 tablet by mouth every night at bedtime for 180 days. 11/3/23 5/1/24  Patricia Hernandez APRN   traZODone (DESYREL) 100 MG tablet Take 1 tablet by mouth Every Night. 11/3/23   Patricia Hernandez APRN        Social History:   Social History     Tobacco Use    Smoking status: Former     Packs/day: 0.25     Years: 35.00     Additional pack years: 0.00     Total pack years: 8.75     Types: Cigarettes     Quit date: 1/15/2020     Years since quitting: 3.8    Smokeless tobacco: Never    Tobacco comments:     A PACK A WEEK   Vaping Use    Vaping Use: Never used   Substance Use Topics    Alcohol use: Not Currently     Comment: Very rarely    Drug use: Not Currently     Frequency: 0.1 times per week     Types: Marijuana     Comment: Very rarely in my youth         Review of Systems:  Review of Systems   Constitutional:  Negative for chills and fever.   HENT:  Negative for congestion, rhinorrhea and sore throat.    Eyes:  Negative for pain and visual disturbance.   Respiratory:  Negative for apnea, cough, chest tightness and shortness of breath.    Cardiovascular:  Negative for chest pain and palpitations.   Gastrointestinal:  Positive for abdominal pain, constipation, nausea and vomiting. Negative for diarrhea.   Genitourinary:  Negative for difficulty urinating and dysuria.   Musculoskeletal:  Negative for joint  "swelling and myalgias.   Skin:  Negative for color change.   Neurological:  Negative for seizures and headaches.   Psychiatric/Behavioral: Negative.     All other systems reviewed and are negative.       Physical Exam:  /58   Pulse 61   Temp 97.8 °F (36.6 °C) (Oral)   Resp 20   Ht 170.2 cm (67\")   Wt 86.1 kg (189 lb 13.1 oz)   LMP 10/28/2023 (Exact Date) Comment: RECEIVED DEPO SHOT IMMEDIATELY AFTER.  SpO2 100%   BMI 29.73 kg/m²     Physical Exam  Vitals and nursing note reviewed.   Constitutional:       General: She is not in acute distress.     Appearance: Normal appearance. She is not toxic-appearing.   HENT:      Head: Normocephalic and atraumatic.      Jaw: There is normal jaw occlusion.   Eyes:      General: Lids are normal.      Extraocular Movements: Extraocular movements intact.      Conjunctiva/sclera: Conjunctivae normal.      Pupils: Pupils are equal, round, and reactive to light.   Cardiovascular:      Rate and Rhythm: Normal rate and regular rhythm.      Pulses: Normal pulses.      Heart sounds: Normal heart sounds.   Pulmonary:      Effort: Pulmonary effort is normal. No respiratory distress.      Breath sounds: Normal breath sounds. No wheezing or rhonchi.   Abdominal:      General: Abdomen is flat.      Palpations: Abdomen is soft.      Tenderness: There is generalized abdominal tenderness. There is no guarding or rebound.   Musculoskeletal:         General: Normal range of motion.      Cervical back: Normal range of motion and neck supple.      Right lower leg: No edema.      Left lower leg: No edema.   Skin:     General: Skin is warm and dry.   Neurological:      Mental Status: She is alert and oriented to person, place, and time. Mental status is at baseline.   Psychiatric:         Mood and Affect: Mood normal.                  Procedures:  Procedures      Medical Decision Making:      Comorbidities that affect care:    Diabetes, hypertension    External Notes reviewed:          The " following orders were placed and all results were independently analyzed by me:  Orders Placed This Encounter   Procedures    CT Abdomen Pelvis With Contrast    [place] Draw    Comprehensive Metabolic Panel    Lipase    Urinalysis With Microscopic If Indicated (No Culture) - Urine, Clean Catch    hCG, Quantitative, Pregnancy    CBC Auto Differential    Urinalysis, Microscopic Only - Urine, Clean Catch    Ambulatory Referral to Gastroenterology    NPO Diet NPO Type: Strict NPO    Undress & Gown    Insert Peripheral IV    CBC & Differential    Green Top (Gel)    Lavender Top    Gold Top - SST    Light Blue Top       Medications Given in the Emergency Department:  Medications   sodium chloride 0.9 % flush 10 mL (has no administration in time range)   sodium chloride 0.9 % bolus 1,000 mL (0 mL Intravenous Stopped 11/28/23 2251)   ondansetron (ZOFRAN) injection 4 mg (4 mg Intravenous Given 11/28/23 2135)   iopamidol (ISOVUE-370) 76 % injection 100 mL (100 mL Intravenous Given 11/28/23 2211)        ED Course:         Labs:    Lab Results (last 24 hours)       Procedure Component Value Units Date/Time    POC Urinalysis Dipstick, Multipro (Automated Dipstick) [[id number]]  (Abnormal) Resulted: 11/28/23 1602    Specimen: Urine Updated: 11/28/23 1603     Color Dark Yellow     Clarity, UA Clear     Glucose, UA Negative mg/dL      Bilirubin Negative     Ketones, UA Trace     Specific Gravity  1.025     Blood, UA Negative     pH, Urine 6.0     Protein, POC Negative mg/dL      Urobilinogen, UA 1 E.U./dL     Nitrite, UA Negative     Leukocytes Negative    POC Pregnancy, Urine [[id number]]  (Normal) Resulted: 11/28/23 1604    Specimen: Urine Updated: 11/28/23 1605     HCG, Urine, QL Negative     Lot Number #[id number]     Internal Positive Control Passed     Internal Negative Control Passed     Expiration Date 10,325    CBC & Differential [[id number]]  (Abnormal) Collected: 11/28/23 1836    Specimen: Blood from Arm, Right Updated:  11/28/23 1849    Narrative:      The following orders were created for panel order CBC & Differential.  Procedure                               Abnormality         Status                     ---------                               -----------         ------                     CBC Auto Differential[[id number]]        Abnormal            Final result                 Please view results for these tests on the individual orders.    Comprehensive Metabolic Panel [[id number]]  (Abnormal) Collected: 11/28/23 1836    Specimen: Blood from Arm, Right Updated: 11/28/23 1910     Glucose 77 mg/dL      BUN 12 mg/dL      Creatinine 1.17 mg/dL      Sodium 138 mmol/L      Potassium 3.7 mmol/L      Chloride 104 mmol/L      CO2 22.1 mmol/L      Calcium 9.3 mg/dL      Total Protein 6.7 g/dL      Albumin 4.1 g/dL      ALT (SGPT) 23 U/L      AST (SGOT) 23 U/L      Alkaline Phosphatase 85 U/L      Total Bilirubin 0.3 mg/dL      Globulin 2.6 gm/dL      A/G Ratio 1.6 g/dL      BUN/Creatinine Ratio 10.3     Anion Gap 11.9 mmol/L      eGFR 59.1 mL/min/1.73     Narrative:      GFR Normal >60  Chronic Kidney Disease <60  Kidney Failure <15      Lipase [[id number]]  (Abnormal) Collected: 11/28/23 1836    Specimen: Blood from Arm, Right Updated: 11/28/23 1910     Lipase 70 U/L     hCG, Quantitative, Pregnancy [[id number]] Collected: 11/28/23 1836    Specimen: Blood from Arm, Right Updated: 11/28/23 1907     HCG Quantitative <0.50 mIU/mL     Narrative:      HCG Ranges by Gestational Age    Females - non-pregnant premenopausal   </= 1mIU/mL HCG  Females - postmenopausal               </= 7mIU/mL HCG    3 Weeks       5.4   -      72 mIU/mL  4 Weeks      10.2   -     708 mIU/mL  5 Weeks       217   -   8,245 mIU/mL  6 Weeks       152   -  32,177 mIU/mL  7 Weeks     4,059   - 153,767 mIU/mL  8 Weeks    31,366   - 149,094 mIU/mL  9 Weeks    59,109   - 135,901 mIU/mL  10 Weeks   44,186   - 170,409 mIU/mL  12 Weeks   27,107   - 201,615 mIU/mL  14 Weeks    24,302   -  93,646 mIU/mL  15 Weeks   12,540   -  69,747 mIU/mL  16 Weeks    8,904   -  55,332 mIU/mL  17 Weeks    8,240   -  51,793 mIU/mL  18 Weeks    9,649   -  55,271 mIU/mL      CBC Auto Differential [[id number]]  (Abnormal) Collected: 11/28/23 1836    Specimen: Blood from Arm, Right Updated: 11/28/23 1849     WBC 9.83 10*3/mm3      RBC 4.59 10*6/mm3      Hemoglobin 13.0 g/dL      Hematocrit 40.0 %      MCV 87.1 fL      MCH 28.3 pg      MCHC 32.5 g/dL      RDW 13.5 %      RDW-SD 42.8 fl      MPV 9.7 fL      Platelets 227 10*3/mm3      Neutrophil % 60.3 %      Lymphocyte % 30.1 %      Monocyte % 7.1 %      Eosinophil % 1.1 %      Basophil % 0.4 %      Immature Grans % 1.0 %      Neutrophils, Absolute 5.92 10*3/mm3      Lymphocytes, Absolute 2.96 10*3/mm3      Monocytes, Absolute 0.70 10*3/mm3      Eosinophils, Absolute 0.11 10*3/mm3      Basophils, Absolute 0.04 10*3/mm3      Immature Grans, Absolute 0.10 10*3/mm3      nRBC 0.0 /100 WBC     Urinalysis With Microscopic If Indicated (No Culture) - Urine, Clean Catch [[id number]]  (Abnormal) Collected: 11/28/23 1842    Specimen: Urine, Clean Catch Updated: 11/28/23 1910     Color, UA Dark Yellow     Appearance, UA Cloudy     pH, UA 5.5     Specific Gravity, UA 1.023     Glucose, UA Negative     Ketones, UA 15 mg/dL (1+)     Bilirubin, UA Negative     Blood, UA Negative     Protein, UA Negative     Leuk Esterase, UA Trace     Nitrite, UA Negative     Urobilinogen, UA 1.0 E.U./dL    Urinalysis, Microscopic Only - Urine, Clean Catch [[id number]]  (Abnormal) Collected: 11/28/23 1842    Specimen: Urine, Clean Catch Updated: 11/28/23 1953     RBC, UA 3-5 /HPF      WBC, UA 0-2 /HPF      Bacteria, UA None Seen /HPF      Squamous Epithelial Cells, UA 0-2 /HPF      Hyaline Casts, UA None Seen /LPF      Calcium Oxalate Crystals, UA Moderate/2+ /HPF      Methodology Manual Light Microscopy             Imaging:    CT Abdomen Pelvis With Contrast    Result Date:  11/29/2023  PROCEDURE: CT ABDOMEN PELVIS W CONTRAST  COMPARISONS: 7/17/2023; 11/12/2022.  INDICATIONS: abd. pain, left-sided; h/o right renal cell ca  TECHNIQUE: After obtaining the patient's consent, 923 CT images were created with non-ionic intravenous contrast material.  No oral contrast agent was administered for the study.  PROTOCOL:   Standard CT imaging protocol performed.    RADIATION:   Total DLP: 1,335 mGy*cm.   Automated exposure control was utilized to minimize radiation dose. CONTRAST: 100 mL Isovue 370 I.V.  FINDINGS:  No acute findings are seen.  There is no convincing CT evidence for residual recurrent tumor.  No enlarged lymph nodes are seen.  There is incidental nonobstructing left nephrolithiasis.  No ureterolithiasis.  No hydronephrosis.  There is an incidental 3.4 cm right adnexal cyst.  It probably represents a normal functional right ovarian cyst.  There are severe degenerative changes of the imaged spine.  The patient has undergone sleeve gastrectomy, partial right nephrectomy, and appendectomy.  There is a stable noncalcified non-cavitating 1.5 cm subpleural anterolateral, inferior right middle lobe pulmonary nodule, as seen on image 29 of series 204 and adjacent images.  No significant interval change since prior abdominal/pelvic CT studies.        No acute findings are appreciated.    Please note that portions of this note were completed with a voice recognition program.  MARIE SAUNDERS JR, MD       Electronically Signed and Approved By: MARIE SAUNDERS JR, MD on 11/29/2023 at 0:19                 Differential Diagnosis and Discussion:    Abdominal Pain: Based on the patient's signs and symptoms, I considered abdominal aortic aneurysm, small bowel obstruction, pancreatitis, acute cholecystitis, acute appendecitis, peptic ulcer disease, gastritis, colitis, endocrine disorders, irritable bowel syndrome and other differential diagnosis an etiology of the patient's abdominal pain.  Vomiting:  Differential diagnosis includes but is not limited to migraine, labyrinthine disorders, psychogenic, metabolic and endocrine causes, peptic ulcer, gastric outlet obstruction, gastritis, gastroenteritis, appendicitis, intestinal obstruction, paralytic ileus, food poisoning, cholecystitis, acute hepatitis, acute pancreatitis, acute febrile illness, and myocardial infarction.    All labs were reviewed and interpreted by me.  CT scan radiology impression was interpreted by me.    MDM     Amount and/or Complexity of Data Reviewed  Decide to obtain previous medical records or to obtain history from someone other than the patient: yes       The patient´s CBC that was reviewed and interpreted by me shows no abnormalities of critical concern. Of note, there is no anemia requiring a blood transfusion and the platelet count is acceptable.  The patient´s CMP that was reviewed and interpretted by me shows no abnormalities of critical concern. Of note, the patient´s sodium and potassium are acceptable. The patient´s liver enzymes are unremarkable.  The patient has a normal anion gap.  Urinalysis shows no evidence of acute UTI.  Patient is resting comfortably in bed at this time with an oxygen saturation of 100% on room air.  CT abdomen pelvis with contrast shows no acute findings.  I will start the patient on Bentyl for the abdominal cramps.  I will provide an ambulatory referral to GI for the recent recurrent constipation.  Instruct the patient to return to the ED the meantime she develops new or worsening symptoms.  Patient states she understands agrees plan of care.          Patient Care Considerations:          Consultants/Shared Management Plan:    None    Social Determinants of Health:    Patient is independent, reliable, and has access to care.       Disposition and Care Coordination:    Discharged: I considered escalation of care by admitting this patient to the hospital, however the patient has improved and is suitable  and stable for discharge.    I have explained the patient´s condition, diagnoses and treatment plan based on the information available to me at this time. I have answered questions and addressed any concerns. The patient has a good  understanding of the patient´s diagnosis, condition, and treatment plan as can be expected at this point. The vital signs have been stable. The patient´s condition is stable and appropriate for discharge from the emergency department.      The patient will pursue further outpatient evaluation with the primary care physician or other designated or consulting physician as outlined in the discharge instructions. They are agreeable to this plan of care and follow-up instructions have been explained in detail. The patient has received these instructions in written format and have expressed an understanding of the discharge instructions. The patient is aware that any significant change in condition or worsening of symptoms should prompt an immediate return to this or the closest emergency department or call to 911.  I have explained discharge medications and the need for follow up with the patient/caretakers. This was also printed in the discharge instructions. Patient was discharged with the following medications and follow up:      Medication List        Changed      clobetasol 0.05 % external solution  Commonly known as: TEMOVATE  Apply 10-15 drops every day to affected areas in the scalp after shampooing until clear.  What changed:   when to take this  reasons to take this     fluticasone 50 MCG/ACT nasal spray  Commonly known as: FLONASE  USE 2 SPRAYS IN EACH NOSTRIL ONCE DAILY AS DIRECTED  What changed:   when to take this  reasons to take this           Zoran Pompa MD  [address] [facility]  [place] [address]  [phone]    Schedule an appointment as soon as possible for a visit in 1 week  follow up       Final diagnoses:   Generalized abdominal pain   Constipation, unspecified  constipation type        ED Disposition       ED Disposition   Discharge    Condition   Stable    Comment   --               This medical record created using voice recognition software.             Maxx Perez PA-C  11/29/23 0038     DISCHARGE

## 2023-12-04 NOTE — ED ADULT NURSE NOTE - CHIEF COMPLAINT
Influenza Vaccination/Rivaroxaban/Xarelto
The patient is a 79y Female complaining of shortness of breath.

## 2024-01-10 ENCOUNTER — APPOINTMENT (OUTPATIENT)
Dept: CARDIOLOGY | Facility: CLINIC | Age: 84
End: 2024-01-10
Payer: MEDICARE

## 2024-01-10 VITALS
RESPIRATION RATE: 18 BRPM | TEMPERATURE: 96 F | HEART RATE: 60 BPM | BODY MASS INDEX: 30.82 KG/M2 | WEIGHT: 157 LBS | SYSTOLIC BLOOD PRESSURE: 115 MMHG | DIASTOLIC BLOOD PRESSURE: 80 MMHG | OXYGEN SATURATION: 94 % | HEIGHT: 60 IN

## 2024-01-10 DIAGNOSIS — I44.2 ATRIOVENTRICULAR BLOCK, COMPLETE: ICD-10-CM

## 2024-01-10 DIAGNOSIS — I50.9 HEART FAILURE, UNSPECIFIED: ICD-10-CM

## 2024-01-10 PROCEDURE — 99214 OFFICE O/P EST MOD 30 MIN: CPT

## 2024-01-10 PROCEDURE — 93000 ELECTROCARDIOGRAM COMPLETE: CPT

## 2024-01-16 NOTE — HISTORY OF PRESENT ILLNESS
[FreeTextEntry1] : 84 y/o female with history of HTN, DL, DM, COPD/asthma, PAF (CHADS-VASC 7), CHB, s/p PPM, arthritis, CRI, TIA, CHF (HFpEF), hospitalized a few months ago for CHF, UTI/urosepsis, possible ischemic changes on ECG. Presents for f/u. Patient was seen in the hospital by Dr. Tello and Roseline, but would like to switch to our group. Was seen by Dr. Junior for EP. Reports episodes of chest discomfort and dyspnea. No syncope. No edema with current dose of Lasix and metolazone. BP is controlled. Echo today - LVH, preserved LV EF.  had hypokalemia on last labs - has repeat in 2 weeks. Did not tolerate higher dose of Entresto due to hypotension. Reports episodes of BP in 80's sytolic. HR is controlled.

## 2024-01-16 NOTE — PHYSICAL EXAM
[No Acute Distress] : no acute distress [Frail] : frail [Ill-Appearing] : ill-appearing [Normal Conjunctiva] : normal conjunctiva [Normal Venous Pressure] : normal venous pressure [No Carotid Bruit] : no carotid bruit [Normal S1, S2] : normal S1, S2 [No Rub] : no rub [No Gallop] : no gallop [Clear Lung Fields] : clear lung fields [Good Air Entry] : good air entry [No Respiratory Distress] : no respiratory distress  [Soft] : abdomen soft [Non Tender] : non-tender [No Masses/organomegaly] : no masses/organomegaly [Normal Bowel Sounds] : normal bowel sounds [Abnormal Gait] : abnormal gait [No Cyanosis] : no cyanosis [No Clubbing] : no clubbing [No Varicosities] : no varicosities [No Rash] : no rash [No Skin Lesions] : no skin lesions [Moves all extremities] : moves all extremities [No Focal Deficits] : no focal deficits [Normal Speech] : normal speech [Alert and Oriented] : alert and oriented [Normal memory] : normal memory [de-identified] : NC/AT [de-identified] : 2/6 systolic murmur [de-identified] : trace edema [de-identified] : uses waker [de-identified] : trace edema

## 2024-01-16 NOTE — HISTORY OF PRESENT ILLNESS
[FreeTextEntry1] : 82 y/o female with history of HTN, DL, DM, COPD/asthma, PAF (CHADS-VASC 7), CHB, s/p PPM, arthritis, CRI, TIA, CHF (HFpEF), hospitalized a few months ago for CHF, UTI/urosepsis, possible ischemic changes on ECG. Presents for f/u. Patient was seen in the hospital by Dr. Tello and Roseline, but would like to switch to our group. Was seen by Dr. Junior for EP. Reports episodes of chest discomfort and dyspnea. No syncope. No edema with current dose of Lasix and metolazone. BP is controlled. Echo today - LVH, preserved LV EF.  had hypokalemia on last labs - has repeat in 2 weeks. Did not tolerate higher dose of Entresto due to hypotension. Reports episodes of BP in 80's sytolic. HR is controlled.

## 2024-01-16 NOTE — PHYSICAL EXAM
[No Acute Distress] : no acute distress [Frail] : frail [Ill-Appearing] : ill-appearing [Normal Conjunctiva] : normal conjunctiva [Normal Venous Pressure] : normal venous pressure [No Carotid Bruit] : no carotid bruit [Normal S1, S2] : normal S1, S2 [No Rub] : no rub [No Gallop] : no gallop [Clear Lung Fields] : clear lung fields [Good Air Entry] : good air entry [No Respiratory Distress] : no respiratory distress  [Soft] : abdomen soft [Non Tender] : non-tender [No Masses/organomegaly] : no masses/organomegaly [Normal Bowel Sounds] : normal bowel sounds [Abnormal Gait] : abnormal gait [No Cyanosis] : no cyanosis [No Clubbing] : no clubbing [No Varicosities] : no varicosities [No Rash] : no rash [No Skin Lesions] : no skin lesions [Moves all extremities] : moves all extremities [No Focal Deficits] : no focal deficits [Normal Speech] : normal speech [Alert and Oriented] : alert and oriented [Normal memory] : normal memory [de-identified] : NC/AT [de-identified] : 2/6 systolic murmur [de-identified] : trace edema [de-identified] : uses waker [de-identified] : trace edema

## 2024-01-16 NOTE — ASSESSMENT
[FreeTextEntry1] : 82 y/o female with history as above Multiple medical problems.  Plan:  CAD Anginal symptoms Patient is not interested in ischemic w/u Reports she would not agree for cath/PCI even if abnormal stress test. Will treat medically C/w nitroglycerin patch. C/w BB Secondary prevention.  CHF 2D echo reviewed, discussed with the patient Valvular heart disease - mild to moderate HFpEF C/w Lasix and metolazone. C/w Entresto, but decrease the dose to 24/25  AF High CHADS-VASC score C/w BB, c/w anticoagulation If CRI progresses, will need to decrease the dose to 2.5 - f/u repeat labs.  CHB S/p PPM EP follow-up  DM, lipid control F/u in 3 months.

## 2024-01-16 NOTE — REVIEW OF SYSTEMS
[Feeling Fatigued] : feeling fatigued [Blurry Vision] : blurred vision [SOB] : shortness of breath [Dyspnea on exertion] : dyspnea during exertion [Chest Discomfort] : chest discomfort [Lower Ext Edema] : lower extremity edema [Nausea] : nausea [Heartburn] : heartburn [Joint Pain] : joint pain [Dizziness] : dizziness [Weakness] : weakness [Negative] : Heme/Lymph [Abdominal Pain] : no abdominal pain [Vomiting] : no vomiting [Change In The Stool] : no change in stool [Dysuria] : no dysuria [Numbness (Hypoesthesia)] : no numbness [Speech Disturbance] : no speech disturbance [FreeTextEntry9] : uses walker

## 2024-02-14 ENCOUNTER — INPATIENT (INPATIENT)
Facility: HOSPITAL | Age: 84
LOS: 1 days | Discharge: HOME HEALTH | DRG: 536 | End: 2024-02-16
Attending: SURGERY | Admitting: SURGERY
Payer: MEDICARE

## 2024-02-14 VITALS
TEMPERATURE: 98 F | RESPIRATION RATE: 16 BRPM | DIASTOLIC BLOOD PRESSURE: 81 MMHG | SYSTOLIC BLOOD PRESSURE: 167 MMHG | OXYGEN SATURATION: 96 % | WEIGHT: 158.07 LBS | HEART RATE: 60 BPM

## 2024-02-14 DIAGNOSIS — S32.9XXA FRACTURE OF UNSPECIFIED PARTS OF LUMBOSACRAL SPINE AND PELVIS, INITIAL ENCOUNTER FOR CLOSED FRACTURE: ICD-10-CM

## 2024-02-14 DIAGNOSIS — Z90.49 ACQUIRED ABSENCE OF OTHER SPECIFIED PARTS OF DIGESTIVE TRACT: Chronic | ICD-10-CM

## 2024-02-14 DIAGNOSIS — Z95.0 PRESENCE OF CARDIAC PACEMAKER: Chronic | ICD-10-CM

## 2024-02-14 LAB
ALBUMIN SERPL ELPH-MCNC: 4.1 G/DL — SIGNIFICANT CHANGE UP (ref 3.5–5.2)
ALP SERPL-CCNC: 109 U/L — SIGNIFICANT CHANGE UP (ref 30–115)
ALT FLD-CCNC: 13 U/L — SIGNIFICANT CHANGE UP (ref 0–41)
ANION GAP SERPL CALC-SCNC: 12 MMOL/L — SIGNIFICANT CHANGE UP (ref 7–14)
APTT BLD: 34 SEC — SIGNIFICANT CHANGE UP (ref 27–39.2)
AST SERPL-CCNC: 23 U/L — SIGNIFICANT CHANGE UP (ref 0–41)
BASOPHILS # BLD AUTO: 0.02 K/UL — SIGNIFICANT CHANGE UP (ref 0–0.2)
BASOPHILS NFR BLD AUTO: 0.3 % — SIGNIFICANT CHANGE UP (ref 0–1)
BILIRUB SERPL-MCNC: 0.6 MG/DL — SIGNIFICANT CHANGE UP (ref 0.2–1.2)
BUN SERPL-MCNC: 29 MG/DL — HIGH (ref 10–20)
CALCIUM SERPL-MCNC: 9.1 MG/DL — SIGNIFICANT CHANGE UP (ref 8.4–10.5)
CHLORIDE SERPL-SCNC: 101 MMOL/L — SIGNIFICANT CHANGE UP (ref 98–110)
CO2 SERPL-SCNC: 28 MMOL/L — SIGNIFICANT CHANGE UP (ref 17–32)
CREAT SERPL-MCNC: 1 MG/DL — SIGNIFICANT CHANGE UP (ref 0.7–1.5)
EGFR: 56 ML/MIN/1.73M2 — LOW
EOSINOPHIL # BLD AUTO: 0.04 K/UL — SIGNIFICANT CHANGE UP (ref 0–0.7)
EOSINOPHIL NFR BLD AUTO: 0.6 % — SIGNIFICANT CHANGE UP (ref 0–8)
ETHANOL SERPL-MCNC: <10 MG/DL — SIGNIFICANT CHANGE UP
GLUCOSE BLDC GLUCOMTR-MCNC: 110 MG/DL — HIGH (ref 70–99)
GLUCOSE SERPL-MCNC: 109 MG/DL — HIGH (ref 70–99)
HCT VFR BLD CALC: 36.8 % — LOW (ref 37–47)
HGB BLD-MCNC: 12.4 G/DL — SIGNIFICANT CHANGE UP (ref 12–16)
IMM GRANULOCYTES NFR BLD AUTO: 1 % — HIGH (ref 0.1–0.3)
INR BLD: 1.43 RATIO — HIGH (ref 0.65–1.3)
LACTATE SERPL-SCNC: 1.4 MMOL/L — SIGNIFICANT CHANGE UP (ref 0.7–2)
LIDOCAIN IGE QN: 64 U/L — HIGH (ref 7–60)
LYMPHOCYTES # BLD AUTO: 2.07 K/UL — SIGNIFICANT CHANGE UP (ref 1.2–3.4)
LYMPHOCYTES # BLD AUTO: 32.9 % — SIGNIFICANT CHANGE UP (ref 20.5–51.1)
MCHC RBC-ENTMCNC: 32.4 PG — HIGH (ref 27–31)
MCHC RBC-ENTMCNC: 33.7 G/DL — SIGNIFICANT CHANGE UP (ref 32–37)
MCV RBC AUTO: 96.1 FL — SIGNIFICANT CHANGE UP (ref 81–99)
MONOCYTES # BLD AUTO: 0.51 K/UL — SIGNIFICANT CHANGE UP (ref 0.1–0.6)
MONOCYTES NFR BLD AUTO: 8.1 % — SIGNIFICANT CHANGE UP (ref 1.7–9.3)
NEUTROPHILS # BLD AUTO: 3.6 K/UL — SIGNIFICANT CHANGE UP (ref 1.4–6.5)
NEUTROPHILS NFR BLD AUTO: 57.1 % — SIGNIFICANT CHANGE UP (ref 42.2–75.2)
NRBC # BLD: 0 /100 WBCS — SIGNIFICANT CHANGE UP (ref 0–0)
PLATELET # BLD AUTO: 181 K/UL — SIGNIFICANT CHANGE UP (ref 130–400)
PMV BLD: 9.8 FL — SIGNIFICANT CHANGE UP (ref 7.4–10.4)
POTASSIUM SERPL-MCNC: 4 MMOL/L — SIGNIFICANT CHANGE UP (ref 3.5–5)
POTASSIUM SERPL-SCNC: 4 MMOL/L — SIGNIFICANT CHANGE UP (ref 3.5–5)
PROT SERPL-MCNC: 7.5 G/DL — SIGNIFICANT CHANGE UP (ref 6–8)
PROTHROM AB SERPL-ACNC: 16.4 SEC — HIGH (ref 9.95–12.87)
RBC # BLD: 3.83 M/UL — LOW (ref 4.2–5.4)
RBC # FLD: 12.8 % — SIGNIFICANT CHANGE UP (ref 11.5–14.5)
SODIUM SERPL-SCNC: 141 MMOL/L — SIGNIFICANT CHANGE UP (ref 135–146)
WBC # BLD: 6.3 K/UL — SIGNIFICANT CHANGE UP (ref 4.8–10.8)
WBC # FLD AUTO: 6.3 K/UL — SIGNIFICANT CHANGE UP (ref 4.8–10.8)

## 2024-02-14 PROCEDURE — 71260 CT THORAX DX C+: CPT | Mod: 26,MA

## 2024-02-14 PROCEDURE — 74177 CT ABD & PELVIS W/CONTRAST: CPT | Mod: 26,MA

## 2024-02-14 PROCEDURE — 99214 OFFICE O/P EST MOD 30 MIN: CPT

## 2024-02-14 PROCEDURE — 72125 CT NECK SPINE W/O DYE: CPT | Mod: 26,MA

## 2024-02-14 PROCEDURE — 73502 X-RAY EXAM HIP UNI 2-3 VIEWS: CPT | Mod: 26,RT

## 2024-02-14 PROCEDURE — 99285 EMERGENCY DEPT VISIT HI MDM: CPT | Mod: FS,25

## 2024-02-14 PROCEDURE — 73130 X-RAY EXAM OF HAND: CPT | Mod: 26,RT

## 2024-02-14 PROCEDURE — 71045 X-RAY EXAM CHEST 1 VIEW: CPT | Mod: 26

## 2024-02-14 PROCEDURE — 36415 COLL VENOUS BLD VENIPUNCTURE: CPT

## 2024-02-14 PROCEDURE — 82962 GLUCOSE BLOOD TEST: CPT

## 2024-02-14 PROCEDURE — 99285 EMERGENCY DEPT VISIT HI MDM: CPT | Mod: 24,25

## 2024-02-14 PROCEDURE — 70486 CT MAXILLOFACIAL W/O DYE: CPT | Mod: 26,MA

## 2024-02-14 PROCEDURE — 99222 1ST HOSP IP/OBS MODERATE 55: CPT

## 2024-02-14 PROCEDURE — 29125 APPL SHORT ARM SPLINT STATIC: CPT | Mod: RT

## 2024-02-14 PROCEDURE — 83036 HEMOGLOBIN GLYCOSYLATED A1C: CPT

## 2024-02-14 PROCEDURE — 70450 CT HEAD/BRAIN W/O DYE: CPT | Mod: 26,MA

## 2024-02-14 PROCEDURE — 93010 ELECTROCARDIOGRAM REPORT: CPT

## 2024-02-14 PROCEDURE — 97163 PT EVAL HIGH COMPLEX 45 MIN: CPT | Mod: GP

## 2024-02-14 PROCEDURE — 73110 X-RAY EXAM OF WRIST: CPT | Mod: 26,RT

## 2024-02-14 PROCEDURE — 73030 X-RAY EXAM OF SHOULDER: CPT | Mod: 26,LT

## 2024-02-14 PROCEDURE — 81001 URINALYSIS AUTO W/SCOPE: CPT

## 2024-02-14 RX ORDER — ACETAMINOPHEN 500 MG
650 TABLET ORAL ONCE
Refills: 0 | Status: COMPLETED | OUTPATIENT
Start: 2024-02-14 | End: 2024-02-14

## 2024-02-14 RX ORDER — SODIUM CHLORIDE 9 MG/ML
250 INJECTION INTRAMUSCULAR; INTRAVENOUS; SUBCUTANEOUS ONCE
Refills: 0 | Status: COMPLETED | OUTPATIENT
Start: 2024-02-14 | End: 2024-02-14

## 2024-02-14 RX ORDER — HYDROMORPHONE HYDROCHLORIDE 2 MG/ML
0.5 INJECTION INTRAMUSCULAR; INTRAVENOUS; SUBCUTANEOUS ONCE
Refills: 0 | Status: DISCONTINUED | OUTPATIENT
Start: 2024-02-14 | End: 2024-02-14

## 2024-02-14 RX ORDER — METOPROLOL TARTRATE 50 MG
100 TABLET ORAL DAILY
Refills: 0 | Status: DISCONTINUED | OUTPATIENT
Start: 2024-02-14 | End: 2024-02-15

## 2024-02-14 RX ORDER — TIOTROPIUM BROMIDE 18 UG/1
2 CAPSULE ORAL; RESPIRATORY (INHALATION) DAILY
Refills: 0 | Status: DISCONTINUED | OUTPATIENT
Start: 2024-02-14 | End: 2024-02-16

## 2024-02-14 RX ORDER — ACETAMINOPHEN 500 MG
650 TABLET ORAL EVERY 6 HOURS
Refills: 0 | Status: DISCONTINUED | OUTPATIENT
Start: 2024-02-14 | End: 2024-02-16

## 2024-02-14 RX ORDER — ALBUTEROL 90 UG/1
2 AEROSOL, METERED ORAL EVERY 6 HOURS
Refills: 0 | Status: DISCONTINUED | OUTPATIENT
Start: 2024-02-14 | End: 2024-02-16

## 2024-02-14 RX ORDER — SENNA PLUS 8.6 MG/1
2 TABLET ORAL AT BEDTIME
Refills: 0 | Status: DISCONTINUED | OUTPATIENT
Start: 2024-02-14 | End: 2024-02-16

## 2024-02-14 RX ORDER — ERTAPENEM SODIUM 1 G/1
0.5 INJECTION, POWDER, LYOPHILIZED, FOR SOLUTION INTRAMUSCULAR; INTRAVENOUS
Qty: 0 | Refills: 0 | DISCHARGE

## 2024-02-14 RX ORDER — SERTRALINE 25 MG/1
25 TABLET, FILM COATED ORAL EVERY 12 HOURS
Refills: 0 | Status: DISCONTINUED | OUTPATIENT
Start: 2024-02-14 | End: 2024-02-16

## 2024-02-14 RX ORDER — QUETIAPINE FUMARATE 200 MG/1
25 TABLET, FILM COATED ORAL ONCE
Refills: 0 | Status: COMPLETED | OUTPATIENT
Start: 2024-02-14 | End: 2024-02-14

## 2024-02-14 RX ORDER — POLYETHYLENE GLYCOL 3350 17 G/17G
17 POWDER, FOR SOLUTION ORAL DAILY
Refills: 0 | Status: DISCONTINUED | OUTPATIENT
Start: 2024-02-14 | End: 2024-02-16

## 2024-02-14 RX ADMIN — QUETIAPINE FUMARATE 25 MILLIGRAM(S): 200 TABLET, FILM COATED ORAL at 20:48

## 2024-02-14 RX ADMIN — SODIUM CHLORIDE 250 MILLILITER(S): 9 INJECTION INTRAMUSCULAR; INTRAVENOUS; SUBCUTANEOUS at 16:30

## 2024-02-14 RX ADMIN — Medication 650 MILLIGRAM(S): at 20:48

## 2024-02-14 NOTE — ED ADULT TRIAGE NOTE - NS ED TRIAGE AVPU SCALE
Alert-The patient is alert, awake and responds to voice. The patient is oriented to time, place, and person. The triage nurse is able to obtain subjective information.
ANAIS

## 2024-02-14 NOTE — ED ADULT NURSE NOTE - NSFALLHARMRISKINTERV_ED_ALL_ED
Assistance OOB with selected safe patient handling equipment if applicable/Assistance with ambulation/Communicate risk of Fall with Harm to all staff, patient, and family/Monitor gait and stability/Provide visual cue: red socks, yellow wristband, yellow gown, etc/Reinforce activity limits and safety measures with patient and family/Bed in lowest position, wheels locked, appropriate side rails in place/Call bell, personal items and telephone in reach/Instruct patient to call for assistance before getting out of bed/chair/stretcher/Non-slip footwear applied when patient is off stretcher/[place] to call system/Physically safe environment - no spills, clutter or unnecessary equipment/Purposeful Proactive Rounding/Room/bathroom lighting operational, light cord in reach

## 2024-02-14 NOTE — ED PROVIDER NOTE - CLINICAL SUMMARY MEDICAL DECISION MAKING FREE TEXT BOX
82 yo F w/ hx of Afib on eliquis, asthma, CAD, HFpEF, DMII, COPD, dementia oriented x 1 at baseline BIBEMS for Presenting for mechanical fall witnessed by her daughter, endorsing right hip pain. labs stable, imaging with pelvic fracture with small hemorrhage. xray hand images independently interpreted by me Dr. Arreguin showing no obvious displaced fracture however given degeneration, placed in thumb spica splint. ekg independently interpreted by yrn Arreguin showing paced rhythm, no stemi. ortho consult appreciated. admitted to surgery for further management.

## 2024-02-14 NOTE — ED PROVIDER NOTE - OBJECTIVE STATEMENT
patient is a 84yo female PMG afib on Eliquis, CHF, COPD, diabetes, hld, CVA, dementia presenting for evaluation after mechanical trip and fall. pt fell forward, hit her head, hurt her left shoulder, right hip, right hand and wrist. also complaining of diffuse abdominal pain. denies LOC, syncope, chest pain, shortness of breath, numbness/ paresthesias, unilateral weakness.

## 2024-02-14 NOTE — H&P ADULT - HISTORY OF PRESENT ILLNESS
TRAUMA ACTIVATION LEVEL:   ALERT   ACTIVATED BY: ED**  INTUBATED: NO**      MECHANISM OF INJURY:   [] Blunt     [] MVC	  [x] Fall	  [] Pedestrian Struck	  [] Motorcycle     [] Assault     [] Bicycle collision    [] Sports injury    [] Penetrating    [] Gun Shot Wound      [] Stab Wound    GCS: 14 	E: 4	V: 4	M: 6    HPI:    83y Female  w/ PMHx of Afib on eliquis, CHF, COPD, DM, HLD, CVA and Dementia  seen as Trauma Alert  s/p mechanical fall +HT, ?LOC, +AC (eliquis, last taken this morning) with complaint of Right hip pain, external signs of trauma include Right eyebrow ecchymosis. Fall was witnessed by daughter who was present. Trauma assessment in ED: ABCs intact , GCS 14, AAOx2,  PHAM.     Fatigue: How much time during the previous 4 weeks did you feel tired?   All or most of the time [   ] Yes (1pt)    [x  ] No  (0pts)  Resistance: Do you have any difficulty walking up 10 steps alone without resting and without aids? [   ] Yes (1pt)    [ x ] No  (0pts)  Ambulation: Do you have any difficulty walking several hundred yards alone without aids? [ x  ] Yes (1pt)    [  ] No  (0pts)  Illness: how many illnesses do you have out of list of 11 total? [   ] 5 or more (1pt) [ x ] < 5 (0pts)  Loss of weight: Have you had weight loss of 5% or more? [   ] Yes (1pt)    [x  ] No  (0pts)    Total Score: 1    Score 1-2: Consult medical comangement  Score 3-4: Consult Geriatric service   Score 5: Consult Palliative service [phone]    Tony Gibson G, Gopi SAAB, Whit CONNOLLY, Lana B. Frality: toward a clinical definition. J AM Med Dir Assoc. 2008; 9 (2): 71-72  Vinny JE, Phill TK, Anurag DK. A simple frailty questionnaire (FRAIL) predicts outcomes in middle aged  Americans. J Nutr Health Aging. 2012; 16 (7): 601-608

## 2024-02-14 NOTE — CONSULT NOTE ADULT - ATTENDING COMMENTS
Trauma Attending H&P Attestation    Patient seen and evaluated with the trauma team in the trauma bay upon arrival. All pertinent labs and radiographic imaging reviewed, pending final reports. Outpatient medications reviewed, including the presence of anticoagulants, if applicable. I agree with the resident's note above, including the physical exam findings, assessment and plan as documented with the following adjustments.     Trauma Level: [ ] Code  [x ] Alert  [ ] Consult [ ] Transfer in  Patient is seen at the bedside at 15:57  Activation by:  [ x] ED physician [x ] EMS  Intubated in Field? [ ] Yes [x ] No  Intubated in ED? [ ] Yes [ x] No  Intubated in Trauma [place]? [ ] Yes [x ] No    PRATEEK LAWRENCE Patient is a 83y old  Female who presents with a chief complaint of  fall on AC with pain at the hip   Patient presented with GCS [14 ]  baseline dementia upon arrival to the trauma bay.  Allergies  oxycodone (Pruritus)  Augmentin (Rash)  Intolerances    PAST MEDICAL & SURGICAL HISTORY:  Afib on Eliquis  Diabetes  Asthma  CAD (coronary atherosclerotic disease  Diastolic heart failure  COPD exacerbation  Hyperlipidemia  CVA (cerebrovascular accident)  Type 2 diabetes mellitus  Dementia  S/P appendectomy  Pacemaker    On AC/Antiplatelets [ ] Yes [ ] No              [x ] NOVACs, [ ] Coumadin, [ ] ASA, [ ] Antiplatelets     Vital Signs Last 24 Hrs  T(C): 36.7 (15 Feb 2024 07:41), Max: 36.8 (14 Feb 2024 18:21)  T(F): 98 (15 Feb 2024 07:41), Max: 98.3 (14 Feb 2024 18:21)  HR: 62 (15 Feb 2024 07:41) (59 - 66)  BP: 106/58 (15 Feb 2024 07:41) (97/59 - 169/74)  BP(mean): 77 (15 Feb 2024 07:41) (73 - 77)  RR: 18 (15 Feb 2024 07:41) (16 - 18)  SpO2: 95% (15 Feb 2024 07:41) (95% - 98%)    Parameters below as of 15 Feb 2024 07:41  Patient On (Oxygen Delivery Method): room air    PE: pain on passive motion at the hip  Assessment: Pelvic fracture, awaiting official read  PLAN  - supportive care  - GI/DVT prophylaxis  - pain management  - repeat studies as needed  - complete and follow up on trauma work up included but not limites to                          [x ] CXR [x ] PXR [x ] Extremities X-RAYs                          [x ] NCHCT [x ] C-Spine CT [x ] CT Chest [ ]x CT Abdomen/Pelvis                          [x ] FAST [ ] Other                          [x ] Trauma Labs   [ ] Toxicology   - Follow up Consults  [ ] Neurosurgery [x ] Orthopaedics [ ] Plastics [ ] Fascial/OMFS [ ] Opthalmology   [ ] Urology  [ ] ENT                                          [x ] Medicine [ ] Geriatrics [ ] Cardiology/EP [ ] Hospice/Palliative Care                                        [ ] Pediatric ICU  [ ] SICU/SDU [ ] Burn/Burn ICU  - IV ABx give as indicated [ ] Yes [ x] No  - Tetanus given as indicated [ ] Yes [x ] No  - Seizures prophylaxis  [ ] Yes,  [x ] No    Tiffanie Patricio MD, FACS  Trauma/ACS/Surgical Critical Care Attending

## 2024-02-14 NOTE — ED ADULT NURSE REASSESSMENT NOTE - NS ED NURSE REASSESS COMMENT FT1
received handoff from day shift RN at 1920. pt A&Ox1-2 hx of dementia, [place] speaking. daughters at bedside to translate; admitted to MED/surg pending bed availability. Bedside cardiac monitor in place; VSS at this time. Pt on room air. Denies any chest pain or SOB at this time. RAC 18G IV in place and intact. pt with R arm splint in place.  Call bell and bed alarm active. Pt made aware of plan of care.

## 2024-02-14 NOTE — ED ADULT TRIAGE NOTE - CHIEF COMPLAINT QUOTE
Trip and fall, c/o left shoulder pain, unable to stand on right hip. Pt on eliquis. +HT. -LOC. Trauma alert activated.

## 2024-02-14 NOTE — ED ADULT NURSE REASSESSMENT NOTE - NSFALLHARMRISKINTERV_ED_ALL_ED
Assistance OOB with selected safe patient handling equipment if applicable/Assistance with ambulation/Communicate risk of Fall with Harm to all staff, patient, and family/Monitor gait and stability/Monitor for mental status changes and reorient to person, place, and time, as needed/Provide visual cue: red socks, yellow wristband, yellow gown, etc/Reinforce activity limits and safety measures with patient and family/Toileting schedule using arm’s reach rule for commode and bathroom/Use of alarms - bed, stretcher, chair and/or video monitoring/Bed in lowest position, wheels locked, appropriate side rails in place/Call bell, personal items and telephone in reach/Instruct patient to call for assistance before getting out of bed/chair/stretcher/Non-slip footwear applied when patient is off stretcher/[place] to call system/Physically safe environment - no spills, clutter or unnecessary equipment/Purposeful Proactive Rounding/Room/bathroom lighting operational, light cord in reach

## 2024-02-14 NOTE — ED PROVIDER NOTE - PHYSICAL EXAMINATION
CONST: in NAD  EYES: PERRL, EOMI, Sclera and conjunctiva clear.   NECK: Non-tender, no meningeal signs  CARD: Normal S1 S2; Normal rate and rhythm  RESP: Equal BS B/L, No wheezes, rhonchi or rales. No distress  GI: Soft, non-distended, general diffuse TTP  MS: Normal ROM in all extremities. No midline spinal tenderness. ecchymosis to left forehead/ eyebrow  SKIN: Warm, dry, no acute rashes. Good turgor  NEURO: A&Ox3, No focal deficits. Strength 4/5 with no sensory deficits

## 2024-02-14 NOTE — ED PROVIDER NOTE - ATTENDING APP SHARED VISIT CONTRIBUTION OF CARE
84 yo F w/ hx of Afib on eliquis, asthma, CAD, HFpEF, DMII, COPD, dementia oriented x 1 at baseline BIBEMS for Presenting for mechanical fall witnessed by her daughter.  Was walked to the bathroom tripped on the carpet, hit her head landing on the right side.  Endorses right hip pain.  No LOC.  No vomiting.  On exam patient with mild bruising to the right hand, tenderness noted to the right hip.  Neurovascular intact lower extremities.  No chest tenderness.  Lungs clear to oxygen bilaterally.  Patient placed in c-collar.  Mild ecchymosis to the right eyebrow.  Trauma alert called, patient oriented x 1 at baseline.

## 2024-02-14 NOTE — ED ADULT NURSE NOTE - OBJECTIVE STATEMENT
Pt c/o L shoulder pain and R hip pain s/p trip and fall. Pt on eliquis. +HT. -LOC. Pt has hx of dementia as per daughter baseline A&Ox0

## 2024-02-14 NOTE — ED ADULT NURSE NOTE - EXTENSIONS OF SELF_ADULT
Referred by: Chucho Dick MD; Medical Diagnosis (from order):    Diagnosis Information      Diagnosis    724.4 (ICD-9-CM) - M54.16 (ICD-10-CM) - Lumbar radiculopathy                Physical Therapy -  Initial Evaluation    Visit:  1   Treatment Diagnosis: diagnosis.symptoms with increased pain/symptoms, impaired posture, impaired range of motion, impaired strength, impaired gait, impaired mobility, impaired balance, impaired activity tolerance, impaired tissue mobility  Date of onset: June 2020  Diagnosis Precautions: PT referral flexion bias, avoid extension.  Teach stabilization program   Chart reviewed at time of initial evaluation (relevant co-morbidities, allergies, tests and medications listed): Smoker, hyperthyroid, CPAP, insomnia, left rotator cuff repair, OA, MRI left hip 8/2020 and lumbar spine 6/2020  Diagnostic Tests: MRI studies: reviewed.     SUBJECTIVE                                                                                                             Insidious onset and woke up in terrible pain and I had to go to emergency room.  I was pulling weeds several days prior.  Take pain meds/gabapentin right when I get up in morning.  Morphine at 9 am and at 9 pm. 2 injections-one into SI-didn't do anything and second one into my left hip gave me 80% relief.  Once my medication kicks in, I can move around the home.  Standing tolerance 10 minutes when medication kicks in.  Sitting tolerance 1-2 hours before left buttock pain limits.  Am using heating pad in sitting.  Walking around the house reaching out to items.  Initially used walker but haven't used in 6 weeks.  I am scared for this therapy.  I can only step up 3 times with left-ascend without rail.  First time I've ever had this leg pain       Pain / Symptoms:  Pain/symptom is: constant  Pain rating (out of 10): Current: 6 ; Best: 4; Worst: 8  Location: Down left buttock, left back of leg and front of shin to ankle    Quality / Description:  ache, tingling, radiating, sharp, shooting, numbness.  Alleviating Factors: prescription medications, heat, change in position, rest.   Progression since onset: improved  Function:   Limitations / Exacerbation Factors: pain, difficulty, sleep disturbed, lower body dressing, meal/food prep, driving/riding in a vehicle, grocery shopping, house/yard work, standing tasks, bending/squatting/lifting, lifting/carrying, squatting/lifting, pushing/pulling, walking and standing, Using CPAP and once I get my left leg settled, then I can sleep through.  Using scooter at stores  Only walking around home  Goal-lift/carry at least 20 lbs, have 2 and 4 year old grandsons   doing all of the house chores/laundry        Prior Level of Function: pain free ADLs and IADLs. no limitation in involved extremity, Never have had this before.    Patient Goals: decreased pain and increased strength, Return to active lifestyle, walking, doing my house work, shopping     Prior treatment: injections, chiropractic services  Discharged from hospital, home health, or skilled nursing facility in last 30 days: no    Home Environment:   Patient lives with significant other  Type of home: multiple level home  Assistance available: as needed  Feels safe at home/work/school.  2 or more falls or an unexplained fall with injury in the last year:  No    OBJECTIVE                                                                                                                     Observation:   Comments / Details: Presented to department in wheelchair with someone pushing chair.    Range of Motion (ROM)   (norms in parentheses, degrees unless noted, active unless noted):   Lumbar:  Finger tip to floor measurements:     - Flexion: 15 cm    - Side Bend Left: 47 cm     - Side Bend Right: 43 cm  Details / Comments: Flexion feels good in spine but creates left nerve tension.  No extension tested.  Left side bend increases left hip pain      Strength  (out of 5  unless noted, standard test position unless noted, lbs tested with hand held dynamometer)   Hip:    - Flexion:        • Left (L2-3): 4        • Right (L2-3): 5   Knee:    - Flexion:        • Left (L5-S1): 4+        • Right (L5-S1): 5  Ankle:    - Dorsiflexion:        • Left (L4-5): 4        • Right (L4-5): 5     - Plantar Flexion:        • Left (S1-2): 4+        • Right (S1-2): 5     - Eversion:        • Left (L5-S1): 5        • Right (L5-S1): 5  First MTP (Hallux):    - Extension:        • Left (L5): 4        • Right (L5): 5  Comments / Details: Notes increased left hip pain when attempting to bridge on mat table for general mobility     Muscle Flexibility:   Comments / Details:   garcia hip ROM wnls and good flexibility into hip abduction/ER with no complaint on left   Special Tests:  Straight Leg Raise:     Passive supine: Left: positive Right: negative  Slump Test: Left: positive Right: negative    Comments / Details: Outcome Measures:   OSWESTRY Total Scored: 20  OSWESTRY Total Possible Score: 45  OSWESTRY Score Calculated: 44.44 %  (0-20% = minimal disability; 20-40% = moderate disability; 40-60% = severe disability; 60-80% = crippled; % = bed bound) see flowsheet for additional documentation    TREATMENT                                                                                                                initial evaluation completed  Therapeutic Exercise:  HEP instruction as below   3 x 30 sec hold of traction on left leg at 30-40 degree angle for pain relief   Educated patient on proper rolling pattern to side for getting out of bed     Skilled input: verbal instruction/cues and tactile instruction/cues    Writer verbally educated and received verbal consent for hand placement, positioning of patient, and techniques to be performed today from patient for hand placement and palpation for techniques as described above and how they are pertinent to the patient's plan of care.    Home Exercise  Program: (*above indicates provided as part of home exercise program)  Single and double knee to chest  Supine hip abduction/ER stretch  Walking with walker for distance and building stamina on driveway, 3-5 minutes as tolerated, several times/day   Spouse to traction left leg for symptom relief       ASSESSMENT                                                                                                             69 year old female patient has reported functional limitations listed above impacted by signs and symptoms consistent with diagnosis., increased pain/symptoms, impaired posture, impaired range of motion, impaired strength, impaired gait, impaired mobility, impaired balance, impaired activity tolerance and impaired tissue mobility.    Prognosis: patient will benefit from skilled therapy  Rehabilitative Potential: good  Predicted patient presentation: Low (stable) - Patient comorbidities and complexities, as defined above, will have little effect on progress for prescribed plan of care.      Pain/symptoms after session: 5  Patient Education:   Who will be receiving education: patient  Are they ready to learn: yes  Preferred learning style: written, verbal and demonstration  Barriers to learning: no barriers apparent at this time  Results of above outlined education: Verbalizes understanding and Demonstrates understanding     PLAN                                                                                                                           The following skilled interventions to be implemented to achieve goals listed below:  Manual Therapy (56883)  Neuromuscular Re-Education (39915)  Therapeutic Activity (77367)  Therapeutic Exercise (46040)  Electrical Stimulation (42021//21199)   Mechanical Traction (33333)  Ultrasound/Phonophoresis (68274)      Frequency / Duration: 2 times per week tapering as patient progresses for an estimated total of 12 visits for 12 weeks    patient involved in and  agreed to plan of care and goals.  Patient has been given attendance policy at time of initial evaluation.    Suggestions for next session as indicated: Progress per plan of care, walking treadmill or biking, possible traction, core strengthening, flexion bias, modalities/manual for pain prn      GOALS                                                                                                                       Long Term Goals: To be met by end of plan of care:      Home Exercise Program: Independent with progressed and modified home exercise program (HEP)      Pain: Decrease pain/symptoms to 4 at most with normal daily demands of static/dynamic postures.    Community Ambulation: Ambulate community distances on even terrain: independent, with least restrictive device and without reported difficultyuneven terrain: independent, with least restrictive device and with reported manageable/tolerable difficulty     Lift: Lift moderate weight household items and without reported difficulty up to 20 lbs for household task and shopping/laundry with neutral spine and body mechanics awareness      Patient Reported Outcome Measure: Improvement in function /disability/impairment as indicated by Oswestry (minimal detectable change - 12%) , or =   18       Procedures and total treatment time documented Time Entry flowsheet.     None

## 2024-02-14 NOTE — ED ADULT NURSE NOTE - SUICIDE SCREENING QUESTION 3
[FreeTextEntry1] : Left Sh MRI ZWP: There is a tear of the anteroinferior segment of the glenoid labrum seen beginning\par at the 2:00 position and extending caudally to the 5:00 position\par 
Patient unable to complete

## 2024-02-14 NOTE — H&P ADULT - NSHPLABSRESULTS_GEN_ALL_CORE
< from: CT Head No Cont (02.14.24 @ 16:40) >    IMPRESSION:    CT HEAD:  No acute intracranial pathology.    Stable mild chronic microvascular ischemic changes.    CT FACIAL BONES:  No acute maxillofacial fracture.    Right periorbital soft tissue swelling.    CT CERVICAL SPINE:  No acute cervical fracture or facet subluxation.    --- End of Report ---    < end of copied text >    < from: CT Chest w/ IV Cont (02.14.24 @ 16:57) >      IMPRESSION:    *1. Acute comminuted fracture involving the right superior pubic ramus   and minimally displaced fracture of the right inferior pubic ramus; small   hemorrhage is noted within the adjacent extraperitoneal space.  2. Nodular liver contour, suggestive of underlying cirrhosis.  3. Cholelithiasis.  4. Sigmoid and descending colonic diverticulosis.        *Spoke with OG HERRERA of the ED on 2/14/2024 5:51 PM with readback.    --- End of Report ---          < end of copied text >    < from: CT Abdomen and Pelvis w/ IV Cont (02.14.24 @ 16:58) >      IMPRESSION:    *1. Acute comminuted fracture involving the right superior pubic ramus   and minimally displaced fracture of the right inferior pubic ramus; small   hemorrhage is noted within the adjacent extraperitoneal space.  2. Nodular liver contour, suggestive of underlying cirrhosis.  3. Cholelithiasis.  4. Sigmoid and descending colonic diverticulosis.        *Spoke with OG HERRERA of the ED on 2/14/2024 5:51 PM with readback.    --- End of Report ---    < end of copied text >

## 2024-02-14 NOTE — H&P ADULT - NSHPPHYSICALEXAM_GEN_ALL_CORE
Secondary Survey:   General: NAD  HEENT: Normocephalic, R eyebrow ecchymosis, EOMI, PEERLA. no scalp lacerations   Neck: Soft, midline trachea. no c-spine tenderness  Chest: No chest wall tenderness, no subcutaneous emphysema   Cardiac: S1, S2, RRR  Respiratory: Bilateral breath sounds, clear and equal bilaterally  Abdomen: Soft, non-distended, RUQ/RLQ tenderness to palpation, no rebound, no guarding.  Groin: Normal appearing, pelvis stable   Ext:  Moving b/l upper and lower extremities. Palpable Radial b/l UE, b/l DP palpable in LE.   Back: No T/L/S spine tenderness, No palpable runoff/stepoff/deformity

## 2024-02-14 NOTE — H&P ADULT - ASSESSMENT
83y Female  w/ PMHx of Afib on eliquis, CHF, COPD, DM, HLD, CVA and Dementia  seen as Trauma Alert  s/p mechanical fall +HT, ?LOC, +AC (eliquis, last taken this morning) with complaint of Right hip pain, external signs of trauma include Right eyebrow ecchymosis. Fall was witnessed by daughter who was present. Trauma assessment in ED: ABCs intact , GCS 14, AAOx2,  PHAM.     Injuries identified:   - R Eyebrow ecchymosis    PLAN:  -admit to trauma team  -pain control  -Ortho: no acute surgical intervention indicated  -WBAT  -UA  -PT/Rehab  -CM x Dispo    TRAUMA SENIOR SPECTRA: [id number]  TRAUMA TEAM SPECTRA: 0924

## 2024-02-14 NOTE — H&P ADULT - ATTENDING COMMENTS
patient was seen earlier for post trauma fall. radiology studies were pending.  Later on radiology images and reports were reviewed. patient has right sup and inferior pubic ramus fracture with local hematoma and also injury to right hand for which thumb spica splint was placed. Patient to be admitted for  rehab and observation . Seen by ortho no orthopedic intervention.

## 2024-02-15 ENCOUNTER — TRANSCRIPTION ENCOUNTER (OUTPATIENT)
Age: 84
End: 2024-02-15

## 2024-02-15 VITALS
RESPIRATION RATE: 18 BRPM | HEART RATE: 62 BPM | SYSTOLIC BLOOD PRESSURE: 106 MMHG | DIASTOLIC BLOOD PRESSURE: 58 MMHG | TEMPERATURE: 98 F | OXYGEN SATURATION: 95 %

## 2024-02-15 LAB
A1C WITH ESTIMATED AVERAGE GLUCOSE RESULT: 5.8 % — HIGH (ref 4–5.6)
APPEARANCE UR: ABNORMAL
BILIRUB UR-MCNC: NEGATIVE — SIGNIFICANT CHANGE UP
COLOR SPEC: YELLOW — SIGNIFICANT CHANGE UP
DIFF PNL FLD: ABNORMAL
ESTIMATED AVERAGE GLUCOSE: 120 MG/DL — HIGH (ref 68–114)
GLUCOSE BLDC GLUCOMTR-MCNC: 154 MG/DL — HIGH (ref 70–99)
GLUCOSE BLDC GLUCOMTR-MCNC: 246 MG/DL — HIGH (ref 70–99)
GLUCOSE BLDC GLUCOMTR-MCNC: 246 MG/DL — HIGH (ref 70–99)
GLUCOSE UR QL: NEGATIVE MG/DL — SIGNIFICANT CHANGE UP
KETONES UR-MCNC: NEGATIVE MG/DL — SIGNIFICANT CHANGE UP
LEUKOCYTE ESTERASE UR-ACNC: ABNORMAL
NITRITE UR-MCNC: NEGATIVE — SIGNIFICANT CHANGE UP
PH UR: 7 — SIGNIFICANT CHANGE UP (ref 5–8)
PROT UR-MCNC: NEGATIVE MG/DL — SIGNIFICANT CHANGE UP
SP GR SPEC: >1.03 — HIGH (ref 1–1.03)
UROBILINOGEN FLD QL: 0.2 MG/DL — SIGNIFICANT CHANGE UP (ref 0.2–1)

## 2024-02-15 PROCEDURE — 99238 HOSP IP/OBS DSCHRG MGMT 30/<: CPT

## 2024-02-15 RX ORDER — DEXTROSE 50 % IN WATER 50 %
15 SYRINGE (ML) INTRAVENOUS ONCE
Refills: 0 | Status: DISCONTINUED | OUTPATIENT
Start: 2024-02-15 | End: 2024-02-16

## 2024-02-15 RX ORDER — DEXTROSE 50 % IN WATER 50 %
12.5 SYRINGE (ML) INTRAVENOUS ONCE
Refills: 0 | Status: DISCONTINUED | OUTPATIENT
Start: 2024-02-15 | End: 2024-02-16

## 2024-02-15 RX ORDER — CEFTRIAXONE 500 MG/1
1000 INJECTION, POWDER, FOR SOLUTION INTRAMUSCULAR; INTRAVENOUS ONCE
Refills: 0 | Status: COMPLETED | OUTPATIENT
Start: 2024-02-15 | End: 2024-02-15

## 2024-02-15 RX ORDER — APIXABAN 2.5 MG/1
5 TABLET, FILM COATED ORAL
Qty: 0 | Refills: 0 | DISCHARGE
Start: 2024-02-15

## 2024-02-15 RX ORDER — GLUCAGON INJECTION, SOLUTION 0.5 MG/.1ML
1 INJECTION, SOLUTION SUBCUTANEOUS ONCE
Refills: 0 | Status: DISCONTINUED | OUTPATIENT
Start: 2024-02-15 | End: 2024-02-16

## 2024-02-15 RX ORDER — ICOSAPENT ETHYL 500 MG/1
2 CAPSULE, LIQUID FILLED ORAL
Qty: 0 | Refills: 0 | DISCHARGE

## 2024-02-15 RX ORDER — NITROFURANTOIN MACROCRYSTAL 50 MG
1 CAPSULE ORAL
Refills: 0 | DISCHARGE

## 2024-02-15 RX ORDER — DEXTROSE 50 % IN WATER 50 %
25 SYRINGE (ML) INTRAVENOUS ONCE
Refills: 0 | Status: DISCONTINUED | OUTPATIENT
Start: 2024-02-15 | End: 2024-02-16

## 2024-02-15 RX ORDER — SACUBITRIL AND VALSARTAN 24; 26 MG/1; MG/1
1 TABLET, FILM COATED ORAL
Refills: 0 | DISCHARGE

## 2024-02-15 RX ORDER — SERTRALINE 25 MG/1
1 TABLET, FILM COATED ORAL
Refills: 0 | DISCHARGE

## 2024-02-15 RX ORDER — DIPHENHYDRAMINE HCL 50 MG
25 CAPSULE ORAL ONCE
Refills: 0 | Status: DISCONTINUED | OUTPATIENT
Start: 2024-02-15 | End: 2024-02-16

## 2024-02-15 RX ORDER — QUETIAPINE FUMARATE 200 MG/1
25 TABLET, FILM COATED ORAL
Refills: 0 | Status: DISCONTINUED | OUTPATIENT
Start: 2024-02-15 | End: 2024-02-16

## 2024-02-15 RX ORDER — EVOLOCUMAB 140 MG/ML
140 INJECTION, SOLUTION SUBCUTANEOUS
Qty: 0 | Refills: 0 | DISCHARGE

## 2024-02-15 RX ORDER — SODIUM CHLORIDE 9 MG/ML
1000 INJECTION, SOLUTION INTRAVENOUS
Refills: 0 | Status: DISCONTINUED | OUTPATIENT
Start: 2024-02-15 | End: 2024-02-16

## 2024-02-15 RX ORDER — CEFTRIAXONE 500 MG/1
INJECTION, POWDER, FOR SOLUTION INTRAMUSCULAR; INTRAVENOUS
Refills: 0 | Status: DISCONTINUED | OUTPATIENT
Start: 2024-02-15 | End: 2024-02-15

## 2024-02-15 RX ORDER — QUETIAPINE FUMARATE 200 MG/1
1 TABLET, FILM COATED ORAL
Refills: 0 | DISCHARGE

## 2024-02-15 RX ORDER — APIXABAN 2.5 MG/1
1 TABLET, FILM COATED ORAL
Qty: 0 | Refills: 0 | DISCHARGE

## 2024-02-15 RX ORDER — INSULIN LISPRO 100/ML
VIAL (ML) SUBCUTANEOUS
Refills: 0 | Status: DISCONTINUED | OUTPATIENT
Start: 2024-02-15 | End: 2024-02-16

## 2024-02-15 RX ORDER — METOPROLOL TARTRATE 50 MG
1 TABLET ORAL
Refills: 0 | DISCHARGE

## 2024-02-15 RX ORDER — ENOXAPARIN SODIUM 100 MG/ML
40 INJECTION SUBCUTANEOUS EVERY 24 HOURS
Refills: 0 | Status: DISCONTINUED | OUTPATIENT
Start: 2024-02-15 | End: 2024-02-16

## 2024-02-15 RX ORDER — SACUBITRIL AND VALSARTAN 24; 26 MG/1; MG/1
1 TABLET, FILM COATED ORAL
Refills: 0 | Status: DISCONTINUED | OUTPATIENT
Start: 2024-02-15 | End: 2024-02-16

## 2024-02-15 RX ORDER — FUROSEMIDE 40 MG
1 TABLET ORAL
Refills: 0 | DISCHARGE

## 2024-02-15 RX ORDER — GABAPENTIN 400 MG/1
1 CAPSULE ORAL
Qty: 21 | Refills: 0
Start: 2024-02-15 | End: 2024-02-21

## 2024-02-15 RX ORDER — IPRATROPIUM/ALBUTEROL SULFATE 18-103MCG
3 AEROSOL WITH ADAPTER (GRAM) INHALATION
Qty: 0 | Refills: 0 | DISCHARGE

## 2024-02-15 RX ORDER — APIXABAN 2.5 MG/1
0 TABLET, FILM COATED ORAL
Qty: 0 | Refills: 0 | DISCHARGE

## 2024-02-15 RX ORDER — ALBUTEROL 90 UG/1
2 AEROSOL, METERED ORAL EVERY 6 HOURS
Refills: 0 | Status: DISCONTINUED | OUTPATIENT
Start: 2024-02-15 | End: 2024-02-16

## 2024-02-15 RX ORDER — METOPROLOL TARTRATE 50 MG
100 TABLET ORAL
Refills: 0 | Status: DISCONTINUED | OUTPATIENT
Start: 2024-02-15 | End: 2024-02-16

## 2024-02-15 RX ORDER — FUROSEMIDE 40 MG
40 TABLET ORAL
Refills: 0 | Status: DISCONTINUED | OUTPATIENT
Start: 2024-02-15 | End: 2024-02-16

## 2024-02-15 RX ORDER — NITROFURANTOIN MACROCRYSTAL 50 MG
100 CAPSULE ORAL
Refills: 0 | Status: DISCONTINUED | OUTPATIENT
Start: 2024-02-15 | End: 2024-02-15

## 2024-02-15 RX ORDER — POLYETHYLENE GLYCOL 3350 17 G/17G
17 POWDER, FOR SOLUTION ORAL
Refills: 0 | Status: DISCONTINUED | OUTPATIENT
Start: 2024-02-15 | End: 2024-02-16

## 2024-02-15 RX ADMIN — CEFTRIAXONE 100 MILLIGRAM(S): 500 INJECTION, POWDER, FOR SOLUTION INTRAMUSCULAR; INTRAVENOUS at 08:35

## 2024-02-15 RX ADMIN — Medication 4: at 12:24

## 2024-02-15 RX ADMIN — SENNA PLUS 2 TABLET(S): 8.6 TABLET ORAL at 00:17

## 2024-02-15 RX ADMIN — QUETIAPINE FUMARATE 25 MILLIGRAM(S): 200 TABLET, FILM COATED ORAL at 06:21

## 2024-02-15 RX ADMIN — SACUBITRIL AND VALSARTAN 1 TABLET(S): 24; 26 TABLET, FILM COATED ORAL at 06:21

## 2024-02-15 RX ADMIN — Medication 650 MILLIGRAM(S): at 06:23

## 2024-02-15 RX ADMIN — SERTRALINE 25 MILLIGRAM(S): 25 TABLET, FILM COATED ORAL at 06:22

## 2024-02-15 RX ADMIN — Medication 40 MILLIGRAM(S): at 13:16

## 2024-02-15 RX ADMIN — Medication 650 MILLIGRAM(S): at 00:16

## 2024-02-15 RX ADMIN — Medication 2: at 08:41

## 2024-02-15 RX ADMIN — Medication 40 MILLIGRAM(S): at 06:21

## 2024-02-15 RX ADMIN — Medication 650 MILLIGRAM(S): at 12:22

## 2024-02-15 NOTE — DISCHARGE NOTE PROVIDER - NSDCMRMEDTOKEN_GEN_ALL_CORE_FT
Advair Diskus 250 mcg-50 mcg inhalation powder: 1 puff(s) inhaled 2 times a day  Albuterol (Eqv-ProAir HFA) 90 mcg/inh inhalation aerosol: 2 puff(s) inhaled every 6 hours, As Needed -for shortness of breath and/or wheezing   docusate sodium 100 mg oral tablet: 1 tab(s) orally 2 times a day   Eliquis Starter Pack for Treatment of DVT and PE 5 mg oral tablet: 5 tab(s) orally 2 times a day  Entresto 24 mg-26 mg oral tablet: 1 tab(s) orally 2 times a day  furosemide 40 mg oral tablet: 1 tab(s) orally 2 times a day  ipratropium-albuterol 0.5 mg-2.5 mg/3 mLinhalation solution: 3 milliliter(s) inhaled 2 times a day  Macrobid 100 mg oral capsule: 1 cap(s) orally 2 times a day  metoprolol succinate 100 mg oral capsule, extended release: 1 cap(s) orally once a day   metoprolol tartrate 100 mg oral tablet: 1 tab(s) orally 2 times a day  MiraLax oral powder for reconstitution: 17 gram(s) orally 2 times a day, As Needed -for constipation   Repatha 140 mg/mL subcutaneous solution: 140 milligram(s) subcutaneously 2 times a month  senna 8.6 mg oral tablet: 2 tab(s) orally once a day (at bedtime)   Seroquel 25 mg oral tablet: 1 tab(s) orally 2 times a day  sertraline 25 mg oral tablet: 1 tab(s) orally 2 times a day  sertraline 25 mg oral tablet: 1 tab(s) orally every 12 hours  Vascepa 1 g oral capsule: 2 cap(s) orally 2 times a day   Advair Diskus 250 mcg-50 mcg inhalation powder: 1 puff(s) inhaled 2 times a day  Albuterol (Eqv-ProAir HFA) 90 mcg/inh inhalation aerosol: 2 puff(s) inhaled every 6 hours, As Needed -for shortness of breath and/or wheezing   docusate sodium 100 mg oral tablet: 1 tab(s) orally 2 times a day   Eliquis 5 mg oral tablet: 1 tab(s) orally 2 times a day  Entresto 24 mg-26 mg oral tablet: 1 tab(s) orally 2 times a day  furosemide 40 mg oral tablet: 1 tab(s) orally 2 times a day  gabapentin 100 mg oral capsule: 1 cap(s) orally 3 times a day  ipratropium-albuterol 0.5 mg-2.5 mg/3 mLinhalation solution: 3 milliliter(s) inhaled 2 times a day  metoprolol succinate 100 mg oral capsule, extended release: 1 cap(s) orally once a day   metoprolol tartrate 100 mg oral tablet: 1 tab(s) orally 2 times a day  MiraLax oral powder for reconstitution: 17 gram(s) orally 2 times a day, As Needed -for constipation   Repatha 140 mg/mL subcutaneous solution: 140 milligram(s) subcutaneously 2 times a month  senna 8.6 mg oral tablet: 2 tab(s) orally once a day (at bedtime)   Seroquel 25 mg oral tablet: 1 tab(s) orally 2 times a day  sertraline 25 mg oral tablet: 1 tab(s) orally 2 times a day  sertraline 25 mg oral tablet: 1 tab(s) orally every 12 hours  Vascepa 1 g oral capsule: 2 cap(s) orally 2 times a day

## 2024-02-15 NOTE — PHYSICAL THERAPY INITIAL EVALUATION ADULT - PERTINENT HX OF CURRENT PROBLEM, REHAB EVAL
83y Female  w/ PMHx of Afib on eliquis, CHF, COPD, DM, HLD, CVA and Dementia  seen as Trauma Alert  s/p mechanical fall +HT, ?LOC, +AC (eliquis, last taken this morning) with complaint of Right hip pain, external signs of trauma include Right eyebrow ecchymosis. Fall was witnessed by daughter who was present. Trauma assessment in ED: ABCs intact , GCS 14, AAOx2,  PHAM. IMPRESSION: Acute comminuted fracture involving the right superior pubic ramus and minimally displaced fracture of the right inferior pubic ramus; small hemorrhage is noted within the adjacent extraperitoneal space.

## 2024-02-15 NOTE — PROGRESS NOTE ADULT - ATTENDING COMMENTS
Patient seen and examined. Reported having R hip tenderness. Daughter would like patient to go home after PT and physiatry evals.     Injuries: R superior pubic ramus fracture, R inferior pubic ramus fracture, pelvic hematoma    No acute interventions. Follow up with orthopedics outpatient. Can restart eliquis tomorrow for atrial fibrillation.

## 2024-02-15 NOTE — PROGRESS NOTE ADULT - SUBJECTIVE AND OBJECTIVE BOX
GENERAL SURGERY PROGRESS NOTE     PRATEEK LAWRENCE  83[facility]    24 hour events:  JOSE  HDS  admitted to surgery     PHYSICAL EXAM:  General: NAD  HEENT: Normocephalic, R eyebrow ecchymosis, EOMI, PEERLA. no scalp lacerations   Neck: Soft, midline trachea. no c-spine tenderness  Chest: No chest wall tenderness, no subcutaneous emphysema   Cardiac: S1, S2, RRR  Respiratory: Bilateral breath sounds, clear and equal bilaterally  Abdomen: Soft, non-distended, RUQ/RLQ tenderness to palpation, no rebound, no guarding.  Groin: Normal appearing, pelvis stable   Ext:  Moving b/l upper and lower extremities. Palpable Radial b/l UE, b/l DP palpable in LE.   Back: No T/L/S spine tenderness, No palpable runoff/stepoff/deformity        T(F): 98 (02-15-24 @ 07:41), Max: 98.3 (02-14-24 @ 18:21)  HR: 62 (02-15-24 @ 07:41) (59 - 66)  BP: 106/58 (02-15-24 @ 07:41) (97/59 - 169/74)  ABP: --  ABP(mean): --  RR: 18 (02-15-24 @ 07:41) (16 - 18)  SpO2: 95% (02-15-24 @ 07:41) (95% - 98%)    IN'S / OUT's:      LABS  Labs:  CAPILLARY BLOOD GLUCOSE      POCT Blood Glucose.: 154 mg/dL (15 Feb 2024 08:37)  POCT Blood Glucose.: 110 mg/dL (14 Feb 2024 22:28)  POCT Blood Glucose.: 82 mg/dL (14 Feb 2024 19:50)  POCT Blood Glucose.: 106 mg/dL (14 Feb 2024 15:59)                          12.4   6.30  )-----------( 181      ( 14 Feb 2024 16:06 )             36.8       Auto Neutrophil %: 57.1 % (02-14-24 @ 16:06)  Auto Immature Granulocyte %: 1.0 % (02-14-24 @ 16:06)    02-14    141  |  101  |  29<H>  ----------------------------<  109<H>  4.0   |  28  |  1.0      Calcium: 9.1 mg/dL (02-14-24 @ 16:06)      LFTs:             7.5  | 0.6  | 23       ------------------[109     ( 14 Feb 2024 16:06 )  4.1  | x    | 13          Lipase:64     Amylase:x         Lactate, Blood: 1.4 mmol/L (02-14-24 @ 16:06)      Coags:     16.40  ----< 1.43    ( 14 Feb 2024 16:06 )     34.0              Alcohol, Blood: <10 mg/dL (02-14-24 @ 16:06)    Urinalysis Basic - ( 15 Feb 2024 02:35 )    Color: Yellow / Appearance: Cloudy / SG: >1.030 / pH: x  Gluc: x / Ketone: Negative mg/dL  / Bili: Negative / Urobili: 0.2 mg/dL   Blood: x / Protein: Negative mg/dL / Nitrite: Negative   Leuk Esterase: Trace / RBC: 6 /HPF / WBC 1 /HPF   Sq Epi: x / Non Sq Epi: 1 /HPF / Bacteria: Many /HPF

## 2024-02-15 NOTE — DISCHARGE NOTE PROVIDER - HOSPITAL COURSE
2/15: 83y Female  w/ PMHx of Afib on eliquis, CHF, COPD, DM, HLD, CVA and Dementia  seen as Trauma Alert  s/p mechanical fall +HT, ?LOC, +AC (eliquis, last taken this morning) with complaint of Right hip pain, external signs of trauma include Right eyebrow ecchymosis. Fall was witnessed by daughter who was present. Trauma assessment in ED: ABCs intact , GCS 14, AAOx2,  PHAM.     Injuries identified:   - R Eyebrow ecchymosis    CTH: negative      Ortho consulted: thumb spica splint  Follow up outpatient with Dr. Elicia meehan restarted     Evaluated by PT:      2/15: 83y Female  w/ PMHx of Afib on eliquis, CHF, COPD, DM, HLD, CVA and Dementia  seen as Trauma Alert  s/p mechanical fall +HT, ?LOC, +AC (eliquis, last taken this morning) with complaint of Right hip pain, external signs of trauma include Right eyebrow ecchymosis. Fall was witnessed by daughter who was present. Trauma assessment in ED: ABCs intact , GCS 14, AAOx2,  PHAM.     Injuries identified:   - R Eyebrow ecchymosis  - Right inferior and superior pubic rami fx, small extraperitoneal hematoma in pelvis  No blood in urine to suggest bladder injury  CTH: negative      Ortho consulted: thumb spica splint, WBAT and non op management of pubic rami  Follow up outpatient with Dr. Elicia meehan to be started 2/16  Treated with IV rocephin for UTI    Evaluated by PT: walked 10 ft with rolling walker with assistance to get out of bed

## 2024-02-15 NOTE — CONSULT NOTE ADULT - ASSESSMENT
IMPRESSION: Rehab of R pelvic fx, s/p fall / Afib on eliquis, CHF, COPD, DM, HLD, CVA and Dementia      PRECAUTIONS: [   ] Cardiac  [   ] Respiratory  [   ] Seizures [   ] Contact Isolation  [   ] Droplet Isolation  [   ] Other    Weight Bearing Status:     RECOMMENDATION:    Out of Bed to Chair     DVT/Decubiti Prophylaxis    REHAB PLAN:     [ x  ] Bedside P/T 3-5 times a week   [    ]   Bedside O/T  2-3 times a week             [    ] Speech Therapy               [    ]  No Rehab Therapy Indicated   Conditioning/ROM                                    ADL  Bed Mobility                                               Conditioning/ROM  Transfers                                                     Bed Mobility  Sitting /Standing Balance                         Transfers                                        Gait Training                                               Sitting/Standing Balance  Stair Training [   ]Applicable                    Home equipment Eval                                                                        Splinting  [   ] Only      GOALS:   ADL   [    ]   Independent                    Transfers  [  x  ] Independent                          Ambulation  [ x   ] Independent     [  x   ] With device                            [    ]  CG                                                         [    ]  CG                                                                  [    ] CG                            [    ] Min A                                                   [    ] Min A                                                              [    ] Min  A          DISCHARGE PLAN:   [    ]  Good candidate for Intensive Rehabilitation/Hospital based                                             Will tolerate 3hrs Intensive Rehab Daily                                       [ x   ]  Short Term Rehab in Skilled Nursing Facility                           vs            [   x  ]  Home with Outpatient or  services                                         [     ]  Possible Candidate for Intensive Hospital based Rehab                                       
  ASSESSMENT:  83y Female  w/ PMHx of Afib on eliquis, CHF, COPD, DM, HLD, CVA and Dementia  seen as Trauma Alert  s/p mechanical fall +HT, ?LOC, +AC (eliquis, last taken this morning) with complaint of Right hip pain, external signs of trauma include Right eyebrow ecchymosis. Fall was witnessed by daughter who was present. Trauma assessment in ED: ABCs intact , GCS 14, AAOx2,  PHAM.     Injuries identified:   - R Eyebrow ecchymosis  -   -     PLAN:   - Trauma Labs: (CBC, BMP, Coags, T&S, UA, EtOH level)  Additional studies:  EKG      Trauma Imaging to include the following: pending  - CXR, Pelvic Xray  - CT Head,  CT C-spine,  CT Chest, CT Abd/Pelvis  - Extremity films: L shoulder     Additional consultations: pending  - Neurosurgery  - Orthopedics  - OMFS  - PT/Rehab/SW  - Hospitalist/Medicine     Disposition pending results of above labs and imaging  Above plan discussed with Trauma attending, Dr. Patricio, patient, patient family, and ED team  --------------------------------------------------------------------------------------  02-14-24 @ 16:20    TRAUMA SENIOR SPECTRA: [id number]  TRAUMA TEAM SPECTRA: [id number]

## 2024-02-15 NOTE — DISCHARGE NOTE PROVIDER - NSDCFUADDINST_GEN_ALL_CORE_FT
- If you develop severe pain, return to the ED or call 911  - For pain, you may take over the counter Tylenol and/or Motrin. Follow instructions on the label.   - Follow up with Dr. Rubi, orthopedics, [address] [place] [address]. Phone number [phone] for scheduling/appointment, in 1 week.

## 2024-02-15 NOTE — PHYSICAL THERAPY INITIAL EVALUATION ADULT - GENERAL OBSERVATIONS, REHAB EVAL
11:04-11:30am pt encountered supine in bed, A & O x 1 to names only, confused but in NAD, follow simple commands, German speaking, daughter Jennifer present at b/s to provide translation. Pt requires Min A in bed mobility, Max A OOB transfer mobility and ambulated 10 ft Max A using RW with unsteady gait/+antalgic gait. Pt demonstrated limited mobility secondary to BLE weakness R>L, impaired balance and c/o pain to Rt hip area during mobility. Pt unable to quantify pain scale secondary to cognition. Pt left seated OOB in a recliner chair, applied heating pad to Rt hip area. Pt will benefit from skilled PT 3-5x/wk for thera ex, functional mobility, balance and gait training to improve mobility status and return to previous level of function.

## 2024-02-15 NOTE — DISCHARGE NOTE PROVIDER - CARE PROVIDER_API CALL
Tray Rubi  Orthopaedic Surgery  [address] [facility] [place] [address]  Phone: [phone]  Fax: [phone]  Follow Up Time: 1 week

## 2024-02-15 NOTE — PROGRESS NOTE ADULT - ASSESSMENT
A/P:  83y Female  w/ PMHx of Afib on eliquis, CHF, COPD, DM, HLD, CVA and Dementia  seen as Trauma Alert  s/p mechanical fall +HT, ?LOC, +AC (eliquis, last taken this morning) with complaint of Right hip pain, external signs of trauma include Right eyebrow ecchymosis. Fall was witnessed by daughter who was present. Trauma assessment in ED: ABCs intact , GCS 14, AAOx2,  PHAM.     Injuries identified:   - R Eyebrow ecchymosis    PLAN:   -Ortho: no acute surgical intervention indicated  -WBAT  - restart DVT ppx  - c/w holding eliquis  - UA neg (per attending)     TAP (Trauma, Acute care, Pediatrics) Spectra [id number]

## 2024-02-15 NOTE — PHYSICAL THERAPY INITIAL EVALUATION ADULT - LEVEL OF CONSCIOUSNESS, REHAB EVAL
Left message for patient to call office back on mother's phone number.    RE: chalmydia, gonorrhea, and needs Vit D3 2000 IU daily.   confused

## 2024-02-15 NOTE — DISCHARGE NOTE PROVIDER - NSDCCPCAREPLAN_GEN_ALL_CORE_FT
PRINCIPAL DISCHARGE DIAGNOSIS  Diagnosis: Pelvic fracture  Assessment and Plan of Treatment:      PRINCIPAL DISCHARGE DIAGNOSIS  Diagnosis: Pelvic fracture  Assessment and Plan of Treatment: You had a fall and were found to have pelvic fractures.   You were seen by orthopedics who are not performing surgery and recommend you bear weight as tolerated and do physical therapy to gain function.   You should continue all home medications as previously prescribed.   Do not restart Eliquis until 2/16.   If you are in pain- you may take tylenol over the counter every 4-6 hours as needed.   Gabapentin was sent to [facility] in [facility] for additional pain control.   Please follow up with Dr Rubi (orthopedics) in 2 weeks. Call office to make appointment.   If you experience severe pain, numbness/tingling in extremities, problems with urination- call office or report to nearest Emergency Department.

## 2024-02-15 NOTE — DISCHARGE NOTE NURSING/CASE MANAGEMENT/SOCIAL WORK - PATIENT PORTAL LINK FT
You can access the FollowMyHealth Patient Portal offered by [facility] by registering at the following website: [email or web address]. By joining [employer]’s FollowMyHealth portal, you will also be able to view your health information using other applications (apps) compatible with our system.

## 2024-02-15 NOTE — CONSULT NOTE ADULT - SUBJECTIVE AND OBJECTIVE BOX
HPI:    83y Female  w/ PMHx of Afib on eliquis, CHF, COPD, DM, HLD, CVA and Dementia  seen as Trauma Alert  s/p mechanical fall +HT, ?LOC, +AC (eliquis, last taken this morning) with complaint of Right hip pain, external signs of trauma include Right eyebrow ecchymosis. Fall was witnessed by daughter who was present. Trauma assessment in ED: ABCs intact , GCS 14, AAOx2,  PHAM.     Fatigue: How much time during the previous 4 weeks did you feel tired?   All or most of the time [   ] Yes (1pt)    [x  ] No  (0pts)  Resistance: Do you have any difficulty walking up 10 steps alone without resting and without aids? [   ] Yes (1pt)    [ x ] No  (0pts)  Ambulation: Do you have any difficulty walking several hundred yards alone without aids? [ x  ] Yes (1pt)    [  ] No  (0pts)  Illness: how many illnesses do you have out of list of 11 total? [   ] 5 or more (1pt) [ x ] < 5 (0pts)  Loss of weight: Have you had weight loss of 5% or more? [   ] Yes (1pt)    [x  ] No  (0pts)    Total Score: 1    Score 1-2: Consult medical comangement  Score 3-4: Consult Geriatric service   Score 5: Consult Palliative service [phone]    Tony Gibson G, Gopi SAAB, Whit CONNOLLY, Lana B. Frality: toward a clinical definition. J AM Med Dir Assoc. 2008; 9 (2): 71-72  Vinny JE, Phill TK, Osborn DK. A simple frailty questionnaire (FRAIL) predicts outcomes in middle aged  Americans. J Nutr Health Aging. 2012; 16 (7): 601-608     Imaging demonstrates R sided LC1 pelvis fracture.     - No acute Orthopaedic intervention  - WBAT BLE      PAST MEDICAL & SURGICAL HISTORY:  Afib  on eliquis      Diabetes      Asthma      CAD (coronary atherosclerotic disease)      Diastolic heart failure      COPD exacerbation      Hyperlipidemia      CVA (cerebrovascular accident)      Type 2 diabetes mellitus      Dementia      S/P appendectomy      Pacemaker          Hospital Course:    TODAY'S SUBJECTIVE & REVIEW OF SYMPTOMS:     Constitutional WNL   Cardio WNL   Resp WNL   GI WNL  Heme WNL  Endo WNL  Skin WNL  MSK right hip pain   Neuro WNL  Cognitive WNL  Psych WNL      MEDICATIONS  (STANDING):  acetaminophen     Tablet .. 650 milliGRAM(s) Oral every 6 hours  dextrose 5%. 1000 milliLiter(s) (50 mL/Hr) IV Continuous <Continuous>  dextrose 5%. 1000 milliLiter(s) (100 mL/Hr) IV Continuous <Continuous>  dextrose 50% Injectable 25 Gram(s) IV Push once  dextrose 50% Injectable 25 Gram(s) IV Push once  dextrose 50% Injectable 12.5 Gram(s) IV Push once  enoxaparin Injectable 40 milliGRAM(s) SubCutaneous every 24 hours  furosemide    Tablet 40 milliGRAM(s) Oral two times a day  glucagon  Injectable 1 milliGRAM(s) IntraMuscular once  insulin lispro (ADMELOG) corrective regimen sliding scale   SubCutaneous three times a day before meals  metoprolol tartrate 100 milliGRAM(s) Oral two times a day  polyethylene glycol 3350 17 Gram(s) Oral daily  QUEtiapine 25 milliGRAM(s) Oral two times a day  sacubitril 24 mG/valsartan 26 mG 1 Tablet(s) Oral two times a day  senna 2 Tablet(s) Oral at bedtime  sertraline 25 milliGRAM(s) Oral every 12 hours  tiotropium 2.5 MICROgram(s) Inhaler 2 Puff(s) Inhalation daily    MEDICATIONS  (PRN):  albuterol    90 MICROgram(s) HFA Inhaler 2 Puff(s) Inhalation every 6 hours PRN Shortness of Breath and/or Wheezing  albuterol    90 MICROgram(s) HFA Inhaler 2 Puff(s) Inhalation every 6 hours PRN Shortness of Breath and/or Wheezing  dextrose Oral Gel 15 Gram(s) Oral once PRN Blood Glucose LESS THAN 70 milliGRAM(s)/deciliter  diphenhydrAMINE Injectable 25 milliGRAM(s) IV Push once PRN Rash and/or Itching  polyethylene glycol 3350 17 Gram(s) Oral two times a day PRN Constipation      FAMILY HISTORY:      Allergies    oxycodone (Pruritus)  Augmentin (Rash)    Intolerances        SOCIAL HISTORY:    [  ] Etoh  [  ] Smoking  [  ] Substance abuse     Home Environment:  [ x  ] Home Alone  [   ] Lives with Family  [  x ] Home Health Aid - 24hr care     Dwelling:  [ x  ] Apartment  [   ] Private House  [   ] Adult Home  [   ] Skilled Nursing Facility      [   ] Short Term  [   ] Long Term  [   ] Stairs       Elevator [ x  ]    FUNCTIONAL STATUS PTA: (Check all that apply)  Ambulation: [  x  ]Independent    [   ] Dependent     [   ] Non-Ambulatory  Assistive Device: [   ] SA Cane  [   ]  Q Cane  [ x  ] Walker  [   ]  Wheelchair  ADL : [   ] Independent  [ x   ]  Dependent       Vital Signs Last 24 Hrs  T(C): 36.7 (15 Feb 2024 07:41), Max: 36.8 (14 Feb 2024 18:21)  T(F): 98 (15 Feb 2024 07:41), Max: 98.3 (14 Feb 2024 18:21)  HR: 62 (15 Feb 2024 07:41) (59 - 66)  BP: 106/58 (15 Feb 2024 07:41) (97/59 - 169/74)  BP(mean): 77 (15 Feb 2024 07:41) (73 - 77)  RR: 18 (15 Feb 2024 07:41) (16 - 18)  SpO2: 95% (15 Feb 2024 07:41) (95% - 98%)    Parameters below as of 15 Feb 2024 07:41  Patient On (Oxygen Delivery Method): room air          PHYSICAL EXAM: Awake & Alert  GENERAL: NAD  HEAD:  Normocephalic  CHEST/LUNG: Clear   HEART: S1S2+  ABDOMEN: Soft, Nontender  EXTREMITIES:  no calf tenderness    NERVOUS SYSTEM:  Cranial Nerves 2-12 intact [   ] Abnormal  [   ]  ROM: WFL all extremities [   ]  Abnormal [ x  ]limitd RLE  Motor Strength: WFL all extremities  [   ]  Abnormal [x  ]limited RLE  Sensation: intact to light touch [  x ] Abnormal [   ]    FUNCTIONAL STATUS:  Bed Mobility: Independent [   ]  Supervision [   ]  Needs Assistance [  x ]  N/A [   ]  Transfers: Independent [   ]  Supervision [   ]  Needs Assistance [ x  ]  N/A [   ]   Ambulation: Independent [   ]  Supervision [   ]  Needs Assistance [ x  ]  N/A [   ]  ADL: Independent [   ] Requires Assistance [   ] N/A [   ]      LABS:                        12.4   6.30  )-----------( 181      ( 14 Feb 2024 16:06 )             36.8     02-14    141  |  101  |  29<H>  ----------------------------<  109<H>  4.0   |  28  |  1.0    Ca    9.1      14 Feb 2024 16:06    TPro  7.5  /  Alb  4.1  /  TBili  0.6  /  DBili  x   /  AST  23  /  ALT  13  /  AlkPhos  109  02-14    PT/INR - ( 14 Feb 2024 16:06 )   PT: 16.40 sec;   INR: 1.43 ratio         PTT - ( 14 Feb 2024 16:06 )  PTT:34.0 sec  Urinalysis Basic - ( 15 Feb 2024 02:35 )    Color: Yellow / Appearance: Cloudy / SG: >1.030 / pH: x  Gluc: x / Ketone: Negative mg/dL  / Bili: Negative / Urobili: 0.2 mg/dL   Blood: x / Protein: Negative mg/dL / Nitrite: Negative   Leuk Esterase: Trace / RBC: 6 /HPF / WBC 1 /HPF   Sq Epi: x / Non Sq Epi: 1 /HPF / Bacteria: Many /HPF        RADIOLOGY & ADDITIONAL STUDIES:  
TRAUMA ACTIVATION LEVEL:   ALERT   ACTIVATED BY: ED**  INTUBATED: NO**      MECHANISM OF INJURY:   [] Blunt     [] MVC	  [x] Fall	  [] Pedestrian Struck	  [] Motorcycle     [] Assault     [] Bicycle collision    [] Sports injury    [] Penetrating    [] Gun Shot Wound      [] Stab Wound    GCS: 14 	E: 4	V: 4	M: 6    HPI:    83y Female  w/ PMHx of Afib on eliquis, CHF, COPD, DM, HLD, CVA and Dementia  seen as Trauma Alert  s/p mechanical fall +HT, ?LOC, +AC (eliquis, last taken this morning) with complaint of Right hip pain, external signs of trauma include Right eyebrow ecchymosis. Fall was witnessed by daughter who was present. Trauma assessment in ED: ABCs intact , GCS 14, AAOx2,  PHAM.     Fatigue: How much time during the previous 4 weeks did you feel tired?   All or most of the time [   ] Yes (1pt)    [x  ] No  (0pts)  Resistance: Do you have any difficulty walking up 10 steps alone without resting and without aids? [   ] Yes (1pt)    [ x ] No  (0pts)  Ambulation: Do you have any difficulty walking several hundred yards alone without aids? [ x  ] Yes (1pt)    [  ] No  (0pts)  Illness: how many illnesses do you have out of list of 11 total? [   ] 5 or more (1pt) [ x ] < 5 (0pts)  Loss of weight: Have you had weight loss of 5% or more? [   ] Yes (1pt)    [x  ] No  (0pts)    Total Score: 1    Score 1-2: Consult medical comangement  Score 3-4: Consult Geriatric service   Score 5: Consult Palliative service [phone]    Tony Gibson G, Gopi SAAB, Whit JE, Lana B. Frality: toward a clinical definition. J AM Med Dir Assoc. 2008; 9 (2): 71-72  Vinny JE, Phill TK, Anurag DK. A simple frailty questionnaire (FRAIL) predicts outcomes in middle aged  Americans. J Nutr Health Aging. 2012; 16 (7): 601-608    PAST MEDICAL & SURGICAL HISTORY:  Afib  on eliquis      Diabetes      Asthma      CAD (coronary atherosclerotic disease)      Diastolic heart failure      COPD exacerbation      Hyperlipidemia      CVA (cerebrovascular accident)      Type 2 diabetes mellitus      Dementia      S/P appendectomy      Pacemaker          Allergies    oxycodone (Pruritus)  Augmentin (Rash)    Intolerances        Home Medications:  ertapenem: 0.5 gram(s) injectable once a day until 10/18 (06 Oct 2022 14:21)  ipratropium-albuterol 0.5 mg-2.5 mg/3 mLinhalation solution: 3 milliliter(s) inhaled 2 times a day (10 Sep 2019 01:39)  sertraline 25 mg oral tablet: 1 tab(s) orally every 12 hours (14 May 2022 11:58)      ROS: 10-system review is otherwise negative except HPI above.      Primary Survey:    A - airway intact  B - bilateral breath sounds and good chest rise  C - palpable pulses in all extremities  D - GCS 15 on arrival, PHAM  Exposure obtained    Vital Signs Last 24 Hrs  T(C): 36.7 (14 Feb 2024 15:45), Max: 36.7 (14 Feb 2024 15:45)  T(F): 98 (14 Feb 2024 15:45), Max: 98 (14 Feb 2024 15:45)  HR: 60 (14 Feb 2024 15:45) (60 - 60)  BP: 167/81 (14 Feb 2024 15:45) (167/81 - 167/81)  BP(mean): --  RR: 16 (14 Feb 2024 15:45) (16 - 16)  SpO2: 96% (14 Feb 2024 15:45) (96% - 96%)    Parameters below as of 14 Feb 2024 15:45  Patient On (Oxygen Delivery Method): room air        Secondary Survey:   General: NAD  HEENT: Normocephalic, R eyebrow ecchymosis, EOMI, PEERLA. no scalp lacerations   Neck: Soft, midline trachea. no c-spine tenderness  Chest: No chest wall tenderness, no subcutaneous emphysema   Cardiac: S1, S2, RRR  Respiratory: Bilateral breath sounds, clear and equal bilaterally  Abdomen: Soft, non-distended, RUQ/RLQ tenderness to palpation, no rebound, no guarding.  Groin: Normal appearing, pelvis stable   Ext:  Moving b/l upper and lower extremities. Palpable Radial b/l UE, b/l DP palpable in LE.   Back: No T/L/S spine tenderness, No palpable runoff/stepoff/deformity      ACCESS / DEVICES:  [ x] Peripheral IV  [ ] Central Venous Line	[ ] R	[ ] L	[ ] IJ	[ ] Fem	[ ] SC	Placed:   [ ] Arterial Line		[ ] R	[ ] L	[ ] Fem	[ ] Rad	[ ] Ax	Placed:   [ ] PICC:					[ ] Mediport  [ ] Urinary Catheter,  Date Placed:   [ ] Chest tube: [ ] Right, [ ] Left  [ ] DONALD/Aleksandar Drains    Labs:  CAPILLARY BLOOD GLUCOSE      POCT Blood Glucose.: 106 mg/dL (14 Feb 2024 15:59)                  LFTs:         Coags:                        RADIOLOGY & ADDITIONAL STUDIES:  ---------------------------------------------------------------------------------------  
ORTHOPAEDIC SURGERY CONSULT NOTE    Reason for Consult: LC1 pelvis fracture    HPI: 83yFemale with PMHx of A-fib (on Eliquis), CHF, COPD, DM, HLD, CVA and Dementia who presented as trauma alert s/p mechanical fall +HT, ?LOC, +AC (Eliquis, last taken this morning). Fall was witnessed by daughter who was present. Trauma pan scan demonstrated LC1 pelvis fracture, Ortho was consulted. Patient lives at home with family. In ED, complains of R sided groin pain. No obvious deformities, moving all extremities spontaneously.     PAST MEDICAL & SURGICAL HISTORY:  Afib  on eliquis  Diabetes  Asthma  CAD (coronary atherosclerotic disease)  Diastolic heart failure  COPD exacerbation  Hyperlipidemia  CVA (cerebrovascular accident)  Dementia  S/P appendectomy  Pacemaker  Allergies: oxycodone (Pruritus)  Augmentin (Rash)    Medications: HYDROmorphone  Injectable 0.5 milliGRAM(s) IV Push Once  sodium chloride 0.9% Bolus 250 milliLiter(s) IV Bolus once    PHYSICAL EXAM:  Vital Signs Last 24 Hrs  T(C): 36.8 (14 Feb 2024 18:21), Max: 36.8 (14 Feb 2024 18:21)  T(F): 98.3 (14 Feb 2024 18:21), Max: 98.3 (14 Feb 2024 18:21)  HR: 60 (14 Feb 2024 18:21) (60 - 60)  BP: 142/71 (14 Feb 2024 18:21) (142/71 - 167/81)  BP(mean): --  RR: 16 (14 Feb 2024 18:21) (16 - 16)  SpO2: 98% (14 Feb 2024 18:21) (96% - 98%)    Parameters below as of 14 Feb 2024 18:21  Patient On (Oxygen Delivery Method): room air    Physical exam:  Awake, alert  Non-labored breathing    BUE  Skin intact  No swelling/erythema/ecchymosis noted  No TTP, stepoff, deformity   Moves extremities spontaneously   Motor/ sensation grossly intact  WWP distally    Pelvis:  (+) R sided groin TTP    BLE  Skin intact  No swelling/erythema/ecchymosis noted  No deformity/laceration/abrasion noted  Leg lengths equal  Moves extremities spontaneously   Compartments soft and compressible, no pain w/ passive stretch of digits  SILT grossly intact distally   Firing TA/EHL/FHL/GS  DP pulse 2+, cap refill <2    Labs:                        12.4   6.30  )-----------( 181      ( 14 Feb 2024 16:06 )             36.8     02-14    141  |  101  |  29<H>  ----------------------------<  109<H>  4.0   |  28  |  1.0    Ca    9.1      14 Feb 2024 16:06    TPro  7.5  /  Alb  4.1  /  TBili  0.6  /  DBili  x   /  AST  23  /  ALT  13  /  AlkPhos  109  02-14    PT/INR - ( 14 Feb 2024 16:06 )   PT: 16.40 sec;   INR: 1.43 ratio         PTT - ( 14 Feb 2024 16:06 )  PTT:34.0 sec    Imaging:   -radiographs of pelvis, R hip, R hand, R shoulder demonstrate: R sided LC1 pelvis fracture, chronic degenerative changes, otherwise no other acute fracture/ dislocation   -CT pelvis demonstrates: LC1 pelvis fracture, no other associated fracture/ dislocation     A/P: 83y Female here after mechanical fall while on Eliquis. Imaging demonstrates R sided LC1 pelvis fracture.     - No acute Orthopaedic intervention  - WBAT BLE  - Pain control per primary team  - DVT PPx per primary  - PT/OT    - If discharged, patient should follow up with Dr. Rubi at [address] [place] [address]. Phone number [phone] for scheduling/appointment.  - Patient should be discharged on 6 weeks (from surgery date) of appropriate DVT prophylaxis due to their decreased functional/ambulation status after lower extremity surgery. Orthopaedic preference would be Aspirin 81 mg BID if there are no contraindications. If patient is already on home anticoagulation, they may continue home anticoagulation medication instead of aspirin. If patient is going to inpatient rehab/nursing facility, and they plan for DVT PPx with SQH/LVX, they may be placed on that instead of aspirin.

## 2024-02-22 DIAGNOSIS — I25.10 ATHEROSCLEROTIC HEART DISEASE OF NATIVE CORONARY ARTERY WITHOUT ANGINA PECTORIS: ICD-10-CM

## 2024-02-22 DIAGNOSIS — I50.32 CHRONIC DIASTOLIC (CONGESTIVE) HEART FAILURE: ICD-10-CM

## 2024-02-22 DIAGNOSIS — Z95.0 PRESENCE OF CARDIAC PACEMAKER: ICD-10-CM

## 2024-02-22 DIAGNOSIS — F03.90 UNSPECIFIED DEMENTIA WITHOUT BEHAVIORAL DISTURBANCE: ICD-10-CM

## 2024-02-22 DIAGNOSIS — Z79.51 LONG TERM (CURRENT) USE OF INHALED STEROIDS: ICD-10-CM

## 2024-02-22 DIAGNOSIS — S32.511A FRACTURE OF SUPERIOR RIM OF RIGHT PUBIS, INITIAL ENCOUNTER FOR CLOSED FRACTURE: ICD-10-CM

## 2024-02-22 DIAGNOSIS — J44.9 CHRONIC OBSTRUCTIVE PULMONARY DISEASE, UNSPECIFIED: ICD-10-CM

## 2024-02-22 DIAGNOSIS — E11.9 TYPE 2 DIABETES MELLITUS WITHOUT COMPLICATIONS: ICD-10-CM

## 2024-02-22 DIAGNOSIS — S00.11XA CONTUSION OF RIGHT EYELID AND PERIOCULAR AREA, INITIAL ENCOUNTER: ICD-10-CM

## 2024-02-22 DIAGNOSIS — Z79.01 LONG TERM (CURRENT) USE OF ANTICOAGULANTS: ICD-10-CM
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
## 2024-02-22 DIAGNOSIS — I48.91 UNSPECIFIED ATRIAL FIBRILLATION: ICD-10-CM

## 2024-02-22 DIAGNOSIS — Z79.899 OTHER LONG TERM (CURRENT) DRUG THERAPY: ICD-10-CM

## 2024-02-22 DIAGNOSIS — E78.5 HYPERLIPIDEMIA, UNSPECIFIED: ICD-10-CM

## 2024-02-22 DIAGNOSIS — N39.0 URINARY TRACT INFECTION, SITE NOT SPECIFIED: ICD-10-CM

## 2024-02-22 DIAGNOSIS — Z86.73 PERSONAL HISTORY OF TRANSIENT ISCHEMIC ATTACK (TIA), AND CEREBRAL INFARCTION WITHOUT RESIDUAL DEFICITS: ICD-10-CM

## 2024-02-22 DIAGNOSIS — Z88.5 ALLERGY STATUS TO NARCOTIC AGENT: ICD-10-CM
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
## 2024-02-22 DIAGNOSIS — Z88.1 ALLERGY STATUS TO OTHER ANTIBIOTIC AGENTS STATUS: ICD-10-CM

## 2024-03-13 NOTE — PHYSICAL THERAPY INITIAL EVALUATION ADULT - RANGE OF MOTION EXAMINATION, REHAB EVAL
None bilateral upper extremity ROM was WFL (within functional limits)/bilateral lower extremity ROM was WFL (within functional limits)

## 2024-04-19 NOTE — PHYSICAL THERAPY INITIAL EVALUATION ADULT - GAIT DISTANCE, PT EVAL
[facility] EMERGENCY DEPT    Pt Name: Sami Green  MRN: [id number]  Birthdate 1950  Date of evaluation: 4/18/2024  Resident Physician: Sherrie Braden MD  Supervising Physician: Dr. Xiomara Ch MD    CHIEF COMPLAINT       Chief Complaint   Patient presents with    Dizziness     Nausea & vomiting     HISTORY OF PRESENT ILLNESS   Sami Green is a 73 y.o. male with PMHx of BPH, prediabetes, hypertension  who presents to the emergency department for evaluation of dizziness, nausea and vomiting.    Patient comes to the ED from urgent care.  Went to urgent care earlier today for these presenting symptoms and sent to ED for further evaluation.  States that around 5:30 PM he was on the couch watching TV with his wife when he got a phone call.  Patient got up to go  the phone.  States that he got very dizzy and felt like the room was spinning around him.  Patient states that when he sat back down, some of the dizziness subsided however then started to have multiple episodes of dry heaving and vomiting.  States that the dizziness is exacerbated by positional change.    Patient denies any previous history of a stroke.  Denies any recent illnesses.  Denies any chest pain shortness of breath fever or chills.    The patient has no other acute complaints at this time.    Review of Systems    Negative Except as Documented Above.    PASTMEDICAL HISTORY     Past Medical History:   Diagnosis Date    BPH (benign prostatic hyperplasia)     COVID-19 11/12/2020    Hepatitis B     Hyperlipidemia     Hypertension 06/08/2022    Neuropathy 2021    Psoriasis        Patient Active Problem List   Diagnosis Code    Benign prostatic hyperplasia with nocturia N40.1, R35.1    Mixed hyperlipidemia E78.2    Psoriasis L40.9    Pre-diabetes R73.03    Primary hypertension I10     SURGICAL HISTORY       Past Surgical History:   Procedure Laterality Date    BACK SURGERY  2015    Herniated disk     MOUTH SURGERY  1968       CURRENT  25 feet

## 2024-04-24 ENCOUNTER — APPOINTMENT (OUTPATIENT)
Dept: CARDIOLOGY | Facility: CLINIC | Age: 84
End: 2024-04-24
Payer: MEDICARE

## 2024-04-24 DIAGNOSIS — J44.9 CHRONIC OBSTRUCTIVE PULMONARY DISEASE, UNSPECIFIED: ICD-10-CM

## 2024-04-24 DIAGNOSIS — E11.9 TYPE 2 DIABETES MELLITUS W/OUT COMPLICATIONS: ICD-10-CM

## 2024-04-24 DIAGNOSIS — I48.91 UNSPECIFIED ATRIAL FIBRILLATION: ICD-10-CM

## 2024-04-24 DIAGNOSIS — I10 ESSENTIAL (PRIMARY) HYPERTENSION: ICD-10-CM

## 2024-04-24 DIAGNOSIS — I50.9 HEART FAILURE, UNSPECIFIED: ICD-10-CM

## 2024-04-24 DIAGNOSIS — E78.1 PURE HYPERGLYCERIDEMIA: ICD-10-CM

## 2024-04-24 PROCEDURE — 99214 OFFICE O/P EST MOD 30 MIN: CPT

## 2024-04-24 PROCEDURE — G2211 COMPLEX E/M VISIT ADD ON: CPT

## 2024-04-24 PROCEDURE — 93280 PM DEVICE PROGR EVAL DUAL: CPT

## 2024-04-24 NOTE — PHYSICAL EXAM
[No Acute Distress] : no acute distress [Frail] : frail [Ill-Appearing] : ill-appearing [Normal Conjunctiva] : normal conjunctiva [Normal Venous Pressure] : normal venous pressure [No Carotid Bruit] : no carotid bruit [Normal S1, S2] : normal S1, S2 [No Rub] : no rub [No Gallop] : no gallop [Clear Lung Fields] : clear lung fields [Good Air Entry] : good air entry [No Respiratory Distress] : no respiratory distress  [Soft] : abdomen soft [Non Tender] : non-tender [No Masses/organomegaly] : no masses/organomegaly [Normal Bowel Sounds] : normal bowel sounds [Abnormal Gait] : abnormal gait [No Cyanosis] : no cyanosis [No Clubbing] : no clubbing [No Rash] : no rash [No Varicosities] : no varicosities [No Skin Lesions] : no skin lesions [Moves all extremities] : moves all extremities [No Focal Deficits] : no focal deficits [Normal Speech] : normal speech [Alert and Oriented] : alert and oriented [Normal memory] : normal memory [de-identified] : NC/AT [de-identified] : 2/6 systolic murmur [de-identified] : trace edema [de-identified] : uses waker [de-identified] : trace edema

## 2024-04-24 NOTE — HISTORY OF PRESENT ILLNESS
[FreeTextEntry1] : 85 y/o female with history of HTN, DL, DM, COPD/asthma, PAF (CHADS-VASC 7), CHB, s/p PPM, arthritis, CRI, TIA, CHF (HFpEF), hospitalized a few months ago for CHF, UTI/urosepsis, possible ischemic changes on ECG. Presents for f/u. Patient was seen in the hospital by Dr. Tello and Roseline, but would like to switch to our group. Was seen by Dr. Junior for EP. Reports episodes of chest discomfort and dyspnea. No syncope. No edema with current dose of Lasix and metolazone. BP is controlled. Echo last visit - LVH, preserved LV EF.  had hypokalemia on last labs - has repeat in 2 weeks. Did not tolerate higher dose of Entresto due to hypotension. Reports episodes of BP in 80's systolic. HR is controlled. Occasional episodes of AF. Device check today - PAF. Had a prior episode of PMT, no further events, sensitivity was adjusted.

## 2024-04-24 NOTE — ASSESSMENT
[FreeTextEntry1] : 85 y/o female with history as above Multiple medical problems.  Plan:  CAD Anginal symptoms Patient is not interested in ischemic w/u Reports she would not agree for cath/PCI even if abnormal stress test. Will treat medically C/w nitroglycerin patch. C/w BB Secondary prevention.  CHF 2D echo reviewed, discussed Valvular heart disease - mild to moderate HFpEF C/w Lasix and metolazone. C/w Entresto, but decrease the dose to 24/25  AF High CHADS-VASC score C/w BB, c/w anticoagulation If CRI progresses, will need to decrease the dose to 2.5 - f/u repeat labs.  CHB S/p PPM EP follow-up Device interrogated today, results discussed with the patient and daughter. Sensitivity changed.  DM, lipid control F/u in 3 months.

## 2024-07-24 ENCOUNTER — APPOINTMENT (OUTPATIENT)
Dept: CARDIOLOGY | Facility: CLINIC | Age: 84
End: 2024-07-24

## 2024-08-14 ENCOUNTER — APPOINTMENT (OUTPATIENT)
Dept: CARDIOLOGY | Facility: CLINIC | Age: 84
End: 2024-08-14
Payer: MEDICARE

## 2024-08-14 VITALS
HEIGHT: 60 IN | WEIGHT: 154 LBS | SYSTOLIC BLOOD PRESSURE: 130 MMHG | HEART RATE: 102 BPM | DIASTOLIC BLOOD PRESSURE: 80 MMHG | BODY MASS INDEX: 30.23 KG/M2

## 2024-08-14 DIAGNOSIS — I48.91 UNSPECIFIED ATRIAL FIBRILLATION: ICD-10-CM

## 2024-08-14 DIAGNOSIS — I50.9 HEART FAILURE, UNSPECIFIED: ICD-10-CM

## 2024-08-14 DIAGNOSIS — E11.9 TYPE 2 DIABETES MELLITUS W/OUT COMPLICATIONS: ICD-10-CM

## 2024-08-14 DIAGNOSIS — I44.2 ATRIOVENTRICULAR BLOCK, COMPLETE: ICD-10-CM

## 2024-08-14 DIAGNOSIS — I10 ESSENTIAL (PRIMARY) HYPERTENSION: ICD-10-CM

## 2024-08-14 PROCEDURE — 93000 ELECTROCARDIOGRAM COMPLETE: CPT

## 2024-08-14 PROCEDURE — 99214 OFFICE O/P EST MOD 30 MIN: CPT

## 2024-08-14 PROCEDURE — G2211 COMPLEX E/M VISIT ADD ON: CPT

## 2024-08-14 NOTE — PHYSICAL EXAM
[No Acute Distress] : no acute distress [Frail] : frail [Ill-Appearing] : ill-appearing [Normal Conjunctiva] : normal conjunctiva [Normal Venous Pressure] : normal venous pressure [No Carotid Bruit] : no carotid bruit [No Rub] : no rub [No Gallop] : no gallop [Murmur] : murmur [Clear Lung Fields] : clear lung fields [Good Air Entry] : good air entry [No Respiratory Distress] : no respiratory distress  [Soft] : abdomen soft [Non Tender] : non-tender [No Masses/organomegaly] : no masses/organomegaly [Normal Bowel Sounds] : normal bowel sounds [Abnormal Gait] : abnormal gait [No Cyanosis] : no cyanosis [No Clubbing] : no clubbing [No Varicosities] : no varicosities [No Rash] : no rash [No Skin Lesions] : no skin lesions [Moves all extremities] : moves all extremities [No Focal Deficits] : no focal deficits [Normal Speech] : normal speech [Alert and Oriented] : alert and oriented [Normal memory] : normal memory [de-identified] : NC/AT [de-identified] : 2/6 systolic murmur [de-identified] : irreg S1-S2, trace edema [de-identified] : uses waker [de-identified] : trace edema

## 2024-08-14 NOTE — HISTORY OF PRESENT ILLNESS
[FreeTextEntry1] : 83 y/o female with history of HTN, DL, DM, COPD/asthma, PAF (CHADS-VASC 7), CHB, s/p PPM, arthritis, CRI, TIA, CHF (HFpEF), hospitalized a few months ago for CHF, UTI/urosepsis, possible ischemic changes on ECG. Presents for f/u. Patient was seen in the hospital by Dr. Tello and Roseline, but would like to switch to our group. Was seen by Dr. Junoir for EP. Reports episodes of chest discomfort and dyspnea. No syncope. No edema with current dose of Lasix and metolazone. BP is controlled. Echo last visit - LVH, preserved LV EF.  had hypokalemia on last labs - has repeat in 2 weeks. Did not tolerate higher dose of Entresto due to hypotension. Reports episodes of BP in 80's systolic. HR is controlled. Occasional episodes of AF. Device check last visit - PAF. Had a prior episode of PMT, no further events, sensitivity was adjusted.

## 2024-08-14 NOTE — ASSESSMENT
[FreeTextEntry1] : 85 y/o female with history as above Multiple medical problems.  Plan:  CAD Anginal symptoms improved with therpay Patient is not interested in ischemic w/u Reports she would not agree for cath/PCI even if abnormal stress test. Will treat medically C/w nitroglycerin patch. C/w BB Secondary prevention.  CHF 2D echo reviewed, discussed Valvular heart disease - mild to moderate HFpEF C/w Lasix and metolazone. C/w Entresto, but decrease the dose to 24/25  AF In AF today. Rate control High CHADS-VASC score C/w BB, c/w anticoagulation If CRI progresses, will need to decrease the dose to 2.5 - f/u repeat labs.  CHB S/p PPM EP follow-up Device interrogation noted, f/u with EP .  DM, lipid control F/u in 3 months.

## 2024-10-15 NOTE — ED ADULT NURSE NOTE - NSSEPSISNEWALTERMENTAL_ED_A_ED
Patient Education    [employer] HANDOUT- PATIENT  8 YEAR VISIT  Here are some suggestions from [employer] experts that may be of value to your family.     TAKING CARE OF YOU  Enjoy spending time with your family.  Help out at home and in your community.  If you get angry with someone, try to walk away.  Say  No!  to drugs, alcohol, and cigarettes or e-cigarettes. Walk away if someone offers you some.  Talk with your parents, teachers, or another trusted adult if anyone bullies, threatens, or hurts you.  Go online only when your parents say it s OK. Don t give your name, address, or phone number on a Web site unless your parents say it s OK.  If you want to chat online, tell your parents first.  If you feel scared online, get off and tell your parents.    EATING WELL AND BEING ACTIVE  Brush your teeth at least twice each day, morning and night.  Floss your teeth every day.  Wear your mouth guard when playing sports.  Eat breakfast every day. It helps you learn.  Be a healthy eater. It helps you do well in school and sports.  Have vegetables, fruits, lean protein, and whole grains at meals and snacks.  Eat when you re hungry. Stop when you feel satisfied.  Eat with your family often.  Drink 3 cups of low-fat or fat-free milk or water instead of soda or juice drinks.  Limit high-fat foods and drinks such as candies, snacks, fast food, and soft drinks.  Talk with us if you re thinking about losing weight or using dietary supplements.  Plan and get at least 1 hour of active exercise every day.    GROWING AND DEVELOPING  Ask a parent or trusted adult questions about the changes in your body.  Share your feelings with others. Talking is a good way to handle anger, disappointment, worry, and sadness.  To handle your anger, try  Staying calm  Listening and talking through it  Trying to understand the other person s point of view  Know that it s OK to feel up sometimes and down others, but if you feel sad most of the  time, let us know.  Don t stay friends with kids who ask you to do scary or harmful things.  Know that it s never OK for an older child or an adult to  Show you his or her private parts.  Ask to see or touch your private parts.  Scare you or ask you not to tell your parents.  If that person does any of these things, get away as soon as you can and tell your parent or another adult you trust.    DOING WELL AT SCHOOL  Try your best at school. Doing well in school helps you feel good about yourself.  Ask for help when you need it.  Join clubs and teams, priscilla groups, and friends for activities after school.  Tell kids who pick on you or try to hurt you to stop. Then walk away.  Tell adults you trust about bullies.    PLAYING IT SAFE  Wear your lap and shoulder seat belt at all times in the car. Use a booster seat if the lap and shoulder seat belt does not fit you yet.  Sit in the back seat until you are 13 years old. It is the safest place.  Wear your helmet and safety gear when riding scooters, biking, skating, in-line skating, skiing, snowboarding, and horseback riding.  Always wear the right safety equipment for your activities.  Never swim alone. Ask about learning how to swim if you don t already know how.  Always wear sunscreen and a hat when you re outside. Try not to be outside for too long between 11:00 am and 3:00 pm, when it s easy to get a sunburn.  Have friends over only when your parents say it s OK.  Ask to go home if you are uncomfortable at someone else s house or a party.  If you see a gun, don t touch it. Tell your parents right away.        Consistent with Bright Futures: Guidelines for Health Supervision of Infants, Children, and Adolescents, 4th Edition  For more information, go to https://brightfutures.aap.org.             Patient Education    BRIGHT FUTURES HANDOUT- PARENT  8 YEAR VISIT  Here are some suggestions from [employer] experts that may be of value to your family.     HOW YOUR  FAMILY IS DOING  Encourage your child to be independent and responsible. Hug and praise him.  Spend time with your child. Get to know his friends and their families.  Take pride in your child for good behavior and doing well in school.  Help your child deal with conflict.  If you are worried about your living or food situation, talk with us. Community agencies and programs such as [employer] can also provide information and assistance.  Don t smoke or use e-cigarettes. Keep your home and car smoke-free. Tobacco-free spaces keep children healthy.  Don t use alcohol or drugs. If you re worried about a family member s use, let us know, or reach out to local or online resources that can help.  Put the family computer in a central place.  Watch your child s computer use.  Know who he talks with online.  Install a safety filter.    STAYING HEALTHY  Take your child to the dentist twice a year.  Give your child a fluoride supplement if the dentist recommends it.  Remind your child to brush his teeth twice a day  After breakfast  Before bed  Use a pea-sized amount of toothpaste with fluoride.  Remind your child to floss his teeth once a day.  Encourage your child to always wear a mouth guard to protect his teeth while playing sports.  Encourage healthy eating by  Eating together often as a family  Serving vegetables, fruits, whole grains, lean protein, and low-fat or fat-free dairy  Limiting sugars, salt, and low-nutrient foods  Limit screen time to 2 hours (not counting schoolwork).  Don t put a TV or computer in your child s bedroom.  Consider making a family media use plan. It helps you make rules for media use and balance screen time with other activities, including exercise.  Encourage your child to play actively for at least 1 hour daily.    YOUR GROWING CHILD  Be a model for your child by saying you are sorry when you make a mistake.  Show your child how to use her words when she is angry.  Teach your child to help  others.  Give your child chores to do and expect them to be done.  Give your child her own personal space.  Get to know your child s friends and their families.  Understand that your child s friends are very important.  Answer questions about puberty. Ask us for help if you don t feel comfortable answering questions.  Teach your child the importance of delaying sexual behavior. Encourage your child to ask questions.  Teach your child how to be safe with other adults.  No adult should ask a child to keep secrets from parents.  No adult should ask to see a child s private parts.  No adult should ask a child for help with the adult s own private parts.    SCHOOL  Show interest in your child s school activities.  If you have any concerns, ask your child s teacher for help.  Praise your child for doing things well at school.  Set a routine and make a quiet place for doing homework.  Talk with your child and her teacher about bullying.    SAFETY  The back seat is the safest place to ride in a car until your child is 13 years old.  Your child should use a belt-positioning booster seat until the vehicle s lap and shoulder belts fit.  Provide a properly fitting helmet and safety gear for riding scooters, biking, skating, in-line skating, skiing, snowboarding, and horseback riding.  Teach your child to swim and watch him in the water.  Use a hat, sun protection clothing, and sunscreen with SPF of 15 or higher on his exposed skin. Limit time outside when the sun is strongest (11:00 am-3:00 pm).  If it is necessary to keep a gun in your home, store it unloaded and locked with the ammunition locked separately from the gun.        Helpful Resources:  Family Media Use Plan: www.healthychildren.org/MediaUsePlan  Smoking Quit Line: 337.868.2308 Information About Car Safety Seats: www.safercar.gov/parents  Toll-free Auto Safety Hotline: 910.221.8531  Consistent with Bright Futures: Guidelines for Health Supervision of Infants,  Children, and Adolescents, 4th Edition  For more information, go to https://brightfutures.aap.org.              No

## 2024-12-18 ENCOUNTER — APPOINTMENT (OUTPATIENT)
Dept: CARDIOLOGY | Facility: CLINIC | Age: 84
End: 2024-12-18

## 2025-03-05 ENCOUNTER — EMERGENCY (EMERGENCY)
Facility: HOSPITAL | Age: 85
LOS: 0 days | Discharge: HOME | End: 2025-03-05
Attending: STUDENT IN AN ORGANIZED HEALTH CARE EDUCATION/TRAINING PROGRAM
Payer: MEDICARE

## 2025-03-05 VITALS
OXYGEN SATURATION: 95 % | HEART RATE: 95 BPM | TEMPERATURE: 98 F | DIASTOLIC BLOOD PRESSURE: 90 MMHG | WEIGHT: 149.91 LBS | SYSTOLIC BLOOD PRESSURE: 210 MMHG | RESPIRATION RATE: 20 BRPM

## 2025-03-05 VITALS
SYSTOLIC BLOOD PRESSURE: 128 MMHG | HEART RATE: 63 BPM | DIASTOLIC BLOOD PRESSURE: 71 MMHG | RESPIRATION RATE: 20 BRPM | OXYGEN SATURATION: 95 %
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
## 2025-03-05 DIAGNOSIS — I50.9 HEART FAILURE, UNSPECIFIED: ICD-10-CM

## 2025-03-05 DIAGNOSIS — S00.01XA ABRASION OF SCALP, INITIAL ENCOUNTER: ICD-10-CM

## 2025-03-05 DIAGNOSIS — M25.562 PAIN IN LEFT KNEE: ICD-10-CM

## 2025-03-05 DIAGNOSIS — S00.33XA CONTUSION OF NOSE, INITIAL ENCOUNTER: ICD-10-CM

## 2025-03-05 DIAGNOSIS — J44.9 CHRONIC OBSTRUCTIVE PULMONARY DISEASE, UNSPECIFIED: ICD-10-CM

## 2025-03-05 DIAGNOSIS — Z88.5 ALLERGY STATUS TO NARCOTIC AGENT: ICD-10-CM

## 2025-03-05 DIAGNOSIS — Z95.0 PRESENCE OF CARDIAC PACEMAKER: Chronic | ICD-10-CM

## 2025-03-05 DIAGNOSIS — M79.89 OTHER SPECIFIED SOFT TISSUE DISORDERS: ICD-10-CM

## 2025-03-05 DIAGNOSIS — Z86.73 PERSONAL HISTORY OF TRANSIENT ISCHEMIC ATTACK (TIA), AND CEREBRAL INFARCTION WITHOUT RESIDUAL DEFICITS: ICD-10-CM

## 2025-03-05 DIAGNOSIS — R91.8 OTHER NONSPECIFIC ABNORMAL FINDING OF LUNG FIELD: ICD-10-CM
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
## 2025-03-05 DIAGNOSIS — Z95.0 PRESENCE OF CARDIAC PACEMAKER: ICD-10-CM

## 2025-03-05 DIAGNOSIS — I11.0 HYPERTENSIVE HEART DISEASE WITH HEART FAILURE: ICD-10-CM

## 2025-03-05 DIAGNOSIS — S80.212A ABRASION, LEFT KNEE, INITIAL ENCOUNTER: ICD-10-CM

## 2025-03-05 DIAGNOSIS — Z23 ENCOUNTER FOR IMMUNIZATION: ICD-10-CM

## 2025-03-05 DIAGNOSIS — Z79.01 LONG TERM (CURRENT) USE OF ANTICOAGULANTS: ICD-10-CM

## 2025-03-05 DIAGNOSIS — Z90.49 ACQUIRED ABSENCE OF OTHER SPECIFIED PARTS OF DIGESTIVE TRACT: Chronic | ICD-10-CM

## 2025-03-05 DIAGNOSIS — E11.9 TYPE 2 DIABETES MELLITUS WITHOUT COMPLICATIONS: ICD-10-CM

## 2025-03-05 DIAGNOSIS — Z88.1 ALLERGY STATUS TO OTHER ANTIBIOTIC AGENTS: ICD-10-CM

## 2025-03-05 DIAGNOSIS — I48.91 UNSPECIFIED ATRIAL FIBRILLATION: ICD-10-CM

## 2025-03-05 DIAGNOSIS — S02.2XXA FRACTURE OF NASAL BONES, INITIAL ENCOUNTER FOR CLOSED FRACTURE: ICD-10-CM

## 2025-03-05 DIAGNOSIS — S09.90XA UNSPECIFIED INJURY OF HEAD, INITIAL ENCOUNTER: ICD-10-CM

## 2025-03-05 LAB
ALBUMIN SERPL ELPH-MCNC: 4.3 G/DL — SIGNIFICANT CHANGE UP (ref 3.5–5.2)
ALP SERPL-CCNC: 71 U/L — SIGNIFICANT CHANGE UP (ref 30–115)
ALT FLD-CCNC: 11 U/L — SIGNIFICANT CHANGE UP (ref 0–41)
ANION GAP SERPL CALC-SCNC: 10 MMOL/L — SIGNIFICANT CHANGE UP (ref 7–14)
APTT BLD: 32.9 SEC — SIGNIFICANT CHANGE UP (ref 27–39.2)
AST SERPL-CCNC: 38 U/L — SIGNIFICANT CHANGE UP (ref 0–41)
BASOPHILS # BLD AUTO: 0.01 K/UL — SIGNIFICANT CHANGE UP (ref 0–0.2)
BASOPHILS NFR BLD AUTO: 0.2 % — SIGNIFICANT CHANGE UP (ref 0–1)
BILIRUB SERPL-MCNC: 0.6 MG/DL — SIGNIFICANT CHANGE UP (ref 0.2–1.2)
BUN SERPL-MCNC: 29 MG/DL — HIGH (ref 10–20)
CALCIUM SERPL-MCNC: 9.2 MG/DL — SIGNIFICANT CHANGE UP (ref 8.4–10.5)
CHLORIDE SERPL-SCNC: 99 MMOL/L — SIGNIFICANT CHANGE UP (ref 98–110)
CO2 SERPL-SCNC: 31 MMOL/L — SIGNIFICANT CHANGE UP (ref 17–32)
CREAT SERPL-MCNC: 1.2 MG/DL — SIGNIFICANT CHANGE UP (ref 0.7–1.5)
EGFR: 44 ML/MIN/1.73M2 — LOW
EOSINOPHIL # BLD AUTO: 0.05 K/UL — SIGNIFICANT CHANGE UP (ref 0–0.7)
EOSINOPHIL NFR BLD AUTO: 0.9 % — SIGNIFICANT CHANGE UP (ref 0–8)
GLUCOSE SERPL-MCNC: 134 MG/DL — HIGH (ref 70–99)
HCT VFR BLD CALC: 37.1 % — SIGNIFICANT CHANGE UP (ref 37–47)
HGB BLD-MCNC: 12.4 G/DL — SIGNIFICANT CHANGE UP (ref 12–16)
IMM GRANULOCYTES NFR BLD AUTO: 0.4 % — HIGH (ref 0.1–0.3)
INR BLD: 1.27 RATIO — SIGNIFICANT CHANGE UP (ref 0.65–1.3)
LIDOCAIN IGE QN: 87 U/L — HIGH (ref 7–60)
LYMPHOCYTES # BLD AUTO: 1.5 K/UL — SIGNIFICANT CHANGE UP (ref 1.2–3.4)
LYMPHOCYTES # BLD AUTO: 28.2 % — SIGNIFICANT CHANGE UP (ref 20.5–51.1)
MCHC RBC-ENTMCNC: 32.5 PG — HIGH (ref 27–31)
MCHC RBC-ENTMCNC: 33.4 G/DL — SIGNIFICANT CHANGE UP (ref 32–37)
MCV RBC AUTO: 97.4 FL — SIGNIFICANT CHANGE UP (ref 81–99)
MONOCYTES # BLD AUTO: 0.38 K/UL — SIGNIFICANT CHANGE UP (ref 0.1–0.6)
MONOCYTES NFR BLD AUTO: 7.2 % — SIGNIFICANT CHANGE UP (ref 1.7–9.3)
NEUTROPHILS # BLD AUTO: 3.35 K/UL — SIGNIFICANT CHANGE UP (ref 1.4–6.5)
NEUTROPHILS NFR BLD AUTO: 63.1 % — SIGNIFICANT CHANGE UP (ref 42.2–75.2)
NRBC BLD AUTO-RTO: 0 /100 WBCS — SIGNIFICANT CHANGE UP (ref 0–0)
PLATELET # BLD AUTO: 211 K/UL — SIGNIFICANT CHANGE UP (ref 130–400)
PMV BLD: 10.4 FL — SIGNIFICANT CHANGE UP (ref 7.4–10.4)
POTASSIUM SERPL-MCNC: 5.7 MMOL/L — HIGH (ref 3.5–5)
POTASSIUM SERPL-SCNC: 5.7 MMOL/L — HIGH (ref 3.5–5)
PROT SERPL-MCNC: 8 G/DL — SIGNIFICANT CHANGE UP (ref 6–8)
PROTHROM AB SERPL-ACNC: 15.1 SEC — HIGH (ref 9.95–12.87)
RBC # BLD: 3.81 M/UL — LOW (ref 4.2–5.4)
RBC # FLD: 13 % — SIGNIFICANT CHANGE UP (ref 11.5–14.5)
SODIUM SERPL-SCNC: 140 MMOL/L — SIGNIFICANT CHANGE UP (ref 135–146)
WBC # BLD: 5.31 K/UL — SIGNIFICANT CHANGE UP (ref 4.8–10.8)
WBC # FLD AUTO: 5.31 K/UL — SIGNIFICANT CHANGE UP (ref 4.8–10.8)

## 2025-03-05 PROCEDURE — 71260 CT THORAX DX C+: CPT | Mod: 26

## 2025-03-05 PROCEDURE — 72170 X-RAY EXAM OF PELVIS: CPT

## 2025-03-05 PROCEDURE — 74177 CT ABD & PELVIS W/CONTRAST: CPT | Mod: 26

## 2025-03-05 PROCEDURE — 73562 X-RAY EXAM OF KNEE 3: CPT | Mod: 26,LT

## 2025-03-05 PROCEDURE — 70450 CT HEAD/BRAIN W/O DYE: CPT | Mod: 26

## 2025-03-05 PROCEDURE — 73562 X-RAY EXAM OF KNEE 3: CPT | Mod: LT

## 2025-03-05 PROCEDURE — 99291 CRITICAL CARE FIRST HOUR: CPT | Mod: FS

## 2025-03-05 PROCEDURE — 72170 X-RAY EXAM OF PELVIS: CPT | Mod: 26

## 2025-03-05 PROCEDURE — 71045 X-RAY EXAM CHEST 1 VIEW: CPT | Mod: 26

## 2025-03-05 PROCEDURE — 72125 CT NECK SPINE W/O DYE: CPT | Mod: 26

## 2025-03-05 PROCEDURE — 85730 THROMBOPLASTIN TIME PARTIAL: CPT

## 2025-03-05 PROCEDURE — 71260 CT THORAX DX C+: CPT | Mod: MC

## 2025-03-05 PROCEDURE — 80053 COMPREHEN METABOLIC PANEL: CPT

## 2025-03-05 PROCEDURE — 82962 GLUCOSE BLOOD TEST: CPT

## 2025-03-05 PROCEDURE — 99284 EMERGENCY DEPT VISIT MOD MDM: CPT

## 2025-03-05 PROCEDURE — 93010 ELECTROCARDIOGRAM REPORT: CPT

## 2025-03-05 PROCEDURE — 99285 EMERGENCY DEPT VISIT HI MDM: CPT

## 2025-03-05 PROCEDURE — 85025 COMPLETE CBC W/AUTO DIFF WBC: CPT

## 2025-03-05 PROCEDURE — 85610 PROTHROMBIN TIME: CPT

## 2025-03-05 PROCEDURE — 93005 ELECTROCARDIOGRAM TRACING: CPT

## 2025-03-05 PROCEDURE — 71045 X-RAY EXAM CHEST 1 VIEW: CPT

## 2025-03-05 PROCEDURE — 70486 CT MAXILLOFACIAL W/O DYE: CPT | Mod: MC

## 2025-03-05 PROCEDURE — 70450 CT HEAD/BRAIN W/O DYE: CPT | Mod: MC

## 2025-03-05 PROCEDURE — 36415 COLL VENOUS BLD VENIPUNCTURE: CPT

## 2025-03-05 PROCEDURE — 83690 ASSAY OF LIPASE: CPT

## 2025-03-05 PROCEDURE — 99291 CRITICAL CARE FIRST HOUR: CPT | Mod: 25

## 2025-03-05 PROCEDURE — 74177 CT ABD & PELVIS W/CONTRAST: CPT | Mod: MC

## 2025-03-05 PROCEDURE — 72125 CT NECK SPINE W/O DYE: CPT | Mod: MC

## 2025-03-05 PROCEDURE — 70486 CT MAXILLOFACIAL W/O DYE: CPT | Mod: 26

## 2025-03-05 PROCEDURE — 90714 TD VACC NO PRESV 7 YRS+ IM: CPT

## 2025-03-05 PROCEDURE — 90471 IMMUNIZATION ADMIN: CPT

## 2025-03-05 RX ORDER — ACETAMINOPHEN 500 MG/5ML
975 LIQUID (ML) ORAL ONCE
Refills: 0 | Status: COMPLETED | OUTPATIENT
Start: 2025-03-05 | End: 2025-03-05

## 2025-03-05 RX ORDER — TETANUS AND DIPHTHERIA TOXOIDS ADSORBED 2; 2 [LF]/.5ML; [LF]/.5ML
0.5 INJECTION INTRAMUSCULAR ONCE
Refills: 0 | Status: COMPLETED | OUTPATIENT
Start: 2025-03-05 | End: 2025-03-05

## 2025-03-05 RX ADMIN — TETANUS AND DIPHTHERIA TOXOIDS ADSORBED 0.5 MILLILITER(S): 2; 2 INJECTION INTRAMUSCULAR at 17:09

## 2025-03-05 RX ADMIN — Medication 975 MILLIGRAM(S): at 17:08

## 2025-03-05 NOTE — ED PROVIDER NOTE - PROGRESS NOTE DETAILS
KA - Patient as oxycodone allergy. Has tolerated morphine and hydrocodone in the past per daughter. GG- CT findings mentioned suspicion for septal hematoma, patient reexamined at bedside no septal hematoma seen on exam KA - ENT evaluated patient.  No acute intervention at this time.  Recommended follow-up tomorrow or Friday at ENT clinic with Dr. Torre

## 2025-03-05 NOTE — CONSULT NOTE ADULT - ATTENDING COMMENTS
Seen at 3 pm    ACS Attending Note Attestation    Patient is examined and evaluated at the bedside with the residents/PAs. Treatment plan discussed with the team, nurses, and consulting physicians and consulting teams. Medications, radiological studies and all other relevant studies reviewed. I reviewed the resident/PA note and agreed with above assessment and plan with following additions and corrections. Time devoted to teaching and to any procedures I billed separately is not included.     Primary survey as above   daughter at bedside, she would like to take the patient home if possible, patient has a health aide    Physical Exam:   I independently performed a medically appropriate exam. The exam was notable for    Patient has bilateral popeye orbital bruising, some nasal swelling  no septal hematoma that could be appreciated on my exam  EOMI intact  no pain on palpation of neck and thoracic and lumbar spine  equal and bilateral air entry  Abd: soft, non tender, not distended  all 4 extremities are neurovascularly intact       Labs: I have reviewed the labs on system  sample hemolyzed, K 5.7, no EKG changes    Radiology: I have reviewed and interpreted the imaging  CT Head: -ve for acute traumatic pathology  CT max fac: non displaced left nasal bone fracture  CT C spine: -ve for acute traumatic pathology  CT Chest abdomen and pelvis: chronic left lower ribs fracture, chronic deformities of right superior and inferior pubic rami   CT  T/L spine: -ve for acute traumatic pathology    Xrays:   L knee: -ve for acute fracture    Assessment:   mechanical fall on eliquis  PMH: dementia, hypertension, diabetes, COPD, CHF and pacemaker placement    Plan:	  OMFS consult for nasal fractures  no intervention needed for chronic fractures  remaining as per ER     [75 ]       minutes spent on total encounter. The necessity of the time above spent during the encounter on this date of service as described above.    Bree Eduardo MD  Trauma/ACS/Surgical Critical care Attending

## 2025-03-05 NOTE — CONSULT NOTE ADULT - ASSESSMENT
ASSESSMENT:  84y Female  w/ PMHx of dementia, hypertension, diabetes, COPD, CHF and pacemaker placement seen as (Trauma Alert) s/p mechanical fall +HT, ?LOC, +AC (eliquis) with complaint of knee pain, external signs of trauma as listed below. Patient is non ambulatory at baseline, stood up and tripped causing her to fall from standing. Trauma assessment in ED: ABCs intact , GCS 15 , AAOx2,  PHAM.     Injuries identified:   - L knee abrasion  - b/l periorbital ecchymosis  - nasal bridge ecchymosis and tenderness to palpation  - L temporal scalp abrsion    PLAN:   - Trauma Labs: (CBC, BMP, Coags, T&S, UA, EtOH level)  Additional studies:  EKG      Trauma Imaging to include the following: pending  - CXR, Pelvic Xray  - CT Head,  CT C-spine, CT Max/Face, CT Chest, CT Abd/Pelvis  - Extremity films: None    Additional consultations: pending  - Neurosurgery  - Orthopedics  - OMFS  - PT/Rehab/SW  - Hospitalist/Medicine     Disposition pending results of above labs and imaging  Above plan discussed with Trauma attending, Dr. Eduardo, patient, patient family, and ED team  --------------------------------------------------------------------------------------  03-05-25 @ 13:19    TRAUMA SENIOR SPECTRA: [id number]  TRAUMA TEAM SPECTRA: [id number] ASSESSMENT:  84y Female  w/ PMHx of dementia, hypertension, diabetes, COPD, CHF and pacemaker placement seen as (Trauma Alert) s/p mechanical fall +HT, ?LOC, +AC (eliquis) with complaint of knee pain, external signs of trauma as listed below. Patient is non ambulatory at baseline, stood up and tripped causing her to fall from standing. Trauma assessment in ED: ABCs intact , GCS 15 , AAOx2,  PHAM.     Injuries identified:   - L knee abrasion  - b/l periorbital ecchymosis  - nasal bridge ecchymosis and tenderness to palpation  - Nondisplaced fractures of the left nasal bone  - L temporal scalp abrasion    PLAN:   - No further trauma work up indicated  - ENT vs OMFS evaluation for nasal bone fx  - DO NOT blow your nose for at least two weeks. DO NOT forcibly spit for one week. DO NOT smoke or use smokeless tobacco; smoking greatly inhibits the healing process, especially in the sinuses. Sneeze with your MOUTH OPEN. If the urge to sneeze arises, do not sneeze through your nose and avoid pinching nostrils. Drink without a straw for one week.   - Dispo as per ED    Above plan discussed with Trauma attending, Dr. Eduardo, patient, patient family, and ED team  --------------------------------------------------------------------------------------  03-05-25 @ 13:19    TRAUMA SENIOR SPECTRA: [id number]  TRAUMA TEAM SPECTRA: [id number]

## 2025-03-05 NOTE — ED PROVIDER NOTE - CLINICAL SUMMARY MEDICAL DECISION MAKING FREE TEXT BOX
Pt signed out pending OMFS/ENTand surgery evaluation.  Cleared by all services.  discharged to outpt f/u.

## 2025-03-05 NOTE — ED PROVIDER NOTE - CARE PLAN
Principal Discharge DX:	Head injury  Secondary Diagnosis:	Fall  Secondary Diagnosis:	Left knee pain   1 Principal Discharge DX:	Head injury  Secondary Diagnosis:	Fall  Secondary Diagnosis:	Left knee pain  Secondary Diagnosis:	Nasal septal hematoma

## 2025-03-05 NOTE — ED PROVIDER NOTE - PHYSICAL EXAMINATION
As Follows:  CONST: Currently in NAD  EYES: PERRL, EOMI, Sclera and conjunctiva clear.   ENT: Left eye / periorbital ecchymosis and swelling. Nasal swelling and abrasion. No nasal discharge or septal hematoma. Missing teeth, baseline/ atraumatic. Oropharynx normal appearing, no erythema / exudates. Uvula midline. Airway intact.   CARD: No murmurs, rubs, or gallops; Normal rate and rhythm  RESP: BS Equal B/L, No wheezes, rhonchi or rales. No distress or accessory breathing  GI: Soft, non-tender, non-distended.  MS: Tenderness and ecchymosis to left knee. Normal ROM in all extremities. No midline Cervical/Thoracic spinal tenderness. Midline lumbar spinal discomfort. Rectal tone intact.   VASC: DP and Radial pulses 2+.   SKIN: Warm, dry, no acute rashes. MMM  NEURO: Alert and Oriented to person, No focal deficits. Strength and sensation intact. Spontaneously moving upper/lower extremities.

## 2025-03-05 NOTE — ED PROVIDER NOTE - OBJECTIVE STATEMENT
Patient is an 84-year-old female with past medical history of dementia, hypertension, diabetes, COPD, CHF, pacemaker placement presents for evaluation of mechanical fall from home.  There is a small step out of the garage which she fell forward hit her head.  Family admits to left knee pain and swelling.  She and daughter deny any LOC, other trauma or injuries, other complaints.  Patient Niuean-speaking with daughter translating.

## 2025-03-05 NOTE — ED ADULT TRIAGE NOTE - CHIEF COMPLAINT QUOTE
BIBEMS S/P trip and fall going on 1 step, +HT , on Eliquis for a-fib. C-collar precautions initiated/ trauma alert activated n

## 2025-03-05 NOTE — ED PROVIDER NOTE - NSFOLLOWUPINSTRUCTIONS_ED_ALL_ED_FT
FOLLOW UP AT THE [facility] TOMORROW OR FRIDAY WITH DR MUSE,    Follow up with ENT in 1 week.    Nasal Fracture    WHAT YOU NEED TO KNOW:    What is a nasal fracture? A nasal fracture is a crack or break in your nose. You may have a break in the upper nose (bridge), the side, or the septum. The septum is in the middle of the nose and divides your nostrils.    What are the signs and symptoms of a nasal fracture?   •Pain and swelling  •Nosebleed  •Deformed nose  •Crackling sound when you touch or move your nose  •Bruising on your nose or under your eyes    How is a nasal fracture treated?   •Medicine may be given to decrease pain or help prevent a bacterial infection. Ask how to take pain medicine safely. Medicine may also be given to decrease nasal swelling and help make breathing easier.  •Wound care may help stop bleeding. If you have a hematoma inside your nose, it will be drained. Healthcare providers may place packing (gauze or other material) inside your nose to soak up blood.  •Closed reduction may be done to put your nasal bones back into the correct position. Local or general anesthesia is used during this procedure. This procedure may be done right away or several days after your injury when the swelling has gone down. Surgery (open reduction) to put your bones back into place may be needed for severe fractures.  •Splints or packing help keep your nose in place for 7 to 10 days after a reduction. Ask your healthcare provider how to care for your wounds, splint, or packing.    How do I care for my nasal fracture at home?   •Apply ice on your nose for 15 to 20 minutes every hour or as directed. Use an ice pack, or put crushed ice in a plastic bag. Cover it with a towel. Ice helps prevent tissue damage and decreases swelling and pain.  •Elevate your head when you lie down. This will help decrease swelling and pain. You may need to see a specialist 3 to 5 days later for tests or more treatment after swelling has gone down.  •Protect your nose to prevent bleeding, bruising, or another fracture. Try not to bump your nose on anything. You may not be able to play sports for up to 6 weeks.    When should I seek immediate care?   •You feel like one or both of your nasal passages are blocked and you have trouble breathing.  •Clear fluid is leaking from your nose.  •You have severe nose pain, even after you take medicine.  •You have double vision or have problems moving your eyes.    When should I call my doctor?   •You have a fever.  •You continue to have nosebleeds.  •You have a headache that gets worse, even after you take pain medicine.  •Your splint or packing is loose.  •You have questions or concerns about your condition or care.

## 2025-03-05 NOTE — ED PROVIDER NOTE - PATIENT PORTAL LINK FT
You can access the FollowMyHealth Patient Portal offered by [facility] by registering at the following website: [email or web address]. By joining [facility]’s FollowMyHealth portal, you will also be able to view your health information using other applications (apps) compatible with our system.

## 2025-03-05 NOTE — CONSULT NOTE ADULT - ASSESSMENT
83y/o F with nasal bone fracture    - Case discussed with Dr. Torre, imaging reviewed by attending  - No acute ENT intervention at this time; physical exam does not correlate with CT scan finding of septal hematoma; rather, small amount of soft tissue edema to lateral portion of left naris.   - Follow up with Dr. Torre tomorrow or Friday to reassess nose   - D/w ED team .

## 2025-03-05 NOTE — ED PROVIDER NOTE - CRITICAL CARE ATTENDING CONTRIBUTION TO CARE
This was a shared visit with the BERNARDINO. I reviewed and verified the documentation and independently performed the documented: History, Exam and Medical Decision Making.    I have reviewed and agree with the mid-level note, except as documented in my note below.    84 y/o female h/o HLD, DM, Afib on eliquis, CHF, COPD, CVA and Dementia s/p trip and fall outside (witnessed by daughter) PTA, + head trauma, reports neck, face and left knee pain, denies LOC, paresthesias or other injuries / complaints at present. Old chart reviewed. I have reviewed and agree with the initial nursing note, except as documented in my note.    Subjective and Objective:  INTUBATED: NO   Patient On (Oxygen Delivery Method): room air  GCS: 15             E: 4       V: 5       M: 6    Trauma assessment in ED: ABCs intact , GCS 15 , AAOx3.  Tetanus UTD [ - ]    Primary Survey:   A - airway intact  B - bilateral breath sounds and good chest rise  C - palpable pulses in all extremities  D - GCS 15 on arrival, PHAM  Exposure obtained    Secondary Survey:   VSS, awake, alert, left periorbital bruising and abrasion, nasal abrasion, otherwise Head NC / NT, no scalp lacerations, PERRL / EOMI, no hemotympanum, no dental injury, no lacerations, chest CTAB, chest wall NT, no subcutaneous emphysema appreciated, no w/r/r, +S1/S2, RRR, no m/r/g, abdomen soft, NT, ND, +BS, able to voluntarily actively rotate neck 45 degrees left and right on request, no midline spinal tenderness, no CVA tenderness, left knee diffusely ttp and w/swelling, otherwise FROM other extrem, no other bony point tenderness, NV intact, alert and oriented to person, place and time, clear speech. This was a shared visit with the BERNARDINO. I reviewed and verified the documentation and independently performed the documented: History, Exam and Medical Decision Making.    I have reviewed and agree with the mid-level note, except as documented in my note below.    84 y/o female h/o HLD, DM, Afib on eliquis, CHF, COPD, CVA and Dementia s/p trip and fall outside (witnessed by daughter) PTA, + head trauma, reports neck, face and left knee pain, denies LOC, paresthesias or other injuries / complaints at present. Old chart reviewed. I have reviewed and agree with the initial nursing note, except as documented in my note.    Subjective and Objective:  INTUBATED: NO   Patient On (Oxygen Delivery Method): room air  GCS: 15             E: 4       V: 5       M: 6    Trauma assessment in ED: ABCs intact , GCS 15 , AAOx3.  Tetanus UTD [ - ]    Primary Survey:   A - airway intact  B - bilateral breath sounds and good chest rise  C - palpable pulses in all extremities  D - GCS 15 on arrival, PHAM  Exposure obtained    Secondary Survey:   VSS, awake, alert, left periorbital bruising and abrasion, nasal abrasion, otherwise Head NC / NT, no scalp lacerations, PERRL / EOMI, no hemotympanum, no dental injury, no lacerations, chest CTAB, chest wall NT, no subcutaneous emphysema appreciated, no w/r/r, +S1/S2, no m/r/g, abdomen soft, NT, ND, +BS, able to voluntarily actively rotate neck 45 degrees left and right on request, no midline spinal tenderness, no CVA tenderness, left knee diffusely ttp and w/swelling, otherwise FROM other extrem, no other bony point tenderness, NV intact, alert and oriented to person, place and time, clear speech.

## 2025-03-05 NOTE — CONSULT NOTE ADULT - SUBJECTIVE AND OBJECTIVE BOX
Vaccine Information Statement(s) was given today. This has been reviewed, questions answered, and verbal consent given by Parent for injection(s) and administration of Diphtheria/Tetanus/Pertussis (Dtap), Measles/Mumps/Rubella (MMR), Polio (IPV) and Varicella-Chickenpox.    Patient tolerated without incident. See immunization grid for documentation.     TRAUMA ACTIVATION LEVEL:  CODE / ALERT  / CONSULT  ACTIVATED BY: EMS**  /  ED**  INTUBATED: YES** / NO**      MECHANISM OF INJURY:   [] Blunt     [] MVC	  [x] Fall	  [] Pedestrian Struck	  [] Motorcycle     [] Assault     [] Bicycle collision    [] Sports injury    [] Penetrating    [] Gun Shot Wound      [] Stab Wound    GCS: 15 	E: 4	V: 5	M: 6    HPI:    84y Female  w/ PMHx of dementia, hypertension, diabetes, COPD, CHF and pacemaker placement seen as (Trauma Alert) s/p mechanical fall +HT, ?LOC, +AC (eliquis) with complaint of knee pain, external signs of trauma as listed below. Patient is non ambulatory at baseline, stood up and tripped causing her to fall from standing. Trauma assessment in ED: ABCs intact , GCS 15 , AAOx2,  PHAM.     Fatigue: How much time during the previous 4 weeks did you feel tired?   All or most of the time [   ] Yes (1pt)    [ x ] No  (0pts)  Resistance: Do you have any difficulty walking up 10 steps alone without resting and without aids? [ x  ] Yes (1pt)    [  ] No  (0pts)  Ambulation: Do you have any difficulty walking several hundred yards alone without aids? [  x ] Yes (1pt)    [  ] No  (0pts)  Illness: how many illnesses do you have out of list of 11 total? [   ] 5 or more (1pt) [ x ] < 5 (0pts)  Loss of weight: Have you had weight loss of 5% or more? [   ] Yes (1pt)    [ x ] No  (0pts)    Total Score: 2    Score 1-2: Consult medical comangement  Score 3-4: Consult Geriatric service   Score 5: Consult Palliative service [phone]    Tony Gibson G, Gopi SAAB, Whit CONNOLLY, Lana B. Frality: toward a clinical definition. J AM Med Dir Assoc. 2008; 9 (2): 71-72  Vinny JE, Phill TK, Anurag DK. A simple frailty questionnaire (FRAIL) predicts outcomes in middle aged  Americans. J Nutr Health Aging. 2012; 16 (7): 601-608    PAST MEDICAL & SURGICAL HISTORY:  Afib  on eliquis      Diabetes      Asthma      CAD (coronary atherosclerotic disease)      Diastolic heart failure      COPD exacerbation      Hyperlipidemia      CVA (cerebrovascular accident)      Type 2 diabetes mellitus      Dementia      S/P appendectomy      Pacemaker          Allergies    oxycodone (Pruritus)  Augmentin (Rash)    Intolerances        Home Medications:  Eliquis 5 mg oral tablet: 1 tab(s) orally 2 times a day (15 Feb 2024 13:15)  Entresto 24 mg-26 mg oral tablet: 1 tab(s) orally 2 times a day (15 Feb 2024 00:23)  furosemide 40 mg oral tablet: 1 tab(s) orally 2 times a day (15 Feb 2024 00:25)  ipratropium-albuterol 0.5 mg-2.5 mg/3 mLinhalation solution: 3 milliliter(s) inhaled 2 times a day (15 Feb 2024 00:33)  metoprolol tartrate 100 mg oral tablet: 1 tab(s) orally 2 times a day (15 Feb 2024 00:24)  Repatha 140 mg/mL subcutaneous solution: 140 milligram(s) subcutaneously 2 times a month (15 Feb 2024 11:33)  Seroquel 25 mg oral tablet: 1 tab(s) orally 2 times a day (15 Feb 2024 00:28)  sertraline 25 mg oral tablet: 1 tab(s) orally 2 times a day (15 Feb 2024 00:29)  sertraline 25 mg oral tablet: 1 tab(s) orally every 12 hours (15 Feb 2024 00:33)  Vascepa 1 g oral capsule: 2 cap(s) orally 2 times a day (15 Feb 2024 11:33)      ROS: 10-system review is otherwise negative except HPI above.      Primary Survey:    A - airway intact  B - bilateral breath sounds and good chest rise  C - palpable pulses in all extremities  D - GCS 15 on arrival, PHAM  Exposure obtained    Vital Signs Last 24 Hrs  T(C): 36.7 (05 Mar 2025 12:00), Max: 36.7 (05 Mar 2025 12:00)  T(F): 98 (05 Mar 2025 12:00), Max: 98 (05 Mar 2025 12:00)  HR: 95 (05 Mar 2025 12:00) (95 - 95)  BP: 210/90 (05 Mar 2025 12:00) (210/90 - 210/90)  BP(mean): --  RR: 20 (05 Mar 2025 12:00) (20 - 20)  SpO2: 95% (05 Mar 2025 12:00) (95% - 95%)    Parameters below as of 05 Mar 2025 12:00  Patient On (Oxygen Delivery Method): room air        Secondary Survey:   General: NAD  HEENT: Normocephalic, EOMI, PEERLA. b/l periorbital ecchymosis, nasal bridge ecchymosis and tenderness to palpation and L temporal scalp abrasion  Neck: Soft, midline trachea. no c-spine tenderness  Chest: No chest wall tenderness, no subcutaneous emphysema   Cardiac: S1, S2, RRR  Respiratory: Bilateral breath sounds, clear and equal bilaterally  Abdomen: Soft, non-distended, non-tender, no rebound, no guarding.  Groin: Normal appearing, pelvis stable   Ext:  Moving b/l upper and lower extremities. Palpable Radial b/l UE, b/l DP palpable in LE. L knee abrasion  Back: +lumbar tenderness, No T/S spine tenderness, No palpable runoff/stepoff/deformity    ACCESS / DEVICES:  [ ] Peripheral IV  [ ] Central Venous Line	[ ] R	[ ] L	[ ] IJ	[ ] Fem	[ ] SC	Placed:   [ ] Arterial Line		[ ] R	[ ] L	[ ] Fem	[ ] Rad	[ ] Ax	Placed:   [ ] PICC:					[ ] Mediport  [ ] Urinary Catheter,  Date Placed:   [ ] Chest tube: [ ] Right, [ ] Left  [ ] DONALD/Aleksandar Drains    Labs:  CAPILLARY BLOOD GLUCOSE      POCT Blood Glucose.: 137 mg/dL (05 Mar 2025 12:06)                          12.4   5.31  )-----------( 211      ( 05 Mar 2025 12:10 )             37.1       Auto Immature Granulocyte %: 0.4 % (03-05-25 @ 12:10)    03-05    140  |  99  |  29[H]  ----------------------------<  134[H]  5.7[H]   |  31  |  1.2      Calcium: 9.2 mg/dL (03-05-25 @ 12:10)      LFTs:             8.0  | 0.6  | 38       ------------------[71      ( 05 Mar 2025 12:10 )  4.3  | x    | 11          Lipase:87     Amylase:x             Coags:     15.10  ----< 1.27    ( 05 Mar 2025 12:10 )     32.9                Urinalysis Basic - ( 05 Mar 2025 12:10 )    Color: x / Appearance: x / SG: x / pH: x  Gluc: 134 mg/dL / Ketone: x  / Bili: x / Urobili: x   Blood: x / Protein: x / Nitrite: x   Leuk Esterase: x / RBC: x / WBC x   Sq Epi: x / Non Sq Epi: x / Bacteria: x                RADIOLOGY & ADDITIONAL STUDIES:  ---------------------------------------------------------------------------------------     TRAUMA ACTIVATION LEVEL:  CODE / ALERT  / CONSULT  ACTIVATED BY: EMS**  /  ED**  INTUBATED: YES** / NO**      MECHANISM OF INJURY:   [] Blunt     [] MVC	  [x] Fall	  [] Pedestrian Struck	  [] Motorcycle     [] Assault     [] Bicycle collision    [] Sports injury    [] Penetrating    [] Gun Shot Wound      [] Stab Wound    GCS: 15 	E: 4	V: 5	M: 6    HPI:    84y Female  w/ PMHx of dementia, hypertension, diabetes, COPD, CHF and pacemaker placement seen as (Trauma Alert) s/p mechanical fall +HT, ?LOC, +AC (eliquis) with complaint of knee pain, external signs of trauma as listed below. Patient is non ambulatory at baseline, stood up and tripped causing her to fall from standing. Trauma assessment in ED: ABCs intact , GCS 15 , AAOx2,  PHAM.     Fatigue: How much time during the previous 4 weeks did you feel tired?   All or most of the time [   ] Yes (1pt)    [ x ] No  (0pts)  Resistance: Do you have any difficulty walking up 10 steps alone without resting and without aids? [ x  ] Yes (1pt)    [  ] No  (0pts)  Ambulation: Do you have any difficulty walking several hundred yards alone without aids? [  x ] Yes (1pt)    [  ] No  (0pts)  Illness: how many illnesses do you have out of list of 11 total? [   ] 5 or more (1pt) [ x ] < 5 (0pts)  Loss of weight: Have you had weight loss of 5% or more? [   ] Yes (1pt)    [ x ] No  (0pts)    Total Score: 2    Score 1-2: Consult medical comangement  Score 3-4: Consult Geriatric service   Score 5: Consult Palliative service [phone]    Tony Gibson G, Gopi SAAB, Whit CONNOLLY, Lana B. Frality: toward a clinical definition. J AM Med Dir Assoc. 2008; 9 (2): 71-72  Vinny JE, Phill TK, Anurag DK. A simple frailty questionnaire (FRAIL) predicts outcomes in middle aged  Americans. J Nutr Health Aging. 2012; 16 (7): 601-608    PAST MEDICAL & SURGICAL HISTORY:  Afib  on eliquis      Diabetes      Asthma      CAD (coronary atherosclerotic disease)      Diastolic heart failure      COPD exacerbation      Hyperlipidemia      CVA (cerebrovascular accident)      Type 2 diabetes mellitus      Dementia      S/P appendectomy      Pacemaker          Allergies    oxycodone (Pruritus)  Augmentin (Rash)    Intolerances        Home Medications:  Eliquis 5 mg oral tablet: 1 tab(s) orally 2 times a day (15 Feb 2024 13:15)  Entresto 24 mg-26 mg oral tablet: 1 tab(s) orally 2 times a day (15 Feb 2024 00:23)  furosemide 40 mg oral tablet: 1 tab(s) orally 2 times a day (15 Feb 2024 00:25)  ipratropium-albuterol 0.5 mg-2.5 mg/3 mLinhalation solution: 3 milliliter(s) inhaled 2 times a day (15 Feb 2024 00:33)  metoprolol tartrate 100 mg oral tablet: 1 tab(s) orally 2 times a day (15 Feb 2024 00:24)  Repatha 140 mg/mL subcutaneous solution: 140 milligram(s) subcutaneously 2 times a month (15 Feb 2024 11:33)  Seroquel 25 mg oral tablet: 1 tab(s) orally 2 times a day (15 Feb 2024 00:28)  sertraline 25 mg oral tablet: 1 tab(s) orally 2 times a day (15 Feb 2024 00:29)  sertraline 25 mg oral tablet: 1 tab(s) orally every 12 hours (15 Feb 2024 00:33)  Vascepa 1 g oral capsule: 2 cap(s) orally 2 times a day (15 Feb 2024 11:33)      ROS: 10-system review is otherwise negative except HPI above.      Primary Survey:    A - airway intact  B - bilateral breath sounds and good chest rise  C - palpable pulses in all extremities  D - GCS 15 on arrival, PHAM  Exposure obtained    Vital Signs Last 24 Hrs  T(C): 36.7 (05 Mar 2025 12:00), Max: 36.7 (05 Mar 2025 12:00)  T(F): 98 (05 Mar 2025 12:00), Max: 98 (05 Mar 2025 12:00)  HR: 95 (05 Mar 2025 12:00) (95 - 95)  BP: 210/90 (05 Mar 2025 12:00) (210/90 - 210/90)  BP(mean): --  RR: 20 (05 Mar 2025 12:00) (20 - 20)  SpO2: 95% (05 Mar 2025 12:00) (95% - 95%)    Parameters below as of 05 Mar 2025 12:00  Patient On (Oxygen Delivery Method): room air        Secondary Survey:   General: NAD  HEENT: Normocephalic, EOMI, PEERLA. b/l periorbital ecchymosis,  L temporal scalp abrasion, nasal bridge ecchymosis and tenderness to palpation w/o septal hematoma  Neck: Soft, midline trachea. no c-spine tenderness  Chest: No chest wall tenderness, no subcutaneous emphysema   Cardiac: S1, S2, RRR  Respiratory: Bilateral breath sounds, clear and equal bilaterally  Abdomen: Soft, non-distended, non-tender, no rebound, no guarding.  Groin: Normal appearing, pelvis stable   Ext:  Moving b/l upper and lower extremities. Palpable Radial b/l UE, b/l DP palpable in LE. L knee abrasion  Back: +lumbar tenderness, No T/S spine tenderness, No palpable runoff/stepoff/deformity    ACCESS / DEVICES:  [ ] Peripheral IV  [ ] Central Venous Line	[ ] R	[ ] L	[ ] IJ	[ ] Fem	[ ] SC	Placed:   [ ] Arterial Line		[ ] R	[ ] L	[ ] Fem	[ ] Rad	[ ] Ax	Placed:   [ ] PICC:					[ ] Mediport  [ ] Urinary Catheter,  Date Placed:   [ ] Chest tube: [ ] Right, [ ] Left  [ ] DONALD/Aleksandar Drains    Labs:  CAPILLARY BLOOD GLUCOSE      POCT Blood Glucose.: 137 mg/dL (05 Mar 2025 12:06)                          12.4   5.31  )-----------( 211      ( 05 Mar 2025 12:10 )             37.1       Auto Immature Granulocyte %: 0.4 % (03-05-25 @ 12:10)    03-05    140  |  99  |  29[H]  ----------------------------<  134[H]  5.7[H]   |  31  |  1.2      Calcium: 9.2 mg/dL (03-05-25 @ 12:10)      LFTs:             8.0  | 0.6  | 38       ------------------[71      ( 05 Mar 2025 12:10 )  4.3  | x    | 11          Lipase:87     Amylase:x             Coags:     15.10  ----< 1.27    ( 05 Mar 2025 12:10 )     32.9                Urinalysis Basic - ( 05 Mar 2025 12:10 )    Color: x / Appearance: x / SG: x / pH: x  Gluc: 134 mg/dL / Ketone: x  / Bili: x / Urobili: x   Blood: x / Protein: x / Nitrite: x   Leuk Esterase: x / RBC: x / WBC x   Sq Epi: x / Non Sq Epi: x / Bacteria: x                RADIOLOGY & ADDITIONAL STUDIES:  ---------------------------------------------------------------------------------------    < from: CT Head No Cont (03.05.25 @ 13:17) >    IMPRESSION:    1.  CT head: No evidence of acute intracranial pathology.    2.  CT face: Nondisplaced fractures of the left nasal bone. Thickening of   the anterior cartilaginous nasal septum suspicious for septal hematoma.    3.  Nasal / perinasal soft tissue swelling.  Left forehead subcutaneous   hematoma.    --- End of Report ---        < end of copied text >      < from: CT Chest w/ IV Cont (03.05.25 @ 13:22) >    IMPRESSION:    Chronic appearing fractures of the left lower ribs but new from 2/14/2024.    Cirrhotic liver.    Scattered bilateral pulmonary nodules measuring up to 5 mm. Consider   chest CT follow-up in 12 months in a high-risk patient.    --- End ofReport ---      < end of copied text >  < from: Xray Pelvis AP only (03.05.25 @ 12:56) >  FINDINGS/  IMPRESSION:  Chronic appearing fracture deformities of the right inferior and superior   pubic rami, new since 2022. No definite acute displaced fracture noted.   Degenerative changes of the spine and bilateral hips. Pelvic phleboliths.    --- End of Report ---    < end of copied text >

## 2025-03-05 NOTE — ED PROVIDER NOTE - CARE PROVIDER_API CALL
Nish Torre  Otolaryngology  [address] [place] [id number]  Phone: [phone]  Fax: [phone]  Scheduled Appointment: 03/06/2025  
2

## 2025-03-05 NOTE — ED PROVIDER NOTE - CARE PROVIDERS DIRECT ADDRESSES
[email or web address]

## 2025-03-05 NOTE — CONSULT NOTE ADULT - SUBJECTIVE AND OBJECTIVE BOX
ENT: Pt is an 85y/o F on Eliquis presenting s/p fall +HT. ENT c/s for possible septal hematoma. Pt unable to provide hx at this time 2/2 dementia, daughter however at bedside to provide history.    PAST MEDICAL & SURGICAL HISTORY:  Afib on eliquis  Diabetes  Asthma  CAD (coronary atherosclerotic disease)  Diastolic heart failure  COPD exacerbation  Hyperlipidemia  CVA (cerebrovascular accident)  Type 2 diabetes mellitus  Dementia  S/P appendectomy  Pacemaker    Allergies  oxycodone (Pruritus)  Augmentin (Rash)    REVIEW OF SYSTEMS   [ x ] Due to altered mental status/intubation, subjective information were not able to be obtained from patient. History was obtained, to the extent possible, from review of the chart and collateral sources of information.    Vital Signs Last 24 Hrs  T(C): 36.7 (05 Mar 2025 12:00), Max: 36.7 (05 Mar 2025 12:00)  T(F): 98 (05 Mar 2025 12:00), Max: 98 (05 Mar 2025 12:00)  HR: 63 (05 Mar 2025 16:05) (63 - 95)  BP: 128/71 (05 Mar 2025 16:05) (128/71 - 210/90)  BP(mean): 90 (05 Mar 2025 16:05) (90 - 90)  RR: 20 (05 Mar 2025 16:05) (20 - 20)  SpO2: 95% (05 Mar 2025 16:05) (95% - 95%)    GEN: NAD, awake   SKIN: Good color, non diaphoretic.  HEENT: Nasal swelling noted, no signs of bleeding to nares. No signs of septal hematoma. Soft tissue swelling of LEFT nostril noted, swelling + possible small hematoma.  NECK: Trachea midline  RESP: No dyspnea  CARDIO: +S1/S2  ABDO: Soft, NT.  EXT: PHAM x 4    LABS:             12.4   5.31  )-----------( 211      ( 05 Mar 2025 12:10 )             37.1     140  |  99  |  29[H]  ----------------------------<  134[H]  5.7[H]   |  31  |  1.2    Ca    9.2      05 Mar 2025 12:10  TPro  8.0  /  Alb  4.3  /  TBili  0.6  /  DBili  x   /  AST  38  /  ALT  11  /  AlkPhos  71  03-05  PT/INR - ( 05 Mar 2025 12:10 )   PT: 15.10 sec;   INR: 1.27 ratio    PTT - ( 05 Mar 2025 12:10 )  PTT:32.9 sec    RADIOLOGY & ADDITIONAL STUDIES:  ACC: [id number] EXAM:  CT MAXILLOFACIAL   ORDERED BY: ALEXIS WILLAMS   ACC: [id number] EXAM:  CT BRAIN   ORDERED BY: ALEXIS WILLAMS     PROCEDURE DATE:  03/05/2025    INTERPRETATION:  CLINICAL INFORMATION: trauma  Technique:  Noncontrast head CT.  Contiguous CT axial images of the head with coronal   and sagittal reformats.  Noncontrast facial CT. Contiguous CT axial images of the face with   coronal and sagittal reformats.  Comparison/correlation: CT head and face 2/14/2024    Findings:  Head: The ventricles and cortical sulci are normal in size and   configuration.  There is no acute intracranial hemorrhage, extra-axial   fluid collection or midline shift.  Gray-white matter differentiation is   maintained.  Face: Left forehead subcutaneous hematoma. Nasal and perinasal soft   tissue swelling.  There are acute mildly displaced fractures of the left nasal bone. There   is thickening of the anterior aspect of the cartilaginous nasal septum   suspicious for septal hematoma.  Unchanged complete opacification of the right maxillary and right   sphenoid sinuses and partial opacification of the left sphenoid sinus and   bilateral posterior ethmoid air cells with hyperdense contents.  The patient is status post right cataract surgery. The globes and orbits   are otherwise unremarkable.    IMPRESSION:  1.  CT head: No evidence of acute intracranial pathology.  2.  CT face: Nondisplaced fractures of the left nasal bone. Thickening of   the anterior cartilaginous nasal septum suspicious for septal hematoma.  3.  Nasal / perinasal soft tissue swelling.  Left forehead subcutaneous   hematoma.    SRINIVASAN RUVALCABA MD; Attending Radiologist  This document has been electronically signed. Mar  5 2025  1:43PM Labs/Imaging Studies/Medications

## 2025-03-06 ENCOUNTER — APPOINTMENT (OUTPATIENT)
Dept: OTOLARYNGOLOGY | Facility: CLINIC | Age: 85
End: 2025-03-06
Payer: MEDICARE

## 2025-03-06 DIAGNOSIS — S02.2XXA FRACTURE OF NASAL BONES, INITIAL ENCOUNTER FOR CLOSED FRACTURE: ICD-10-CM

## 2025-03-06 DIAGNOSIS — S09.92XA UNSPECIFIED INJURY OF NOSE, INITIAL ENCOUNTER: ICD-10-CM

## 2025-03-06 DIAGNOSIS — R09.81 NASAL CONGESTION: ICD-10-CM

## 2025-03-06 PROCEDURE — 31231 NASAL ENDOSCOPY DX: CPT

## 2025-03-06 PROCEDURE — 99204 OFFICE O/P NEW MOD 45 MIN: CPT | Mod: 25

## 2025-03-06 RX ORDER — LORATADINE 5 MG/5 ML
0.05 SOLUTION, ORAL ORAL TWICE DAILY
Qty: 1 | Refills: 0 | Status: ACTIVE | COMMUNITY
Start: 2025-03-06 | End: 1900-01-01

## 2025-04-02 ENCOUNTER — APPOINTMENT (OUTPATIENT)
Dept: OTOLARYNGOLOGY | Facility: CLINIC | Age: 85
End: 2025-04-02

## 2025-05-12 NOTE — PATIENT PROFILE ADULT - NSPROEXTENSIONSOFSELF_GEN_A_NUR
Reviewed glooko- going hyperglycemic post dinner time, the U500 likely stacks and leads to early AM (5am or so) hypoglycemia. Ongoing pattern for patient- last office visit reduced the ICR but it appears this ICR is controlling her well during afternoon - will readjust back to where it was which may prevent stacking in automation. Asked to follow up in 1wk.        Sever hyperglycemia preceding low:         none